# Patient Record
Sex: MALE | Race: WHITE | NOT HISPANIC OR LATINO | Employment: OTHER | URBAN - NONMETROPOLITAN AREA
[De-identification: names, ages, dates, MRNs, and addresses within clinical notes are randomized per-mention and may not be internally consistent; named-entity substitution may affect disease eponyms.]

---

## 2018-06-01 ENCOUNTER — LAB (OUTPATIENT)
Dept: LAB | Facility: HOSPITAL | Age: 68
End: 2018-06-01

## 2018-06-01 ENCOUNTER — TRANSCRIBE ORDERS (OUTPATIENT)
Dept: LAB | Facility: HOSPITAL | Age: 68
End: 2018-06-01

## 2018-06-01 ENCOUNTER — APPOINTMENT (OUTPATIENT)
Dept: LAB | Facility: HOSPITAL | Age: 68
End: 2018-06-01

## 2018-06-01 DIAGNOSIS — D37.6 NEOPLASM OF UNCERTAIN BEHAVIOR OF LIVER, GALLBLADDER AND BILE DUCTS: Primary | ICD-10-CM

## 2018-06-01 DIAGNOSIS — D37.6 NEOPLASM OF UNCERTAIN BEHAVIOR OF LIVER AND BILIARY PASSAGES: ICD-10-CM

## 2018-06-01 DIAGNOSIS — K74.69 OTHER CIRRHOSIS OF LIVER (HCC): Primary | ICD-10-CM

## 2018-06-01 DIAGNOSIS — K76.9 LESION OF LIVER: ICD-10-CM

## 2018-06-01 DIAGNOSIS — K74.69 OTHER CIRRHOSIS OF LIVER (HCC): ICD-10-CM

## 2018-06-01 DIAGNOSIS — D37.9 NEOPLASM OF UNCERTAIN BEHAVIOR OF DIGESTIVE ORGAN, UNSPECIFIED: ICD-10-CM

## 2018-06-01 DIAGNOSIS — R97.8 OTHER ABNORMAL TUMOR MARKERS: ICD-10-CM

## 2018-06-01 DIAGNOSIS — K74.60 CIRRHOSIS OF LIVER WITHOUT ASCITES, UNSPECIFIED HEPATIC CIRRHOSIS TYPE (HCC): ICD-10-CM

## 2018-06-01 PROCEDURE — 80053 COMPREHEN METABOLIC PANEL: CPT

## 2018-06-01 PROCEDURE — 85610 PROTHROMBIN TIME: CPT

## 2018-06-01 PROCEDURE — 86301 IMMUNOASSAY TUMOR CA 19-9: CPT

## 2018-06-01 PROCEDURE — 85025 COMPLETE CBC W/AUTO DIFF WBC: CPT

## 2018-06-01 PROCEDURE — 82378 CARCINOEMBRYONIC ANTIGEN: CPT

## 2018-06-01 PROCEDURE — 36415 COLL VENOUS BLD VENIPUNCTURE: CPT

## 2018-06-04 ENCOUNTER — TRANSCRIBE ORDERS (OUTPATIENT)
Dept: LAB | Facility: HOSPITAL | Age: 68
End: 2018-06-04

## 2018-06-04 DIAGNOSIS — D37.9 NEOPLASM OF UNCERTAIN BEHAVIOR OF DIGESTIVE ORGAN, UNSPECIFIED: ICD-10-CM

## 2018-06-04 DIAGNOSIS — K74.69 OTHER CIRRHOSIS OF LIVER (HCC): Primary | ICD-10-CM

## 2018-06-04 DIAGNOSIS — D37.6 NEOPLASM OF UNCERTAIN BEHAVIOR OF LIVER AND BILIARY PASSAGES: ICD-10-CM

## 2018-06-04 DIAGNOSIS — K76.9 LESION OF LIVER: ICD-10-CM

## 2018-06-04 DIAGNOSIS — R97.8 OTHER ABNORMAL TUMOR MARKERS: ICD-10-CM

## 2018-06-04 LAB
ALBUMIN SERPL-MCNC: 4.1 G/DL (ref 3.4–4.8)
ALBUMIN/GLOB SERPL: 1.4 G/DL (ref 1.1–1.8)
ALP SERPL-CCNC: 115 U/L (ref 38–126)
ALT SERPL W P-5'-P-CCNC: 32 U/L (ref 21–72)
ANION GAP SERPL CALCULATED.3IONS-SCNC: 12 MMOL/L (ref 5–15)
AST SERPL-CCNC: 28 U/L (ref 17–59)
BASOPHILS # BLD AUTO: 0.01 10*3/MM3 (ref 0–0.2)
BASOPHILS NFR BLD AUTO: 0.3 % (ref 0–2)
BILIRUB SERPL-MCNC: 1.6 MG/DL (ref 0.2–1.3)
BUN BLD-MCNC: 13 MG/DL (ref 7–21)
BUN/CREAT SERPL: 17.6 (ref 7–25)
CALCIUM SPEC-SCNC: 9.8 MG/DL (ref 8.4–10.2)
CHLORIDE SERPL-SCNC: 98 MMOL/L (ref 95–110)
CO2 SERPL-SCNC: 27 MMOL/L (ref 22–31)
CREAT BLD-MCNC: 0.74 MG/DL (ref 0.7–1.3)
DEPRECATED RDW RBC AUTO: 42.2 FL (ref 35.1–43.9)
EOSINOPHIL # BLD AUTO: 0.37 10*3/MM3 (ref 0–0.7)
EOSINOPHIL NFR BLD AUTO: 9.6 % (ref 0–7)
ERYTHROCYTE [DISTWIDTH] IN BLOOD BY AUTOMATED COUNT: 12.5 % (ref 11.5–14.5)
GFR SERPL CREATININE-BSD FRML MDRD: 105 ML/MIN/1.73 (ref 49–113)
GLOBULIN UR ELPH-MCNC: 2.9 GM/DL (ref 2.3–3.5)
GLUCOSE BLD-MCNC: 193 MG/DL (ref 60–100)
HCT VFR BLD AUTO: 36.9 % (ref 39–49)
HGB BLD-MCNC: 12.9 G/DL (ref 13.7–17.3)
IMM GRANULOCYTES # BLD: 0.01 10*3/MM3 (ref 0–0.02)
IMM GRANULOCYTES NFR BLD: 0.3 % (ref 0–0.5)
INR PPP: 1.05 (ref 0.8–1.2)
LYMPHOCYTES # BLD AUTO: 0.75 10*3/MM3 (ref 0.6–4.2)
LYMPHOCYTES NFR BLD AUTO: 19.4 % (ref 10–50)
MCH RBC QN AUTO: 32.3 PG (ref 26.5–34)
MCHC RBC AUTO-ENTMCNC: 35 G/DL (ref 31.5–36.3)
MCV RBC AUTO: 92.3 FL (ref 80–98)
MONOCYTES # BLD AUTO: 0.26 10*3/MM3 (ref 0–0.9)
MONOCYTES NFR BLD AUTO: 6.7 % (ref 0–12)
NEUTROPHILS # BLD AUTO: 2.46 10*3/MM3 (ref 2–8.6)
NEUTROPHILS NFR BLD AUTO: 63.7 % (ref 37–80)
NRBC BLD MANUAL-RTO: 0 /100 WBC (ref 0–0)
PLATELET # BLD AUTO: 83 10*3/MM3 (ref 150–450)
PMV BLD AUTO: 10.5 FL (ref 8–12)
POTASSIUM BLD-SCNC: 3.8 MMOL/L (ref 3.5–5.1)
PROT SERPL-MCNC: 7 G/DL (ref 6.3–8.6)
PROTHROMBIN TIME: 13.5 SECONDS (ref 11.1–15.3)
RBC # BLD AUTO: 4 10*6/MM3 (ref 4.37–5.74)
SODIUM BLD-SCNC: 137 MMOL/L (ref 137–145)
WBC NRBC COR # BLD: 3.86 10*3/MM3 (ref 3.2–9.8)

## 2018-06-05 LAB — CANCER AG19-9 SERPL-ACNC: 68 U/ML (ref 0–35)

## 2018-06-06 LAB — CEA SERPL-MCNC: 2.5 NG/ML (ref 0–5)

## 2023-02-09 ENCOUNTER — APPOINTMENT (OUTPATIENT)
Dept: CT IMAGING | Facility: HOSPITAL | Age: 73
DRG: 441 | End: 2023-02-09
Payer: MEDICARE

## 2023-02-09 ENCOUNTER — APPOINTMENT (OUTPATIENT)
Dept: GENERAL RADIOLOGY | Facility: HOSPITAL | Age: 73
DRG: 441 | End: 2023-02-09
Payer: MEDICARE

## 2023-02-09 ENCOUNTER — HOSPITAL ENCOUNTER (INPATIENT)
Facility: HOSPITAL | Age: 73
LOS: 4 days | Discharge: ANOTHER HOSPITAL | DRG: 441 | End: 2023-02-14
Attending: EMERGENCY MEDICINE | Admitting: STUDENT IN AN ORGANIZED HEALTH CARE EDUCATION/TRAINING PROGRAM
Payer: MEDICARE

## 2023-02-09 DIAGNOSIS — E87.5 HYPERKALEMIA: ICD-10-CM

## 2023-02-09 DIAGNOSIS — E72.20 HYPERAMMONEMIA: ICD-10-CM

## 2023-02-09 DIAGNOSIS — D64.9 ANEMIA, UNSPECIFIED TYPE: ICD-10-CM

## 2023-02-09 DIAGNOSIS — R41.0 CONFUSION: ICD-10-CM

## 2023-02-09 DIAGNOSIS — N17.9 ACUTE RENAL FAILURE, UNSPECIFIED ACUTE RENAL FAILURE TYPE: Primary | ICD-10-CM

## 2023-02-09 PROBLEM — K74.60 LIVER CIRRHOSIS SECONDARY TO NASH: Status: ACTIVE | Noted: 2023-02-09

## 2023-02-09 PROBLEM — D63.8 ANEMIA, CHRONIC DISEASE: Status: ACTIVE | Noted: 2023-02-09

## 2023-02-09 PROBLEM — K75.81 LIVER CIRRHOSIS SECONDARY TO NASH: Status: ACTIVE | Noted: 2023-02-09

## 2023-02-09 PROBLEM — R77.8 ELEVATED TROPONIN: Status: ACTIVE | Noted: 2023-02-09

## 2023-02-09 PROBLEM — E87.20 METABOLIC ACIDOSIS: Status: ACTIVE | Noted: 2023-02-09

## 2023-02-09 PROBLEM — G93.41 METABOLIC ENCEPHALOPATHY: Status: ACTIVE | Noted: 2023-02-09

## 2023-02-09 PROBLEM — E87.1 HYPONATREMIA: Status: ACTIVE | Noted: 2023-02-09

## 2023-02-09 PROBLEM — E80.6 HYPERBILIRUBINEMIA: Status: ACTIVE | Noted: 2023-02-09

## 2023-02-09 LAB
ALBUMIN SERPL-MCNC: 3 G/DL (ref 3.5–5.2)
ALBUMIN/GLOB SERPL: 0.8 G/DL
ALP SERPL-CCNC: 126 U/L (ref 39–117)
ALT SERPL W P-5'-P-CCNC: 22 U/L (ref 1–41)
AMMONIA BLD-SCNC: 77 UMOL/L (ref 16–60)
AMPHET+METHAMPHET UR QL: NEGATIVE
AMPHETAMINES UR QL: NEGATIVE
ANION GAP SERPL CALCULATED.3IONS-SCNC: 17 MMOL/L (ref 5–15)
ANION GAP SERPL CALCULATED.3IONS-SCNC: 18 MMOL/L (ref 5–15)
APAP SERPL-MCNC: <5 MCG/ML (ref 0–30)
AST SERPL-CCNC: 40 U/L (ref 1–40)
BACTERIA UR QL AUTO: ABNORMAL /HPF
BARBITURATES UR QL SCN: NEGATIVE
BASOPHILS # BLD AUTO: 0.11 10*3/MM3 (ref 0–0.2)
BASOPHILS NFR BLD AUTO: 1.1 % (ref 0–1.5)
BENZODIAZ UR QL SCN: NEGATIVE
BILIRUB SERPL-MCNC: 2.6 MG/DL (ref 0–1.2)
BILIRUB UR QL STRIP: NEGATIVE
BUN SERPL-MCNC: 71 MG/DL (ref 8–23)
BUN SERPL-MCNC: 72 MG/DL (ref 8–23)
BUN/CREAT SERPL: 15.6 (ref 7–25)
BUN/CREAT SERPL: 15.6 (ref 7–25)
BUPRENORPHINE SERPL-MCNC: NEGATIVE NG/ML
CALCIUM SPEC-SCNC: 9.6 MG/DL (ref 8.6–10.5)
CALCIUM SPEC-SCNC: 9.6 MG/DL (ref 8.6–10.5)
CANNABINOIDS SERPL QL: NEGATIVE
CHLORIDE SERPL-SCNC: 101 MMOL/L (ref 98–107)
CHLORIDE SERPL-SCNC: 102 MMOL/L (ref 98–107)
CLARITY UR: CLEAR
CO2 SERPL-SCNC: 14 MMOL/L (ref 22–29)
CO2 SERPL-SCNC: 16 MMOL/L (ref 22–29)
COCAINE UR QL: NEGATIVE
COLOR UR: YELLOW
CREAT SERPL-MCNC: 4.56 MG/DL (ref 0.76–1.27)
CREAT SERPL-MCNC: 4.61 MG/DL (ref 0.76–1.27)
D-LACTATE SERPL-SCNC: 5.3 MMOL/L (ref 0.5–2)
D-LACTATE SERPL-SCNC: 6.7 MMOL/L (ref 0.5–2)
DEPRECATED RDW RBC AUTO: 64.5 FL (ref 37–54)
EGFRCR SERPLBLD CKD-EPI 2021: 12.6 ML/MIN/1.73
EGFRCR SERPLBLD CKD-EPI 2021: 12.8 ML/MIN/1.73
EOSINOPHIL # BLD AUTO: 1.94 10*3/MM3 (ref 0–0.4)
EOSINOPHIL NFR BLD AUTO: 19.7 % (ref 0.3–6.2)
ERYTHROCYTE [DISTWIDTH] IN BLOOD BY AUTOMATED COUNT: 17.2 % (ref 12.3–15.4)
ETHANOL BLD-MCNC: <10 MG/DL (ref 0–10)
GEN 5 2HR TROPONIN T REFLEX: 40 NG/L
GLOBULIN UR ELPH-MCNC: 3.6 GM/DL
GLUCOSE SERPL-MCNC: 104 MG/DL (ref 65–99)
GLUCOSE SERPL-MCNC: 122 MG/DL (ref 65–99)
GLUCOSE UR STRIP-MCNC: NEGATIVE MG/DL
HCT VFR BLD AUTO: 28.5 % (ref 37.5–51)
HGB BLD-MCNC: 9.7 G/DL (ref 13–17.7)
HGB UR QL STRIP.AUTO: ABNORMAL
HOLD SPECIMEN: NORMAL
HOLD SPECIMEN: NORMAL
HYALINE CASTS UR QL AUTO: ABNORMAL /LPF
IMM GRANULOCYTES # BLD AUTO: 0.1 10*3/MM3 (ref 0–0.05)
IMM GRANULOCYTES NFR BLD AUTO: 1 % (ref 0–0.5)
INR PPP: 1.43 (ref 0.84–1.13)
KETONES UR QL STRIP: ABNORMAL
LEUKOCYTE ESTERASE UR QL STRIP.AUTO: ABNORMAL
LYMPHOCYTES # BLD AUTO: 1.07 10*3/MM3 (ref 0.7–3.1)
LYMPHOCYTES NFR BLD AUTO: 10.9 % (ref 19.6–45.3)
MAGNESIUM SERPL-MCNC: 2 MG/DL (ref 1.6–2.4)
MCH RBC QN AUTO: 34.9 PG (ref 26.6–33)
MCHC RBC AUTO-ENTMCNC: 34 G/DL (ref 31.5–35.7)
MCV RBC AUTO: 102.5 FL (ref 79–97)
METHADONE UR QL SCN: NEGATIVE
MONOCYTES # BLD AUTO: 0.71 10*3/MM3 (ref 0.1–0.9)
MONOCYTES NFR BLD AUTO: 7.2 % (ref 5–12)
NEUTROPHILS NFR BLD AUTO: 5.9 10*3/MM3 (ref 1.7–7)
NEUTROPHILS NFR BLD AUTO: 60.1 % (ref 42.7–76)
NITRITE UR QL STRIP: NEGATIVE
NRBC BLD AUTO-RTO: 0 /100 WBC (ref 0–0.2)
OPIATES UR QL: NEGATIVE
OSMOLALITY SERPL: 315 MOSM/KG (ref 275–295)
OXYCODONE UR QL SCN: NEGATIVE
PCP UR QL SCN: NEGATIVE
PH UR STRIP.AUTO: <=5 [PH] (ref 5–8)
PLATELET # BLD AUTO: 144 10*3/MM3 (ref 140–450)
PMV BLD AUTO: 9 FL (ref 6–12)
POTASSIUM SERPL-SCNC: 5.2 MMOL/L (ref 3.5–5.2)
POTASSIUM SERPL-SCNC: 6 MMOL/L (ref 3.5–5.2)
PROCALCITONIN SERPL-MCNC: 0.79 NG/ML (ref 0–0.25)
PROPOXYPH UR QL: NEGATIVE
PROT SERPL-MCNC: 6.6 G/DL (ref 6–8.5)
PROT UR QL STRIP: ABNORMAL
PROTHROMBIN TIME: 17.4 SECONDS (ref 11.4–14.4)
RBC # BLD AUTO: 2.78 10*6/MM3 (ref 4.14–5.8)
RBC # UR STRIP: ABNORMAL /HPF
REF LAB TEST METHOD: ABNORMAL
SALICYLATES SERPL-MCNC: <0.3 MG/DL
SODIUM SERPL-SCNC: 134 MMOL/L (ref 136–145)
SODIUM SERPL-SCNC: 134 MMOL/L (ref 136–145)
SP GR UR STRIP: 1.02 (ref 1–1.03)
SQUAMOUS #/AREA URNS HPF: ABNORMAL /HPF
T4 FREE SERPL-MCNC: 1.27 NG/DL (ref 0.93–1.7)
TRICYCLICS UR QL SCN: NEGATIVE
TROPONIN T DELTA: -5 NG/L
TROPONIN T SERPL HS-MCNC: 45 NG/L
TSH SERPL DL<=0.05 MIU/L-ACNC: 1.6 UIU/ML (ref 0.27–4.2)
UROBILINOGEN UR QL STRIP: ABNORMAL
WBC # UR STRIP: ABNORMAL /HPF
WBC NRBC COR # BLD: 9.83 10*3/MM3 (ref 3.4–10.8)
WHOLE BLOOD HOLD COAG: NORMAL
WHOLE BLOOD HOLD SPECIMEN: NORMAL

## 2023-02-09 PROCEDURE — 80143 DRUG ASSAY ACETAMINOPHEN: CPT | Performed by: EMERGENCY MEDICINE

## 2023-02-09 PROCEDURE — 82077 ASSAY SPEC XCP UR&BREATH IA: CPT | Performed by: EMERGENCY MEDICINE

## 2023-02-09 PROCEDURE — 80179 DRUG ASSAY SALICYLATE: CPT | Performed by: EMERGENCY MEDICINE

## 2023-02-09 PROCEDURE — 83735 ASSAY OF MAGNESIUM: CPT | Performed by: EMERGENCY MEDICINE

## 2023-02-09 PROCEDURE — 84484 ASSAY OF TROPONIN QUANT: CPT | Performed by: EMERGENCY MEDICINE

## 2023-02-09 PROCEDURE — 84439 ASSAY OF FREE THYROXINE: CPT | Performed by: EMERGENCY MEDICINE

## 2023-02-09 PROCEDURE — 71045 X-RAY EXAM CHEST 1 VIEW: CPT

## 2023-02-09 PROCEDURE — 84145 PROCALCITONIN (PCT): CPT | Performed by: NURSE PRACTITIONER

## 2023-02-09 PROCEDURE — 99285 EMERGENCY DEPT VISIT HI MDM: CPT

## 2023-02-09 PROCEDURE — 94640 AIRWAY INHALATION TREATMENT: CPT

## 2023-02-09 PROCEDURE — G0378 HOSPITAL OBSERVATION PER HR: HCPCS

## 2023-02-09 PROCEDURE — 82140 ASSAY OF AMMONIA: CPT | Performed by: EMERGENCY MEDICINE

## 2023-02-09 PROCEDURE — 80306 DRUG TEST PRSMV INSTRMNT: CPT | Performed by: EMERGENCY MEDICINE

## 2023-02-09 PROCEDURE — 81001 URINALYSIS AUTO W/SCOPE: CPT | Performed by: EMERGENCY MEDICINE

## 2023-02-09 PROCEDURE — 87086 URINE CULTURE/COLONY COUNT: CPT | Performed by: EMERGENCY MEDICINE

## 2023-02-09 PROCEDURE — 85025 COMPLETE CBC W/AUTO DIFF WBC: CPT | Performed by: EMERGENCY MEDICINE

## 2023-02-09 PROCEDURE — 99223 1ST HOSP IP/OBS HIGH 75: CPT | Performed by: INTERNAL MEDICINE

## 2023-02-09 PROCEDURE — 93005 ELECTROCARDIOGRAM TRACING: CPT | Performed by: EMERGENCY MEDICINE

## 2023-02-09 PROCEDURE — P9612 CATHETERIZE FOR URINE SPEC: HCPCS

## 2023-02-09 PROCEDURE — 80053 COMPREHEN METABOLIC PANEL: CPT | Performed by: EMERGENCY MEDICINE

## 2023-02-09 PROCEDURE — 83930 ASSAY OF BLOOD OSMOLALITY: CPT | Performed by: INTERNAL MEDICINE

## 2023-02-09 PROCEDURE — 71250 CT THORAX DX C-: CPT

## 2023-02-09 PROCEDURE — 74176 CT ABD & PELVIS W/O CONTRAST: CPT

## 2023-02-09 PROCEDURE — 70450 CT HEAD/BRAIN W/O DYE: CPT

## 2023-02-09 PROCEDURE — 84443 ASSAY THYROID STIM HORMONE: CPT | Performed by: EMERGENCY MEDICINE

## 2023-02-09 PROCEDURE — 36415 COLL VENOUS BLD VENIPUNCTURE: CPT

## 2023-02-09 PROCEDURE — 85610 PROTHROMBIN TIME: CPT | Performed by: NURSE PRACTITIONER

## 2023-02-09 PROCEDURE — 83605 ASSAY OF LACTIC ACID: CPT | Performed by: INTERNAL MEDICINE

## 2023-02-09 PROCEDURE — 63710000001 INSULIN REGULAR HUMAN PER 5 UNITS: Performed by: EMERGENCY MEDICINE

## 2023-02-09 RX ORDER — SODIUM CHLORIDE 9 MG/ML
50 INJECTION, SOLUTION INTRAVENOUS CONTINUOUS
Status: DISCONTINUED | OUTPATIENT
Start: 2023-02-09 | End: 2023-02-10

## 2023-02-09 RX ORDER — SODIUM CHLORIDE 0.9 % (FLUSH) 0.9 %
10 SYRINGE (ML) INJECTION AS NEEDED
Status: DISCONTINUED | OUTPATIENT
Start: 2023-02-09 | End: 2023-02-14 | Stop reason: HOSPADM

## 2023-02-09 RX ORDER — HYDROXYZINE HYDROCHLORIDE 25 MG/1
25 TABLET, FILM COATED ORAL 3 TIMES DAILY PRN
Status: DISCONTINUED | OUTPATIENT
Start: 2023-02-09 | End: 2023-02-14 | Stop reason: HOSPADM

## 2023-02-09 RX ORDER — LANOLIN ALCOHOL/MO/W.PET/CERES
400 CREAM (GRAM) TOPICAL DAILY
Status: ON HOLD | COMMUNITY
End: 2023-03-26

## 2023-02-09 RX ORDER — PIOGLITAZONEHYDROCHLORIDE 45 MG/1
45 TABLET ORAL DAILY
COMMUNITY
End: 2023-02-14 | Stop reason: HOSPADM

## 2023-02-09 RX ORDER — HYDROXYZINE HYDROCHLORIDE 25 MG/1
25 TABLET, FILM COATED ORAL 3 TIMES DAILY PRN
COMMUNITY
End: 2023-02-14 | Stop reason: HOSPADM

## 2023-02-09 RX ORDER — SPIRONOLACTONE 50 MG/1
50 TABLET, FILM COATED ORAL DAILY
COMMUNITY
Start: 2022-10-04 | End: 2023-02-14 | Stop reason: HOSPADM

## 2023-02-09 RX ORDER — FERROUS GLUCONATE 270(27)MG
1 TABLET ORAL DAILY
COMMUNITY
Start: 2023-01-10 | End: 2023-04-04 | Stop reason: HOSPADM

## 2023-02-09 RX ORDER — ALBUTEROL SULFATE 2.5 MG/3ML
10 SOLUTION RESPIRATORY (INHALATION) ONCE
Status: COMPLETED | OUTPATIENT
Start: 2023-02-09 | End: 2023-02-09

## 2023-02-09 RX ORDER — PROPRANOLOL HYDROCHLORIDE 10 MG/1
10 TABLET ORAL 2 TIMES DAILY
Status: DISCONTINUED | OUTPATIENT
Start: 2023-02-09 | End: 2023-02-11

## 2023-02-09 RX ORDER — SIMVASTATIN 5 MG
1 TABLET ORAL DAILY
COMMUNITY
Start: 2022-12-19 | End: 2023-02-09 | Stop reason: SDUPTHER

## 2023-02-09 RX ORDER — LISINOPRIL 20 MG/1
20 TABLET ORAL DAILY
COMMUNITY
End: 2023-02-14 | Stop reason: HOSPADM

## 2023-02-09 RX ORDER — FLUTICASONE PROPIONATE 50 MCG
2 SPRAY, SUSPENSION (ML) NASAL DAILY
COMMUNITY
Start: 2022-12-19

## 2023-02-09 RX ORDER — LOSARTAN POTASSIUM 50 MG/1
100 TABLET ORAL DAILY
COMMUNITY
End: 2023-02-14 | Stop reason: HOSPADM

## 2023-02-09 RX ORDER — GLIMEPIRIDE 1 MG/1
1 TABLET ORAL
COMMUNITY
End: 2023-02-14 | Stop reason: HOSPADM

## 2023-02-09 RX ORDER — DEXTROSE MONOHYDRATE 25 G/50ML
50 INJECTION, SOLUTION INTRAVENOUS ONCE
Status: COMPLETED | OUTPATIENT
Start: 2023-02-09 | End: 2023-02-09

## 2023-02-09 RX ORDER — LACTULOSE 10 G/15ML
20 SOLUTION ORAL 3 TIMES DAILY
Status: DISCONTINUED | OUTPATIENT
Start: 2023-02-09 | End: 2023-02-10

## 2023-02-09 RX ORDER — PROPRANOLOL HYDROCHLORIDE 10 MG/1
10 TABLET ORAL 2 TIMES DAILY
COMMUNITY
End: 2023-02-14 | Stop reason: HOSPADM

## 2023-02-09 RX ORDER — NICOTINE POLACRILEX 4 MG
15 LOZENGE BUCCAL
Status: DISCONTINUED | OUTPATIENT
Start: 2023-02-09 | End: 2023-02-14 | Stop reason: HOSPADM

## 2023-02-09 RX ORDER — INSULIN LISPRO 100 [IU]/ML
0-7 INJECTION, SOLUTION INTRAVENOUS; SUBCUTANEOUS
Status: DISCONTINUED | OUTPATIENT
Start: 2023-02-10 | End: 2023-02-14 | Stop reason: HOSPADM

## 2023-02-09 RX ORDER — FLUTICASONE PROPIONATE 50 MCG
2 SPRAY, SUSPENSION (ML) NASAL DAILY
Status: DISCONTINUED | OUTPATIENT
Start: 2023-02-10 | End: 2023-02-14 | Stop reason: HOSPADM

## 2023-02-09 RX ORDER — DEXTROSE MONOHYDRATE 25 G/50ML
25 INJECTION, SOLUTION INTRAVENOUS
Status: DISCONTINUED | OUTPATIENT
Start: 2023-02-09 | End: 2023-02-14 | Stop reason: HOSPADM

## 2023-02-09 RX ORDER — URSODIOL 300 MG/1
300 CAPSULE ORAL 3 TIMES DAILY
Status: ON HOLD | COMMUNITY
End: 2023-04-04 | Stop reason: SDUPTHER

## 2023-02-09 RX ORDER — URSODIOL 300 MG/1
300 CAPSULE ORAL 2 TIMES DAILY
Status: DISCONTINUED | OUTPATIENT
Start: 2023-02-09 | End: 2023-02-14 | Stop reason: HOSPADM

## 2023-02-09 RX ORDER — FERROUS SULFATE 325(65) MG
325 TABLET ORAL
Status: DISCONTINUED | OUTPATIENT
Start: 2023-02-10 | End: 2023-02-14 | Stop reason: HOSPADM

## 2023-02-09 RX ORDER — LANOLIN ALCOHOL/MO/W.PET/CERES
400 CREAM (GRAM) TOPICAL DAILY
Status: DISCONTINUED | OUTPATIENT
Start: 2023-02-10 | End: 2023-02-14 | Stop reason: HOSPADM

## 2023-02-09 RX ORDER — SODIUM CHLORIDE 9 MG/ML
40 INJECTION, SOLUTION INTRAVENOUS AS NEEDED
Status: DISCONTINUED | OUTPATIENT
Start: 2023-02-09 | End: 2023-02-14 | Stop reason: HOSPADM

## 2023-02-09 RX ORDER — SODIUM CHLORIDE 0.9 % (FLUSH) 0.9 %
10 SYRINGE (ML) INJECTION EVERY 12 HOURS SCHEDULED
Status: DISCONTINUED | OUTPATIENT
Start: 2023-02-09 | End: 2023-02-14 | Stop reason: HOSPADM

## 2023-02-09 RX ORDER — FUROSEMIDE 20 MG/1
20 TABLET ORAL 2 TIMES DAILY
COMMUNITY
End: 2023-02-14 | Stop reason: HOSPADM

## 2023-02-09 RX ORDER — LOSARTAN POTASSIUM 100 MG/1
100 TABLET ORAL DAILY
COMMUNITY
Start: 2015-07-10 | End: 2023-02-09 | Stop reason: SDUPTHER

## 2023-02-09 RX ADMIN — ALBUTEROL SULFATE 10 MG: 2.5 SOLUTION RESPIRATORY (INHALATION) at 21:27

## 2023-02-09 RX ADMIN — DEXTROSE MONOHYDRATE 50 ML: 25 INJECTION, SOLUTION INTRAVENOUS at 21:23

## 2023-02-09 RX ADMIN — INSULIN HUMAN 7 UNITS: 100 INJECTION, SOLUTION PARENTERAL at 21:23

## 2023-02-09 RX ADMIN — SODIUM ZIRCONIUM CYCLOSILICATE 10 G: 10 POWDER, FOR SUSPENSION ORAL at 21:23

## 2023-02-09 RX ADMIN — SODIUM CHLORIDE 1000 ML: 9 INJECTION, SOLUTION INTRAVENOUS at 21:24

## 2023-02-10 ENCOUNTER — APPOINTMENT (OUTPATIENT)
Dept: ULTRASOUND IMAGING | Facility: HOSPITAL | Age: 73
DRG: 441 | End: 2023-02-10
Payer: MEDICARE

## 2023-02-10 ENCOUNTER — APPOINTMENT (OUTPATIENT)
Dept: CARDIOLOGY | Facility: HOSPITAL | Age: 73
DRG: 441 | End: 2023-02-10
Payer: MEDICARE

## 2023-02-10 PROBLEM — N17.9 ACUTE RENAL FAILURE, UNSPECIFIED ACUTE RENAL FAILURE TYPE (HCC): Status: ACTIVE | Noted: 2023-02-10

## 2023-02-10 LAB
ALBUMIN FLD-MCNC: 0.3 G/DL
ALBUMIN SERPL-MCNC: 2.9 G/DL (ref 3.5–5.2)
ALBUMIN/GLOB SERPL: 0.8 G/DL
ALP SERPL-CCNC: 119 U/L (ref 39–117)
ALT SERPL W P-5'-P-CCNC: 20 U/L (ref 1–41)
AMMONIA BLD-SCNC: 189 UMOL/L (ref 16–60)
ANION GAP SERPL CALCULATED.3IONS-SCNC: 20 MMOL/L (ref 5–15)
ANISOCYTOSIS BLD QL: NORMAL
APPEARANCE FLD: CLEAR
AST SERPL-CCNC: 40 U/L (ref 1–40)
BASOPHILS # BLD AUTO: 0.09 10*3/MM3 (ref 0–0.2)
BASOPHILS NFR BLD AUTO: 1 % (ref 0–1.5)
BILIRUB SERPL-MCNC: 2.3 MG/DL (ref 0–1.2)
BUN SERPL-MCNC: 73 MG/DL (ref 8–23)
BUN/CREAT SERPL: 15.2 (ref 7–25)
BURR CELLS BLD QL SMEAR: NORMAL
CALCIUM SPEC-SCNC: 9.7 MG/DL (ref 8.6–10.5)
CHLORIDE SERPL-SCNC: 103 MMOL/L (ref 98–107)
CHLORIDE UR-SCNC: 35 MMOL/L
CO2 SERPL-SCNC: 14 MMOL/L (ref 22–29)
COLOR FLD: YELLOW
CREAT SERPL-MCNC: 4.79 MG/DL (ref 0.76–1.27)
D-LACTATE SERPL-SCNC: 2.8 MMOL/L (ref 0.5–2)
D-LACTATE SERPL-SCNC: 3.6 MMOL/L (ref 0.5–2)
D-LACTATE SERPL-SCNC: 5.4 MMOL/L (ref 0.5–2)
D-LACTATE SERPL-SCNC: 6.7 MMOL/L (ref 0.5–2)
DEPRECATED RDW RBC AUTO: 66.4 FL (ref 37–54)
EGFRCR SERPLBLD CKD-EPI 2021: 12.1 ML/MIN/1.73
EOSINOPHIL # BLD AUTO: 1.76 10*3/MM3 (ref 0–0.4)
EOSINOPHIL NFR BLD AUTO: 18.7 % (ref 0.3–6.2)
ERYTHROCYTE [DISTWIDTH] IN BLOOD BY AUTOMATED COUNT: 17.2 % (ref 12.3–15.4)
GLOBULIN UR ELPH-MCNC: 3.6 GM/DL
GLUCOSE BLDC GLUCOMTR-MCNC: 116 MG/DL (ref 70–130)
GLUCOSE BLDC GLUCOMTR-MCNC: 133 MG/DL (ref 70–130)
GLUCOSE BLDC GLUCOMTR-MCNC: 183 MG/DL (ref 70–130)
GLUCOSE SERPL-MCNC: 113 MG/DL (ref 65–99)
HBA1C MFR BLD: 6.1 % (ref 4.8–5.6)
HCT VFR BLD AUTO: 28.9 % (ref 37.5–51)
HGB BLD-MCNC: 9.5 G/DL (ref 13–17.7)
HOLD SPECIMEN: NORMAL
IMM GRANULOCYTES # BLD AUTO: 0.08 10*3/MM3 (ref 0–0.05)
IMM GRANULOCYTES NFR BLD AUTO: 0.9 % (ref 0–0.5)
INR PPP: 1.47 (ref 0.84–1.13)
LYMPHOCYTES # BLD AUTO: 1 10*3/MM3 (ref 0.7–3.1)
LYMPHOCYTES NFR BLD AUTO: 10.6 % (ref 19.6–45.3)
LYMPHOCYTES NFR FLD MANUAL: 10 %
MAGNESIUM SERPL-MCNC: 2.1 MG/DL (ref 1.6–2.4)
MCH RBC QN AUTO: 34.1 PG (ref 26.6–33)
MCHC RBC AUTO-ENTMCNC: 32.9 G/DL (ref 31.5–35.7)
MCV RBC AUTO: 103.6 FL (ref 79–97)
MESOTHL CELL NFR FLD MANUAL: 12 %
MONOCYTES # BLD AUTO: 0.78 10*3/MM3 (ref 0.1–0.9)
MONOCYTES NFR BLD AUTO: 8.3 % (ref 5–12)
MONOCYTES NFR FLD: 71 %
NEUTROPHILS NFR BLD AUTO: 5.7 10*3/MM3 (ref 1.7–7)
NEUTROPHILS NFR BLD AUTO: 60.5 % (ref 42.7–76)
NEUTROPHILS NFR FLD MANUAL: 7 %
NRBC BLD AUTO-RTO: 0 /100 WBC (ref 0–0.2)
OSMOLALITY UR: 379 MOSM/KG (ref 300–1100)
PHOSPHATE SERPL-MCNC: 3.5 MG/DL (ref 2.5–4.5)
PLAT MORPH BLD: NORMAL
PLATELET # BLD AUTO: 144 10*3/MM3 (ref 140–450)
PMV BLD AUTO: 9.4 FL (ref 6–12)
POTASSIUM SERPL-SCNC: 5.1 MMOL/L (ref 3.5–5.2)
PROT FLD-MCNC: <1 G/DL
PROT SERPL-MCNC: 6.5 G/DL (ref 6–8.5)
PROTHROMBIN TIME: 17.8 SECONDS (ref 11.4–14.4)
QT INTERVAL: 452 MS
QTC INTERVAL: 484 MS
RBC # BLD AUTO: 2.79 10*6/MM3 (ref 4.14–5.8)
RBC # FLD AUTO: <2000 /MM3
SODIUM SERPL-SCNC: 137 MMOL/L (ref 136–145)
SODIUM UR-SCNC: <20 MMOL/L
WBC # FLD AUTO: 52 /MM3
WBC MORPH BLD: NORMAL
WBC NRBC COR # BLD: 9.41 10*3/MM3 (ref 3.4–10.8)

## 2023-02-10 PROCEDURE — 93010 ELECTROCARDIOGRAM REPORT: CPT | Performed by: INTERNAL MEDICINE

## 2023-02-10 PROCEDURE — 84100 ASSAY OF PHOSPHORUS: CPT | Performed by: NURSE PRACTITIONER

## 2023-02-10 PROCEDURE — 87040 BLOOD CULTURE FOR BACTERIA: CPT | Performed by: NURSE PRACTITIONER

## 2023-02-10 PROCEDURE — C1729 CATH, DRAINAGE: HCPCS

## 2023-02-10 PROCEDURE — 93975 VASCULAR STUDY: CPT

## 2023-02-10 PROCEDURE — 85610 PROTHROMBIN TIME: CPT | Performed by: NURSE PRACTITIONER

## 2023-02-10 PROCEDURE — 87205 SMEAR GRAM STAIN: CPT | Performed by: STUDENT IN AN ORGANIZED HEALTH CARE EDUCATION/TRAINING PROGRAM

## 2023-02-10 PROCEDURE — 82140 ASSAY OF AMMONIA: CPT | Performed by: NURSE PRACTITIONER

## 2023-02-10 PROCEDURE — 83605 ASSAY OF LACTIC ACID: CPT | Performed by: INTERNAL MEDICINE

## 2023-02-10 PROCEDURE — 82042 OTHER SOURCE ALBUMIN QUAN EA: CPT | Performed by: STUDENT IN AN ORGANIZED HEALTH CARE EDUCATION/TRAINING PROGRAM

## 2023-02-10 PROCEDURE — 85025 COMPLETE CBC W/AUTO DIFF WBC: CPT | Performed by: NURSE PRACTITIONER

## 2023-02-10 PROCEDURE — 82436 ASSAY OF URINE CHLORIDE: CPT | Performed by: INTERNAL MEDICINE

## 2023-02-10 PROCEDURE — 87015 SPECIMEN INFECT AGNT CONCNTJ: CPT | Performed by: STUDENT IN AN ORGANIZED HEALTH CARE EDUCATION/TRAINING PROGRAM

## 2023-02-10 PROCEDURE — 83036 HEMOGLOBIN GLYCOSYLATED A1C: CPT | Performed by: NURSE PRACTITIONER

## 2023-02-10 PROCEDURE — P9047 ALBUMIN (HUMAN), 25%, 50ML: HCPCS | Performed by: PHYSICIAN ASSISTANT

## 2023-02-10 PROCEDURE — 84300 ASSAY OF URINE SODIUM: CPT | Performed by: NURSE PRACTITIONER

## 2023-02-10 PROCEDURE — 84157 ASSAY OF PROTEIN OTHER: CPT | Performed by: STUDENT IN AN ORGANIZED HEALTH CARE EDUCATION/TRAINING PROGRAM

## 2023-02-10 PROCEDURE — 87070 CULTURE OTHR SPECIMN AEROBIC: CPT | Performed by: STUDENT IN AN ORGANIZED HEALTH CARE EDUCATION/TRAINING PROGRAM

## 2023-02-10 PROCEDURE — 0W9G3ZZ DRAINAGE OF PERITONEAL CAVITY, PERCUTANEOUS APPROACH: ICD-10-PCS | Performed by: STUDENT IN AN ORGANIZED HEALTH CARE EDUCATION/TRAINING PROGRAM

## 2023-02-10 PROCEDURE — 76942 ECHO GUIDE FOR BIOPSY: CPT

## 2023-02-10 PROCEDURE — 25010000002 CEFTRIAXONE PER 250 MG: Performed by: NURSE PRACTITIONER

## 2023-02-10 PROCEDURE — 99222 1ST HOSP IP/OBS MODERATE 55: CPT | Performed by: PHYSICIAN ASSISTANT

## 2023-02-10 PROCEDURE — 25010000002 OCTREOTIDE PER 25 MCG: Performed by: PHYSICIAN ASSISTANT

## 2023-02-10 PROCEDURE — 89051 BODY FLUID CELL COUNT: CPT | Performed by: STUDENT IN AN ORGANIZED HEALTH CARE EDUCATION/TRAINING PROGRAM

## 2023-02-10 PROCEDURE — 25010000002 ALBUMIN HUMAN 25% PER 50 ML: Performed by: INTERNAL MEDICINE

## 2023-02-10 PROCEDURE — 99233 SBSQ HOSP IP/OBS HIGH 50: CPT | Performed by: STUDENT IN AN ORGANIZED HEALTH CARE EDUCATION/TRAINING PROGRAM

## 2023-02-10 PROCEDURE — 87075 CULTR BACTERIA EXCEPT BLOOD: CPT | Performed by: STUDENT IN AN ORGANIZED HEALTH CARE EDUCATION/TRAINING PROGRAM

## 2023-02-10 PROCEDURE — 85007 BL SMEAR W/DIFF WBC COUNT: CPT | Performed by: NURSE PRACTITIONER

## 2023-02-10 PROCEDURE — 83935 ASSAY OF URINE OSMOLALITY: CPT | Performed by: NURSE PRACTITIONER

## 2023-02-10 PROCEDURE — 63710000001 INSULIN LISPRO (HUMAN) PER 5 UNITS: Performed by: NURSE PRACTITIONER

## 2023-02-10 PROCEDURE — 80053 COMPREHEN METABOLIC PANEL: CPT | Performed by: NURSE PRACTITIONER

## 2023-02-10 PROCEDURE — 25010000002 ALBUMIN HUMAN 25% PER 50 ML: Performed by: PHYSICIAN ASSISTANT

## 2023-02-10 PROCEDURE — 82962 GLUCOSE BLOOD TEST: CPT

## 2023-02-10 PROCEDURE — 83735 ASSAY OF MAGNESIUM: CPT | Performed by: NURSE PRACTITIONER

## 2023-02-10 PROCEDURE — 93005 ELECTROCARDIOGRAM TRACING: CPT | Performed by: NURSE PRACTITIONER

## 2023-02-10 PROCEDURE — 82570 ASSAY OF URINE CREATININE: CPT | Performed by: NURSE PRACTITIONER

## 2023-02-10 PROCEDURE — P9047 ALBUMIN (HUMAN), 25%, 50ML: HCPCS | Performed by: INTERNAL MEDICINE

## 2023-02-10 RX ORDER — MIDODRINE HYDROCHLORIDE 10 MG/1
10 TABLET ORAL
Status: DISCONTINUED | OUTPATIENT
Start: 2023-02-10 | End: 2023-02-11

## 2023-02-10 RX ORDER — ALBUMIN (HUMAN) 12.5 G/50ML
12.5 SOLUTION INTRAVENOUS ONCE
Status: DISCONTINUED | OUTPATIENT
Start: 2023-02-10 | End: 2023-02-10

## 2023-02-10 RX ORDER — LACTULOSE 10 G/15ML
30 SOLUTION ORAL 3 TIMES DAILY
Status: DISCONTINUED | OUTPATIENT
Start: 2023-02-10 | End: 2023-02-11

## 2023-02-10 RX ORDER — LACTULOSE 10 G/15ML
300 SOLUTION ORAL ONCE
Status: COMPLETED | OUTPATIENT
Start: 2023-02-10 | End: 2023-02-10

## 2023-02-10 RX ORDER — ALBUMIN (HUMAN) 12.5 G/50ML
50 SOLUTION INTRAVENOUS ONCE
Status: COMPLETED | OUTPATIENT
Start: 2023-02-10 | End: 2023-02-10

## 2023-02-10 RX ORDER — LIDOCAINE HYDROCHLORIDE 10 MG/ML
5 INJECTION, SOLUTION EPIDURAL; INFILTRATION; INTRACAUDAL; PERINEURAL ONCE
Status: DISCONTINUED | OUTPATIENT
Start: 2023-02-10 | End: 2023-02-14 | Stop reason: HOSPADM

## 2023-02-10 RX ORDER — ALBUMIN (HUMAN) 12.5 G/50ML
25 SOLUTION INTRAVENOUS 3 TIMES DAILY
Status: DISCONTINUED | OUTPATIENT
Start: 2023-02-10 | End: 2023-02-14 | Stop reason: HOSPADM

## 2023-02-10 RX ORDER — OCTREOTIDE ACETATE 100 UG/ML
100 INJECTION, SOLUTION INTRAVENOUS; SUBCUTANEOUS 3 TIMES DAILY
Status: DISCONTINUED | OUTPATIENT
Start: 2023-02-10 | End: 2023-02-12

## 2023-02-10 RX ORDER — SODIUM BICARBONATE 650 MG/1
1300 TABLET ORAL 3 TIMES DAILY
Status: DISCONTINUED | OUTPATIENT
Start: 2023-02-10 | End: 2023-02-14 | Stop reason: HOSPADM

## 2023-02-10 RX ADMIN — Medication 10 ML: at 00:08

## 2023-02-10 RX ADMIN — SODIUM BICARBONATE 650 MG TABLET 1300 MG: at 17:56

## 2023-02-10 RX ADMIN — CEFTRIAXONE 2 G: 2 INJECTION, POWDER, FOR SOLUTION INTRAMUSCULAR; INTRAVENOUS at 21:16

## 2023-02-10 RX ADMIN — PROPRANOLOL HYDROCHLORIDE 10 MG: 10 TABLET ORAL at 00:07

## 2023-02-10 RX ADMIN — LACTULOSE 20 G: 20 SOLUTION ORAL at 00:07

## 2023-02-10 RX ADMIN — LACTULOSE 30 G: 20 SOLUTION ORAL at 12:26

## 2023-02-10 RX ADMIN — SODIUM BICARBONATE 650 MG TABLET 1300 MG: at 21:33

## 2023-02-10 RX ADMIN — PROPRANOLOL HYDROCHLORIDE 10 MG: 10 TABLET ORAL at 21:20

## 2023-02-10 RX ADMIN — SODIUM CHLORIDE 50 ML/HR: 9 INJECTION, SOLUTION INTRAVENOUS at 00:08

## 2023-02-10 RX ADMIN — OCTREOTIDE ACETATE 100 MCG: 100 INJECTION, SOLUTION INTRAVENOUS; SUBCUTANEOUS at 21:29

## 2023-02-10 RX ADMIN — LACTULOSE 300 ML: 10 SOLUTION ORAL at 16:13

## 2023-02-10 RX ADMIN — Medication 10 ML: at 21:21

## 2023-02-10 RX ADMIN — ALBUMIN (HUMAN) 50 G: 0.25 INJECTION, SOLUTION INTRAVENOUS at 14:56

## 2023-02-10 RX ADMIN — Medication 10 ML: at 09:00

## 2023-02-10 RX ADMIN — CEFTRIAXONE 2 G: 2 INJECTION, POWDER, FOR SOLUTION INTRAMUSCULAR; INTRAVENOUS at 00:07

## 2023-02-10 RX ADMIN — ALBUMIN (HUMAN) 25 G: 0.25 INJECTION, SOLUTION INTRAVENOUS at 22:45

## 2023-02-10 RX ADMIN — OCTREOTIDE ACETATE 100 MCG: 100 INJECTION, SOLUTION INTRAVENOUS; SUBCUTANEOUS at 18:03

## 2023-02-10 RX ADMIN — INSULIN LISPRO 2 UNITS: 100 INJECTION, SOLUTION INTRAVENOUS; SUBCUTANEOUS at 17:56

## 2023-02-10 NOTE — ED PROVIDER NOTES
"Subjective   History of Present Illness  72-year-old male presents for evaluation of altered mental status.  Of note, the patient has a history of HAYES cirrhosis.  He is currently a somewhat limited historian.  I am awaiting arrival with his family to further corroborate his history.  His last known well is currently unclear.  He has reportedly been significantly confused today when compared to his baseline so family called EMS and he was brought to our facility to be evaluated.  Per EMS personnel, the patient was confused.  He was not hypoglycemic.  Per EMS, the patient is reportedly ambulatory and conversant at baseline.  They note that his current mental status is quite different than his baseline per family at home.        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  Pt. states this rxn has occurred \"the last couple of times I had MRI contrast.   • Gadobutrol Unknown (See Comments)       Past Surgical History:   Procedure Laterality Date   • ANKLE SURGERY     • EYE SURGERY         Family History   Problem Relation Age of Onset   • Rheum arthritis Mother    • Diabetes Mother    • COPD Mother    • Thyroid disease Mother    • Heart disease Father    • COPD Father    • Cancer Father    • Diabetes Father        Social History     Socioeconomic History   • Marital status:    Tobacco Use   • Smoking status: Never   • Smokeless tobacco: Never   Substance and Sexual Activity   • Alcohol use: Not Currently   • Drug use: Never           Objective   Physical Exam  Vitals and nursing note reviewed.   Constitutional:       Appearance: He is well-developed. He is not diaphoretic.      Comments: " Ill-appearing male, appears confused   HENT:      Head: Normocephalic and atraumatic.   Neck:      Vascular: No JVD.      Comments: No meningeal signs or nuchal rigidity  Cardiovascular:      Rate and Rhythm: Normal rate and regular rhythm.      Heart sounds: Normal heart sounds. No murmur heard.    No friction rub. No gallop.   Pulmonary:      Effort: Pulmonary effort is normal. No respiratory distress.      Breath sounds: Normal breath sounds. No wheezing or rales.   Abdominal:      General: Bowel sounds are normal. There is no distension.      Palpations: Abdomen is soft. There is no mass.      Tenderness: There is no abdominal tenderness. There is no guarding.      Comments: No focal abdominal tenderness, no peritoneal signs, no pain out of proportion to exam   Musculoskeletal:         General: Normal range of motion.      Cervical back: Normal range of motion.   Skin:     General: Skin is warm and dry.      Coloration: Skin is not pale.      Findings: No erythema or rash.   Neurological:      Comments: Alert to person only, moving all fours, appears generally weak, no unilateral weakness, neurovascularly intact distally in all fours abetting distal pulses normal sensation, no asterixis, following simple commands   Psychiatric:         Thought Content: Thought content normal.         Judgment: Judgment normal.      Comments: Unable to adequately evaluate         Critical Care  Performed by: Honorio Baltazar MD  Authorized by: Honorio Baltazar MD     Critical care provider statement:     Critical care time (minutes):  35    Critical care was necessary to treat or prevent imminent or life-threatening deterioration of the following conditions:  Renal failure    Critical care was time spent personally by me on the following activities:  Development of treatment plan with patient or surrogate, evaluation of patient's response to treatment, examination of patient, obtaining history from patient or surrogate,  ordering and performing treatments and interventions, ordering and review of laboratory studies, ordering and review of radiographic studies, pulse oximetry, re-evaluation of patient's condition and review of old charts               ED Course  ED Course as of 02/10/23 0018   Thu Feb 09, 2023 2152 72-year-old male presents for evaluation of altered mental status.  He is currently a somewhat limited historian.  I am awaiting arrival of family to further corroborate his history.  His last known well is unclear.  He has reportedly been significantly confused today when compared to baseline so EMS was called and he was brought to our facility to be evaluated.  On arrival, the patient believes that he is currently in [place], [place] and believes that the year is 1950.  He is able to follow simple commands.  He appears generally weak but is moving all 4 extremities.  Labs obtained remarkable for acute renal failure with a creatinine of 4.6 and a potassium of 6.  No QRS widening noted on EKG.  Hyperkalemia treated medically.  IV fluids given.  CT head is negative. [DD]   2153 I personally and independently viewed the patient's x-ray images myself, and I am in agreement with the radiologist's reading for final interpretation.   [DD]   2154 The patient clearly warrants admission to the hospital at this point.  I discussed the patient's case with Dr. Mar, and the patient will be admitted under his care for further evaluation and treatment.  The patient is hemodynamically stable at this time. [DD]      ED Course User Index  [DD] Honorio Baltazar MD                                          Recent Results (from the past 24 hour(s))   ECG 12 Lead ED Triage Standing Order; Weak / Dizzy / AMS    Collection Time: 02/09/23  7:43 PM   Result Value Ref Range    QT Interval 464 ms    QTC Interval 482 ms   Urinalysis With Culture If Indicated - Straight Cath    Collection Time: 02/09/23  8:10 PM    Specimen: Straight Cath;  Urine   Result Value Ref Range    Color, UA Yellow Yellow, Straw    Appearance, UA Clear Clear    pH, UA <=5.0 5.0 - 8.0    Specific Gravity, UA 1.020 1.001 - 1.030    Glucose, UA Negative Negative    Ketones, UA Trace (A) Negative    Bilirubin, UA Negative Negative    Blood, UA Trace (A) Negative    Protein, UA 30 mg/dL (1+) (A) Negative    Leuk Esterase, UA Trace (A) Negative    Nitrite, UA Negative Negative    Urobilinogen, UA 1.0 E.U./dL 0.2 - 1.0 E.U./dL   Urine Drug Screen - Straight Cath    Collection Time: 02/09/23  8:10 PM    Specimen: Straight Cath; Urine   Result Value Ref Range    THC, Screen, Urine Negative Negative    Phencyclidine (PCP), Urine Negative Negative    Cocaine Screen, Urine Negative Negative    Methamphetamine, Ur Negative Negative    Opiate Screen Negative Negative    Amphetamine Screen, Urine Negative Negative    Benzodiazepine Screen, Urine Negative Negative    Tricyclic Antidepressants Screen Negative Negative    Methadone Screen, Urine Negative Negative    Barbiturates Screen, Urine Negative Negative    Oxycodone Screen, Urine Negative Negative    Propoxyphene Screen Negative Negative    Buprenorphine, Screen, Urine Negative Negative   Urinalysis, Microscopic Only - Straight Cath    Collection Time: 02/09/23  8:10 PM    Specimen: Straight Cath; Urine   Result Value Ref Range    RBC, UA 3-6 (A) None Seen, 0-2 /HPF    WBC, UA 6-12 (A) None Seen, 0-2 /HPF    Bacteria, UA Trace None Seen, Trace /HPF    Squamous Epithelial Cells, UA 3-6 (A) None Seen, 0-2 /HPF    Hyaline Casts, UA 7-12 0 - 6 /LPF    Methodology Manual Light Microscopy    Comprehensive Metabolic Panel    Collection Time: 02/09/23  8:12 PM    Specimen: Blood   Result Value Ref Range    Glucose 104 (H) 65 - 99 mg/dL    BUN 72 (H) 8 - 23 mg/dL    Creatinine 4.61 (H) 0.76 - 1.27 mg/dL    Sodium 134 (L) 136 - 145 mmol/L    Potassium 6.0 (H) 3.5 - 5.2 mmol/L    Chloride 101 98 - 107 mmol/L    CO2 16.0 (L) 22.0 - 29.0 mmol/L     Calcium 9.6 8.6 - 10.5 mg/dL    Total Protein 6.6 6.0 - 8.5 g/dL    Albumin 3.0 (L) 3.5 - 5.2 g/dL    ALT (SGPT) 22 1 - 41 U/L    AST (SGOT) 40 1 - 40 U/L    Alkaline Phosphatase 126 (H) 39 - 117 U/L    Total Bilirubin 2.6 (H) 0.0 - 1.2 mg/dL    Globulin 3.6 gm/dL    A/G Ratio 0.8 g/dL    BUN/Creatinine Ratio 15.6 7.0 - 25.0    Anion Gap 17.0 (H) 5.0 - 15.0 mmol/L    eGFR 12.6 (L) >60.0 mL/min/1.73   High Sensitivity Troponin T    Collection Time: 02/09/23  8:12 PM    Specimen: Blood   Result Value Ref Range    HS Troponin T 45 (H) <15 ng/L   Magnesium    Collection Time: 02/09/23  8:12 PM    Specimen: Blood   Result Value Ref Range    Magnesium 2.0 1.6 - 2.4 mg/dL   Green Top (Gel)    Collection Time: 02/09/23  8:12 PM   Result Value Ref Range    Extra Tube Hold for add-ons.    Lavender Top    Collection Time: 02/09/23  8:12 PM   Result Value Ref Range    Extra Tube hold for add-on    Gold Top - SST    Collection Time: 02/09/23  8:12 PM   Result Value Ref Range    Extra Tube Hold for add-ons.    Gray Top    Collection Time: 02/09/23  8:12 PM   Result Value Ref Range    Extra Tube Hold for add-ons.    Light Blue Top    Collection Time: 02/09/23  8:12 PM   Result Value Ref Range    Extra Tube Hold for add-ons.    CBC Auto Differential    Collection Time: 02/09/23  8:12 PM    Specimen: Blood   Result Value Ref Range    WBC 9.83 3.40 - 10.80 10*3/mm3    RBC 2.78 (L) 4.14 - 5.80 10*6/mm3    Hemoglobin 9.7 (L) 13.0 - 17.7 g/dL    Hematocrit 28.5 (L) 37.5 - 51.0 %    .5 (H) 79.0 - 97.0 fL    MCH 34.9 (H) 26.6 - 33.0 pg    MCHC 34.0 31.5 - 35.7 g/dL    RDW 17.2 (H) 12.3 - 15.4 %    RDW-SD 64.5 (H) 37.0 - 54.0 fl    MPV 9.0 6.0 - 12.0 fL    Platelets 144 140 - 450 10*3/mm3    Neutrophil % 60.1 42.7 - 76.0 %    Lymphocyte % 10.9 (L) 19.6 - 45.3 %    Monocyte % 7.2 5.0 - 12.0 %    Eosinophil % 19.7 (H) 0.3 - 6.2 %    Basophil % 1.1 0.0 - 1.5 %    Immature Grans % 1.0 (H) 0.0 - 0.5 %    Neutrophils, Absolute 5.90 1.70 -  7.00 10*3/mm3    Lymphocytes, Absolute 1.07 0.70 - 3.10 10*3/mm3    Monocytes, Absolute 0.71 0.10 - 0.90 10*3/mm3    Eosinophils, Absolute 1.94 (H) 0.00 - 0.40 10*3/mm3    Basophils, Absolute 0.11 0.00 - 0.20 10*3/mm3    Immature Grans, Absolute 0.10 (H) 0.00 - 0.05 10*3/mm3    nRBC 0.0 0.0 - 0.2 /100 WBC   TSH    Collection Time: 02/09/23  8:12 PM    Specimen: Blood   Result Value Ref Range    TSH 1.600 0.270 - 4.200 uIU/mL   T4, Free    Collection Time: 02/09/23  8:12 PM    Specimen: Blood   Result Value Ref Range    Free T4 1.27 0.93 - 1.70 ng/dL   Salicylate Level    Collection Time: 02/09/23  8:12 PM    Specimen: Blood   Result Value Ref Range    Salicylate <0.3 <=30.0 mg/dL   Ethanol    Collection Time: 02/09/23  8:12 PM    Specimen: Blood   Result Value Ref Range    Ethanol <10 0 - 10 mg/dL   Acetaminophen Level    Collection Time: 02/09/23  8:12 PM    Specimen: Blood   Result Value Ref Range    Acetaminophen <5.0 0.0 - 30.0 mcg/mL   Ammonia    Collection Time: 02/09/23  8:12 PM    Specimen: Blood   Result Value Ref Range    Ammonia 77 (H) 16 - 60 umol/L   Procalcitonin    Collection Time: 02/09/23  8:12 PM    Specimen: Blood   Result Value Ref Range    Procalcitonin 0.79 (H) 0.00 - 0.25 ng/mL   Protime-INR    Collection Time: 02/09/23  8:12 PM    Specimen: Blood   Result Value Ref Range    Protime 17.4 (H) 11.4 - 14.4 Seconds    INR 1.43 (H) 0.84 - 1.13   Osmolality, Serum    Collection Time: 02/09/23  8:12 PM    Specimen: Blood   Result Value Ref Range    Osmolality 315 (H) 275 - 295 mOsm/kg   Lactic Acid, Plasma    Collection Time: 02/09/23  8:12 PM    Specimen: Blood   Result Value Ref Range    Lactate 5.3 (C) 0.5 - 2.0 mmol/L   High Sensitivity Troponin T 2Hr    Collection Time: 02/09/23 10:47 PM    Specimen: Blood   Result Value Ref Range    HS Troponin T 40 (H) <15 ng/L    Troponin T Delta -5 (L) <= -/+ 4 Change ng/L   Basic Metabolic Panel    Collection Time: 02/09/23 10:47 PM    Specimen: Blood  "  Result Value Ref Range    Glucose 122 (H) 65 - 99 mg/dL    BUN 71 (H) 8 - 23 mg/dL    Creatinine 4.56 (H) 0.76 - 1.27 mg/dL    Sodium 134 (L) 136 - 145 mmol/L    Potassium 5.2 3.5 - 5.2 mmol/L    Chloride 102 98 - 107 mmol/L    CO2 14.0 (L) 22.0 - 29.0 mmol/L    Calcium 9.6 8.6 - 10.5 mg/dL    BUN/Creatinine Ratio 15.6 7.0 - 25.0    Anion Gap 18.0 (H) 5.0 - 15.0 mmol/L    eGFR 12.8 (L) >60.0 mL/min/1.73   STAT Lactic Acid, Reflex    Collection Time: 02/09/23 11:17 PM    Specimen: Blood   Result Value Ref Range    Lactate 6.7 (C) 0.5 - 2.0 mmol/L     Note: In addition to lab results from this visit, the labs listed above may include labs taken at another facility or during a different encounter within the last 24 hours. Please correlate lab times with ED admission and discharge times for further clarification of the services performed during this visit.    CT Head Without Contrast   Final Result   Impression:   Mild generalized atrophy and chronic microvascular ischemia. No acute intracranial process.      Electronically Signed: Abhishek Reyes     2/9/2023 9:43 PM EST     Workstation ID: [id number]      XR Chest 1 View   Final Result   Impression:   Layering right pleural effusion. Right basilar atelectasis.      Electronically Signed: Abhishek Reyes     2/9/2023 9:19 PM EST     Workstation ID: [id number]      CT Abdomen Pelvis Without Contrast    (Results Pending)   CT Chest Without Contrast Diagnostic    (Results Pending)     Vitals:    02/09/23 1944 02/09/23 2127 02/09/23 2235   BP: 125/66  126/86   BP Location: Left arm  Left arm   Patient Position: Lying  Lying   Pulse: 63 64 67   Resp: 14 16 15   Temp: 97.5 °F (36.4 °C)  97.6 °F (36.4 °C)   TempSrc: Oral  Oral   SpO2: 99% 100% 100%   Weight: 72.6 kg (160 lb)  72.1 kg (159 lb)   Height: 172.7 cm (67.99\")       Medications   sodium chloride 0.9 % flush 10 mL (has no administration in time range)   lactulose (CHRONULAC) 10 GM/15ML solution 20 g (20 g Oral Given 2/10/23 " 0007)   ferrous sulfate tablet 325 mg (has no administration in time range)   fluticasone (FLONASE) 50 MCG/ACT nasal spray 2 spray (has no administration in time range)   folic acid (FOLVITE) tablet 400 mcg (has no administration in time range)   hydrOXYzine (ATARAX) tablet 25 mg (has no administration in time range)   propranolol (INDERAL) tablet 10 mg (10 mg Oral Given 2/10/23 0007)   ursodiol (ACTIGALL) capsule 300 mg (has no administration in time range)   sodium chloride 0.9 % flush 10 mL (10 mL Intravenous Given 2/10/23 0008)   sodium chloride 0.9 % flush 10 mL (has no administration in time range)   sodium chloride 0.9 % infusion 40 mL (has no administration in time range)   sodium chloride 0.9 % infusion (50 mL/hr Intravenous New Bag 2/10/23 0008)   dextrose (GLUTOSE) oral gel 15 g (has no administration in time range)   dextrose (D50W) (25 g/50 mL) IV injection 25 g (has no administration in time range)   glucagon (GLUCAGEN) injection 1 mg (has no administration in time range)   Insulin Lispro (humaLOG) injection 0-7 Units (has no administration in time range)   cefTRIAXone (ROCEPHIN) 2 g/100 mL 0.9% NS IVPB (MBP) (2 g Intravenous New Bag 2/10/23 0007)   insulin regular (humuLIN R,novoLIN R) injection 7 Units (7 Units Intravenous Given 2/9/23 2123)   dextrose (D50W) (25 g/50 mL) IV injection 50 mL (50 mL Intravenous Given 2/9/23 2123)   sodium zirconium cyclosilicate (LOKELMA) pack 10 g (10 g Oral Given 2/9/23 2123)   albuterol (PROVENTIL) nebulizer solution 0.083% 2.5 mg/3mL (10 mg Nebulization Given 2/9/23 2127)   sodium chloride 0.9 % bolus 1,000 mL (1,000 mL Intravenous New Bag 2/9/23 2124)     ECG/EMG Results (last 24 hours)     Procedure Component Value Units Date/Time    ECG 12 Lead ED Triage Standing Order; Weak / Dizzy / AMS [[id number]] Collected: 02/09/23 1943     Updated: 02/09/23 1943     QT Interval 464 ms      QTC Interval 482 ms     Narrative:      Test Reason : ED Triage Standing  Order~  Blood Pressure :   */*   mmHG  Vent. Rate :  65 BPM     Atrial Rate :  65 BPM     P-R Int : 170 ms          QRS Dur :  92 ms      QT Int : 464 ms       P-R-T Axes :  50 -17   7 degrees     QTc Int : 482 ms    Normal sinus rhythm  Prolonged QT  Abnormal ECG  When compared with ECG of 24-MAY-2015 03:57,  QT has lengthened    Referred By: EDMD           Confirmed By:         ECG 12 Lead ED Triage Standing Order; Weak / Dizzy / AMS   Preliminary Result   Test Reason : ED Triage Standing Order~   Blood Pressure :   */*   mmHG   Vent. Rate :  65 BPM     Atrial Rate :  65 BPM      P-R Int : 170 ms          QRS Dur :  92 ms       QT Int : 464 ms       P-R-T Axes :  50 -17   7 degrees      QTc Int : 482 ms      Normal sinus rhythm   Prolonged QT   Abnormal ECG   When compared with ECG of 24-MAY-2015 03:57,   QT has lengthened      Referred By: EDMD           Confirmed By:       ECG 12 Lead Altered Mental Status    (Results Pending)           MDM    Final diagnoses:   Acute renal failure, unspecified acute renal failure type (HCC)   Hyperkalemia   Hyperammonemia (HCC)   Confusion   Anemia, unspecified type       ED Disposition  ED Disposition     ED Disposition   Decision to Admit    Condition   --    Comment   Level of Care: Telemetry [5]   Diagnosis: Altered mental status [780.97.ICD-9-CM]               No follow-up provider specified.       Medication List      ASK your doctor about these medications    losartan 50 MG tablet  Commonly known as: COZAAR  Ask about: Which instructions should I use?     metFORMIN 1000 MG tablet  Commonly known as: GLUCOPHAGE  Ask about: Which instructions should I use?     SIMVASTATIN PO  Ask about: Which instructions should I use?             Honorio Baltazar MD  02/10/23 0022

## 2023-02-10 NOTE — PROGRESS NOTES
[facility] Medicine Services  PROGRESS NOTE    Patient Name: Leoncio Hopson  : 1950  MRN: [id number]    Date of Admission: 2023  Primary Care Physician: Antonio Castro MD    Subjective   Subjective     CC:  Altered mental status    HPI:  Patient's daughter and wife at bedside.  Patient extremely altered.  Answers some questions, but mostly just says no to everything.  Actively getting an ultrasound, but having difficulty following two-step commands.  Family reports that he has been stooling this morning.  Had 1-2 episodes of emesis yesterday.  No bleeding that they know.  No recent illnesses.  No prior hepatic encephalopathy.  Nursing, when they walked into the room this morning he was sitting on the  side of the bed and having a bowel movement.    ROS:  Unable to obtain given altered mental status    Objective   Objective     Vital Signs:   Temp:  [97.1 °F (36.2 °C)-97.6 °F (36.4 °C)] 97.1 °F (36.2 °C)  Heart Rate:  [63-81] 69  Resp:  [14-16] 16  BP: ()/(43-86) 105/57     Physical Exam:  Constitutional: Laying in bed  HENT: NCAT, mucous membranes moist  Respiratory: Clear to auscultation bilaterally, respiratory effort normal   Cardiovascular: RRR, no murmurs, rubs, or gallops  Gastrointestinal: Normoactive bowel sounds, soft, nontender, nondistended  Musculoskeletal: No bilateral ankle edema  Psychiatric: Agitated  Neurologic: PERRL, opens eyes and mouth on command, positive asterixis, +1-2 beat clonus in bilateral feet, moving all extremities  Skin: No rashes, several scabs on his abdomen    Results Reviewed:  LAB RESULTS:      Lab 02/10/23  1232 02/10/23  0655 02/10/23  0301 23  2317 23   WBC  --   --  9.41  --  9.83   HEMOGLOBIN  --   --  9.5*  --  9.7*   HEMATOCRIT  --   --  28.9*  --  28.5*   PLATELETS  --   --  144  --  144   NEUTROS ABS  --   --  5.70  --  5.90   IMMATURE GRANS (ABS)  --   --  0.08*  --  0.10*   LYMPHS ABS  --   --  1.00  --   1.07   MONOS ABS  --   --  0.78  --  0.71   EOS ABS  --   --  1.76*  --  1.94*   MCV  --   --  103.6*  --  102.5*   PROCALCITONIN  --   --   --   --  0.79*   LACTATE 3.6* 5.4* 6.7* 6.7* 5.3*   PROTIME  --   --  17.8*  --  17.4*         Lab 02/10/23  0301 02/09/23  2247 02/09/23  2012   SODIUM 137 134* 134*   POTASSIUM 5.1 5.2 6.0*   CHLORIDE 103 102 101   CO2 14.0* 14.0* 16.0*   ANION GAP 20.0* 18.0* 17.0*   BUN 73* 71* 72*   CREATININE 4.79* 4.56* 4.61*   EGFR 12.1* 12.8* 12.6*   GLUCOSE 113* 122* 104*   CALCIUM 9.7 9.6 9.6   MAGNESIUM 2.1  --  2.0   PHOSPHORUS 3.5  --   --    HEMOGLOBIN A1C 6.10*  --   --    TSH  --   --  1.600         Lab 02/10/23  0301 02/09/23  2012   TOTAL PROTEIN 6.5 6.6   ALBUMIN 2.9* 3.0*   GLOBULIN 3.6 3.6   ALT (SGPT) 20 22   AST (SGOT) 40 40   BILIRUBIN 2.3* 2.6*   ALK PHOS 119* 126*         Lab 02/10/23  0301 02/09/23  2247 02/09/23  2012   HSTROP T  --  40* 45*   PROTIME 17.8*  --  17.4*   INR 1.47*  --  1.43*                 Brief Urine Lab Results  (Last result in the past 365 days)      Color   Clarity   Blood   Leuk Est   Nitrite   Protein   CREAT   Urine HCG        02/09/23 2010 Yellow   Clear   Trace   Trace   Negative   30 mg/dL (1+)                 Microbiology Results Abnormal     Procedure Component Value - Date/Time    Urine Culture - Urine, Straight Cath [[id number]]  (Normal) Collected: 02/09/23 2010    Lab Status: Preliminary result Specimen: Urine from Straight Cath Updated: 02/10/23 1118     Urine Culture No growth          CT Abdomen Pelvis Without Contrast    Result Date: 2/10/2023  EXAMINATION: CT SCAN OF THE CHEST, ABDOMEN AND PELVIS WITHOUT INTRAVENOUS CONTRAST DATE OF EXAM: 2/9/2023 11:30 PM SLOT:  60  HISTORY: Shortness of breath and abdominal distention. COMPARISON: None. TECHNIQUE: CT examination of the chest, abdomen and pelvis was performed without intravenous contrast. CT dose lowering techniques were used, to include: automated exposure control, adjustment  for patient size, and/or use of iterative reconstruction. Note: The exam is limited because some types of pathology may not be adequately demonstrated due to lack of contrast enhancement. FINDINGS: CHEST: Lungs:  Passive atelectasis in the right lung. Pleura: Moderate right pleural effusion. No pneumothorax. Mediastinum And Griselda: No suspicious lymphadenopathy. Cardiovascular: Coronary atherosclerosis. Chest Wall:  Normal. ABDOMEN/PELVIS: Liver: Cirrhotic liver morphology. Limited evaluation for focal lesions due to lack of intravenous contrast and artifacts related to overlying lead wires. Gallbladder/Billary: Cholelithiasis. Pancreas: Mildly atrophic. Spleen: Borderline enlarged. Calcified granulomata. Adrenal Glands: Normal. Kidneys: Right renal cyst. GI Tract: Mild diverticulosis without evidence of acute diverticulitis. Appendix: Not identified. No localized inflammatory changes present at the base of cecum. Mesentery/Peritoneum: Moderate volume abdominopelvic ascites. Vasculature: Scattered atherosclerotic vascular calcifications. No abdominal aortic aneurysm. Mild splenorenal shunting suggested. Lymph Nodes: No suspicious lymphadenopathy. Abdominal Wall: Normal. Bladder: Normal. Reproductive: Normal. Musculoskeletal: Bilateral L5 spondylolysis with grade 1 anterolisthesis of L5 over S1. No acute abnormality.     Impression: CT CHEST: 1.  Moderate right pleural effusion with associated atelectasis.  CT ABDOMEN/PELVIS: 1.  Cirrhosis with stigmata of portal hypertension. 2.  Moderate volume ascites. 3.  Cholelithiasis. Electronically signed by:  Troy Belcher M.D.  2/9/2023 10:22 PM Mountain Time    CT Head Without Contrast    Result Date: 2/9/2023  CT HEAD WO CONTRAST Date of Exam: 2/9/2023 9:31 PM EST Indication: Altered mental status. Comparison: None available. Technique: Axial CT images were obtained of the head without contrast administration.  Reconstructed coronal and sagittal images were also  obtained. Automated exposure control and iterative construction methods were used. Findings: There is mild diffuse generalized atrophy. There is low attenuation in the periventricular white matter consistent with chronic microvascular ischemic change. There is no acute hemorrhage. There is no mass or mass effect. There are no abnormal extra-axial fluid collections. The paranasal sinuses are clear.     Impression: Impression: Mild generalized atrophy and chronic microvascular ischemia. No acute intracranial process. Electronically Signed: Abhishek Reyes  2/9/2023 9:43 PM EST  Workstation ID: [id number]    CT Chest Without Contrast Diagnostic    Result Date: 2/10/2023  EXAMINATION: CT SCAN OF THE CHEST, ABDOMEN AND PELVIS WITHOUT INTRAVENOUS CONTRAST DATE OF EXAM: 2/9/2023 11:30 PM SLOT:  60  HISTORY: Shortness of breath and abdominal distention. COMPARISON: None. TECHNIQUE: CT examination of the chest, abdomen and pelvis was performed without intravenous contrast. CT dose lowering techniques were used, to include: automated exposure control, adjustment for patient size, and/or use of iterative reconstruction. Note: The exam is limited because some types of pathology may not be adequately demonstrated due to lack of contrast enhancement. FINDINGS: CHEST: Lungs:  Passive atelectasis in the right lung. Pleura: Moderate right pleural effusion. No pneumothorax. Mediastinum And Griselda: No suspicious lymphadenopathy. Cardiovascular: Coronary atherosclerosis. Chest Wall:  Normal. ABDOMEN/PELVIS: Liver: Cirrhotic liver morphology. Limited evaluation for focal lesions due to lack of intravenous contrast and artifacts related to overlying lead wires. Gallbladder/Billary: Cholelithiasis. Pancreas: Mildly atrophic. Spleen: Borderline enlarged. Calcified granulomata. Adrenal Glands: Normal. Kidneys: Right renal cyst. GI Tract: Mild diverticulosis without evidence of acute diverticulitis. Appendix: Not identified. No localized  inflammatory changes present at the base of cecum. Mesentery/Peritoneum: Moderate volume abdominopelvic ascites. Vasculature: Scattered atherosclerotic vascular calcifications. No abdominal aortic aneurysm. Mild splenorenal shunting suggested. Lymph Nodes: No suspicious lymphadenopathy. Abdominal Wall: Normal. Bladder: Normal. Reproductive: Normal. Musculoskeletal: Bilateral L5 spondylolysis with grade 1 anterolisthesis of L5 over S1. No acute abnormality.     Impression: CT CHEST: 1.  Moderate right pleural effusion with associated atelectasis.  CT ABDOMEN/PELVIS: 1.  Cirrhosis with stigmata of portal hypertension. 2.  Moderate volume ascites. 3.  Cholelithiasis. Electronically signed by:  Troy Belcher M.D.  2/9/2023 10:22 PM Mountain Time    XR Chest 1 View    Result Date: 2/9/2023  XR CHEST 1 VW Date of Exam: 2/9/2023 8:40 PM EST Indication: Weak/Dizzy/AMS triage protocol. Comparison: May 24, 2015 Findings: The heart is enlarged. There is mild pulmonary vascular congestion. Density is seen over the right lower chest consistent with a layering pleural effusion. There is right basilar atelectasis. The left lung is clear.     Impression: Impression: Layering right pleural effusion. Right basilar atelectasis. Electronically Signed: Abhishek Reyes  2/9/2023 9:19 PM EST  Workstation ID: [id number]          I have reviewed the medications:  Scheduled Meds:albumin human, 50 g, Intravenous, Once  cefTRIAXone, 2 g, Intravenous, Q24H  ferrous sulfate, 325 mg, Oral, Daily With Breakfast  fluticasone, 2 spray, Each Nare, Daily  folic acid, 400 mcg, Oral, Daily  insulin lispro, 0-7 Units, Subcutaneous, TID AC  lactulose, 30 g, Oral, TID  lactulose, 300 mL, Rectal, Once  octreotide, 100 mcg, Intravenous, TID  propranolol, 10 mg, Oral, BID  sodium chloride, 10 mL, Intravenous, Q12H  ursodiol, 300 mg, Oral, BID      Continuous Infusions:sodium chloride, 50 mL/hr, Last Rate: 50 mL/hr (02/10/23 0008)      PRN Meds:.•  dextrose  •   dextrose  •  glucagon (human recombinant)  •  hydrOXYzine  •  sodium chloride  •  sodium chloride  •  sodium chloride    Assessment & Plan   Assessment & Plan     Active Hospital Problems    Diagnosis  POA   • **Acute renal failure, unspecified acute renal failure type (HCC) [N17.9]  Yes   • Metabolic encephalopathy [G93.41]  Yes   • Metabolic acidosis [E87.20]  Yes   • Elevated troponin [R77.8]  Yes   • PAOLO (acute kidney injury) (HCC) [N17.9]  Yes   • Hyponatremia [E87.1]  Yes   • Hyperkalemia [E87.5]  Yes   • Liver cirrhosis secondary to HAYES (HCC) [K75.81, K74.60]  Yes   • Hyperbilirubinemia [E80.6]  Yes   • Anemia, chronic disease [D63.8]  Yes   • Hyperammonemia (HCC) [E72.20]  Yes      Resolved Hospital Problems   No resolved problems to display.        Brief Hospital Course to date:  Leoncio Hopson is a 72 y.o. male  With HAYES cirrhosis, recurrent pleural effusion, HTN, chronic liver lesions, DM2, chronic anemia, who presented to the ED with complaint of altered mental status who was found to have concern for PAOLO, metabolic acidosis, hyperkalemia, hyperammonemia.     Hepatic encephalopathy  Decompensated Hayes cirrhosis with associated ascites, hepatic hydrothorax, coagulopathy, macrocytic anemia, hypoalbuminemia, hyperbilirubinemia, acidosis  MELD-Na 28, Child-Ravi class C  Chronic liver lesions  Esophageal varices  Portal hypertension  - Increased lactulose to 30 g 3 times daily on 2/10  - Lactulose enema x1  - Fall precautions  - Known HAYES cirrhosis, follows outpatient with GI at   - Duplex ultrasound pending  - Consulted GI  - Started on rifaximin 550 mg twice daily  - Diagnostic paracentesis pending; call down to IR and communicated with them that only diagnostic paracentesis is desired, only obtain enough fluid to send cultures/fluid studies  - Trend lactic acid, currently downtrending  - Hold Lasix and spironolactone for now pending repeat CMP in the a.m. secondary to PAOLO  - Continue  propanolol     Lactic acidosis, secondary to above  Elevated procal   - Follow-up urine culture, no growth to date; UA with squamous epithelial cells, concern for possible contamination  - Follow-up blood cultures, currently no growth  - Follow-up cultures from paracentesis    PAOLO  - Creatinine recently increased (daughter has lab results from hematology in [place] with most recent creatinine 1.5-1.8)  - Likely related partially to dehydration, medications, possible HRS  - IVF bolus x1 given in the ED  - Albumin, octreotide  - Renal consulted     Hyperkalemia, resolved  - Albuterol, dextrose/insulin, and Lokelma given in the ED  - CMP in the a.m.     Elevated troponin  - Delta -5, likely related to demand from above and also acute kidney injury  - EKG without acute ischemia     Right pleural effusion  - Has been ongoing with most recent thoracentesis at  on 1/25/2023 negative for malignant growth  - Thought to be secondary to cirrhosis    Diabetes mellitus 2  - Hold glimepiride and Actos  - FSBG 3 times daily  - SS insulin  - Hemoglobin A1c 6.1%     Hypertension  - Hold lisinopril and losartan secondary to PAOLO (unclear why was on both)     Anemia  - Likely secondary to renal and hepatic disease  - Stable  - CBC in the a.m.  - Follows outpatient with hematology in [place]    Expected Discharge Location and Transportation: home vs rehab  Expected Discharge   Expected Discharge Date and Time     Expected Discharge Date Expected Discharge Time    Feb 14, 2023            DVT prophylaxis:  Mechanical DVT prophylaxis orders are present.          CODE STATUS:   Code Status and Medical Interventions:   Ordered at: 02/09/23 2248     Code Status (Patient has no pulse and is not breathing):    CPR (Attempt to Resuscitate)     Medical Interventions (Patient has pulse or is breathing):    Full Support       Tracie Sultana MD  02/10/23

## 2023-02-10 NOTE — CONSULTS
[facility]ology Consult    Referring Provider: Tracie Sultana MD   PCP: Antonio Castro MD    Reason for Consultation: Altered mental status     Chief complaint: Altered mental status     History of present illness:    Leoncio Hopson is a 72 y.o. male who is admitted with hepatic encephalopathy.  He has known decompensated HAYES cirrhosis with complications of hepatic hydrothorax and chronic liver lesions.  He is followed at  Hepatology.   No history of previous hepatic encephalopathy.   He began to have acute mental status change yesterday.   He is lethargic and unable to provide history.  His wife and daughter are at bedside whom assist in history.        Allergies:  Contrast dye (echo or unknown ct/mr) and Gadobutrol    Scheduled Meds:  albumin human, 12.5 g, Intravenous, Once  cefTRIAXone, 2 g, Intravenous, Q24H  ferrous sulfate, 325 mg, Oral, Daily With Breakfast  fluticasone, 2 spray, Each Nare, Daily  folic acid, 400 mcg, Oral, Daily  insulin lispro, 0-7 Units, Subcutaneous, TID AC  lactulose, 30 g, Oral, TID  propranolol, 10 mg, Oral, BID  sodium chloride, 10 mL, Intravenous, Q12H  ursodiol, 300 mg, Oral, BID         Infusions:  sodium chloride, 50 mL/hr, Last Rate: 50 mL/hr (02/10/23 0008)        PRN Meds:  •  dextrose  •  dextrose  •  glucagon (human recombinant)  •  hydrOXYzine  •  sodium chloride  •  sodium chloride  •  sodium chloride    Home Meds:  Medications Prior to Admission   Medication Sig Dispense Refill Last Dose   • folic acid (FOLVITE) 400 MCG tablet Take 400 mcg by mouth Daily.      • furosemide (LASIX) 20 MG tablet Take 20 mg by mouth 2 (Two) Times a Day.      • glimepiride (AMARYL) 1 MG tablet Take 1 mg by mouth Every Morning Before Breakfast.      • hydrOXYzine (ATARAX) 25 MG tablet Take 25 mg by mouth 3 (Three) Times a Day As Needed for Itching.      • losartan (COZAAR) 50 MG tablet Take 100 mg by mouth Daily.      • metFORMIN (GLUCOPHAGE) 1000 MG tablet Take 1,000 mg by mouth 2  "(Two) Times a Day With Meals.      • pioglitazone (ACTOS) 45 MG tablet Take 45 mg by mouth Daily.      • propranolol (INDERAL) 10 MG tablet Take 10 mg by mouth 2 (Two) Times a Day.      • SIMVASTATIN PO Take  by mouth.      • ursodiol (ACTIGALL) 300 MG capsule Take 300 mg by mouth 2 (Two) Times a Day.      • Fergon 240 (27 Fe) MG tablet Take 1 tablet by mouth Daily.      • fluticasone (FLONASE) 50 MCG/ACT nasal spray 2 sprays by Each Nare route Daily.      • lisinopril (PRINIVIL,ZESTRIL) 20 MG tablet Take 20 mg by mouth Daily.      • spironolactone (ALDACTONE) 50 MG tablet Take 50 mg by mouth Daily.          ROS: Review of Systems   Unable to perform ROS: Mental status change       PAST MED HX:  Past Medical History:   Diagnosis Date   • Anemia    • Diabetes mellitus (HCC)    • Hypertension    • Liver cirrhosis secondary to HAYES (HCC)    • Liver lesion        PAST SURG HX:  Past Surgical History:   Procedure Laterality Date   • ANKLE SURGERY     • EYE SURGERY         FAM HX:  Family History   Problem Relation Age of Onset   • Rheum arthritis Mother    • Diabetes Mother    • COPD Mother    • Thyroid disease Mother    • Heart disease Father    • COPD Father    • Cancer Father    • Diabetes Father        SOC HX:  Social History     Socioeconomic History   • Marital status:    Tobacco Use   • Smoking status: Never   • Smokeless tobacco: Never   Vaping Use   • Vaping Use: Never used   Substance and Sexual Activity   • Alcohol use: Not Currently   • Drug use: Never       PHYSICAL EXAM  BP 91/43   Pulse 69   Temp 97.5 °F (36.4 °C) (Axillary)   Resp 16   Ht 172.7 cm (67.99\")   Wt 71.5 kg (157 lb 11.2 oz)   SpO2 98%   BMI 23.98 kg/m²   Wt Readings from Last 3 Encounters:   02/10/23 71.5 kg (157 lb 11.2 oz)   07/10/15 69.4 kg (153 lb)   ,body mass index is 23.98 kg/m².  Physical Exam  Constitutional:       Appearance: He is ill-appearing.   HENT:      Head: Normocephalic and atraumatic.      Nose: Nose normal. " "  Eyes:      General: No scleral icterus.  Cardiovascular:      Rate and Rhythm: Normal rate and regular rhythm.   Pulmonary:      Effort: Pulmonary effort is normal. No respiratory distress.   Abdominal:      General: Bowel sounds are normal. There is distension.      Palpations: Abdomen is soft.      Tenderness: There is no abdominal tenderness. There is no guarding.   Musculoskeletal:      Right lower leg: No edema.      Left lower leg: No edema.   Skin:     General: Skin is warm and dry.   Neurological:      Mental Status: He is alert.      Comments: Not oriented to time, nor place.  Able to awaken briefly and say \"yes\"     Does not follow commands    Psychiatric:         Cognition and Memory: Cognition is impaired. Memory is impaired.       Results Review:   I reviewed the patient's new clinical results.    Lab Results   Component Value Date    WBC 9.41 02/10/2023    HGB 9.5 (L) 02/10/2023    HGB 9.7 (L) 02/09/2023    HGB 7.4 (L) 11/01/2022    HCT 28.9 (L) 02/10/2023    .6 (H) 02/10/2023     02/10/2023     Lab Results   Component Value Date    INR 1.47 (H) 02/10/2023    INR 1.43 (H) 02/09/2023    INR 1.3 (H) 11/01/2022       Lab Results   Component Value Date    GLUCOSE 113 (H) 02/10/2023    BUN 73 (H) 02/10/2023    CREATININE 4.79 (H) 02/10/2023    EGFRIFNONA 59 (L) 04/22/2022    EGFRIFAFRI >60 04/22/2022    BCR 15.2 02/10/2023     02/10/2023    K 5.1 02/10/2023    CO2 14.0 (L) 02/10/2023    CALCIUM 9.7 02/10/2023    PROTENTOTREF 5.8 (L) 05/24/2015    ALBUMIN 2.9 (L) 02/10/2023    ALKPHOS 119 (H) 02/10/2023    BILITOT 2.3 (H) 02/10/2023    ALT 20 02/10/2023    AST 40 02/10/2023     I reviewed his pertinent previous medical records including most recent  Hepatology office visit with QUOC Peña from 11/1/2022.   He gets repeat thoracentesis (last 1/25/23), approximately monthly.        Latest Reference Range & Units 02/10/23 06:55   Ammonia 16 - 60 umol/L 189 (H)     CT " Abdomen/Pelvis and chest:   CHEST:   Lungs:  Passive atelectasis in the right lung.    Pleura: Moderate right pleural effusion. No pneumothorax.    Mediastinum And Griselda: No suspicious lymphadenopathy.    Cardiovascular: Coronary atherosclerosis.    Chest Wall:  Normal.    ABDOMEN/PELVIS:   Liver: Cirrhotic liver morphology. Limited evaluation for focal lesions due to lack of intravenous contrast and artifacts related to overlying lead wires.    Gallbladder/Billary: Cholelithiasis.   Pancreas: Mildly atrophic.    Spleen: Borderline enlarged. Calcified granulomata.    Adrenal Glands: Normal.    Kidneys: Right renal cyst.    GI Tract: Mild diverticulosis without evidence of acute diverticulitis.    Appendix: Not identified. No localized inflammatory changes present at the base of cecum.   Mesentery/Peritoneum: Moderate volume abdominopelvic ascites.   Vasculature: Scattered atherosclerotic vascular calcifications. No abdominal aortic aneurysm. Mild splenorenal shunting suggested.   Lymph Nodes: No suspicious lymphadenopathy.    Abdominal Wall: Normal.    Bladder: Normal.    Reproductive: Normal.    Musculoskeletal: Bilateral L5 spondylolysis with grade 1 anterolisthesis of L5 over S1. No acute abnormality.   IMPRESSION:   CT CHEST:  1.  Moderate right pleural effusion with associated atelectasis.   CT ABDOMEN/PELVIS:  1.  Cirrhosis with stigmata of portal hypertension.  2.  Moderate volume ascites.  3.  Cholelithiasis.    ASSESSMENTS/PLANS    1. Hepatic encephalopathy  2. Decompensated HAYES Cirrhosis with ascites, hepatic hydrothorax.   Receives outpatient monthly thoracentesis.   MELD-Na is 28.  Child Ravi Class C.  3.  Acute kidney injury, severe.   Possible HRS.      4. Macrocytic anemia, stable/improved.     5. UTI ? Culture pending.      >>  Agree with nephrology consultation.  Possible HRS.  Will check a urine sodium.   Will add 50 gm Albumin IV as well as IV Octreotide 100 mg TID.  >> Lactulose enema x 1 now    >> Lactulose 30 gm TID  >> IV Rocephin, possible UTI.    >> Begin Xifaxan 550 mg BID     I discussed the patient's findings and my recommendations with patient, his wife and daughter whom are present at the bedside.      JOSE Gee  02/10/23  11:15 EST

## 2023-02-10 NOTE — H&P
[facility] Medicine Services  HISTORY AND PHYSICAL    Patient Name: Leoncio Hopson  : 1950  MRN: [id number]  Primary Care Physician: Antonio Castro MD  Date of admission: 2023    Subjective   Subjective     Chief Complaint:  Altered mental status    HPI:  Leoncio Hopson is a 72 y.o. male with PMH significant for Hayes cirrhosis, recurrent pleural effusion, HTN, chronic liver lesions, DM2, chronic anemia, who presents to the ED with complaint of altered mental status.  His daughter who is at bedside provides HPI.  She states that yesterday he was his normal state of health.  Today, she was called by her brother and told that he was very confused.  When she arrived, he was very disoriented and not able to answer questions appropriately.  She notes that he was previously very active until the past year since struggling with Hayes cirrhosis and now recurrent pleural effusions.  Upon arrival to the ED, he is noted to have elevated troponin, hyperkalemia, metabolic acidosis, PAOLO, hyperbilirubinemia.  His ammonia is noted to be elevated at 77 his chronic anemia is stable.  UDS is negative.  CT head is negative for acute findings.  CXR notes right layering pleural effusion.  EKG notes prolonged QT.  He was given albuterol, insulin/dextrose, Lokelma, and IVF bolus while in the ED.  He will be admitted to hospital medicine for further evaluation.    Review of Systems   Unable to perform ROS: Mental status change        All other systems reviewed and are negative.     Personal History     Past Medical History:   Diagnosis Date   • Anemia    • Diabetes mellitus (HCC)    • Hypertension    • Liver cirrhosis secondary to HAYES (HCC)    • Liver lesion        Past Surgical History:   Procedure Laterality Date   • ANKLE SURGERY     • EYE SURGERY         Family History:  family history includes COPD in his father and mother; Cancer in his father; Diabetes in his father and mother; Heart disease  "in his father; Rheum arthritis in his mother; Thyroid disease in his mother. Otherwise pertinent FHx was reviewed and unremarkable.     Social History:  reports that he has never smoked. He has never used smokeless tobacco. He reports that he does not currently use alcohol. He reports that he does not use drugs.  Social History     Social History Narrative   • Not on file       Medications:  Simvastatin, ferrous gluconate, fluticasone, folic acid, furosemide, glimepiride, hydrOXYzine, lisinopril, losartan, metFORMIN, pioglitazone, propranolol, spironolactone, and ursodiol    Allergies   Allergen Reactions   • Contrast Dye (Echo Or Unknown Ct/Mr) Hives     Single hive in substernal/epigastric abdominal area.  Pt. states this rxn has occurred \"the last couple of times I had MRI contrast.   • Gadobutrol Unknown (See Comments)       Objective   Objective     Vital Signs:   Temp:  [97.5 °F (36.4 °C)-97.6 °F (36.4 °C)] 97.6 °F (36.4 °C)  Heart Rate:  [63-67] 67  Resp:  [14-16] 15  BP: (125-126)/(66-86) 126/86    Physical Exam   Constitutional: Somnolent, lying in bed with eyes closed  Eyes: PERRLA, mild sclerae icterus, no conjunctival injection  HENT: NCAT, mucous membranes moist  Neck: Supple, no thyromegaly, no lymphadenopathy, trachea midline  Respiratory: Clear to auscultation on the left, decreased on the right, nonlabored respirations, currently has albuterol nebulizing treatment running  Cardiovascular: RRR, no murmurs, rubs, or gallops, palpable pedal pulses bilaterally  Gastrointestinal: Positive bowel sounds, soft, nontender, distended  Musculoskeletal: No bilateral ankle edema, no clubbing or cyanosis to extremities  Psychiatric: Appropriate affect, cooperative  Neurologic: Oriented to self only, strength symmetric in all extremities, Cranial Nerves grossly intact to confrontation, speech clear  Skin: No rashes, diffuse scabbing lower extremities      Result Review:  I have personally reviewed the results from " the time of this admission to 2/9/2023 22:55 EST and agree with these findings:  [x]  Laboratory list / accordion  []  Microbiology  [x]  Radiology  [x]  EKG/Telemetry   []  Cardiology/Vascular   []  Pathology  [x]  Old records  [x]  Other: Medical records from   Most notable findings include:    LAB RESULTS:      Lab 02/09/23 2012   WBC 9.83   HEMOGLOBIN 9.7*   HEMATOCRIT 28.5*   PLATELETS 144   NEUTROS ABS 5.90   IMMATURE GRANS (ABS) 0.10*   LYMPHS ABS 1.07   MONOS ABS 0.71   EOS ABS 1.94*   .5*   PROCALCITONIN 0.79*   PROTIME 17.4*         Lab 02/09/23 2012   SODIUM 134*   POTASSIUM 6.0*   CHLORIDE 101   CO2 16.0*   ANION GAP 17.0*   BUN 72*   CREATININE 4.61*   EGFR 12.6*   GLUCOSE 104*   CALCIUM 9.6   MAGNESIUM 2.0   TSH 1.600         Lab 02/09/23 2012   TOTAL PROTEIN 6.6   ALBUMIN 3.0*   GLOBULIN 3.6   ALT (SGPT) 22   AST (SGOT) 40   BILIRUBIN 2.6*   ALK PHOS 126*         Lab 02/09/23 2012   HSTROP T 45*   PROTIME 17.4*   INR 1.43*                 Brief Urine Lab Results  (Last result in the past 365 days)      Color   Clarity   Blood   Leuk Est   Nitrite   Protein   CREAT   Urine HCG        02/09/23 2010 Yellow   Clear   Trace   Trace   Negative   30 mg/dL (1+)               Microbiology Results (last 10 days)     ** No results found for the last 240 hours. **          CT Head Without Contrast    Result Date: 2/9/2023  CT HEAD WO CONTRAST Date of Exam: 2/9/2023 9:31 PM EST Indication: Altered mental status. Comparison: None available. Technique: Axial CT images were obtained of the head without contrast administration.  Reconstructed coronal and sagittal images were also obtained. Automated exposure control and iterative construction methods were used. Findings: There is mild diffuse generalized atrophy. There is low attenuation in the periventricular white matter consistent with chronic microvascular ischemic change. There is no acute hemorrhage. There is no mass or mass effect. There are no  abnormal extra-axial fluid collections. The paranasal sinuses are clear.     Impression: Impression: Mild generalized atrophy and chronic microvascular ischemia. No acute intracranial process. Electronically Signed: Abhishek Reyes  2/9/2023 9:43 PM EST  Workstation ID: [id number]    XR Chest 1 View    Result Date: 2/9/2023  XR CHEST 1 VW Date of Exam: 2/9/2023 8:40 PM EST Indication: Weak/Dizzy/AMS triage protocol. Comparison: May 24, 2015 Findings: The heart is enlarged. There is mild pulmonary vascular congestion. Density is seen over the right lower chest consistent with a layering pleural effusion. There is right basilar atelectasis. The left lung is clear.     Impression: Impression: Layering right pleural effusion. Right basilar atelectasis. Electronically Signed: Abhishek Reyes  2/9/2023 9:19 PM EST  Workstation ID: [id number]          Assessment & Plan   Assessment & Plan       Metabolic encephalopathy    Metabolic acidosis    Elevated troponin    PAOLO (acute kidney injury) (HCC)    Hyponatremia    Hyperkalemia    Liver cirrhosis secondary to HAYES (HCC)    Hyperbilirubinemia    Anemia, chronic disease    Hyperammonemia (HCC)    72 y.o. male with PMH significant for Hayes cirrhosis, recurrent pleural effusion, HTN, chronic liver lesions, DM2, chronic anemia, who presents to the ED with complaint of altered mental status who was found to have concern for PAOLO, metabolic acidosis, hyperkalemia, hyperammonemia.    Metabolic encephalopathy  Hyperammonemia  Hyperbilirubinemia  - Lactulose 20 g 3 times daily for now  - Repeat ammonia in the a.m.  - Fall precautions  - Known Hayes cirrhosis, follows outpatient with GI at   - CMP in the a.m.    Lactic acidosis  Elevated procal   -will cover with Rocephin 2g for now  -trend lactic   -CBC in am     PAOLO  -  creatinine recently increased (daughter has lab results from hematology in [place] with most recent creatinine 1.5-1.8)  - Likely related partially to dehydration and  medications  - IVF bolus x1 given in the ED  - Continue gentle IVF  - Urine studies  - Consider nephrology consult in the a.m. if not improving  - Hold nephrotoxic medications    Hyperkalemia  - Albuterol, dextrose/insulin, and Lokelma given in the ED  - Repeat BMP at midnight  - CMP in the a.m.  - Repeat EKG in a.m.    Metabolic acidosis  - Likely related volume depletion and PAOLO  - CMP in the a.m.  - Continue gentle IVF    Elevated troponin  - Reflex and delta pending  - EKG without acute ischemia    Right pleural effusion  - Has been ongoing with most recent thoracentesis at  on 1/25/2023 negative for malignant growth  - Thought to be secondary to cirrhosis    HAYES cirrhosis  Chronic liver lesions  Esophageal varices  Portal hypertension  - Follows outpatient with GI at   - Hold Lasix and spironolactone for now pending repeat CMP in the a.m. secondary to PAOLO  - Continue propanolol    Diabetes mellitus 2  - Hold glimepiride and Actos  - FSBG 3 times daily  -  insulin  - Hemoglobin A1c in the a.m.    Hypertension  - Hold lisinopril and losartan secondary to PAOLO    Anemia  - Likely secondary to renal and hepatic disease  - Stable  - CBC in the a.m.  - Follows outpatient with hematology in [place]    DVT prophylaxis: Mechanical    CODE STATUS: Discussed with daughter at bedside  Code Status (Patient has no pulse and is not breathing): CPR (Attempt to Resuscitate)  Medical Interventions (Patient has pulse or is breathing): Full Support      Expected Discharge 2/12 (Discharge date is tentative pending patient's medical condition and is subject to change)      This note has been completed as part of a split-shared workflow.     Signature: Electronically signed by QUOC Kate, 02/09/23, 10:54 PM EST.  Total APC time spent 61 minutes       Attending   Admission Attestation       I have performed an independent face-to-face diagnostic evaluation including performing an independent physical examination as  documented here.  The documented plan of care above was reviewed and developed with the advanced practice clinician (APC).      Brief Summary Statement:   Leoncio Hopson is a 72 y.o. male w HAYES cirrhosis c/b hepatic hydrothorax, CKDIIIa who presents with 1 day of acute encephalopathy. Acquired history from daughter and son who were both bedside on my personal exam. Yesterday was his normal self - some weakness that family attributes to his anemia that is chronic, but no other apparent changes and son present most of day. This morning, woke up at 0430 and vomited x1, then back to bed until 1100. Then up to recliner where he slept most of the day, very altered, until daughter evaluated him after work and brought him in for evaluation.   Family denies NSAID use, deny toxic ingestion, deny h/o alcohol use and patient is monitored by them and wife often. Report consistent decline from liver disease but are unaware of prior instances of HE/ascites requiring paracentesis, only thoracenteses for hydrothorax multiple times. Last Cr ~1.5-1.8 in January, prior to that Cr ~1.1.  Patient denies any pain to me, cannot answer questions reliably making us dependent on the above.    Remainder of detailed HPI is as noted by APC and has been reviewed and/or edited by me for completeness.    Attending Physical Exam:  Temp:  [97.5 °F (36.4 °C)-97.6 °F (36.4 °C)] 97.6 °F (36.4 °C)  Heart Rate:  [63-67] 67  Resp:  [14-16] 15  BP: (125-126)/(66-86) 126/86    Constitutional: Awake, alert male. Confused in appearance, sitting up in bed  Eyes: PERRLA, sclerae anicteric, no conjunctival injection  HENT: NCAT, mucous membranes moist  Neck: Supple, no thyromegaly, no lymphadenopathy, trachea midline  Respiratory: Clear to auscultation bilaterally, nonlabored respirations   Cardiovascular: RRR, +systolic murmur II/VI present, no rubs or gallops, palpable pedal pulses bilaterally  Gastrointestinal: Positive bowel sounds, soft, moderate distension  w tightness and no clear fluid wave, not tender  Musculoskeletal: No bilateral ankle edema, no clubbing or cyanosis to extremities  Psychiatric: Calm but dazes in place and is not appropriate  Neurologic: Alert with eyes open and tracking, but not responding appropriately to simple questions. +asterixis present. Speech clear but hesitant w long pauses. No facial droop appreciated or focal weakness.   Skin: No rashes appreciated on exam    Brief Assessment/Plan :    In summary, this is a 72 y.o. male w HAYES cirrhosis c/b hepatic hydrothorax, CKDIIIa who presents with 1 day of acute encephalopathy. Found to have severe PAOLO c/b hyperkalemia and lactic acidosis.    1) Severe PAOLO on CKDIIIa c/b hyperkalemia  -baseline Cr ~1.5 on review, uptrending over last 3 months  -ddx includes hepatorenal (though BP strong), prerenal, septic PAOLO. Bladder scan appropriate post-void so post-renal less likely  -repeat BMP to assess response in K/Cr after ED fluids. Nephrology consult placed for AM  -hard tele  -cIVF. Low threshold for albumin 1.5 g/kg as next step  -hold home lisinopril or losartan (both listed on med rec) + spironolactone    2) Lactic acidosis, unclear etiology  -likely component of hypovolemia  -no clear infectious source. Possible UTI, possible SBP  -CT abdomen stat --> paracentesis if any ascites to r/o SBP  -blood cultures  -rocephin w low threshold to broaden coverage further    3) Acute encephalopathy  -likely multifactorial w uremia, lactic acidosis/infection, and hepatic encephalopathy  -asterixis on p/e + elevated ammonia, start lactulose. Escalate to enema if not taking PO    4) Decompensated HAYES cirrhosis c/b hepatic hydrothorax  -right pleural effusion chronic, s/p multiple thoracentesis on review. No rush for now  -lactulose, dx para if ascites on imaging    5) Anemia, chronic - appears at baseline, no s/s of active blood loss    For assessment of other problems, please refer to note above      Eve PLATA  MD Alvino  02/09/23

## 2023-02-10 NOTE — PRE-SEDATION DOCUMENTATION
[facility]   Vascular Interventional Radiology  History & Physicial    Pertinent information including components of history, physical examination, review of systems, lab and imaging data, and impression and plan from this source was also reviewed for the creation of the following pre-procedural note: Progress Notes by Tracie Sultana MD (02/10/2023 13:35)         Patient Name:Leoncio Hopson    : 1950    MRN: [id number]    Primary Care Physician: Antonio Castro MD    Referring Physician: No ref. provider found     Date of admission: 2023    Subjective     Reason for Consult: Para    History of Present Illness     Leoncio Hopson is a 72 y.o. male referred to IR as noted above.      Active Hospital Problems:  Active Hospital Problems    Diagnosis    • **Acute renal failure, unspecified acute renal failure type (HCC)    • Metabolic encephalopathy    • Metabolic acidosis    • Elevated troponin    • PAOLO (acute kidney injury) (HCC)    • Hyponatremia    • Hyperkalemia    • Liver cirrhosis secondary to HAYES (HCC)    • Hyperbilirubinemia    • Anemia, chronic disease    • Hyperammonemia (HCC)        Personal History     Past Medical History:   Diagnosis Date   • Anemia    • Diabetes mellitus (HCC)    • Hypertension    • Liver cirrhosis secondary to HAYES (HCC)    • Liver lesion        Past Surgical History:   Procedure Laterality Date   • ANKLE SURGERY     • EYE SURGERY         Family History: His family history includes COPD in his father and mother; Cancer in his father; Diabetes in his father and mother; Heart disease in his father; Rheum arthritis in his mother; Thyroid disease in his mother.     Social History: He  reports that he has never smoked. He has never used smokeless tobacco. He reports that he does not currently use alcohol. He reports that he does not use drugs.    Home Medications:  Simvastatin, ferrous gluconate, fluticasone, folic acid, furosemide, glimepiride, hydrOXYzine, lisinopril,  "losartan, metFORMIN, pioglitazone, propranolol, spironolactone, and ursodiol    Current Medications:  •  albumin human  •  cefTRIAXone  •  dextrose  •  dextrose  •  ferrous sulfate  •  fluticasone  •  folic acid  •  glucagon (human recombinant)  •  hydrOXYzine  •  insulin lispro  •  lactulose  •  lactulose  •  octreotide  •  propranolol  •  sodium chloride  •  sodium chloride  •  sodium chloride  •  sodium chloride  •  sodium chloride  •  ursodiol     Allergies:  He is allergic to contrast dye (echo or unknown ct/mr) and gadobutrol.    Review of Systems    IR Procedure pertinent significant findings are mentioned in the PMH and PSH above.    Objective     Visit Vitals  /57 (BP Location: Left arm, Patient Position: Lying)   Pulse 69   Temp 97.1 °F (36.2 °C) (Axillary)   Resp 16   Ht 170 cm (66.93\")   Wt 71.2 kg (157 lb)   SpO2 98%   BMI 24.64 kg/m²        Physical Exam    See note mentioned above.    Result Review      I have personally reviewed the results from the time of this admission to 2/10/2023 13:51 EST and agree with these findings.  [x]  Laboratory  []  Microbiology  [x]  Radiology  []  EKG/Telemetry   []  Cardiology/Vascular   []  Pathology  []  Old records  []  Other:    Most notable findings include: As noted:    Results from last 7 days   Lab Units 02/10/23  0301 02/09/23 2012   INR  1.47* 1.43*   HEMOGLOBIN g/dL 9.5* 9.7*   HEMATOCRIT % 28.9* 28.5*   PLATELETS 10*3/mm3 144 144       Estimated Creatinine Clearance: 13.6 mL/min (A) (by C-G formula based on SCr of 4.79 mg/dL (H)).   Creatinine   Date Value Ref Range Status   02/10/2023 4.79 (H) 0.76 - 1.27 mg/dL Final   02/09/2023 4.56 (H) 0.76 - 1.27 mg/dL Final   02/09/2023 4.61 (H) 0.76 - 1.27 mg/dL Final       SARS-CoV-2, JUAN   Date Value Ref Range Status   04/28/2022 Not Detected Not Detected Final        No results found for: PREGTESTUR, PREGSERUM, HCG, HCGQUANT     ASA SCALE ASSESSMENT (applicable ONLY if sedation planned):        MALLAMPATI " CLASSIFICATION (applicable ONLY if sedation planned):       Assessment / Plan     Leoncio Hopson is a 72 y.o. male referred to the IR service with above problem.    Plan:   As above.    Notice: The note was created before the performance of the procedure. It might have been left in the pending status and signed off after the procedure. The time stamp on the note may be misleading.    Jamar Dickson MD   Vascular Interventional Radiology  02/10/23   1:51 PM EST

## 2023-02-10 NOTE — CONSULTS
"                  Clinical Nutrition     Nutrition Support Assessment  Reason for Visit: MST score 2+, Reduced oral intake, \"Unsure\" unintentional weight loss      Patient Name: Leoncio Hopson  YOB: 1950  MRN: [id number]  Date of Encounter: 02/10/23 11:29 EST  Admission date: 2/9/2023    Comments:     Pt does not meet malnutrition criteria with data available at this time; unable to complete NFPE & verify wt history. RD will continue to follow closely. Supplements in place.    Nutrition Assessment   Admission Diagnosis:  Altered mental status [R41.82]  Acute renal failure, unspecified acute renal failure type (HCC) [N17.9]      Problem List:    Acute renal failure, unspecified acute renal failure type (HCC)    Metabolic encephalopathy    Metabolic acidosis    Elevated troponin    PAOLO (acute kidney injury) (HCC)    Hyponatremia    Hyperkalemia    Liver cirrhosis secondary to HAYES (HCC)    Hyperbilirubinemia    Anemia, chronic disease    Hyperammonemia (HCC)        PMH:   He  has a past medical history of Anemia, Diabetes mellitus (HCC), Hypertension, Liver cirrhosis secondary to HAYES (HCC), and Liver lesion.    PSH:  He  has a past surgical history that includes Ankle surgery and Eye surgery.      Applicable Nutrition Concerns:   Skin:  Oral:  GI:      Applicable Interval History:         Reported/Observed/Food/Nutrition Related History:     RD spoke w/pt's daughter and wife at bedside; pt in procedure during visit. Pt's family states pt appears to have had wt loss and reports pt's [id number] (EMR indicates pt wt this admit is 157lbs-bed scale). Family states pt's appetite has decreased 2/2 to fatigue and has been eating ~25% of usual intake off and on x 1 month. Family tells me pt would likely be agreeable to ONS-will add TID. NKFA.       Labs    Labs Reviewed: Yes     Results from last 7 days   Lab Units 02/10/23  0301 02/09/23  2247 02/09/23 2012   GLUCOSE mg/dL 113* 122* 104*   BUN mg/dL 73* " "71* 72*   CREATININE mg/dL 4.79* 4.56* 4.61*   SODIUM mmol/L 137 134* 134*   CHLORIDE mmol/L 103 102 101   POTASSIUM mmol/L 5.1 5.2 6.0*   PHOSPHORUS mg/dL 3.5  --   --    MAGNESIUM mg/dL 2.1  --  2.0   ALT (SGPT) U/L 20  --  22       Results from last 7 days   Lab Units 02/10/23  0301 02/09/23 2012   ALBUMIN g/dL 2.9* 3.0*       Results from last 7 days   Lab Units 02/10/23  0730   GLUCOSE mg/dL 116     Lab Results   Lab Value Date/Time    HGBA1C 6.10 (H) 02/10/2023 0301    HGBA1C 5.9 (H) 04/22/2022 0912    HGBA1C 7.7 (H) 05/24/2015 0625                 Medications    Medications Reviewed: Yes  Pertinent: folvite, insulin, lactulose, iron  Infusion: NS @ 50ml/hr  PRN:       Intake/Ouptut 24 hrs (0701 - 0700)   I&O's Reviewed: Yes     BM 2/10    Anthropometrics     Flowsheet Rows    Flowsheet Row First Filed Value   Admission Height 172.7 cm (67.99\") Documented at 02/09/2023 1944   Admission Weight 72.6 kg (160 lb) Documented at 02/09/2023 1944            Height: Height: 172.7 cm (67.99\")  Last Filed Weight: Weight: 71.5 kg (157 lb 11.2 oz) (02/10/23 0500)  Method: Weight Method: Bed scale  BMI: BMI (Calculated): 24  BMI classification: Normal: 18.5-24.9kg/m2  IBW:  150    UBW: 150-170lbs (per family report)  Weight change: PADMINI    Nutrition Focused Physical Exam     Date:  2/10    Unable to perform exam due to: Pt unable to participate at time of visit    Current Nutrition Prescription     PO: Diet: Diabetic Diets, Renal Diets; Consistent Carbohydrate; Low Sodium (2-3g); Texture: Regular Texture (IDDSI 7); Fluid Consistency: Thin (IDDSI 0)  Oral Nutrition Supplement:   Intake: 1 Day: 0% breakfast this am      Nutrition Diagnosis   Date: 2/10 Updated:   Problem Inadequate energy intake   Etiology Decreased appetite 2/2 to fatigue   Signs/Symptoms 0% x breakfast this am, 25% usual intake x 1 month   Status: ongoing      Goal:   General: Maintain nutrition, Nutrition to support treatment  PO: Establish PO, Tolerate " PO, Increase intake  EN/PN: N/A    Nutrition Intervention      Follow treatment progress, Care plan reviewed, Encourage intake, Supplement provided    Boost GC TID; will modify ONS to nepro/novasource if K+ elevated    Monitoring/Evaluation:   Per protocol, PO intake, Supplement intake, Weight      Gely Gonzalez RD  Time Spent: 35

## 2023-02-10 NOTE — CASE MANAGEMENT/SOCIAL WORK
Discharge Planning Assessment  [facility]     Patient Name: Leoncio Hopson  MRN: [id number]  Today's Date: 2/10/2023    Admit Date: 2/9/2023    Plan: HOME   Discharge Needs Assessment     Row Name 02/10/23 120       Living Environment    People in Home spouse    Current Living Arrangements home    Primary Care Provided by self    Provides Primary Care For no one    Family Caregiver if Needed spouse    Quality of Family Relationships helpful;involved;supportive    Able to Return to Prior Arrangements yes       Transition Planning    Patient/Family Anticipates Transition to home with family    Patient/Family Anticipated Services at Transition     Transportation Anticipated family or friend will provide       Discharge Needs Assessment    Readmission Within the Last 30 Days no previous admission in last 30 days    Equipment Currently Used at Home none    Concerns to be Addressed no discharge needs identified;denies needs/concerns at this time;discharge planning    Anticipated Changes Related to Illness --  TBD    Equipment Needed After Discharge --  TBD    Outpatient/Agency/Support Group Needs --  TBD    Discharge Facility/Level of Care Needs --  TBD    Current Discharge Risk chronically ill               Discharge Plan     Row Name 02/10/23 1200       Plan    Plan HOME    Patient/Family in Agreement with Plan yes    Plan Comments Met with pt/wife at bedside for DCP.  Pt sleeping.  Wife provided info.  They reside in [facility].  Pt is independent with ADLs at baseline.  No current DME or HH services.  Confirmed he has Medicare and [employer] insurance with Rx coverage.  Goal is to return home upon DC.  CM will cont to follow for any DC needs.    Final Discharge Disposition Code 01 - home or self-care              Continued Care and Services - Admitted Since 2/9/2023    Coordination has not been started for this encounter.       Expected Discharge Date and Time     Expected Discharge Date Expected Discharge Time     Feb 14, 2023          Demographic Summary     Row Name 02/10/23 1202       General Information    Admission Type inpatient    Referral Source admission list    Reason for Consult discharge planning    Preferred Language English       Contact Information    Permission Granted to Share Info With     Contact Information Obtained for                Functional Status     Row Name 02/10/23 1202       Functional Status    Usual Activity Tolerance moderate    Current Activity Tolerance moderate       Functional Status, IADL    Medications independent    Meal Preparation assistive person    Housekeeping assistive person    Laundry assistive person    Shopping assistive person               Psychosocial    No documentation.                Abuse/Neglect    No documentation.                Legal    No documentation.                Substance Abuse    No documentation.                Patient Forms    No documentation.                   Tomeka Butt RN

## 2023-02-10 NOTE — CONSULTS
Patient Care Team:  Antonio Castro MD as PCP - General  Antonio Castro MD as PCP - Family Medicine    Chief complaint: Acute on chronic kidney disease stage III  Hyperkalemia  AMS        History of Present Illness: Mr Hopson is a 73 yo gentleman with past medical hx of HAYES, Liver cirrhosis, hepatic hydrothorax in the past. He is admitted with progressive worsening mental status and concern for hepatic encephalopathy. On admission he is noted to have acute worsening of renal function. Patient is lethargic and confused HPI obtain from wife present at bedside. She mentioned patient's mentation changed in last few days. He became more and more confuse, His appetite also dropped. She jaime any hx of diarrhea, n or vomiting. No hx of NSAID use. Labs on admission remarkable for cr~ 4.5-4.7 K 6.1 bicarb 14. Lactate 3.6 Ammonia 189. Patient has no LE edema. He is currently on room air. CT A/P showed moderate ascites, no hydronephrosis.     Review of Systems   Unable to perform ROS: Acuity of condition       Past Medical History:   Diagnosis Date   • Anemia    • Diabetes mellitus (HCC)    • Hypertension    • Liver cirrhosis secondary to HAYES (HCC)    • Liver lesion    ,   Past Surgical History:   Procedure Laterality Date   • ANKLE SURGERY     • EYE SURGERY     ,   Family History   Problem Relation Age of Onset   • Rheum arthritis Mother    • Diabetes Mother    • COPD Mother    • Thyroid disease Mother    • Heart disease Father    • COPD Father    • Cancer Father    • Diabetes Father    ,   Social History     Socioeconomic History   • Marital status:    Tobacco Use   • Smoking status: Never   • Smokeless tobacco: Never   Vaping Use   • Vaping Use: Never used   Substance and Sexual Activity   • Alcohol use: Not Currently   • Drug use: Never     E-cigarette/Vaping   • E-cigarette/Vaping Use Never User      E-cigarette/Vaping Substances     E-cigarette/Vaping Devices       ,   Medications Prior to Admission    Medication Sig Dispense Refill Last Dose   • folic acid (FOLVITE) 400 MCG tablet Take 400 mcg by mouth Daily.      • furosemide (LASIX) 20 MG tablet Take 20 mg by mouth 2 (Two) Times a Day.      • glimepiride (AMARYL) 1 MG tablet Take 1 mg by mouth Every Morning Before Breakfast.      • hydrOXYzine (ATARAX) 25 MG tablet Take 25 mg by mouth 3 (Three) Times a Day As Needed for Itching.      • losartan (COZAAR) 50 MG tablet Take 100 mg by mouth Daily.      • metFORMIN (GLUCOPHAGE) 1000 MG tablet Take 1,000 mg by mouth 2 (Two) Times a Day With Meals.      • pioglitazone (ACTOS) 45 MG tablet Take 45 mg by mouth Daily.      • propranolol (INDERAL) 10 MG tablet Take 10 mg by mouth 2 (Two) Times a Day.      • SIMVASTATIN PO Take  by mouth.      • ursodiol (ACTIGALL) 300 MG capsule Take 300 mg by mouth 2 (Two) Times a Day.      • Fergon 240 (27 Fe) MG tablet Take 1 tablet by mouth Daily.      • fluticasone (FLONASE) 50 MCG/ACT nasal spray 2 sprays by Each Nare route Daily.      • lisinopril (PRINIVIL,ZESTRIL) 20 MG tablet Take 20 mg by mouth Daily.      • spironolactone (ALDACTONE) 50 MG tablet Take 50 mg by mouth Daily.       and Scheduled Meds:  albumin human, 25 g, Intravenous, TID  cefTRIAXone, 2 g, Intravenous, Q24H  ferrous sulfate, 325 mg, Oral, Daily With Breakfast  fluticasone, 2 spray, Each Nare, Daily  folic acid, 400 mcg, Oral, Daily  insulin lispro, 0-7 Units, Subcutaneous, TID AC  lactulose, 30 g, Oral, TID  lidocaine PF 1%, 5 mL, Injection, Once  octreotide, 100 mcg, Intravenous, TID  propranolol, 10 mg, Oral, BID  sodium bicarbonate, 1,300 mg, Oral, TID  sodium chloride, 10 mL, Intravenous, Q12H  ursodiol, 300 mg, Oral, BID        Objective     Vital Signs  Temp:  [97.1 °F (36.2 °C)-97.6 °F (36.4 °C)] 97.3 °F (36.3 °C)  Heart Rate:  [63-81] 73  Resp:  [14-16] 14  BP: ()/(43-86) 131/61    No intake/output data recorded.  No intake/output data recorded.    Physical Exam  Constitutional:        Appearance: He is ill-appearing.   HENT:      Head: Normocephalic.      Mouth/Throat:      Mouth: Mucous membranes are moist.   Eyes:      Extraocular Movements: Extraocular movements intact.      Pupils: Pupils are equal, round, and reactive to light.   Cardiovascular:      Rate and Rhythm: Normal rate and regular rhythm.   Pulmonary:      Effort: Pulmonary effort is normal. No respiratory distress.      Breath sounds: No wheezing or rales.   Chest:      Chest wall: No tenderness.   Abdominal:      General: Bowel sounds are normal. There is distension.      Tenderness: There is no abdominal tenderness. There is no guarding.   Musculoskeletal:      Right lower leg: No edema.      Left lower leg: No edema.   Skin:     General: Skin is dry.   Neurological:      Comments: Confused. Arousable. Following few verbal commands. Non focal. No asterixis noted         Results Review:    I reviewed the patient's new clinical results.    WBC WBC   Date Value Ref Range Status   02/10/2023 9.41 3.40 - 10.80 10*3/mm3 Final   02/09/2023 9.83 3.40 - 10.80 10*3/mm3 Final      HGB Hemoglobin   Date Value Ref Range Status   02/10/2023 9.5 (L) 13.0 - 17.7 g/dL Final   02/09/2023 9.7 (L) 13.0 - 17.7 g/dL Final      HCT Hematocrit   Date Value Ref Range Status   02/10/2023 28.9 (L) 37.5 - 51.0 % Final   02/09/2023 28.5 (L) 37.5 - 51.0 % Final      Platlets No results found for: LABPLAT   MCV MCV   Date Value Ref Range Status   02/10/2023 103.6 (H) 79.0 - 97.0 fL Final   02/09/2023 102.5 (H) 79.0 - 97.0 fL Final          Sodium Sodium   Date Value Ref Range Status   02/10/2023 137 136 - 145 mmol/L Final   02/09/2023 134 (L) 136 - 145 mmol/L Final   02/09/2023 134 (L) 136 - 145 mmol/L Final      Potassium Potassium   Date Value Ref Range Status   02/10/2023 5.1 3.5 - 5.2 mmol/L Final   02/09/2023 5.2 3.5 - 5.2 mmol/L Final     Comment:     Slight hemolysis detected by analyzer. Results may be affected.   02/09/2023 6.0 (H) 3.5 - 5.2 mmol/L  Final      Chloride Chloride   Date Value Ref Range Status   02/10/2023 103 98 - 107 mmol/L Final   02/09/2023 102 98 - 107 mmol/L Final   02/09/2023 101 98 - 107 mmol/L Final      CO2 CO2   Date Value Ref Range Status   02/10/2023 14.0 (L) 22.0 - 29.0 mmol/L Final   02/09/2023 14.0 (L) 22.0 - 29.0 mmol/L Final   02/09/2023 16.0 (L) 22.0 - 29.0 mmol/L Final      BUN BUN   Date Value Ref Range Status   02/10/2023 73 (H) 8 - 23 mg/dL Final   02/09/2023 71 (H) 8 - 23 mg/dL Final   02/09/2023 72 (H) 8 - 23 mg/dL Final      Creatinine Creatinine   Date Value Ref Range Status   02/10/2023 4.79 (H) 0.76 - 1.27 mg/dL Final   02/09/2023 4.56 (H) 0.76 - 1.27 mg/dL Final   02/09/2023 4.61 (H) 0.76 - 1.27 mg/dL Final      Calcium Calcium   Date Value Ref Range Status   02/10/2023 9.7 8.6 - 10.5 mg/dL Final   02/09/2023 9.6 8.6 - 10.5 mg/dL Final   02/09/2023 9.6 8.6 - 10.5 mg/dL Final      PO4 No results found for: CAPO4   Albumin Albumin   Date Value Ref Range Status   02/10/2023 2.9 (L) 3.5 - 5.2 g/dL Final   02/09/2023 3.0 (L) 3.5 - 5.2 g/dL Final      Magnesium Magnesium   Date Value Ref Range Status   02/10/2023 2.1 1.6 - 2.4 mg/dL Final   02/09/2023 2.0 1.6 - 2.4 mg/dL Final      Uric Acid No results found for: URICACID         Assessment & Plan       Acute renal failure, unspecified acute renal failure type (HCC)    Metabolic encephalopathy    Metabolic acidosis    Elevated troponin    PAOLO (acute kidney injury) (HCC)    Hyponatremia    Hyperkalemia    Liver cirrhosis secondary to HAYES (HCC)    Hyperbilirubinemia    Anemia, chronic disease    Hyperammonemia (HCC)      Assessment & Plan     Acute kidney injury: Baseline cr ~ 0.8-1.2mg/dl. Cr on this admission 4.5-4.7mg/dl BUN 72. UA trace ketones, blood and protein. CT didn't show any hydro     Met acidosis; Due to PAOLO and GI bicarb loss    Hyperkalemia: Due to PAOLO. On admission improved.    AMS: Hepatic encephalopathy    HAYES: Decompensated liver cirrhosis    Ascites:  moderate volume ascites.     Hypotension: in the setting of decompensated liver disease    Hyponatremia: improved.      Recs   Acute kidney injury likely hemodynamic variation in renal function. Other possibility includes hepato-renal syndrome vs ATN. Will start albumin challenge 25 g TID. Given low blood pressure will add midodrine 10 mg TID. Target SBP>120. Check Urine Na, cl and creatinine to calculate FeNA. Start na-bicab 1300 mg TID for correction of acidosis. Agree with lactulose for hepatic encephalopathy. Daily labs. Strict I/O No indication of dialysis at this stage. Hold ARB for now.  I discussed the patients findings and my recommendations with patient    Sergei العلي MD  02/10/23  16:55 EST

## 2023-02-11 PROBLEM — K74.60 DECOMPENSATED HEPATIC CIRRHOSIS: Status: ACTIVE | Noted: 2023-02-11

## 2023-02-11 PROBLEM — R18.8 OTHER ASCITES: Status: ACTIVE | Noted: 2023-02-11

## 2023-02-11 PROBLEM — K76.82 ENCEPHALOPATHY, HEPATIC (HCC): Status: ACTIVE | Noted: 2023-02-11

## 2023-02-11 PROBLEM — D68.9 COAGULOPATHY: Status: ACTIVE | Noted: 2023-02-11

## 2023-02-11 PROBLEM — K72.90 DECOMPENSATED HEPATIC CIRRHOSIS (HCC): Status: ACTIVE | Noted: 2023-02-11

## 2023-02-11 PROBLEM — D69.6 THROMBOCYTOPENIA: Status: ACTIVE | Noted: 2023-02-11

## 2023-02-11 LAB
ABO GROUP BLD: NORMAL
ABO GROUP BLD: NORMAL
ALBUMIN SERPL-MCNC: 3 G/DL (ref 3.5–5.2)
ALBUMIN/GLOB SERPL: 1.2 G/DL
ALP SERPL-CCNC: 76 U/L (ref 39–117)
ALT SERPL W P-5'-P-CCNC: 17 U/L (ref 1–41)
AMPHET+METHAMPHET UR QL: NEGATIVE
AMPHETAMINES UR QL: NEGATIVE
ANION GAP SERPL CALCULATED.3IONS-SCNC: 17 MMOL/L (ref 5–15)
AST SERPL-CCNC: 27 U/L (ref 1–40)
BACTERIA SPEC AEROBE CULT: NO GROWTH
BARBITURATES UR QL SCN: NEGATIVE
BASOPHILS # BLD AUTO: 0.04 10*3/MM3 (ref 0–0.2)
BASOPHILS NFR BLD AUTO: 0.8 % (ref 0–1.5)
BENZODIAZ UR QL SCN: NEGATIVE
BILIRUB SERPL-MCNC: 1.4 MG/DL (ref 0–1.2)
BLD GP AB SCN SERPL QL: NEGATIVE
BUN SERPL-MCNC: 83 MG/DL (ref 8–23)
BUN/CREAT SERPL: 14.8 (ref 7–25)
BUPRENORPHINE SERPL-MCNC: NEGATIVE NG/ML
CALCIUM SPEC-SCNC: 9.2 MG/DL (ref 8.6–10.5)
CANNABINOIDS SERPL QL: NEGATIVE
CHLORIDE SERPL-SCNC: 105 MMOL/L (ref 98–107)
CO2 SERPL-SCNC: 16 MMOL/L (ref 22–29)
COCAINE UR QL: NEGATIVE
CREAT SERPL-MCNC: 5.6 MG/DL (ref 0.76–1.27)
CREAT UR-MCNC: 223 MG/DL
DEPRECATED RDW RBC AUTO: 63.9 FL (ref 37–54)
DEPRECATED RDW RBC AUTO: 65 FL (ref 37–54)
EGFRCR SERPLBLD CKD-EPI 2021: 10 ML/MIN/1.73
EOSINOPHIL # BLD AUTO: 1.13 10*3/MM3 (ref 0–0.4)
EOSINOPHIL NFR BLD AUTO: 21.9 % (ref 0.3–6.2)
ERYTHROCYTE [DISTWIDTH] IN BLOOD BY AUTOMATED COUNT: 17 % (ref 12.3–15.4)
ERYTHROCYTE [DISTWIDTH] IN BLOOD BY AUTOMATED COUNT: 17.1 % (ref 12.3–15.4)
FLUAV RNA RESP QL NAA+PROBE: NOT DETECTED
FLUBV RNA RESP QL NAA+PROBE: NOT DETECTED
GLOBULIN UR ELPH-MCNC: 2.6 GM/DL
GLUCOSE BLDC GLUCOMTR-MCNC: 115 MG/DL (ref 70–130)
GLUCOSE BLDC GLUCOMTR-MCNC: 188 MG/DL (ref 70–130)
GLUCOSE BLDC GLUCOMTR-MCNC: 253 MG/DL (ref 70–130)
GLUCOSE SERPL-MCNC: 104 MG/DL (ref 65–99)
HCT VFR BLD AUTO: 21.6 % (ref 37.5–51)
HCT VFR BLD AUTO: 23.6 % (ref 37.5–51)
HGB BLD-MCNC: 7.2 G/DL (ref 13–17.7)
HGB BLD-MCNC: 7.8 G/DL (ref 13–17.7)
IMM GRANULOCYTES # BLD AUTO: 0.04 10*3/MM3 (ref 0–0.05)
IMM GRANULOCYTES NFR BLD AUTO: 0.8 % (ref 0–0.5)
INR PPP: 1.72 (ref 0.84–1.13)
LYMPHOCYTES # BLD AUTO: 0.76 10*3/MM3 (ref 0.7–3.1)
LYMPHOCYTES NFR BLD AUTO: 14.7 % (ref 19.6–45.3)
MCH RBC QN AUTO: 34.1 PG (ref 26.6–33)
MCH RBC QN AUTO: 34.4 PG (ref 26.6–33)
MCHC RBC AUTO-ENTMCNC: 33.1 G/DL (ref 31.5–35.7)
MCHC RBC AUTO-ENTMCNC: 33.3 G/DL (ref 31.5–35.7)
MCV RBC AUTO: 102.4 FL (ref 79–97)
MCV RBC AUTO: 104 FL (ref 79–97)
METHADONE UR QL SCN: NEGATIVE
MONOCYTES # BLD AUTO: 0.39 10*3/MM3 (ref 0.1–0.9)
MONOCYTES NFR BLD AUTO: 7.6 % (ref 5–12)
NEUTROPHILS NFR BLD AUTO: 2.8 10*3/MM3 (ref 1.7–7)
NEUTROPHILS NFR BLD AUTO: 54.2 % (ref 42.7–76)
NRBC BLD AUTO-RTO: 0 /100 WBC (ref 0–0.2)
OPIATES UR QL: NEGATIVE
OXYCODONE UR QL SCN: NEGATIVE
PCP UR QL SCN: NEGATIVE
PLATELET # BLD AUTO: 77 10*3/MM3 (ref 140–450)
PLATELET # BLD AUTO: 90 10*3/MM3 (ref 140–450)
PMV BLD AUTO: 8.9 FL (ref 6–12)
PMV BLD AUTO: 9.5 FL (ref 6–12)
POTASSIUM SERPL-SCNC: 4.6 MMOL/L (ref 3.5–5.2)
PROPOXYPH UR QL: NEGATIVE
PROT SERPL-MCNC: 5.6 G/DL (ref 6–8.5)
PROTHROMBIN TIME: 20.2 SECONDS (ref 11.4–14.4)
RBC # BLD AUTO: 2.11 10*6/MM3 (ref 4.14–5.8)
RBC # BLD AUTO: 2.27 10*6/MM3 (ref 4.14–5.8)
RH BLD: POSITIVE
RH BLD: POSITIVE
RSV RNA NPH QL NAA+NON-PROBE: NOT DETECTED
SARS-COV-2 RNA RESP QL NAA+PROBE: NOT DETECTED
SODIUM SERPL-SCNC: 138 MMOL/L (ref 136–145)
T&S EXPIRATION DATE: NORMAL
TRICYCLICS UR QL SCN: NEGATIVE
WBC NRBC COR # BLD: 5.16 10*3/MM3 (ref 3.4–10.8)
WBC NRBC COR # BLD: 5.9 10*3/MM3 (ref 3.4–10.8)

## 2023-02-11 PROCEDURE — 0 PHYTONADIONE 10 MG/ML SOLUTION 1 ML AMPULE: Performed by: STUDENT IN AN ORGANIZED HEALTH CARE EDUCATION/TRAINING PROGRAM

## 2023-02-11 PROCEDURE — P9047 ALBUMIN (HUMAN), 25%, 50ML: HCPCS | Performed by: INTERNAL MEDICINE

## 2023-02-11 PROCEDURE — 97165 OT EVAL LOW COMPLEX 30 MIN: CPT

## 2023-02-11 PROCEDURE — 99233 SBSQ HOSP IP/OBS HIGH 50: CPT | Performed by: STUDENT IN AN ORGANIZED HEALTH CARE EDUCATION/TRAINING PROGRAM

## 2023-02-11 PROCEDURE — 86923 COMPATIBILITY TEST ELECTRIC: CPT

## 2023-02-11 PROCEDURE — 85027 COMPLETE CBC AUTOMATED: CPT | Performed by: STUDENT IN AN ORGANIZED HEALTH CARE EDUCATION/TRAINING PROGRAM

## 2023-02-11 PROCEDURE — 86900 BLOOD TYPING SEROLOGIC ABO: CPT

## 2023-02-11 PROCEDURE — 87637 SARSCOV2&INF A&B&RSV AMP PRB: CPT | Performed by: STUDENT IN AN ORGANIZED HEALTH CARE EDUCATION/TRAINING PROGRAM

## 2023-02-11 PROCEDURE — 25010000002 ALBUMIN HUMAN 25% PER 50 ML: Performed by: STUDENT IN AN ORGANIZED HEALTH CARE EDUCATION/TRAINING PROGRAM

## 2023-02-11 PROCEDURE — 25010000002 CEFTRIAXONE PER 250 MG: Performed by: NURSE PRACTITIONER

## 2023-02-11 PROCEDURE — 86901 BLOOD TYPING SEROLOGIC RH(D): CPT

## 2023-02-11 PROCEDURE — 86850 RBC ANTIBODY SCREEN: CPT | Performed by: STUDENT IN AN ORGANIZED HEALTH CARE EDUCATION/TRAINING PROGRAM

## 2023-02-11 PROCEDURE — 80306 DRUG TEST PRSMV INSTRMNT: CPT | Performed by: STUDENT IN AN ORGANIZED HEALTH CARE EDUCATION/TRAINING PROGRAM

## 2023-02-11 PROCEDURE — 25010000002 ALBUMIN HUMAN 25% PER 50 ML: Performed by: INTERNAL MEDICINE

## 2023-02-11 PROCEDURE — 86901 BLOOD TYPING SEROLOGIC RH(D): CPT | Performed by: STUDENT IN AN ORGANIZED HEALTH CARE EDUCATION/TRAINING PROGRAM

## 2023-02-11 PROCEDURE — 86900 BLOOD TYPING SEROLOGIC ABO: CPT | Performed by: STUDENT IN AN ORGANIZED HEALTH CARE EDUCATION/TRAINING PROGRAM

## 2023-02-11 PROCEDURE — 82962 GLUCOSE BLOOD TEST: CPT

## 2023-02-11 PROCEDURE — 85610 PROTHROMBIN TIME: CPT | Performed by: STUDENT IN AN ORGANIZED HEALTH CARE EDUCATION/TRAINING PROGRAM

## 2023-02-11 PROCEDURE — 80053 COMPREHEN METABOLIC PANEL: CPT | Performed by: STUDENT IN AN ORGANIZED HEALTH CARE EDUCATION/TRAINING PROGRAM

## 2023-02-11 PROCEDURE — 97162 PT EVAL MOD COMPLEX 30 MIN: CPT

## 2023-02-11 PROCEDURE — P9047 ALBUMIN (HUMAN), 25%, 50ML: HCPCS | Performed by: STUDENT IN AN ORGANIZED HEALTH CARE EDUCATION/TRAINING PROGRAM

## 2023-02-11 PROCEDURE — 85025 COMPLETE CBC W/AUTO DIFF WBC: CPT | Performed by: STUDENT IN AN ORGANIZED HEALTH CARE EDUCATION/TRAINING PROGRAM

## 2023-02-11 PROCEDURE — 99232 SBSQ HOSP IP/OBS MODERATE 35: CPT | Performed by: PHYSICIAN ASSISTANT

## 2023-02-11 PROCEDURE — 97116 GAIT TRAINING THERAPY: CPT

## 2023-02-11 PROCEDURE — 25010000002 OCTREOTIDE PER 25 MCG: Performed by: PHYSICIAN ASSISTANT

## 2023-02-11 PROCEDURE — 63710000001 INSULIN LISPRO (HUMAN) PER 5 UNITS: Performed by: NURSE PRACTITIONER

## 2023-02-11 RX ORDER — OCTREOTIDE ACETATE 100 UG/ML
100 INJECTION, SOLUTION INTRAVENOUS; SUBCUTANEOUS 3 TIMES DAILY
Status: CANCELLED
Start: 2023-02-11

## 2023-02-11 RX ORDER — LACTULOSE 10 G/15ML
20 SOLUTION ORAL 2 TIMES DAILY
Status: DISCONTINUED | OUTPATIENT
Start: 2023-02-11 | End: 2023-02-11

## 2023-02-11 RX ORDER — ALBUMIN (HUMAN) 12.5 G/50ML
25 SOLUTION INTRAVENOUS 3 TIMES DAILY
Status: CANCELLED
Start: 2023-02-11

## 2023-02-11 RX ORDER — LACTULOSE 10 G/15ML
30 SOLUTION ORAL 2 TIMES DAILY
Status: DISCONTINUED | OUTPATIENT
Start: 2023-02-11 | End: 2023-02-14 | Stop reason: HOSPADM

## 2023-02-11 RX ORDER — ALBUMIN (HUMAN) 12.5 G/50ML
50 SOLUTION INTRAVENOUS ONCE
Status: COMPLETED | OUTPATIENT
Start: 2023-02-11 | End: 2023-02-11

## 2023-02-11 RX ADMIN — SODIUM BICARBONATE 650 MG TABLET 1300 MG: at 09:23

## 2023-02-11 RX ADMIN — OCTREOTIDE ACETATE 100 MCG: 100 INJECTION, SOLUTION INTRAVENOUS; SUBCUTANEOUS at 21:46

## 2023-02-11 RX ADMIN — SODIUM BICARBONATE 650 MG TABLET 1300 MG: at 21:45

## 2023-02-11 RX ADMIN — ALBUMIN (HUMAN) 50 G: 0.25 INJECTION, SOLUTION INTRAVENOUS at 13:13

## 2023-02-11 RX ADMIN — OCTREOTIDE ACETATE 100 MCG: 100 INJECTION, SOLUTION INTRAVENOUS; SUBCUTANEOUS at 10:08

## 2023-02-11 RX ADMIN — Medication 10 ML: at 10:10

## 2023-02-11 RX ADMIN — FLUTICASONE PROPIONATE 2 SPRAY: 50 SPRAY, METERED NASAL at 09:23

## 2023-02-11 RX ADMIN — INSULIN LISPRO 4 UNITS: 100 INJECTION, SOLUTION INTRAVENOUS; SUBCUTANEOUS at 13:10

## 2023-02-11 RX ADMIN — ALBUMIN (HUMAN) 25 G: 0.25 INJECTION, SOLUTION INTRAVENOUS at 10:10

## 2023-02-11 RX ADMIN — CEFTRIAXONE 2 G: 2 INJECTION, POWDER, FOR SOLUTION INTRAMUSCULAR; INTRAVENOUS at 21:45

## 2023-02-11 RX ADMIN — OCTREOTIDE ACETATE 100 MCG: 100 INJECTION, SOLUTION INTRAVENOUS; SUBCUTANEOUS at 17:38

## 2023-02-11 RX ADMIN — LACTULOSE 30 G: 20 SOLUTION ORAL at 09:24

## 2023-02-11 RX ADMIN — PHYTONADIONE 10 MG: 10 INJECTION, EMULSION INTRAMUSCULAR; INTRAVENOUS; SUBCUTANEOUS at 16:24

## 2023-02-11 RX ADMIN — MIDODRINE HYDROCHLORIDE 15 MG: 10 TABLET ORAL at 17:36

## 2023-02-11 RX ADMIN — SODIUM BICARBONATE 650 MG TABLET 1300 MG: at 17:36

## 2023-02-11 RX ADMIN — MIDODRINE HYDROCHLORIDE 15 MG: 10 TABLET ORAL at 09:23

## 2023-02-11 RX ADMIN — Medication 325 MG: at 09:22

## 2023-02-11 RX ADMIN — Medication 10 ML: at 21:46

## 2023-02-11 RX ADMIN — ALBUMIN (HUMAN) 25 G: 0.25 INJECTION, SOLUTION INTRAVENOUS at 21:46

## 2023-02-11 RX ADMIN — INSULIN LISPRO 2 UNITS: 100 INJECTION, SOLUTION INTRAVENOUS; SUBCUTANEOUS at 17:36

## 2023-02-11 RX ADMIN — FOLIC ACID TAB 400 MCG 400 MCG: 400 TAB at 09:23

## 2023-02-11 NOTE — PROGRESS NOTES
[facility] Medicine Services  PROGRESS NOTE    Patient Name: Leoncio Hopson  : 1950  MRN: [id number]    Date of Admission: 2023  Primary Care Physician: Antonio Castro MD    Subjective   Subjective     CC:  Altered mental status    HPI:  Patient's daughter, son, wife at bedside. Got paracentesis done yesterday. Seen bedside with Dr. العلي. More awake today. Denied pain. Only 55mL of UOP.     ROS:  Denied pain, otherwise unable to answer questions    Objective   Objective     Vital Signs:   Temp:  [97.1 °F (36.2 °C)-98.3 °F (36.8 °C)] 97.6 °F (36.4 °C)  Heart Rate:  [68-82] 72  Resp:  [14-16] 16  BP: ()/(43-65) 98/49     Physical Exam:  Constitutional: Laying in bed, NAD  HENT: NCAT, mucous membranes moist  Respiratory: Clear to auscultation bilaterally, respiratory effort normal   Cardiovascular: RRR, no murmurs, rubs, or gallops  Gastrointestinal: Normoactive bowel sounds, soft, nontender, distended  Musculoskeletal: No bilateral ankle edema  Psychiatric: calm  Neurologic: PERRL, opens eyes to voice, symmetric facies, oriented to self (full name), [facility], but unsure why he's in the hospital, unsure of date  Skin: No rashes, several scabs on his abdomen    Results Reviewed:  LAB RESULTS:      Lab 23  0506 02/10/23  2040 02/10/23  1232 02/10/23  0655 02/10/23  0301 23  2317 23   WBC 5.16  --   --   --  9.41  --  9.83   HEMOGLOBIN 7.2*  --   --   --  9.5*  --  9.7*   HEMATOCRIT 21.6*  --   --   --  28.9*  --  28.5*   PLATELETS 77*  --   --   --  144  --  144   NEUTROS ABS 2.80  --   --   --  5.70  --  5.90   IMMATURE GRANS (ABS) 0.04  --   --   --  0.08*  --  0.10*   LYMPHS ABS 0.76  --   --   --  1.00  --  1.07   MONOS ABS 0.39  --   --   --  0.78  --  0.71   EOS ABS 1.13*  --   --   --  1.76*  --  1.94*   .4*  --   --   --  103.6*  --  102.5*   PROCALCITONIN  --   --   --   --   --   --  0.79*   LACTATE  --  2.8* 3.6*  5.4* 6.7* 6.7* 5.3*   PROTIME 20.2*  --   --   --  17.8*  --  17.4*         Lab 02/11/23  0506 02/10/23  0301 02/09/23  2247 02/09/23  2012   SODIUM 138 137 134* 134*   POTASSIUM 4.6 5.1 5.2 6.0*   CHLORIDE 105 103 102 101   CO2 16.0* 14.0* 14.0* 16.0*   ANION GAP 17.0* 20.0* 18.0* 17.0*   BUN 83* 73* 71* 72*   CREATININE 5.60* 4.79* 4.56* 4.61*   EGFR 10.0* 12.1* 12.8* 12.6*   GLUCOSE 104* 113* 122* 104*   CALCIUM 9.2 9.7 9.6 9.6   MAGNESIUM  --  2.1  --  2.0   PHOSPHORUS  --  3.5  --   --    HEMOGLOBIN A1C  --  6.10*  --   --    TSH  --   --   --  1.600         Lab 02/11/23  0506 02/10/23  0301 02/09/23  2012   TOTAL PROTEIN 5.6* 6.5 6.6   ALBUMIN 3.0* 2.9* 3.0*   GLOBULIN 2.6 3.6 3.6   ALT (SGPT) 17 20 22   AST (SGOT) 27 40 40   BILIRUBIN 1.4* 2.3* 2.6*   ALK PHOS 76 119* 126*         Lab 02/11/23  0506 02/10/23  0301 02/09/23  2247 02/09/23  2012   HSTROP T  --   --  40* 45*   PROTIME 20.2* 17.8*  --  17.4*   INR 1.72* 1.47*  --  1.43*                 Brief Urine Lab Results  (Last result in the past 365 days)      Color   Clarity   Blood   Leuk Est   Nitrite   Protein   CREAT   Urine HCG        02/10/23 1831             223.0               Microbiology Results Abnormal     Procedure Component Value - Date/Time    Blood Culture - Blood, Hand, Left [[id number]]  (Normal) Collected: 02/10/23 0039    Lab Status: Preliminary result Specimen: Blood from Hand, Left Updated: 02/11/23 0200     Blood Culture No growth at 24 hours    Blood Culture - Blood, Hand, Right [[id number]]  (Normal) Collected: 02/10/23 0039    Lab Status: Preliminary result Specimen: Blood from Hand, Right Updated: 02/11/23 0200     Blood Culture No growth at 24 hours    Body Fluid Culture - Body Fluid, Peritoneum [[id number]] Collected: 02/10/23 1422    Lab Status: Preliminary result Specimen: Body Fluid from Peritoneum Updated: 02/10/23 2018     Gram Stain No WBCs or organisms seen    Urine Culture - Urine, Straight Cath [[id number]]  (Normal)  Collected: 02/09/23 2010    Lab Status: Preliminary result Specimen: Urine from Straight Cath Updated: 02/10/23 1118     Urine Culture No growth          CT Abdomen Pelvis Without Contrast    Result Date: 2/10/2023  EXAMINATION: CT SCAN OF THE CHEST, ABDOMEN AND PELVIS WITHOUT INTRAVENOUS CONTRAST DATE OF EXAM: 2/9/2023 11:30 PM SLOT:  60  HISTORY: Shortness of breath and abdominal distention. COMPARISON: None. TECHNIQUE: CT examination of the chest, abdomen and pelvis was performed without intravenous contrast. CT dose lowering techniques were used, to include: automated exposure control, adjustment for patient size, and/or use of iterative reconstruction. Note: The exam is limited because some types of pathology may not be adequately demonstrated due to lack of contrast enhancement. FINDINGS: CHEST: Lungs:  Passive atelectasis in the right lung. Pleura: Moderate right pleural effusion. No pneumothorax. Mediastinum And Griselda: No suspicious lymphadenopathy. Cardiovascular: Coronary atherosclerosis. Chest Wall:  Normal. ABDOMEN/PELVIS: Liver: Cirrhotic liver morphology. Limited evaluation for focal lesions due to lack of intravenous contrast and artifacts related to overlying lead wires. Gallbladder/Billary: Cholelithiasis. Pancreas: Mildly atrophic. Spleen: Borderline enlarged. Calcified granulomata. Adrenal Glands: Normal. Kidneys: Right renal cyst. GI Tract: Mild diverticulosis without evidence of acute diverticulitis. Appendix: Not identified. No localized inflammatory changes present at the base of cecum. Mesentery/Peritoneum: Moderate volume abdominopelvic ascites. Vasculature: Scattered atherosclerotic vascular calcifications. No abdominal aortic aneurysm. Mild splenorenal shunting suggested. Lymph Nodes: No suspicious lymphadenopathy. Abdominal Wall: Normal. Bladder: Normal. Reproductive: Normal. Musculoskeletal: Bilateral L5 spondylolysis with grade 1 anterolisthesis of L5 over S1. No acute abnormality.      Impression: CT CHEST: 1.  Moderate right pleural effusion with associated atelectasis.  CT ABDOMEN/PELVIS: 1.  Cirrhosis with stigmata of portal hypertension. 2.  Moderate volume ascites. 3.  Cholelithiasis. Electronically signed by:  Troy Belcher M.D.  2/9/2023 10:22 PM Mountain Time    CT Head Without Contrast    Result Date: 2/9/2023  CT HEAD WO CONTRAST Date of Exam: 2/9/2023 9:31 PM EST Indication: Altered mental status. Comparison: None available. Technique: Axial CT images were obtained of the head without contrast administration.  Reconstructed coronal and sagittal images were also obtained. Automated exposure control and iterative construction methods were used. Findings: There is mild diffuse generalized atrophy. There is low attenuation in the periventricular white matter consistent with chronic microvascular ischemic change. There is no acute hemorrhage. There is no mass or mass effect. There are no abnormal extra-axial fluid collections. The paranasal sinuses are clear.     Impression: Impression: Mild generalized atrophy and chronic microvascular ischemia. No acute intracranial process. Electronically Signed: Abhishek Reyes  2/9/2023 9:43 PM EST  Workstation ID: [id number]    CT Chest Without Contrast Diagnostic    Result Date: 2/10/2023  EXAMINATION: CT SCAN OF THE CHEST, ABDOMEN AND PELVIS WITHOUT INTRAVENOUS CONTRAST DATE OF EXAM: 2/9/2023 11:30 PM SLOT:  60  HISTORY: Shortness of breath and abdominal distention. COMPARISON: None. TECHNIQUE: CT examination of the chest, abdomen and pelvis was performed without intravenous contrast. CT dose lowering techniques were used, to include: automated exposure control, adjustment for patient size, and/or use of iterative reconstruction. Note: The exam is limited because some types of pathology may not be adequately demonstrated due to lack of contrast enhancement. FINDINGS: CHEST: Lungs:  Passive atelectasis in the right lung. Pleura: Moderate right  pleural effusion. No pneumothorax. Mediastinum And Griselda: No suspicious lymphadenopathy. Cardiovascular: Coronary atherosclerosis. Chest Wall:  Normal. ABDOMEN/PELVIS: Liver: Cirrhotic liver morphology. Limited evaluation for focal lesions due to lack of intravenous contrast and artifacts related to overlying lead wires. Gallbladder/Billary: Cholelithiasis. Pancreas: Mildly atrophic. Spleen: Borderline enlarged. Calcified granulomata. Adrenal Glands: Normal. Kidneys: Right renal cyst. GI Tract: Mild diverticulosis without evidence of acute diverticulitis. Appendix: Not identified. No localized inflammatory changes present at the base of cecum. Mesentery/Peritoneum: Moderate volume abdominopelvic ascites. Vasculature: Scattered atherosclerotic vascular calcifications. No abdominal aortic aneurysm. Mild splenorenal shunting suggested. Lymph Nodes: No suspicious lymphadenopathy. Abdominal Wall: Normal. Bladder: Normal. Reproductive: Normal. Musculoskeletal: Bilateral L5 spondylolysis with grade 1 anterolisthesis of L5 over S1. No acute abnormality.     Impression: CT CHEST: 1.  Moderate right pleural effusion with associated atelectasis.  CT ABDOMEN/PELVIS: 1.  Cirrhosis with stigmata of portal hypertension. 2.  Moderate volume ascites. 3.  Cholelithiasis. Electronically signed by:  Troy Belcher M.D.  2/9/2023 10:22 PM Mountain Time    XR Chest 1 View    Result Date: 2/9/2023  XR CHEST 1 VW Date of Exam: 2/9/2023 8:40 PM EST Indication: Weak/Dizzy/AMS triage protocol. Comparison: May 24, 2015 Findings: The heart is enlarged. There is mild pulmonary vascular congestion. Density is seen over the right lower chest consistent with a layering pleural effusion. There is right basilar atelectasis. The left lung is clear.     Impression: Impression: Layering right pleural effusion. Right basilar atelectasis. Electronically Signed: Abhishek Reyes  2/9/2023 9:19 PM EST  Workstation ID: [id number]    US Paracentesis    Result Date:  2/10/2023   PARACENTESIS-                                                         History: DIAGNOSTIC PARACENTESIS ONLY; only remove enough fluid for fluid studies; concern for SBP; N17.9-Acute kidney failure, unspecified; E87.5-Hyperkalemia; E72.20-Disorder of urea cycle metabolism, unspecified; R41.0-Disorientation, unspecified; D64.9-Anemia, unspecified    : Jamar Dickson MD.  Assistant:  None.                                                                             Modality: Ultrasonography                                                                                                          Sedation: None.  Anesthesia: Lidocaine, local infiltration.           Estimated blood loss:  < 5 cc.          Technique: Discussion of risks, benefits, and alternatives were made with the patient. The patient expressed understanding and agreed to proceed. A written informed written consent was obtained. A universal timeout was performed prior to starting the procedure.  A preliminary ultrasonography was performed to assess the target and determine a safe access site. It showed ascites. Pertinent ultrasound images were stored to the PACS for documentation.  Maximal sterile precautions were utilized. The procedure room personnel used personal protective equipment. The operators additionally used sterile surgical gloves. The access site was sterilely prepped and draped. Local anesthesia was administered. A dermatotomy was performed if needed. A catheter over the needle system was advanced into the peritoneal cavity. Straw colored fluid was aspirated and the plastic catheter advanced into the peritoneum. The catheter was then connected to a fluid recovery system. At the end of the procedure, the catheter was withdrawn and an aseptic dressing applied. The patient tolerated the procedure well.  After uneventful recovery recovery, the patient was discharged from the department in stable condition.  The total  amount of fluid recovered is given below.  Albumin was infused intravenously if ordered by the referring doctor.  Complications: None immediate.  Specimen: The specimen was labeled and sent to the lab in appropriate carriers & containers if such was requested by the ordering provider.                                                                 Impression: Impression:                                                              Successful ultrasound-guided paracentesis using a Right upper quadrant access with recovery of 50 cc of fluid for diagnostic purposes as described above.  Thank you for the opportunity to assist in the care of your patient.  This report was finalized on 2/10/2023 8:56 PM by aJmar Dickson MD.            I have reviewed the medications:  Scheduled Meds:albumin human, 25 g, Intravenous, TID  cefTRIAXone, 2 g, Intravenous, Q24H  ferrous sulfate, 325 mg, Oral, Daily With Breakfast  fluticasone, 2 spray, Each Nare, Daily  folic acid, 400 mcg, Oral, Daily  insulin lispro, 0-7 Units, Subcutaneous, TID AC  lactulose, 30 g, Oral, TID  lidocaine PF 1%, 5 mL, Injection, Once  midodrine, 10 mg, Oral, TID AC  octreotide, 100 mcg, Intravenous, TID  propranolol, 10 mg, Oral, BID  sodium bicarbonate, 1,300 mg, Oral, TID  sodium chloride, 10 mL, Intravenous, Q12H  ursodiol, 300 mg, Oral, BID      Continuous Infusions:   PRN Meds:.•  dextrose  •  dextrose  •  glucagon (human recombinant)  •  hydrOXYzine  •  sodium chloride  •  sodium chloride  •  sodium chloride    Assessment & Plan   Assessment & Plan     Active Hospital Problems    Diagnosis  POA   • **Acute renal failure, unspecified acute renal failure type (HCC) [N17.9]  Yes   • Metabolic encephalopathy [G93.41]  Yes   • Metabolic acidosis [E87.20]  Yes   • Elevated troponin [R77.8]  Yes   • PAOLO (acute kidney injury) (HCC) [N17.9]  Yes   • Hyponatremia [E87.1]  Yes   • Hyperkalemia [E87.5]  Yes   • Liver cirrhosis secondary to HAYES (HCC) [K75.81,  K74.60]  Yes   • Hyperbilirubinemia [E80.6]  Yes   • Anemia, chronic disease [D63.8]  Yes   • Hyperammonemia (HCC) [E72.20]  Yes      Resolved Hospital Problems   No resolved problems to display.        Brief Hospital Course to date:  Leoncio Hopson is a 72 y.o. male  with DE LEON cirrhosis, recurrent pleural effusion, HTN, chronic liver lesions, DM2, chronic anemia, who presented to the ED with complaint of altered mental status who was found to have concern for PAOLO, metabolic acidosis, hyperkalemia, hyperammonemia.    Discussed overall poor prognosis with family on 2/11 with Dr. العلي present. All questions were answered.      Hepatic encephalopathy  Decompensated De Leon cirrhosis with associated ascites, hepatic hydrothorax, coagulopathy, macrocytic anemia, hypoalbuminemia, hyperbilirubinemia, acidosis  MELD-Na 28 on admission, Child-Ravi class C  Chronic liver lesions  Esophageal varices  Portal hypertension  - Increased lactulose to 30 g 3 times daily on 2/10; mentation improved on 2/11 and more distended --> decrease to 20g BID  - s/p Lactulose enema x1  - Fall precautions  - Known DE LEON cirrhosis, follows outpatient with GI at   - Duplex ultrasound done on 2/10, result pending, but prelim per GI is not concerning for PVT  - Consulted GI  - Started on rifaximin 550 mg twice daily  - Diagnostic paracentesis done on 2/10 with removal of 50mL of fluid; ANC 36, albumin 0.3, protein <1; per IR attending, there was minimal ascites on the paracentesis; low suspicion for abdominal compartment syndrome  - Trended lactic acid, currently downtrending  - Hold Lasix and spironolactone for now given poor renal function  - Off propanolol  -- Contacted [facility] to see about transfer for hepatology/liver transplant evaluation (age cut off of 73)  --Spoke with Dr. Noe Bruno from [facility] transfer services, he requested COVID screen and UDS; Dr. Bruno stated patient was excepted for transfer, but said that transfer could take several days  to a week at the least.     Lactic acidosis, secondary to above  Elevated procal   - Follow-up urine culture, no growth to date; UA with squamous epithelial cells, concern for possible contamination  - Follow-up blood cultures, currently no growth  - Follow-up cultures from paracentesis  -- Empiric CTX for now while awaiting cultures    Borderline anuric ARF  - Creatinine recently increased (daughter has lab results from hematology in [place] with most recent creatinine 1.5-1.8)  -- Urine Na < 20  - Likely related partially to dehydration, medications, suspected HRS  - IVF bolus x1 given in the ED  - Albumin, octreotide, midodrine (dose increased to 15mg TID)  -- Making minimal urine; ~55mL from straight cath on 2/10; placed abdul on 2/11 for 24h monitoring of UOP per renal  - Renal consulted    Worsening anemia  New thrombocytopenia  -- All cell lines have dropped compared to 2/10  -- Not on heparin  -- STAT repeat CBC, type and screen ordered; hemoglobin actually improved  -- If truly downtrending, then will obtain CT abd/pelvis  - Follows outpatient with hematology in [place]     Hyperkalemia, resolved  - Albuterol, dextrose/insulin, and Lokelma given in the ED  - CMP in the a.m.     Elevated troponin  - Delta -5, likely related to demand from above and also acute kidney injury  - EKG without acute ischemia     Right pleural effusion  - Has been ongoing with most recent thoracentesis at  on 1/25/2023 negative for malignant growth  -- Gets monthly thoracentesis  - Thought to be secondary to cirrhosis    Diabetes mellitus 2  - Hold glimepiride and Actos  - FSBG 3 times daily  - SS insulin  - Hemoglobin A1c 6.1%     Hypertension  - Hold lisinopril and losartan secondary to PAOLO (unclear why was on both)     Expected Discharge Location and Transportation: home vs rehab  Expected Discharge   Expected Discharge Date and Time     Expected Discharge Date Expected Discharge Time    Feb 14, 2023            DVT  prophylaxis:  Mechanical DVT prophylaxis orders are present.          CODE STATUS:   Code Status and Medical Interventions:   Ordered at: 02/09/23 2248     Code Status (Patient has no pulse and is not breathing):    CPR (Attempt to Resuscitate)     Medical Interventions (Patient has pulse or is breathing):    Full Support       Tracie Sultana MD  02/11/23

## 2023-02-11 NOTE — PROGRESS NOTES
"GI Daily Progress Note  Subjective:    Chief Complaint:  Follow up hepatic encephalopathy     More alert and oriented today.   Worked with physical therapy and required minimum assist to the bathroom.   He is now fatigued this afternoon and falling asleep during encounter.      Had 2 bowel movements this morning.       Objective:    BP 98/49 (BP Location: Left arm, Patient Position: Lying)   Pulse 69   Temp 97.6 °F (36.4 °C) (Axillary)   Resp 16   Ht 170 cm (66.93\")   Wt 72.6 kg (160 lb)   SpO2 97%   BMI 25.11 kg/m²     Physical Exam  Constitutional:       Appearance: He is ill-appearing.   Cardiovascular:      Rate and Rhythm: Normal rate and regular rhythm.   Pulmonary:      Effort: Pulmonary effort is normal. No respiratory distress.   Abdominal:      General: Bowel sounds are normal.      Palpations: Abdomen is soft.      Tenderness: There is no abdominal tenderness.      Comments: Mild distention.  Abdomen is soft, no tympany    Musculoskeletal:      Right lower leg: No edema.      Left lower leg: No edema.   Skin:     Coloration: Skin is pale.   Neurological:      Comments: Oriented to self and place         Lab  Lab Results   Component Value Date    WBC 5.90 02/11/2023    HGB 7.8 (L) 02/11/2023    HGB 7.2 (L) 02/11/2023    HGB 9.5 (L) 02/10/2023    .0 (H) 02/11/2023    PLT 90 (L) 02/11/2023    INR 1.72 (H) 02/11/2023    INR 1.47 (H) 02/10/2023    INR 1.43 (H) 02/09/2023    INR 1.3 (H) 11/01/2022    INR 1.3 (H) 04/22/2022     Lab Results   Component Value Date    GLUCOSE 104 (H) 02/11/2023    BUN 83 (H) 02/11/2023    CREATININE 5.60 (H) 02/11/2023    EGFRIFNONA 59 (L) 04/22/2022    EGFRIFAFRI >60 04/22/2022    BCR 14.8 02/11/2023     02/11/2023    K 4.6 02/11/2023    CO2 16.0 (L) 02/11/2023    CALCIUM 9.2 02/11/2023    PROTENTOTREF 5.8 (L) 05/24/2015    ALBUMIN 3.0 (L) 02/11/2023    ALKPHOS 76 02/11/2023    BILITOT 1.4 (H) 02/11/2023    ALT 17 02/11/2023    AST 27 02/11/2023 "     Assessment:    1. Hepatic encephalopathy  2. Decompensated HAYES Cirrhosis with ascites, hepatic hydrothorax.   Receives outpatient monthly thoracentesis.   MELD-Na is 28.  Child Ravi Class C.  3.  Acute kidney injury, severe.   Likely  HRS.      4. Macrocytic anemia, worse  5. UTI ? Culture pending.    6. Coagulopathy of liver disease, worse.       Plan:    Hepatic encephalopathy improving.   His renal function is however worsening.  Urine sodium < 20.  Likely HRS.      >> Discontinue Propanolol  >> IV Octreotide 100 mg TID   >> IV Albumin 25 mg TID  >> Midodrine 15 mg TID     >> Will decrease Lactulose to BID.   Goal of 2-3 soft bowel movements daily.       >> IV Vitamin K+ 10 mg x 1     Discussed concerns of poor prognosis with HRS and worsening renal function overnight.  Patient's wife, daughter and son are present at the bedside today.   They would like to pursue evaluation for liver transplant and transfer to .  They voiced understanding that he may not be candidate due to his advanced age as well as HE.    Discussed with Dr. Sultana whom will contact [facility].       Addendum: 1504 Patient accepted for transfer to [facility].  Await bed placement.      JOSE Gee  02/11/23  14:52 EST

## 2023-02-11 NOTE — THERAPY EVALUATION
Patient Name: Leoncio Hopson  : 1950    MRN: [id number]                              Today's Date: 2023       Admit Date: 2023    Visit Dx:     ICD-10-CM ICD-9-CM   1. Acute renal failure, unspecified acute renal failure type (HCC)  N17.9 584.9   2. Hyperkalemia  E87.5 276.7   3. Hyperammonemia (HCC)  E72.20 270.6   4. Confusion  R41.0 298.9   5. Anemia, unspecified type  D64.9 285.9     Patient Active Problem List   Diagnosis   • Metabolic encephalopathy   • Metabolic acidosis   • Elevated troponin   • PAOLO (acute kidney injury) (HCC)   • Hyponatremia   • Hyperkalemia   • Liver cirrhosis secondary to HAYES (HCC)   • Hyperbilirubinemia   • Anemia, chronic disease   • Hyperammonemia (HCC)   • Acute renal failure, unspecified acute renal failure type (HCC)     Past Medical History:   Diagnosis Date   • Anemia    • Diabetes mellitus (HCC)    • Hypertension    • Liver cirrhosis secondary to HAYES (HCC)    • Liver lesion      Past Surgical History:   Procedure Laterality Date   • ANKLE SURGERY     • EYE SURGERY        General Information     Row Name 23 1013          Physical Therapy Time and Intention    Document Type evaluation  -FREDY     Mode of Treatment physical therapy  -FREDY     Row Name 23 1013          General Information    Patient Profile Reviewed yes  -FREDY     Prior Level of Function independent:;all household mobility;ADL's;driving  -FREDY     Existing Precautions/Restrictions fall;other (see comments)  [place], wears hearing aid in R ear.  -FREDY     Barriers to Rehab hearing deficit  -FREDY     Row Name 23 1013          Living Environment    People in Home spouse  -FREDY     Row Name 23 1013          Home Main Entrance    Number of Stairs, Main Entrance one  -FREDY     Stair Railings, Main Entrance none  -FREDY     Row Name 23 1013          Stairs Within Home, Primary    Stairs, Within Home, Primary Has upstairs, does not have to use.  -FREDY     Row Name 23 1013          Cognition     "Orientation Status (Cognition) oriented to;person;place;disoriented to;time  -FREDY     Row Name 02/11/23 1013          Safety Issues, Functional Mobility    Impairments Affecting Function (Mobility) balance;endurance/activity tolerance;strength  -FREDY           User Key  (r) = Recorded By, (t) = Taken By, (c) = Cosigned By    Initials Name Provider Type    Rocio Reid, PT Physical Therapist               Mobility     Row Name 02/11/23 1016          Bed Mobility    Bed Mobility supine-sit  -FREDY     Supine-Sit [place] (Bed Mobility) standby assist  -FREDY     Assistive Device (Bed Mobility) head of bed elevated  -FREDY     Comment, (Bed Mobility) No assistance needed.  -FREDY     Row Name 02/11/23 1016          Sit-Stand Transfer    Sit-Stand [place] (Transfers) contact guard  -FREDY     Comment, (Sit-Stand Transfer) did not use AD.  -FREDY     Row Name 02/11/23 1016          Gait/Stairs (Locomotion)    [place] Level (Gait) minimum assist (75% patient effort);2 person assist;verbal cues  -FREDY     Assistive Device (Gait) other (see comments)  B UE support  -FREDY     Distance in Feet (Gait) 30  -FREDY     Deviations/Abnormal Patterns (Gait) bilateral deviations;gait speed decreased  -FREDY     Comment, (Gait/Stairs) Pt demo \"furniture surfing\" with gait, transitioned to B UE support. Pt will benefit from trial of FWW next visit. Further distance limited by fatigue.  -FREDY           User Key  (r) = Recorded By, (t) = Taken By, (c) = Cosigned By    Initials Name Provider Type    Rocio Reid, PT Physical Therapist               Obj/Interventions     Row Name 02/11/23 1018          Range of Motion Comprehensive    General Range of Motion bilateral lower extremity ROM WFL  -FREDY     Row Name 02/11/23 1018          Strength Comprehensive (MMT)    General Manual Muscle Testing (MMT) Assessment lower extremity strength deficits identified  -FREDY     Comment, General Manual Muscle Testing (MMT) Assessment B LEs grossly 4-/5  -FREDY     " [facility] Name 02/11/23 1018          Balance    Balance Assessment sitting static balance;sitting dynamic balance;sit to stand dynamic balance;standing static balance  -FREDY     Static Sitting Balance modified independence  -FREDY     Dynamic Sitting Balance contact guard  -FREDY     Position, Sitting Balance unsupported;sitting edge of bed  -FREDY     Sit to Stand Dynamic Balance contact guard  -FREDY     Static Standing Balance contact guard  -FREDY     Position/Device Used, Standing Balance unsupported  -FREDY     Balance Interventions sitting;standing;sit to stand;occupation based/functional task  -[facility] Name 02/11/23 1018          Sensory Assessment (Somatosensory)    Sensory Assessment (Somatosensory) not tested  -FREDY           User Key  (r) = Recorded By, (t) = Taken By, (c) = Cosigned By    Initials Name Provider Type    FREDY Rocio Saenz, PT Physical Therapist               Goals/Plan     Row Name 02/11/23 1022          Bed Mobility Goal 1 (PT)    Activity/Assistive Device (Bed Mobility Goal 1, PT) sit to supine/supine to sit  -FREDY     [place] Level/Cues Needed (Bed Mobility Goal 1, PT) independent  -FREDY     Time Frame (Bed Mobility Goal 1, PT) 10 days  -FREDY     Row Name 02/11/23 1022          Transfer Goal 1 (PT)    Activity/Assistive Device (Transfer Goal 1, PT) sit-to-stand/stand-to-sit;walker, rolling  -FREDY     [place] Level/Cues Needed (Transfer Goal 1, PT) modified independence  -FREDY     Time Frame (Transfer Goal 1, PT) 10 days  -FREDY     Row Name 02/11/23 1022          Gait Training Goal 1 (PT)    Activity/Assistive Device (Gait Training Goal 1, PT) gait (walking locomotion);walker, rolling  -FREDY     [place] Level (Gait Training Goal 1, PT) modified independence  -FREDY     Distance (Gait Training Goal 1, PT) 100  -FREDY     Time Frame (Gait Training Goal 1, PT) 10 days  -[facility] Name 02/11/23 1022          Therapy Assessment/Plan (PT)    Planned Therapy Interventions (PT) balance training;bed mobility  training;gait training;home exercise program;strengthening;stair training;transfer training;stretching;patient/family education  -FREDY           User Key  (r) = Recorded By, (t) = Taken By, (c) = Cosigned By    Initials Name Provider Type    FREDY Rocio Saenz, PT Physical Therapist               Clinical Impression     Row Name 02/11/23 1019          Pain    Pretreatment Pain Rating 0/10 - no pain  -FREDY     Posttreatment Pain Rating 0/10 - no pain  -FREDY     Row Name 02/11/23 1019          Plan of Care Review    Plan of Care Reviewed With patient;daughter  -FREDY     Progress no change  -FREDY     Outcome Evaluation PT evaluation completed. Pt presents with decreased strength and balance, will benefit from IP PT services. Pt req minAx2 with ambulation in room, recommend IP rehab facility upon DC at this time.  -     Row Name 02/11/23 1019          Therapy Assessment/Plan (PT)    Rehab Potential (PT) good, to achieve stated therapy goals  -FREDY     Criteria for Skilled Interventions Met (PT) yes;skilled treatment is necessary  -FREDY     Therapy Frequency (PT) daily  -     Row Name 02/11/23 1019          Vital Signs    Pre Systolic BP Rehab 98  -FREDY     Pre Treatment Diastolic BP 49  -FREDY     Pretreatment Heart Rate (beats/min) 71  -FREDY     Posttreatment Heart Rate (beats/min) 73  -FREDY     Pre SpO2 (%) 97  -FREDY     O2 Delivery Pre Treatment room air  -FREDY     O2 Delivery Intra Treatment room air  -FREDY     Post SpO2 (%) 99  -FREDY     O2 Delivery Post Treatment room air  -FREDY     Pre Patient Position Supine  -FREDY     Intra Patient Position Standing  -FREDY     Post Patient Position Sitting  -FREDY     Row Name 02/11/23 1019          Positioning and Restraints    Pre-Treatment Position in bed  -FREDY     Post Treatment Position chair  -FREDY     In Chair notified nsg;reclined;call light within reach;encouraged to call for assist;exit alarm on;with family/caregiver;legs elevated;waffle cushion  -           User Key  (r) = Recorded By, (t) = Taken By,  (c) = Cosigned By    Initials Name Provider Type    Rocio Reid, PT Physical Therapist               Outcome Measures     Row Name 02/11/23 1023          How much help from another person do you currently need...    Turning from your back to your side while in flat bed without using bedrails? 4  -FREDY     Moving from lying on back to sitting on the side of a flat bed without bedrails? 3  -FREDY     Moving to and from a bed to a chair (including a wheelchair)? 3  -FREDY     Standing up from a chair using your arms (e.g., wheelchair, bedside chair)? 3  -FREDY     Climbing 3-5 steps with a railing? 2  -FREDY     To walk in hospital room? 3  -FREDY     AM-PAC 6 Clicks Score (PT) 18  -FREDY     Highest level of mobility 6 --> Walked 10 steps or more  -FREDY     Row Name 02/11/23 1023 02/11/23 0817       Functional Assessment    Outcome Measure Options AM-PAC 6 Clicks Basic Mobility (PT)  -FREDY AM-PAC 6 Clicks Daily Activity (OT)  -AC          User Key  (r) = Recorded By, (t) = Taken By, (c) = Cosigned By    Initials Name Provider Type    Reyna Ashby, OT Occupational Therapist    Rocio Reid, YASMIN Physical Therapist                             Physical Therapy Education     Title: PT OT SLP Therapies (In Progress)     Topic: Physical Therapy (In Progress)     Point: Mobility training (Done)     Learning Progress Summary           Patient Acceptance, E, VU,NR by FREDY at 2/11/2023 1023   Family Acceptance, E, VU,NR by FREDY at 2/11/2023 1023                   Point: Home exercise program (Not Started)     Learner Progress:  Not documented in this visit.          Point: Body mechanics (Done)     Learning Progress Summary           Patient Acceptance, E, VU,NR by FREDY at 2/11/2023 1023   Family Acceptance, E, VU,NR by FREDY at 2/11/2023 1023                   Point: Precautions (Done)     Learning Progress Summary           Patient Acceptance, E, VU,NR by FREDY at 2/11/2023 1023   Family Acceptance, E, VU,NR by FREDY at 2/11/2023 1023                                User Key     Initials Effective Dates Name Provider Type Discipline    FREDY 06/16/21 -  Rocio Saenz PT Physical Therapist PT              PT Recommendation and Plan  Planned Therapy Interventions (PT): balance training, bed mobility training, gait training, home exercise program, strengthening, stair training, transfer training, stretching, patient/family education  Plan of Care Reviewed With: patient, daughter  Progress: no change  Outcome Evaluation: PT evaluation completed. Pt presents with decreased strength and balance, will benefit from IP PT services. Pt req minAx2 with ambulation in room, recommend IP rehab facility upon DC at this time.     Time Calculation:    PT Charges     Row Name 02/11/23 1024             Time Calculation    Start Time 0825  -FREDY      PT Received On 02/11/23  -FREDY      PT Goal Re-Cert Due Date 02/21/23  -FREDY         Time Calculation- PT    Total Timed Code Minutes- PT 8 minute(s)  -FREDY         Timed Charges    62056 - Gait Training Minutes  8  -FREDY         Untimed Charges    PT Eval/Re-eval Minutes 53  -FREDY         Total Minutes    Timed Charges Total Minutes 8  -FREDY      Untimed Charges Total Minutes 53  -FREDY       Total Minutes 61  -FREDY            User Key  (r) = Recorded By, (t) = Taken By, (c) = Cosigned By    Initials Name Provider Type    Rocio Reid, PT Physical Therapist              Therapy Charges for Today     Code Description Service Date Service Provider Modifiers Qty    17008918246 HC GAIT TRAINING EA 15 MIN 2/11/2023 Rocio Saenz, PT GP 1    70300092430 HC PT EVAL MOD COMPLEXITY 4 2/11/2023 Rocio Saenz, PT GP 1          PT G-Codes  Outcome Measure Options: AM-PAC 6 Clicks Basic Mobility (PT)  AM-PAC 6 Clicks Score (PT): 18  AM-PAC 6 Clicks Score (OT): 16  PT Discharge Summary  Anticipated Discharge Disposition (PT): inpatient rehabilitation facility    Rocio Saenz PT  2/11/2023

## 2023-02-11 NOTE — PROGRESS NOTES
" LOS: 1 day   Patient Care Team:  Antonio Castro MD as PCP - General  Antonio Castro MD as PCP - Family Medicine    Chief Complaint: Acute kidney injury     Subjective       Subjective:  Symptoms:  Improved.  No shortness of breath, chest pain or chest pressure.    Diet:  Adequate intake.        History taken from: patient    Objective     Vital Sign Min/Max for last 24 hours  Temp  Min: 97.3 °F (36.3 °C)  Max: 98.3 °F (36.8 °C)   BP  Min: 98/49  Max: 131/61   Pulse  Min: 69  Max: 82   Resp  Min: 14  Max: 16   SpO2  Min: 95 %  Max: 100 %   No data recorded   Weight  Min: 72.6 kg (160 lb)  Max: 72.6 kg (160 lb)     Flowsheet Rows    Flowsheet Row First Filed Value   Admission Height 172.7 cm (67.99\") Documented at 02/09/2023 1944   Admission Weight 72.6 kg (160 lb) Documented at 02/09/2023 1944          No intake/output data recorded.  I/O last 3 completed shifts:  In: 300 [P.O.:300]  Out: 55 [Urine:55]    Objective:  General Appearance:  Comfortable.    Vital signs: (most recent): Blood pressure 98/49, pulse 69, temperature 97.6 °F (36.4 °C), temperature source Axillary, resp. rate 16, height 170 cm (66.93\"), weight 72.6 kg (160 lb), SpO2 97 %.  Vital signs are normal.    Output: Minimal urine output.    Lungs:  Normal effort and normal respiratory rate.  Breath sounds clear to auscultation.  He is not in respiratory distress.  No decreased breath sounds or wheezes.    Heart: Normal rate.  Regular rhythm.  S1 normal and S2 normal.  No murmur.   Abdomen: Abdomen is soft and distended.  There are signs of ascites.   Extremities: There is no dependent edema or local swelling.    Neurological: Patient is alert and oriented to person, place and time.    Pupils:  Pupils are equal, round, and reactive to light.              Results Review:     I reviewed the patient's new clinical results.    WBC WBC   Date Value Ref Range Status   02/11/2023 5.90 3.40 - 10.80 10*3/mm3 Final   02/11/2023 5.16 3.40 - 10.80 10*3/mm3 " Final   02/10/2023 9.41 3.40 - 10.80 10*3/mm3 Final   02/09/2023 9.83 3.40 - 10.80 10*3/mm3 Final      HGB Hemoglobin   Date Value Ref Range Status   02/11/2023 7.8 (L) 13.0 - 17.7 g/dL Final   02/11/2023 7.2 (L) 13.0 - 17.7 g/dL Final   02/10/2023 9.5 (L) 13.0 - 17.7 g/dL Final   02/09/2023 9.7 (L) 13.0 - 17.7 g/dL Final      HCT Hematocrit   Date Value Ref Range Status   02/11/2023 23.6 (L) 37.5 - 51.0 % Final   02/11/2023 21.6 (L) 37.5 - 51.0 % Final   02/10/2023 28.9 (L) 37.5 - 51.0 % Final   02/09/2023 28.5 (L) 37.5 - 51.0 % Final      Platlets No results found for: LABPLAT   MCV MCV   Date Value Ref Range Status   02/11/2023 104.0 (H) 79.0 - 97.0 fL Final   02/11/2023 102.4 (H) 79.0 - 97.0 fL Final   02/10/2023 103.6 (H) 79.0 - 97.0 fL Final   02/09/2023 102.5 (H) 79.0 - 97.0 fL Final          Sodium Sodium   Date Value Ref Range Status   02/11/2023 138 136 - 145 mmol/L Final   02/10/2023 137 136 - 145 mmol/L Final   02/09/2023 134 (L) 136 - 145 mmol/L Final   02/09/2023 134 (L) 136 - 145 mmol/L Final      Potassium Potassium   Date Value Ref Range Status   02/11/2023 4.6 3.5 - 5.2 mmol/L Final   02/10/2023 5.1 3.5 - 5.2 mmol/L Final   02/09/2023 5.2 3.5 - 5.2 mmol/L Final     Comment:     Slight hemolysis detected by analyzer. Results may be affected.   02/09/2023 6.0 (H) 3.5 - 5.2 mmol/L Final      Chloride Chloride   Date Value Ref Range Status   02/11/2023 105 98 - 107 mmol/L Final   02/10/2023 103 98 - 107 mmol/L Final   02/09/2023 102 98 - 107 mmol/L Final   02/09/2023 101 98 - 107 mmol/L Final      CO2 CO2   Date Value Ref Range Status   02/11/2023 16.0 (L) 22.0 - 29.0 mmol/L Final   02/10/2023 14.0 (L) 22.0 - 29.0 mmol/L Final   02/09/2023 14.0 (L) 22.0 - 29.0 mmol/L Final   02/09/2023 16.0 (L) 22.0 - 29.0 mmol/L Final      BUN BUN   Date Value Ref Range Status   02/11/2023 83 (H) 8 - 23 mg/dL Final   02/10/2023 73 (H) 8 - 23 mg/dL Final   02/09/2023 71 (H) 8 - 23 mg/dL Final   02/09/2023 72 (H) 8 - 23  mg/dL Final      Creatinine Creatinine   Date Value Ref Range Status   02/11/2023 5.60 (H) 0.76 - 1.27 mg/dL Final   02/10/2023 4.79 (H) 0.76 - 1.27 mg/dL Final   02/09/2023 4.56 (H) 0.76 - 1.27 mg/dL Final   02/09/2023 4.61 (H) 0.76 - 1.27 mg/dL Final      Calcium Calcium   Date Value Ref Range Status   02/11/2023 9.2 8.6 - 10.5 mg/dL Final   02/10/2023 9.7 8.6 - 10.5 mg/dL Final   02/09/2023 9.6 8.6 - 10.5 mg/dL Final   02/09/2023 9.6 8.6 - 10.5 mg/dL Final      PO4 No results found for: CAPO4   Albumin Albumin   Date Value Ref Range Status   02/11/2023 3.0 (L) 3.5 - 5.2 g/dL Final   02/10/2023 2.9 (L) 3.5 - 5.2 g/dL Final   02/09/2023 3.0 (L) 3.5 - 5.2 g/dL Final      Magnesium Magnesium   Date Value Ref Range Status   02/10/2023 2.1 1.6 - 2.4 mg/dL Final   02/09/2023 2.0 1.6 - 2.4 mg/dL Final      Uric Acid No results found for: URICACID     Medication Review: yes    Assessment & Plan       Acute renal failure, unspecified acute renal failure type (HCC)    Metabolic encephalopathy    Metabolic acidosis    Elevated troponin    PAOLO (acute kidney injury) (HCC)    Hyponatremia    Hyperkalemia    Liver cirrhosis secondary to HAYES (HCC)    Hyperbilirubinemia    Anemia, chronic disease    Hyperammonemia (HCC)      Assessment & Plan     Acute kidney injury: Baseline cr ~ 0.8-1.2mg/dl. Cr on this admission 4.5-4.7mg/dl BUN 72. UA trace ketones, blood and protein. CT didn't show any hydro. Urine Na<20 Urine cl 35     Met acidosis; Due to PAOLO and GI bicarb loss     Hyperkalemia: Due to PAOLO. On admission improved.     AMS: Hepatic encephalopathy     HAYES: Decompensated liver cirrhosis     Ascites: moderate volume ascites.      Hypotension: in the setting of decompensated liver disease     Hyponatremia: improved.        Recs   Acute kidney injury likely hemodynamic variation in renal function. Other possibility includes hepato-renal syndrome vs ATN. So far no improvement tin renal function. Will increase midodrine to 15 mg  TID. Agree with adding octerotide. Target SBP>120. Paracentesis to relieve abdominal pressure. Gallegos cath for 24hr for strict I/O. Discussed with family at bedside about worsening renal function and severe limitation of dialysis in HRS if there is no definitive plan for liver txp. Family will discuss with Hepatology team regarding options.     Sergei العلي MD  02/11/23  12:46 EST

## 2023-02-11 NOTE — DISCHARGE SUMMARY
[facility] Medicine Services  DISCHARGE SUMMARY    Patient Name: Leoncio Hopson  : 1950  MRN: [id number]    Date of Admission: 2023  7:39 PM  Date of Discharge:  2023  Primary Care Physician: Antonio Castro MD    Consults     Date and Time Order Name Status Description    2/10/2023  4:41 AM Inpatient Gastroenterology Consult Completed     2/10/2023  4:41 AM Inpatient Nephrology Consult      2/10/2023 12:34 AM Inpatient Nephrology Consult            Hospital Course     Presenting Problem:   Altered mental status [R41.82]  Acute renal failure, unspecified acute renal failure type (HCC) [N17.9]    Active Hospital Problems    Diagnosis  POA   • **Acute renal failure, unspecified acute renal failure type (HCC) [N17.9]  Yes   • Other ascites [R18.8]  Yes   • Coagulopathy (HCC) [D68.9]  Yes   • Thrombocytopenia (HCC) [D69.6]  No   • Decompensated hepatic cirrhosis (HCC) [K72.90, K74.60]  Yes   • Encephalopathy, hepatic [K76.82]  Yes   • Metabolic encephalopathy [G93.41]  Yes   • Metabolic acidosis [E87.20]  Yes   • Elevated troponin [R77.8]  Yes   • PAOLO (acute kidney injury) (HCC) [N17.9]  Yes   • Hyponatremia [E87.1]  Yes   • Hyperkalemia [E87.5]  Yes   • Liver cirrhosis secondary to HAYES (HCC) [K75.81, K74.60]  Yes   • Hyperbilirubinemia [E80.6]  Yes   • Anemia, chronic disease [D63.8]  Yes   • Hyperammonemia (HCC) [E72.20]  Yes      Resolved Hospital Problems   No resolved problems to display.          Hospital Course:  Leoncio Hopson is a 72 y.o. male  with HAYES cirrhosis with associated recurrent pleural effusion, HTN, chronic liver lesions, DM2, chronic anemia, who presented to the ED with complaint of altered mental status who was found to have concern for PAOLO, metabolic acidosis, hyperkalemia, hyperammonemia. Family report this was a sudden decompensation, but upon chart review, patient has had at least 3 monthly thoracenteses for hepatic hydrothorax, indicating  decompensated cirrhosis and also progressively worsening renal function/CKD with Cr up to 1.75 prior to admission. Both of these things indicate a progressive decline prior to admission.     Discussed overall poor prognosis with family on 2/11 with Dr. العلي present. All questions were answered.     This patient's problems and plans were partially entered by my partner and updated as appropriate by me 02/14/23.     Hepatic encephalopathy, improved  Decompensated De Leon cirrhosis with associated ascites (SAAG >1.1), hepatic hydrothorax, coagulopathy, macrocytic anemia, thrombocytopenia, hypoalbuminemia, hyperbilirubinemia, acidosis, hepatic encephalopathy  MELD-Na 28 on admission, Child-Ravi class C  Chronic liver lesions  Esophageal varices  Portal hypertension  Hematochezia  - Known DE LEON cirrhosis, follows outpatient with GI at   - Consulted GI  -- s/p Lactulose enema x1  - Lactulose 30 g 3 times daily   - Fall precautions  - Duplex ultrasound done on 2/10, not concerning for PVT  - Rifaximin 550 mg twice daily  - Diagnostic paracentesis done on 2/10 with removal of 50mL of fluid; ANC 36, albumin 0.3, protein <1; per IR attending, there was minimal ascites on the paracentesis; low suspicion for abdominal compartment syndrome  - Trended lactic acid, currently downtrending  - Hold Lasix and spironolactone for now given poor renal function  - Off propanolol  -- Contacted [facility] to see about transfer for hepatology/liver transplant evaluation (age cut off of 73)  -- On 2/11, spoke with Dr. Noe Bruno from [facility] transfer services, he requested COVID screen and UDS; Dr. Bruno stated patient was excepted for transfer, but said that transfer could take several days to a week at the least.  -- IV PPI daily  --IV vitamin K given on 2/11 and 2/12  --Discussed with JOSE Caceres last on 2/13; no plans for EGD or colonoscopy this admission per GI  --Has low protein ascites and renal dysfunction, along with Child-Ravi class C; recommend  continued SBP ppx (currently on CTX once daily)  --Transfer to  via EMS at 8am; family and patient aware; patient OK with transfer     Lactic acidosis, secondary to above  Elevated procal   - Follow-up urine culture, no growth to date; UA with squamous epithelial cells, concern for possible contamination  - Follow-up blood cultures, currently no growth  -- Follow-up cultures from paracentesis, currently no growth    Non-olguric ARF, likely due to HRS on CKD  - Creatinine recently increased (daughter has lab results from hematology in [place] with most recent creatinine 1.5-1.8)  -- Urine Na < 20  -- Other differential: ATN, medication-induced  - IVF bolus x1 given in the ED  - Albumin, octreotide (dose increased to 200mcg TID), midodrine (dose increased to 15mg TID)  -- Making small amount of urine, but increasing; ~55mL from straight cath on 2/10; placed abdul on 2/11 for 24h monitoring of UOP per renal and had 150mL UOP (subsequently removed after 24h period); 2/12: 375mL UOP; 2/13: 200mL UOP plus 3 unmeasured voids  - Renal consulted    Worsening anemia  Worsening thrombocytopenia  -- All cell lines have dropped compared to 2/10  -- Not on heparin  -- Transfused 1 unit RBC on 2/13 for Hgb 6.7, which patient and wife are agreeable to  -- Repeat H&H showed hemoglobin 7.5 on 2/13  - Follows outpatient with hematology in [place]     Hyperkalemia, resolved  - Albuterol, dextrose/insulin, and Lokelma given in the ED  - CMP in the a.m.     Elevated troponin  - Delta -5, likely related to demand from above and also acute kidney injury  - EKG without acute ischemia     Right pleural effusion  Hepatic hydrothorax  - Has been ongoing with most recent thoracentesis at  on 1/25/2023 negative for malignant growth  -- Gets monthly thoracentesis  - Thought to be secondary to cirrhosis    Diabetes mellitus 2  - Hold glimepiride and Actos  - FSBG 3 times daily  - SS insulin  - Hemoglobin A1c 6.1%     Hypertension  - Hold  "losartan secondary to PAOLO    Discharge Follow Up Recommendations for outpatient labs/diagnostics:  1. Follow-up with primary care doctor within 5 to 7 days regarding this hospitalization and for repeat lab work (CBC, CMP, INR)  2. Follow-up with nephrology within 1 to 2 weeks regarding your renal dysfunction  3. Follow-up with your hematologist as scheduled in [facility]  4. Follow-up with  hepatology as per    Rest of follow-up per   5. Would benefit from SBP ppx on discharge from  if still qualifies based on labs, Child-Ravi, but defer to     Day of Discharge     HPI:   Patient with improved mentation.  Having bowel movements.  Son at bedside.  Denied pain. Has hearing aid in, but still hard of hearing.     Review of Systems  Gen- No fevers, chills  CV- No chest pain, palpitations  Resp- No cough, dyspnea  GI- No N/V/D, abd pain    Vital Signs:   Temp:  [97.5 °F (36.4 °C)-98.6 °F (37 °C)] 98.1 °F (36.7 °C)  Heart Rate:  [61-85] 71  Resp:  [16-18] 16  BP: (104-138)/(48-85) 129/58  Flow (L/min):  [2] 2      Physical Exam:  Constitutional: Laying in bed, NAD, alert  HENT: NCAT, mucous membranes moist, no intraoral lesions  Respiratory: Clear to auscultation bilaterally, respiratory effort normal   Cardiovascular: RRR, no murmurs, rubs, or gallops  Gastrointestinal: Normoactive bowel sounds, soft, nontender, mildly distended  Musculoskeletal: No bilateral ankle edema  Psychiatric: calm, smiling  Neurologic: PERRL, symmetric facies, symmetric palate rise, tongue midline, no asterixis, oriented to self, [facility], 2023, knows it is Tuesday, he is in the hospital (for \"liver problems\")  Skin: No rashes, several scabs on his abdomen      Pertinent  and/or Most Recent Results     LAB RESULTS:      Lab 02/14/23  0338 02/13/23  1824 02/13/23  0508 02/12/23  0848 02/11/23  1127 02/11/23  0506 02/10/23  2040 02/10/23  1232 02/10/23  0655 02/10/23  0301 02/09/23  2317 02/09/23 2012   WBC 4.21  --  4.94 " 8.94 5.90 5.16  --   --   --  9.41  --  9.83   HEMOGLOBIN 7.2* 7.5* 6.7* 7.4* 7.8* 7.2*  --   --   --  9.5*  --  9.7*   HEMATOCRIT 21.1* 22.6* 20.0* 22.7* 23.6* 21.6*  --   --   --  28.9*  --  28.5*   PLATELETS 53*  --  59* 84* 90* 77*  --   --   --  144  --  144   NEUTROS ABS  --   --   --   --   --  2.80  --   --   --  5.70  --  5.90   IMMATURE GRANS (ABS)  --   --   --   --   --  0.04  --   --   --  0.08*  --  0.10*   LYMPHS ABS  --   --   --   --   --  0.76  --   --   --  1.00  --  1.07   MONOS ABS  --   --   --   --   --  0.39  --   --   --  0.78  --  0.71   EOS ABS  --   --   --   --   --  1.13*  --   --   --  1.76*  --  1.94*   .0*  --  103.6* 103.2* 104.0* 102.4*  --   --   --  103.6*  --  102.5*   PROCALCITONIN  --   --   --   --   --   --   --   --   --   --   --  0.79*   LACTATE  --   --   --   --   --   --  2.8* 3.6* 5.4* 6.7* 6.7* 5.3*   PROTIME 22.6*  --  22.5* 24.9*  --  20.2*  --   --   --  17.8*  --  17.4*         [facility] 02/14/23  0338 02/13/23  0508 02/12/23  0848 02/11/23  0506 02/10/23  0301 02/09/23  2247 02/09/23 2012   SODIUM 138 137 136 138 137   < > 134*   POTASSIUM 3.8 3.9 4.2 4.6 5.1   < > 6.0*   CHLORIDE 104 102 103 105 103   < > 101   CO2 20.0* 21.0* 18.0* 16.0* 14.0*   < > 16.0*   ANION GAP 14.0 14.0 15.0 17.0* 20.0*   < > 17.0*   BUN 78* 81* 84* 83* 73*   < > 72*   CREATININE 5.39* 5.02* 5.76* 5.60* 4.79*   < > 4.61*   EGFR 10.5* 11.4* 9.7* 10.0* 12.1*   < > 12.6*   GLUCOSE 164* 92 243* 104* 113*   < > 104*   CALCIUM 9.3 9.3 9.1 9.2 9.7   < > 9.6   MAGNESIUM  --   --   --   --  2.1  --  2.0   PHOSPHORUS  --   --   --   --  3.5  --   --    HEMOGLOBIN A1C  --   --   --   --  6.10*  --   --    TSH  --   --   --   --   --   --  1.600    < > = values in this interval not displayed.         Lab 02/14/23  0338 02/13/23  0508 02/12/23  0848 02/11/23  0506 02/10/23  0301   TOTAL PROTEIN 6.0 5.7* 5.7* 5.6* 6.5   ALBUMIN 4.0 3.8 3.4* 3.0* 2.9*   GLOBULIN 2.0 1.9 2.3 2.6 3.6   ALT (SGPT) 12  10 14 17 20   AST (SGOT) 21 21 26 27 40   BILIRUBIN 1.8* 1.2 1.4* 1.4* 2.3*   ALK PHOS 71 57 61 76 119*         Lab 02/14/23  0338 02/13/23  0508 02/12/23  0848 02/11/23  0506 02/10/23  0301 02/09/23  2247 02/09/23 2012   HSTROP T  --   --   --   --   --  40* 45*   PROTIME 22.6* 22.5* 24.9* 20.2* 17.8*  --  17.4*   INR 1.98* 1.98* 2.24* 1.72* 1.47*  --  1.43*             Lab 02/11/23  1622   ABO TYPING O   RH TYPING Positive   ANTIBODY SCREEN Negative         Brief Urine Lab Results  (Last result in the past 365 days)      Color   Clarity   Blood   Leuk Est   Nitrite   Protein   CREAT   Urine HCG        02/10/23 1831             223.0             Microbiology Results (last 10 days)     Procedure Component Value - Date/Time    COVID PRE-OP / PRE-PROCEDURE SCREENING ORDER (NO ISOLATION) - Swab, Nasopharynx [[id number]]  (Normal) Collected: 02/11/23 1605    Lab Status: Final result Specimen: Swab from Nasopharynx Updated: 02/11/23 1655    Narrative:      The following orders were created for panel order COVID PRE-OP / PRE-PROCEDURE SCREENING ORDER (NO ISOLATION) - Swab, Nasopharynx.  Procedure                               Abnormality         Status                     ---------                               -----------         ------                     COVID-19, FLU A/B, RSV P...[[id number]]  Normal              Final result                 Please view results for these tests on the individual orders.    COVID-19, FLU A/B, RSV PCR - Swab, Nasopharynx [[id number]]  (Normal) Collected: 02/11/23 1605    Lab Status: Final result Specimen: Swab from Nasopharynx Updated: 02/11/23 1655     COVID19 Not Detected     Influenza A PCR Not Detected     Influenza B PCR Not Detected     RSV, PCR Not Detected    Narrative:      Fact sheet for providers: https://www.fda.gov/media/119353/download    Fact sheet for patients: https://www.fda.gov/media/389231/download    Test performed by PCR.    Body Fluid Culture - Body Fluid, Peritoneum  [[id number]] Collected: 02/10/23 1422    Lab Status: Final result Specimen: Body Fluid from Peritoneum Updated: 02/13/23 0850     Body Fluid Culture No growth at 3 days     Gram Stain No WBCs or organisms seen    Anaerobic Culture - Body Fluid, Peritoneum [[id number]]  (Normal) Collected: 02/10/23 1422    Lab Status: Preliminary result Specimen: Body Fluid from Peritoneum Updated: 02/13/23 0820     Anaerobic Culture No anaerobes isolated at 3 days    Blood Culture - Blood, Hand, Left [[id number]]  (Normal) Collected: 02/10/23 0039    Lab Status: Preliminary result Specimen: Blood from Hand, Left Updated: 02/14/23 0200     Blood Culture No growth at 4 days    Blood Culture - Blood, Hand, Right [[id number]]  (Normal) Collected: 02/10/23 0039    Lab Status: Preliminary result Specimen: Blood from Hand, Right Updated: 02/14/23 0200     Blood Culture No growth at 4 days    Urine Culture - Urine, Straight Cath [[id number]]  (Normal) Collected: 02/09/23 2010    Lab Status: Final result Specimen: Urine from Straight Cath Updated: 02/11/23 1024     Urine Culture No growth          CT Abdomen Pelvis Without Contrast    Result Date: 2/10/2023  EXAMINATION: CT SCAN OF THE CHEST, ABDOMEN AND PELVIS WITHOUT INTRAVENOUS CONTRAST DATE OF EXAM: 2/9/2023 11:30 PM SLOT:  60  HISTORY: Shortness of breath and abdominal distention. COMPARISON: None. TECHNIQUE: CT examination of the chest, abdomen and pelvis was performed without intravenous contrast. CT dose lowering techniques were used, to include: automated exposure control, adjustment for patient size, and/or use of iterative reconstruction. Note: The exam is limited because some types of pathology may not be adequately demonstrated due to lack of contrast enhancement. FINDINGS: CHEST: Lungs:  Passive atelectasis in the right lung. Pleura: Moderate right pleural effusion. No pneumothorax. Mediastinum And Griselda: No suspicious lymphadenopathy. Cardiovascular: Coronary atherosclerosis. Chest  Wall:  Normal. ABDOMEN/PELVIS: Liver: Cirrhotic liver morphology. Limited evaluation for focal lesions due to lack of intravenous contrast and artifacts related to overlying lead wires. Gallbladder/Billary: Cholelithiasis. Pancreas: Mildly atrophic. Spleen: Borderline enlarged. Calcified granulomata. Adrenal Glands: Normal. Kidneys: Right renal cyst. GI Tract: Mild diverticulosis without evidence of acute diverticulitis. Appendix: Not identified. No localized inflammatory changes present at the base of cecum. Mesentery/Peritoneum: Moderate volume abdominopelvic ascites. Vasculature: Scattered atherosclerotic vascular calcifications. No abdominal aortic aneurysm. Mild splenorenal shunting suggested. Lymph Nodes: No suspicious lymphadenopathy. Abdominal Wall: Normal. Bladder: Normal. Reproductive: Normal. Musculoskeletal: Bilateral L5 spondylolysis with grade 1 anterolisthesis of L5 over S1. No acute abnormality.     CT CHEST: 1.  Moderate right pleural effusion with associated atelectasis.  CT ABDOMEN/PELVIS: 1.  Cirrhosis with stigmata of portal hypertension. 2.  Moderate volume ascites. 3.  Cholelithiasis. Electronically signed by:  Troy Belcher M.D.  2/9/2023 10:22 PM Mountain Time    CT Head Without Contrast    Result Date: 2/9/2023  CT HEAD WO CONTRAST Date of Exam: 2/9/2023 9:31 PM EST Indication: Altered mental status. Comparison: None available. Technique: Axial CT images were obtained of the head without contrast administration.  Reconstructed coronal and sagittal images were also obtained. Automated exposure control and iterative construction methods were used. Findings: There is mild diffuse generalized atrophy. There is low attenuation in the periventricular white matter consistent with chronic microvascular ischemic change. There is no acute hemorrhage. There is no mass or mass effect. There are no abnormal extra-axial fluid collections. The paranasal sinuses are clear.     Impression: Mild  generalized atrophy and chronic microvascular ischemia. No acute intracranial process. Electronically Signed: Abhishek Reyes  2/9/2023 9:43 PM EST  Workstation ID: [id number]    CT Chest Without Contrast Diagnostic    Result Date: 2/10/2023  EXAMINATION: CT SCAN OF THE CHEST, ABDOMEN AND PELVIS WITHOUT INTRAVENOUS CONTRAST DATE OF EXAM: 2/9/2023 11:30 PM SLOT:  60  HISTORY: Shortness of breath and abdominal distention. COMPARISON: None. TECHNIQUE: CT examination of the chest, abdomen and pelvis was performed without intravenous contrast. CT dose lowering techniques were used, to include: automated exposure control, adjustment for patient size, and/or use of iterative reconstruction. Note: The exam is limited because some types of pathology may not be adequately demonstrated due to lack of contrast enhancement. FINDINGS: CHEST: Lungs:  Passive atelectasis in the right lung. Pleura: Moderate right pleural effusion. No pneumothorax. Mediastinum And Griselda: No suspicious lymphadenopathy. Cardiovascular: Coronary atherosclerosis. Chest Wall:  Normal. ABDOMEN/PELVIS: Liver: Cirrhotic liver morphology. Limited evaluation for focal lesions due to lack of intravenous contrast and artifacts related to overlying lead wires. Gallbladder/Billary: Cholelithiasis. Pancreas: Mildly atrophic. Spleen: Borderline enlarged. Calcified granulomata. Adrenal Glands: Normal. Kidneys: Right renal cyst. GI Tract: Mild diverticulosis without evidence of acute diverticulitis. Appendix: Not identified. No localized inflammatory changes present at the base of cecum. Mesentery/Peritoneum: Moderate volume abdominopelvic ascites. Vasculature: Scattered atherosclerotic vascular calcifications. No abdominal aortic aneurysm. Mild splenorenal shunting suggested. Lymph Nodes: No suspicious lymphadenopathy. Abdominal Wall: Normal. Bladder: Normal. Reproductive: Normal. Musculoskeletal: Bilateral L5 spondylolysis with grade 1 anterolisthesis of L5 over S1. No  acute abnormality.     CT CHEST: 1.  Moderate right pleural effusion with associated atelectasis.  CT ABDOMEN/PELVIS: 1.  Cirrhosis with stigmata of portal hypertension. 2.  Moderate volume ascites. 3.  Cholelithiasis. Electronically signed by:  Troy Belcher M.D.  2/9/2023 10:22 PM Mountain Time    XR Chest 1 View    Result Date: 2/9/2023  XR CHEST 1 VW Date of Exam: 2/9/2023 8:40 PM EST Indication: Weak/Dizzy/AMS triage protocol. Comparison: May 24, 2015 Findings: The heart is enlarged. There is mild pulmonary vascular congestion. Density is seen over the right lower chest consistent with a layering pleural effusion. There is right basilar atelectasis. The left lung is clear.     Impression: Layering right pleural effusion. Right basilar atelectasis. Electronically Signed: Abhishek Reyes  2/9/2023 9:19 PM EST  Workstation ID: [id number]    US Paracentesis    Result Date: 2/10/2023  US PARACENTESIS-                                                         History: DIAGNOSTIC PARACENTESIS ONLY; only remove enough fluid for fluid studies; concern for SBP; N17.9-Acute kidney failure, unspecified; E87.5-Hyperkalemia; E72.20-Disorder of urea cycle metabolism, unspecified; R41.0-Disorientation, unspecified; D64.9-Anemia, unspecified    : Jamar Dickson MD.  Assistant:  None.                                                                             Modality: Ultrasonography                                                                                                          Sedation: None.  Anesthesia: Lidocaine, local infiltration.           Estimated blood loss:  < 5 cc.          Technique: Discussion of risks, benefits, and alternatives were made with the patient. The patient expressed understanding and agreed to proceed. A written informed written consent was obtained. A universal timeout was performed prior to starting the procedure.  A preliminary ultrasonography was performed to assess the target  and determine a safe access site. It showed ascites. Pertinent ultrasound images were stored to the PACS for documentation.  Maximal sterile precautions were utilized. The procedure room personnel used personal protective equipment. The operators additionally used sterile surgical gloves. The access site was sterilely prepped and draped. Local anesthesia was administered. A dermatotomy was performed if needed. A catheter over the needle system was advanced into the peritoneal cavity. Straw colored fluid was aspirated and the plastic catheter advanced into the peritoneum. The catheter was then connected to a fluid recovery system. At the end of the procedure, the catheter was withdrawn and an aseptic dressing applied. The patient tolerated the procedure well.  After uneventful recovery recovery, the patient was discharged from the department in stable condition.  The total amount of fluid recovered is given below.  Albumin was infused intravenously if ordered by the referring doctor.  Complications: None immediate.  Specimen: The specimen was labeled and sent to the lab in appropriate carriers & containers if such was requested by the ordering provider.                                                                 Impression:                                                              Successful ultrasound-guided paracentesis using a Right upper quadrant access with recovery of 50 cc of fluid for diagnostic purposes as described above.  Thank you for the opportunity to assist in the care of your patient.  This report was finalized on 2/10/2023 8:56 PM by Jamar Dickson MD.      Duplex Portal Hepatic Complete CAR    Result Date: 2/13/2023  DUPLEX PORTAL HEPATIC COMPLETE CAR Date of Exam: 2/10/2023 10:25 AM EST Indication: 72-year-old male with hepatic cirrhosis and possible portal vein thrombosis. Comparison: No comparisons available. Technique: Color-flow duplex hepatic ultrasonography is accomplished as well as  limited evaluation of the left upper quadrant region. Findings: The liver demonstrates increased echogenicity and decreased size, consistent with probable cirrhosis. There is a moderate amount of ascites present. The main portal vein is patent with hepatopedal direction blood flow and a velocity of 38 cm/s. The main portal vein bifurcation along with the right and left portal veins appear to be patent. The right-sided middle hepatic veins are patent with appropriate hepatofugal direction blood flow. The IVC in its proximal portion is patent as is the abdominal aorta  which measures approximate 2.7 cm in AP dimension on sagittal images. As best can be determined, the superior mesenteric vein appears patent splenic vein is patent in the splenic hilum. The spleen is borderline enlarged at 12.5 cm length. The adjacent right hepatic artery appears patent as do the right and left     Impression: 1. Patent main portal vein as well as the left and right portal veins with hepatopedal direction blood flow. 2. Patent right and middle hepatic veins. 3. Patent superior mesenteric vein and splenic vein and the splenic hilar region. 4. Coarsened echotexture of the liver, consistent with cirrhosis. 5. Moderate amount of ascites. #6 additional findings as described above in detail. Electronically Signed: Jameson Wilkinson  2/13/2023 9:15 AM EST  Workstation ID: [id number]      Results for orders placed during the hospital encounter of 02/09/23    Duplex Portal Hepatic Complete CAR    Interpretation Summary  DUPLEX PORTAL HEPATIC COMPLETE CAR    Date of Exam: 2/10/2023 10:25 AM EST    Indication: 72-year-old male with hepatic cirrhosis and possible portal vein thrombosis.    Comparison: No comparisons available.    Technique: Color-flow duplex hepatic ultrasonography is accomplished as well as limited evaluation of the left upper quadrant region.    Findings:  The liver demonstrates increased echogenicity and decreased size, consistent  with probable cirrhosis. There is a moderate amount of ascites present. The main portal vein is patent with hepatopedal direction blood flow and a velocity of 38 cm/s. The main  portal vein bifurcation along with the right and left portal veins appear to be patent. The right-sided middle hepatic veins are patent with appropriate hepatofugal direction blood flow. The IVC in its proximal portion is patent as is the abdominal aorta  which measures approximate 2.7 cm in AP dimension on sagittal images.  As best can be determined, the superior mesenteric vein appears patent splenic vein is patent in the splenic hilum. The spleen is borderline enlarged at 12.5 cm length. The adjacent right hepatic artery appears patent as do the right and left    Impression  Impression:    1. Patent main portal vein as well as the left and right portal veins with hepatopedal direction blood flow.  2. Patent right and middle hepatic veins.  3. Patent superior mesenteric vein and splenic vein and the splenic hilar region.  4. Coarsened echotexture of the liver, consistent with cirrhosis.  5. Moderate amount of ascites. #6 additional findings as described above in detail.    Electronically Signed: Jameson Wilkinson  2/13/2023 9:15 AM EST  Workstation ID: [id number]      Results for orders placed during the hospital encounter of 02/09/23    Duplex Portal Hepatic Complete CAR    Interpretation Summary  DUPLEX PORTAL HEPATIC COMPLETE CAR    Date of Exam: 2/10/2023 10:25 AM EST    Indication: 72-year-old male with hepatic cirrhosis and possible portal vein thrombosis.    Comparison: No comparisons available.    Technique: Color-flow duplex hepatic ultrasonography is accomplished as well as limited evaluation of the left upper quadrant region.    Findings:  The liver demonstrates increased echogenicity and decreased size, consistent with probable cirrhosis. There is a moderate amount of ascites present. The main portal vein is patent with hepatopedal  direction blood flow and a velocity of 38 cm/s. The main  portal vein bifurcation along with the right and left portal veins appear to be patent. The right-sided middle hepatic veins are patent with appropriate hepatofugal direction blood flow. The IVC in its proximal portion is patent as is the abdominal aorta  which measures approximate 2.7 cm in AP dimension on sagittal images.  As best can be determined, the superior mesenteric vein appears patent splenic vein is patent in the splenic hilum. The spleen is borderline enlarged at 12.5 cm length. The adjacent right hepatic artery appears patent as do the right and left    Impression  Impression:    1. Patent main portal vein as well as the left and right portal veins with hepatopedal direction blood flow.  2. Patent right and middle hepatic veins.  3. Patent superior mesenteric vein and splenic vein and the splenic hilar region.  4. Coarsened echotexture of the liver, consistent with cirrhosis.  5. Moderate amount of ascites. #6 additional findings as described above in detail.    Electronically Signed: Jameson Wilkinson  2/13/2023 9:15 AM EST  Workstation ID: [id number]          Plan for Follow-up of Pending Labs/Results: PCP  Pending Labs     Order Current Status    Anaerobic Culture - Body Fluid, Peritoneum Preliminary result    Blood Culture - Blood, Hand, Left Preliminary result    Blood Culture - Blood, Hand, Right Preliminary result        Discharge Details        Discharge Medications      New Medications      Instructions Start Date   cefTRIAXone  Commonly known as: ROCEPHIN   2 g, Intravenous, Every 24 Hours Scheduled      lactulose 10 GM/15ML solution  Commonly known as: CHRONULAC   30 g, Oral, 2 Times Daily      midodrine 5 MG tablet  Commonly known as: PROAMATINE   15 mg, Oral, 3 Times Daily Before Meals      octreotide 100 MCG/ML injection  Commonly known as: sandoSTATIN   200 mcg, Intravenous, 3 Times Daily      pantoprazole 40 MG injection  Commonly known  "as: PROTONIX   40 mg, Intravenous, Every Early Morning      riFAXIMin 550 MG tablet  Commonly known as: XIFAXAN   550 mg, Oral, Every 12 Hours Scheduled      sodium bicarbonate 650 MG tablet   1,300 mg, Oral, 3 Times Daily         Continue These Medications      Instructions Start Date   Fergon 240 (27 FE) MG tablet  Generic drug: ferrous gluconate   1 tablet, Oral, Daily      fluticasone 50 MCG/ACT nasal spray  Commonly known as: FLONASE   2 sprays, Each Nare, Daily      folic acid 400 MCG tablet  Commonly known as: FOLVITE   400 mcg, Oral, Daily      ursodiol 300 MG capsule  Commonly known as: ACTIGALL   300 mg, Oral, 2 Times Daily         Stop These Medications    furosemide 20 MG tablet  Commonly known as: LASIX     glimepiride 1 MG tablet  Commonly known as: AMARYL     hydrOXYzine 25 MG tablet  Commonly known as: ATARAX     lisinopril 20 MG tablet  Commonly known as: PRINIVIL,ZESTRIL     losartan 50 MG tablet  Commonly known as: COZAAR     metFORMIN 1000 MG tablet  Commonly known as: GLUCOPHAGE     pioglitazone 45 MG tablet  Commonly known as: ACTOS     propranolol 10 MG tablet  Commonly known as: INDERAL     SIMVASTATIN PO     spironolactone 50 MG tablet  Commonly known as: ALDACTONE            Allergies   Allergen Reactions   • Contrast Dye (Echo Or Unknown Ct/Mr) Hives     Single hive in substernal/epigastric abdominal area.  Pt. states this rxn has occurred \"the last couple of times I had MRI contrast.   • Gadobutrol Unknown (See Comments)         Discharge Disposition:  TO     Diet:  Hospital:  Diet Order   Procedures   • Diet: Diabetic Diets, Renal Diets; Consistent Carbohydrate; Low Sodium (2-3g); Texture: Regular Texture (IDDSI 7); Fluid Consistency: Thin (IDDSI 0)       Activity:      Restrictions or Other Recommendations:  Up with assistance       CODE STATUS:    Code Status and Medical Interventions:   Ordered at: 02/09/23 4292     Code Status (Patient has no pulse and is not breathing):    CPR " (Attempt to Resuscitate)     Medical Interventions (Patient has pulse or is breathing):    Full Support       No future appointments.    Additional Instructions for the Follow-ups that You Need to Schedule     Discharge Follow-up with PCP   As directed       Currently Documented PCP:    Antonio Castro MD    PCP Phone Number:    [phone]     Follow Up Details: Follow-up with primary care doctor within 5 to 7 days regarding this hospitalization and for repeat lab work (CBC, CMP, INR)         Discharge Follow-up with Specialty: Follow-up with nephrology within 1 to 2 weeks regarding your renal dysfunction   As directed      Specialty: Follow-up with nephrology within 1 to 2 weeks regarding your renal dysfunction                     Tracie Sultana MD  02/14/23      Time Spent on Discharge:  I spent  50  minutes on this discharge activity which included: face-to-face encounter with the patient, reviewing the data in the system, coordination of the care with the nursing staff as well as consultants, documentation, and entering orders.

## 2023-02-11 NOTE — THERAPY EVALUATION
Patient Name: Leoncio Hopson  : 1950    MRN: [id number]                              Today's Date: 2023       Admit Date: 2023    Visit Dx:     ICD-10-CM ICD-9-CM   1. Acute renal failure, unspecified acute renal failure type (HCC)  N17.9 584.9   2. Hyperkalemia  E87.5 276.7   3. Hyperammonemia (HCC)  E72.20 270.6   4. Confusion  R41.0 298.9   5. Anemia, unspecified type  D64.9 285.9     Patient Active Problem List   Diagnosis   • Metabolic encephalopathy   • Metabolic acidosis   • Elevated troponin   • PAOLO (acute kidney injury) (HCC)   • Hyponatremia   • Hyperkalemia   • Liver cirrhosis secondary to HAYES (HCC)   • Hyperbilirubinemia   • Anemia, chronic disease   • Hyperammonemia (HCC)   • Acute renal failure, unspecified acute renal failure type (HCC)     Past Medical History:   Diagnosis Date   • Anemia    • Diabetes mellitus (HCC)    • Hypertension    • Liver cirrhosis secondary to HAYES (HCC)    • Liver lesion      Past Surgical History:   Procedure Laterality Date   • ANKLE SURGERY     • EYE SURGERY        General Information     Row Name 23          OT Time and Intention    Document Type evaluation  -AC     Mode of Treatment occupational therapy  -AC     Row Name 23          General Information    Patient Profile Reviewed yes  -AC     Prior Level of Function independent:;all household mobility;ADL's;driving  no AD  -AC     Existing Precautions/Restrictions fall  [place]  -AC     Barriers to Rehab hearing deficit  -AC     Row Name 23          Occupational Profile    Environmental Supports and Barriers (Occupational Profile) walk in shower with shower seat  -AC     Row Name 23          Living Environment    People in Home spouse  -AC     Row Name 23          Home Main Entrance    Number of Stairs, Main Entrance one  -AC     Stair Railings, Main Entrance none  -AC     Row Name 23          Stairs Within Home, Primary    Stairs, Within  Home, Primary 2 story - lives main level  -     Row Name 02/11/23 0817          Cognition    Orientation Status (Cognition) oriented to;person;place;disoriented to;time  knew he was in the hospital, but not name of hospital  -     Row Name 02/11/23 0817          Safety Issues, Functional Mobility    Safety Issues Affecting Function (Mobility) insight into deficits/self-awareness;safety precaution awareness  -     Impairments Affecting Function (Mobility) balance;endurance/activity tolerance;strength;cognition  -     Cognitive Impairments, Mobility Safety/Performance insight into deficits/self-awareness;safety precaution awareness  -           User Key  (r) = Recorded By, (t) = Taken By, (c) = Cosigned By    Initials Name Provider Type     Reyna Montelongo OT Occupational Therapist                 Mobility/ADL's     Row Name 02/11/23 0942          Bed Mobility    Bed Mobility supine-sit  -     Supine-Sit [place] (Bed Mobility) supervision  -     Row Name 02/11/23 0942          Transfers    Transfers sit-stand transfer  -     Row Name 02/11/23 0942          Sit-Stand Transfer    Sit-Stand [place] (Transfers) contact guard  -     Row Name 02/11/23 0942          Functional Mobility    Functional Mobility- Ind. Level minimum assist (75% patient effort);2 person assist required  -     Functional Mobility- Device other (see comments)  BUE support  -     Functional Mobility-Distance (Feet) 30  -     Row Name 02/11/23 0942          Activities of Daily Living    BADL Assessment/Intervention lower body dressing;feeding  -     Row Name 02/11/23 0942          Lower Body Dressing Assessment/Training    [place] Level (Lower Body Dressing) don;socks;minimum assist (75% patient effort)  -     Position (Lower Body Dressing) edge of bed sitting  -     Row Name 02/11/23 0942          Self-Feeding Assessment/Training    [place] Level (Feeding) scoop food and bring to mouth;set up  -      Position (Self-Feeding) supported sitting  -           User Key  (r) = Recorded By, (t) = Taken By, (c) = Cosigned By    Initials Name Provider Type    Reyna Ashby OT Occupational Therapist               Obj/Interventions     Row Name 02/11/23 0943          Sensory Assessment (Somatosensory)    Sensory Assessment (Somatosensory) UE sensation intact  -     Row Name 02/11/23 0943          Vision Assessment/Intervention    Visual Impairment/Limitations corrective lenses full-time  -     Row Name 02/11/23 0943          Range of Motion Comprehensive    General Range of Motion bilateral upper extremity ROM WNL  -     Row Name 02/11/23 0943          Strength Comprehensive (MMT)    Comment, General Manual Muscle Testing (MMT) Assessment BUE grossly 4/5  -           User Key  (r) = Recorded By, (t) = Taken By, (c) = Cosigned By    Initials Name Provider Type    Reyna Ashby, ARCELIA Occupational Therapist               Goals/Plan     Row Name 02/11/23 0947          Transfer Goal 1 (OT)    Activity/Assistive Device (Transfer Goal 1, OT) bed-to-chair/chair-to-bed;toilet;walker, rolling  -AC     [place] Level/Cues Needed (Transfer Goal 1, OT) contact guard required  -AC     Time Frame (Transfer Goal 1, OT) by discharge  -AC     Progress/Outcome (Transfer Goal 1, OT) goal ongoing  -     Row Name 02/11/23 0947          Dressing Goal 1 (OT)    Activity/Device (Dressing Goal 1, OT) lower body dressing  -AC     Christian/Cues Needed (Dressing Goal 1, OT) set-up required;supervision required  -AC     Time Frame (Dressing Goal 1, OT) by discharge  -AC     Progress/Outcome (Dressing Goal 1, OT) goal ongoing  -     Row Name 02/11/23 0947          Toileting Goal 1 (OT)    Activity/Device (Toileting Goal 1, OT) adjust/manage clothing;perform perineal hygiene  -AC     [place] Level/Cues Needed (Toileting Goal 1, OT) minimum assist (75% or more patient effort)  -AC     Time Frame (Toileting Goal 1, OT)  by discharge  -     Progress/Outcome (Toileting Goal 1, OT) goal ongoing  -     Row Name 02/11/23 0947          Therapy Assessment/Plan (OT)    Planned Therapy Interventions (OT) activity tolerance training;BADL retraining;functional balance retraining;occupation/activity based interventions;patient/caregiver education/training;strengthening exercise;transfer/mobility retraining  -           User Key  (r) = Recorded By, (t) = Taken By, (c) = Cosigned By    Initials Name Provider Type     Reyna Montelongo, OT Occupational Therapist               Clinical Impression     Row Name 02/11/23 0944          Pain Assessment    Pretreatment Pain Rating 0/10 - no pain  -     Posttreatment Pain Rating 0/10 - no pain  -     Row Name 02/11/23 0944          Plan of Care Review    Plan of Care Reviewed With patient;daughter  -     Outcome Evaluation Pt presents below baseline function with self care/mobility d/t weakness, decreased balance and activity tolerance.  OT will follow to advance pt toward PLOF.  Recommend IP rehab upon d/c pending progress.  -     Row Name 02/11/23 0944          Therapy Assessment/Plan (OT)    Rehab Potential (OT) good, to achieve stated therapy goals  -     Criteria for Skilled Therapeutic Interventions Met (OT) yes;skilled treatment is necessary  -     Therapy Frequency (OT) daily  -     Row Name 02/11/23 0944          Therapy Plan Review/Discharge Plan (OT)    Anticipated Discharge Disposition (OT) inpatient rehabilitation facility  -     Row Name 02/11/23 0944          Vital Signs    Pre Systolic BP Rehab 98  -AC     Pre Treatment Diastolic BP 49  -AC     Pretreatment Heart Rate (beats/min) 71  -AC     Posttreatment Heart Rate (beats/min) 72  -AC     Pre SpO2 (%) 96  -AC     O2 Delivery Pre Treatment room air  -AC     O2 Delivery Intra Treatment room air  -AC     Post SpO2 (%) 98  -AC     O2 Delivery Post Treatment room air  -AC     Pre Patient Position Supine  -AC     Post  Patient Position Sitting  -AC     Row Name 02/11/23 0944          Positioning and Restraints    Pre-Treatment Position in bed  -AC     Post Treatment Position chair  -AC     In Chair notified nsg;reclined;call light within reach;encouraged to call for assist;exit alarm on;waffle cushion  -           User Key  (r) = Recorded By, (t) = Taken By, (c) = Cosigned By    Initials Name Provider Type    Reyna Ashby, OT Occupational Therapist               Outcome Measures     Row Name 02/11/23 0817          How much help from another is currently needed...    Putting on and taking off regular lower body clothing? 3  -AC     Bathing (including washing, rinsing, and drying) 2  -AC     Toileting (which includes using toilet bed pan or urinal) 2  -AC     Putting on and taking off regular upper body clothing 3  -AC     Taking care of personal grooming (such as brushing teeth) 3  -AC     Eating meals 3  -AC     AM-PAC 6 Clicks Score (OT) 16  -     Row Name 02/11/23 0817          Functional Assessment    Outcome Measure Options AM-PAC 6 Clicks Daily Activity (OT)  -           User Key  (r) = Recorded By, (t) = Taken By, (c) = Cosigned By    Initials Name Provider Type    Reyna Ashby, OT Occupational Therapist                Occupational Therapy Education     Title: PT OT SLP Therapies (In Progress)     Topic: Occupational Therapy (In Progress)     Point: ADL training (Done)     Description:   Instruct learner(s) on proper safety adaptation and remediation techniques during self care or transfers.   Instruct in proper use of assistive devices.              Learning Progress Summary           Patient Acceptance, E, VU by  at 2/11/2023 0949    Comment: Role of OT, beneftis of activity   Family Acceptance, E, VU by  at 2/11/2023 0949    Comment: Role of OT, beneftis of activity                   Point: Home exercise program (Not Started)     Description:   Instruct learner(s) on appropriate technique for  monitoring, assisting and/or progressing therapeutic exercises/activities.              Learner Progress:  Not documented in this visit.          Point: Precautions (Not Started)     Description:   Instruct learner(s) on prescribed precautions during self-care and functional transfers.              Learner Progress:  Not documented in this visit.          Point: Body mechanics (Not Started)     Description:   Instruct learner(s) on proper positioning and spine alignment during self-care, functional mobility activities and/or exercises.              Learner Progress:  Not documented in this visit.                      User Key     Initials Effective Dates Name Provider Type [facility] 02/03/23 -  Reyna Montelongo OT Occupational Therapist OT              OT Recommendation and Plan  Planned Therapy Interventions (OT): activity tolerance training, BADL retraining, functional balance retraining, occupation/activity based interventions, patient/caregiver education/training, strengthening exercise, transfer/mobility retraining  Therapy Frequency (OT): daily  Plan of Care Review  Plan of Care Reviewed With: patient, daughter  Outcome Evaluation: Pt presents below baseline function with self care/mobility d/t weakness, decreased balance and activity tolerance.  OT will follow to advance pt toward PLOF.  Recommend IP rehab upon d/c pending progress.     Time Calculation:    Time Calculation- OT     Row Name 02/11/23 0817             Time Calculation- OT    OT Start Time 0817  -AC      OT Received On 02/11/23  -      OT Goal Re-Cert Due Date 02/21/23  -         Untimed Charges    OT Eval/Re-eval Minutes 50  -AC         Total Minutes    Untimed Charges Total Minutes 50  -AC       Total Minutes 50  -AC            User Key  (r) = Recorded By, (t) = Taken By, (c) = Cosigned By    Initials Name Provider Type     Reyna Montelongo OT Occupational Therapist              Therapy Charges for Today     Code Description Service  Date Service Provider Modifiers Qty    19121039400 HC OT EVAL LOW COMPLEXITY 4 2/11/2023 Reyna Montelongo, OT GO 1               Reyna Montelongo OT  2/11/2023

## 2023-02-11 NOTE — PLAN OF CARE
Goal Outcome Evaluation:  Patient has been lethargic and confused today. He is also very hard of hearing. He has had two large bowel movements today. He has not had any urine output, bladder scan for 58 mL and in and out cath for total of 55 mL to send for UA. Patient had hepatic ultrasound at bedside and went down for a paracentesis this afternoon. Patient pulled out PIV in right arm, PIV in left arm still good. Lactate has improved today. Daughter, wife and two sons have all taken turns being at bedside today. He has remained on RA and NSR on monitor.

## 2023-02-11 NOTE — PLAN OF CARE
Goal Outcome Evaluation:  Plan of Care Reviewed With: patient, daughter           Outcome Evaluation: Pt presents below baseline function with self care/mobility d/t weakness, decreased balance and activity tolerance.  OT will follow to advance pt toward PLOF.  Recommend IP rehab upon d/c pending progress.

## 2023-02-11 NOTE — PLAN OF CARE
Goal Outcome Evaluation:  Plan of Care Reviewed With: patient, daughter        Progress: no change  Outcome Evaluation: PT evaluation completed. Pt presents with decreased strength and balance, will benefit from IP PT services. Pt req minAx2 with ambulation in room, recommend IP rehab facility upon DC at this time.

## 2023-02-12 LAB
ALBUMIN SERPL-MCNC: 3.4 G/DL (ref 3.5–5.2)
ALBUMIN/GLOB SERPL: 1.5 G/DL
ALP SERPL-CCNC: 61 U/L (ref 39–117)
ALT SERPL W P-5'-P-CCNC: 14 U/L (ref 1–41)
ANION GAP SERPL CALCULATED.3IONS-SCNC: 15 MMOL/L (ref 5–15)
AST SERPL-CCNC: 26 U/L (ref 1–40)
BILIRUB SERPL-MCNC: 1.4 MG/DL (ref 0–1.2)
BUN SERPL-MCNC: 84 MG/DL (ref 8–23)
BUN/CREAT SERPL: 14.6 (ref 7–25)
CALCIUM SPEC-SCNC: 9.1 MG/DL (ref 8.6–10.5)
CHLORIDE SERPL-SCNC: 103 MMOL/L (ref 98–107)
CO2 SERPL-SCNC: 18 MMOL/L (ref 22–29)
CREAT SERPL-MCNC: 5.76 MG/DL (ref 0.76–1.27)
DEPRECATED RDW RBC AUTO: 62.2 FL (ref 37–54)
EGFRCR SERPLBLD CKD-EPI 2021: 9.7 ML/MIN/1.73
ERYTHROCYTE [DISTWIDTH] IN BLOOD BY AUTOMATED COUNT: 16.6 % (ref 12.3–15.4)
GLOBULIN UR ELPH-MCNC: 2.3 GM/DL
GLUCOSE BLDC GLUCOMTR-MCNC: 224 MG/DL (ref 70–130)
GLUCOSE BLDC GLUCOMTR-MCNC: 241 MG/DL (ref 70–130)
GLUCOSE BLDC GLUCOMTR-MCNC: 267 MG/DL (ref 70–130)
GLUCOSE SERPL-MCNC: 243 MG/DL (ref 65–99)
HCT VFR BLD AUTO: 22.7 % (ref 37.5–51)
HEMOCCULT STL QL: POSITIVE
HGB BLD-MCNC: 7.4 G/DL (ref 13–17.7)
INR PPP: 2.24 (ref 0.84–1.13)
MCH RBC QN AUTO: 33.6 PG (ref 26.6–33)
MCHC RBC AUTO-ENTMCNC: 32.6 G/DL (ref 31.5–35.7)
MCV RBC AUTO: 103.2 FL (ref 79–97)
PLATELET # BLD AUTO: 84 10*3/MM3 (ref 140–450)
PMV BLD AUTO: 9 FL (ref 6–12)
POTASSIUM SERPL-SCNC: 4.2 MMOL/L (ref 3.5–5.2)
PROT SERPL-MCNC: 5.7 G/DL (ref 6–8.5)
PROTHROMBIN TIME: 24.9 SECONDS (ref 11.4–14.4)
RBC # BLD AUTO: 2.2 10*6/MM3 (ref 4.14–5.8)
SODIUM SERPL-SCNC: 136 MMOL/L (ref 136–145)
WBC NRBC COR # BLD: 8.94 10*3/MM3 (ref 3.4–10.8)

## 2023-02-12 PROCEDURE — 63710000001 INSULIN LISPRO (HUMAN) PER 5 UNITS: Performed by: NURSE PRACTITIONER

## 2023-02-12 PROCEDURE — 0 PHYTONADIONE 10 MG/ML SOLUTION 1 ML AMPULE: Performed by: INTERNAL MEDICINE

## 2023-02-12 PROCEDURE — 85610 PROTHROMBIN TIME: CPT | Performed by: STUDENT IN AN ORGANIZED HEALTH CARE EDUCATION/TRAINING PROGRAM

## 2023-02-12 PROCEDURE — 82962 GLUCOSE BLOOD TEST: CPT

## 2023-02-12 PROCEDURE — 25010000002 OCTREOTIDE PER 25 MCG: Performed by: PHYSICIAN ASSISTANT

## 2023-02-12 PROCEDURE — 80053 COMPREHEN METABOLIC PANEL: CPT | Performed by: STUDENT IN AN ORGANIZED HEALTH CARE EDUCATION/TRAINING PROGRAM

## 2023-02-12 PROCEDURE — 99232 SBSQ HOSP IP/OBS MODERATE 35: CPT | Performed by: STUDENT IN AN ORGANIZED HEALTH CARE EDUCATION/TRAINING PROGRAM

## 2023-02-12 PROCEDURE — 25010000002 ALBUMIN HUMAN 25% PER 50 ML: Performed by: INTERNAL MEDICINE

## 2023-02-12 PROCEDURE — 82272 OCCULT BLD FECES 1-3 TESTS: CPT | Performed by: PHYSICIAN ASSISTANT

## 2023-02-12 PROCEDURE — 25010000002 OCTREOTIDE PER 25 MCG

## 2023-02-12 PROCEDURE — P9047 ALBUMIN (HUMAN), 25%, 50ML: HCPCS | Performed by: INTERNAL MEDICINE

## 2023-02-12 PROCEDURE — 99232 SBSQ HOSP IP/OBS MODERATE 35: CPT | Performed by: INTERNAL MEDICINE

## 2023-02-12 PROCEDURE — 85027 COMPLETE CBC AUTOMATED: CPT | Performed by: STUDENT IN AN ORGANIZED HEALTH CARE EDUCATION/TRAINING PROGRAM

## 2023-02-12 PROCEDURE — 25010000002 CEFTRIAXONE PER 250 MG: Performed by: NURSE PRACTITIONER

## 2023-02-12 RX ORDER — OCTREOTIDE ACETATE 100 UG/ML
200 INJECTION, SOLUTION INTRAVENOUS; SUBCUTANEOUS 3 TIMES DAILY
Status: DISCONTINUED | OUTPATIENT
Start: 2023-02-12 | End: 2023-02-14 | Stop reason: HOSPADM

## 2023-02-12 RX ORDER — OCTREOTIDE ACETATE 500 UG/ML
200 INJECTION, SOLUTION INTRAVENOUS; SUBCUTANEOUS 3 TIMES DAILY
Status: DISCONTINUED | OUTPATIENT
Start: 2023-02-12 | End: 2023-02-12

## 2023-02-12 RX ORDER — PANTOPRAZOLE SODIUM 40 MG/10ML
40 INJECTION, POWDER, LYOPHILIZED, FOR SOLUTION INTRAVENOUS
Status: DISCONTINUED | OUTPATIENT
Start: 2023-02-13 | End: 2023-02-14 | Stop reason: HOSPADM

## 2023-02-12 RX ADMIN — MIDODRINE HYDROCHLORIDE 15 MG: 10 TABLET ORAL at 16:49

## 2023-02-12 RX ADMIN — ALBUMIN (HUMAN) 25 G: 0.25 INJECTION, SOLUTION INTRAVENOUS at 20:44

## 2023-02-12 RX ADMIN — SODIUM BICARBONATE 650 MG TABLET 1300 MG: at 16:49

## 2023-02-12 RX ADMIN — URSODIOL 300 MG: 300 CAPSULE ORAL at 20:45

## 2023-02-12 RX ADMIN — LACTULOSE 30 G: 20 SOLUTION ORAL at 09:12

## 2023-02-12 RX ADMIN — OCTREOTIDE ACETATE 200 MCG: 100 INJECTION, SOLUTION INTRAVENOUS; SUBCUTANEOUS at 16:51

## 2023-02-12 RX ADMIN — INSULIN LISPRO 3 UNITS: 100 INJECTION, SOLUTION INTRAVENOUS; SUBCUTANEOUS at 16:47

## 2023-02-12 RX ADMIN — SODIUM BICARBONATE 650 MG TABLET 1300 MG: at 20:45

## 2023-02-12 RX ADMIN — OCTREOTIDE ACETATE 100 MCG: 100 INJECTION, SOLUTION INTRAVENOUS; SUBCUTANEOUS at 10:37

## 2023-02-12 RX ADMIN — MIDODRINE HYDROCHLORIDE 15 MG: 10 TABLET ORAL at 09:13

## 2023-02-12 RX ADMIN — INSULIN LISPRO 3 UNITS: 100 INJECTION, SOLUTION INTRAVENOUS; SUBCUTANEOUS at 09:22

## 2023-02-12 RX ADMIN — CEFTRIAXONE 2 G: 2 INJECTION, POWDER, FOR SOLUTION INTRAMUSCULAR; INTRAVENOUS at 22:17

## 2023-02-12 RX ADMIN — Medication 10 ML: at 20:56

## 2023-02-12 RX ADMIN — FOLIC ACID TAB 400 MCG 400 MCG: 400 TAB at 09:13

## 2023-02-12 RX ADMIN — ALBUMIN (HUMAN) 25 G: 0.25 INJECTION, SOLUTION INTRAVENOUS at 09:16

## 2023-02-12 RX ADMIN — PHYTONADIONE 10 MG: 10 INJECTION, EMULSION INTRAMUSCULAR; INTRAVENOUS; SUBCUTANEOUS at 12:10

## 2023-02-12 RX ADMIN — ALBUMIN (HUMAN) 25 G: 0.25 INJECTION, SOLUTION INTRAVENOUS at 16:48

## 2023-02-12 RX ADMIN — OCTREOTIDE ACETATE 200 MCG: 100 INJECTION, SOLUTION INTRAVENOUS; SUBCUTANEOUS at 20:47

## 2023-02-12 RX ADMIN — Medication 10 ML: at 09:16

## 2023-02-12 RX ADMIN — URSODIOL 300 MG: 300 CAPSULE ORAL at 09:14

## 2023-02-12 RX ADMIN — SODIUM BICARBONATE 650 MG TABLET 1300 MG: at 09:13

## 2023-02-12 RX ADMIN — FLUTICASONE PROPIONATE 2 SPRAY: 50 SPRAY, METERED NASAL at 09:14

## 2023-02-12 RX ADMIN — INSULIN LISPRO 2 UNITS: 100 INJECTION, SOLUTION INTRAVENOUS; SUBCUTANEOUS at 12:09

## 2023-02-12 RX ADMIN — Medication 325 MG: at 09:13

## 2023-02-12 NOTE — PLAN OF CARE
Goal Outcome Evaluation:  Plan of Care Reviewed With: patient, son     Progress: declining  Outcome Evaluation: VSS on RA. Patient up and down to BSC several times throughout the night. This morning, patient had a small amount of bright red blood per rectum and several small blood clots. Patient in no acute distress. Denies any pain or SOA. Son at the bedside.

## 2023-02-12 NOTE — PROGRESS NOTES
"GI Daily Progress Note  Subjective:    Chief Complaint:  \"not confused\"    RN reports that patient had a bloody bm that was bright with small clots;  Hemoglobin unchanged from yesterday;    Patient denies abdominal pain and wife reports he is eating  No colitis on CT    Objective:    /53 (BP Location: Left arm, Patient Position: Lying)   Pulse 70   Temp 97.6 °F (36.4 °C) (Oral)   Resp 16   Ht 170 cm (66.93\")   Wt 71.7 kg (158 lb 1.6 oz)   SpO2 98%   BMI 24.81 kg/m²     Physical Exam  Constitutional:       Appearance: He is ill-appearing.   HENT:      Head:      Comments: Decreased auditory acuity     Nose: Nose normal.      Mouth/Throat:      Mouth: Mucous membranes are dry.   Eyes:      Extraocular Movements: Extraocular movements intact.      Conjunctiva/sclera: Conjunctivae normal.      Pupils: Pupils are equal, round, and reactive to light.   Cardiovascular:      Rate and Rhythm: Normal rate and regular rhythm.      Pulses: Normal pulses.   Pulmonary:      Effort: Pulmonary effort is normal.   Abdominal:      General: Abdomen is flat. Bowel sounds are normal.      Palpations: Abdomen is soft.   Musculoskeletal:      Right lower leg: No edema.      Left lower leg: No edema.   Skin:     General: Skin is warm.   Neurological:      Mental Status: He is alert. Mental status is at baseline.   Psychiatric:         Mood and Affect: Mood normal.         Behavior: Behavior normal.         Lab  Lab Results   Component Value Date    WBC 8.94 02/12/2023    HGB 7.4 (L) 02/12/2023    HGB 7.8 (L) 02/11/2023    HGB 7.2 (L) 02/11/2023    .2 (H) 02/12/2023    PLT 84 (L) 02/12/2023    INR 2.24 (H) 02/12/2023    INR 1.72 (H) 02/11/2023    INR 1.47 (H) 02/10/2023    INR 1.43 (H) 02/09/2023    INR 1.3 (H) 11/01/2022       Lab Results   Component Value Date    GLUCOSE 243 (H) 02/12/2023    BUN 84 (H) 02/12/2023    CREATININE 5.76 (H) 02/12/2023    EGFRIFNONA 59 (L) 04/22/2022    EGFRIFAFRI >60 04/22/2022    BCR 14.6 " 02/12/2023     02/12/2023    K 4.2 02/12/2023    CO2 18.0 (L) 02/12/2023    CALCIUM 9.1 02/12/2023    PROTENTOTREF 5.8 (L) 05/24/2015    ALBUMIN 3.4 (L) 02/12/2023    ALKPHOS 61 02/12/2023    BILITOT 1.4 (H) 02/12/2023    ALT 14 02/12/2023    AST 26 02/12/2023       Assessment:    1. Hepatic encephalopathy  2. Decompensated HAYES Cirrhosis with ascites, hepatic hydrothorax.   Receives outpatient monthly thoracentesis.   MELD-Na is 28.  Child Ravi Class C.  3.  Acute kidney injury, severe.   Likely  HRS.      4. Macrocytic anemia, worse  5. UTI ? Culture pending.    6. Coagulopathy of liver disease, worse.       Hematochezia suspect lower source with either hemorrhoid, rectal varix, or ischemia with hypotension.  He did have lactulose enema which may have lead to mild trauma    HRS  Encephalopathy improved but I am concerned that he is has continue liver decompensation and agree with plans for transfer to [facility]      Plan:    1.  Add PPI  2.  Increase octreotide to 200 TID and albumin  3.  Add vitamin K noting INR increasing  4.  Continue lactulose but hold if volume depletion  5.  Awaiting for [facility] transfer and patient's family aware that main goal would be to get evaluation by hepatology team there    Discussed with RN the hematochezia    Reyna Sanchez MD  02/12/23  11:49 EST

## 2023-02-12 NOTE — PROGRESS NOTES
" LOS: 2 days   Patient Care Team:  Antonio Castro MD as PCP - General  Antonio Castro MD as PCP - Family Medicine    Chief Complaint: Acute kidney injury     Subjective      Mild confusion, BP slightly better. UOP 150cc. Cr stable. No significant improvement.     Subjective:  Symptoms:  Improved.  No shortness of breath, chest pain or chest pressure.    Diet:  Adequate intake.        History taken from: patient    Objective     Vital Sign Min/Max for last 24 hours  Temp  Min: 97.6 °F (36.4 °C)  Max: 98.2 °F (36.8 °C)   BP  Min: 104/74  Max: 141/70   Pulse  Min: 67  Max: 109   Resp  Min: 16  Max: 18   SpO2  Min: 95 %  Max: 100 %   No data recorded   Weight  Min: 71.7 kg (158 lb 1.6 oz)  Max: 71.7 kg (158 lb 1.6 oz)     Flowsheet Rows    Flowsheet Row First Filed Value   Admission Height 172.7 cm (67.99\") Documented at 02/09/2023 1944   Admission Weight 72.6 kg (160 lb) Documented at 02/09/2023 1944          No intake/output data recorded.  I/O last 3 completed shifts:  In: 840 [P.O.:840]  Out: 150 [Urine:150]    Objective:  General Appearance:  Comfortable.    Vital signs: (most recent): Blood pressure 141/70, pulse 69, temperature 97.6 °F (36.4 °C), temperature source Oral, resp. rate 18, height 170 cm (66.93\"), weight 71.7 kg (158 lb 1.6 oz), SpO2 97 %.  Vital signs are normal.    Output: Minimal urine output.    Lungs:  Normal effort and normal respiratory rate.  Breath sounds clear to auscultation.  He is not in respiratory distress.  No decreased breath sounds or wheezes.    Heart: Normal rate.  Regular rhythm.  S1 normal and S2 normal.  No murmur.   Abdomen: Abdomen is soft and non-distended.  There are no signs of ascites.    Extremities: There is no dependent edema or local swelling.    Neurological: Patient is alert and oriented to person, place and time.    Pupils:  Pupils are equal, round, and reactive to light.              Results Review:     I reviewed the patient's new clinical results.    WBC " WBC   Date Value Ref Range Status   02/12/2023 8.94 3.40 - 10.80 10*3/mm3 Final   02/11/2023 5.90 3.40 - 10.80 10*3/mm3 Final   02/11/2023 5.16 3.40 - 10.80 10*3/mm3 Final   02/10/2023 9.41 3.40 - 10.80 10*3/mm3 Final   02/09/2023 9.83 3.40 - 10.80 10*3/mm3 Final      HGB Hemoglobin   Date Value Ref Range Status   02/12/2023 7.4 (L) 13.0 - 17.7 g/dL Final   02/11/2023 7.8 (L) 13.0 - 17.7 g/dL Final   02/11/2023 7.2 (L) 13.0 - 17.7 g/dL Final   02/10/2023 9.5 (L) 13.0 - 17.7 g/dL Final   02/09/2023 9.7 (L) 13.0 - 17.7 g/dL Final      HCT Hematocrit   Date Value Ref Range Status   02/12/2023 22.7 (L) 37.5 - 51.0 % Final   02/11/2023 23.6 (L) 37.5 - 51.0 % Final   02/11/2023 21.6 (L) 37.5 - 51.0 % Final   02/10/2023 28.9 (L) 37.5 - 51.0 % Final   02/09/2023 28.5 (L) 37.5 - 51.0 % Final      Platlets No results found for: LABPLAT   MCV MCV   Date Value Ref Range Status   02/12/2023 103.2 (H) 79.0 - 97.0 fL Final   02/11/2023 104.0 (H) 79.0 - 97.0 fL Final   02/11/2023 102.4 (H) 79.0 - 97.0 fL Final   02/10/2023 103.6 (H) 79.0 - 97.0 fL Final   02/09/2023 102.5 (H) 79.0 - 97.0 fL Final          Sodium Sodium   Date Value Ref Range Status   02/12/2023 136 136 - 145 mmol/L Final   02/11/2023 138 136 - 145 mmol/L Final   02/10/2023 137 136 - 145 mmol/L Final   02/09/2023 134 (L) 136 - 145 mmol/L Final   02/09/2023 134 (L) 136 - 145 mmol/L Final      Potassium Potassium   Date Value Ref Range Status   02/12/2023 4.2 3.5 - 5.2 mmol/L Final   02/11/2023 4.6 3.5 - 5.2 mmol/L Final   02/10/2023 5.1 3.5 - 5.2 mmol/L Final   02/09/2023 5.2 3.5 - 5.2 mmol/L Final     Comment:     Slight hemolysis detected by analyzer. Results may be affected.   02/09/2023 6.0 (H) 3.5 - 5.2 mmol/L Final      Chloride Chloride   Date Value Ref Range Status   02/12/2023 103 98 - 107 mmol/L Final   02/11/2023 105 98 - 107 mmol/L Final   02/10/2023 103 98 - 107 mmol/L Final   02/09/2023 102 98 - 107 mmol/L Final   02/09/2023 101 98 - 107 mmol/L Final       CO2 CO2   Date Value Ref Range Status   02/12/2023 18.0 (L) 22.0 - 29.0 mmol/L Final   02/11/2023 16.0 (L) 22.0 - 29.0 mmol/L Final   02/10/2023 14.0 (L) 22.0 - 29.0 mmol/L Final   02/09/2023 14.0 (L) 22.0 - 29.0 mmol/L Final   02/09/2023 16.0 (L) 22.0 - 29.0 mmol/L Final      BUN BUN   Date Value Ref Range Status   02/12/2023 84 (H) 8 - 23 mg/dL Final   02/11/2023 83 (H) 8 - 23 mg/dL Final   02/10/2023 73 (H) 8 - 23 mg/dL Final   02/09/2023 71 (H) 8 - 23 mg/dL Final   02/09/2023 72 (H) 8 - 23 mg/dL Final      Creatinine Creatinine   Date Value Ref Range Status   02/12/2023 5.76 (H) 0.76 - 1.27 mg/dL Final   02/11/2023 5.60 (H) 0.76 - 1.27 mg/dL Final   02/10/2023 4.79 (H) 0.76 - 1.27 mg/dL Final   02/09/2023 4.56 (H) 0.76 - 1.27 mg/dL Final   02/09/2023 4.61 (H) 0.76 - 1.27 mg/dL Final      Calcium Calcium   Date Value Ref Range Status   02/12/2023 9.1 8.6 - 10.5 mg/dL Final   02/11/2023 9.2 8.6 - 10.5 mg/dL Final   02/10/2023 9.7 8.6 - 10.5 mg/dL Final   02/09/2023 9.6 8.6 - 10.5 mg/dL Final   02/09/2023 9.6 8.6 - 10.5 mg/dL Final      PO4 No results found for: CAPO4   Albumin Albumin   Date Value Ref Range Status   02/12/2023 3.4 (L) 3.5 - 5.2 g/dL Final   02/11/2023 3.0 (L) 3.5 - 5.2 g/dL Final   02/10/2023 2.9 (L) 3.5 - 5.2 g/dL Final   02/09/2023 3.0 (L) 3.5 - 5.2 g/dL Final      Magnesium Magnesium   Date Value Ref Range Status   02/10/2023 2.1 1.6 - 2.4 mg/dL Final   02/09/2023 2.0 1.6 - 2.4 mg/dL Final      Uric Acid No results found for: URICACID     Medication Review: yes    Assessment & Plan       Acute renal failure, unspecified acute renal failure type (HCC)    Metabolic encephalopathy    Metabolic acidosis    Elevated troponin    PAOLO (acute kidney injury) (HCC)    Hyponatremia    Hyperkalemia    Liver cirrhosis secondary to HAYES (HCC)    Hyperbilirubinemia    Anemia, chronic disease    Hyperammonemia (HCC)    Other ascites    Coagulopathy (HCC)    Thrombocytopenia (HCC)    Decompensated hepatic  cirrhosis (HCC)    Encephalopathy, hepatic      Assessment & Plan     Acute kidney injury: Baseline cr ~ 0.8-1.2mg/dl. Cr on this admission 4.5-4.7mg/dl BUN 72. UA trace ketones, blood and protein. CT didn't show any hydro. Urine Na<20 Urine cl 35     Met acidosis; Due to PAOLO and GI bicarb loss     Hyperkalemia: Due to PAOLO. On admission improved.     AMS: Hepatic encephalopathy     HAYES: Decompensated liver cirrhosis     Hypotension: in the setting of decompensated liver disease     Hyponatremia: improved.    Aneima: CT negative for bleed. Dilutional ?        Recs   Acute kidney injury likely hepato-renal syndrome vs ATN. So far no improvement tin renal function. Continue 25gm TID for another day for total 72 hr. Continue midodrine to 15 mg TID. Agree Also on octerotide. Target SBP>120. Ok to remove abdul cath. Patient is accepted at  for liver txp eval therefore will consider him for dialysis if needed. No indication of HD at present.     Sergei العلي MD  02/12/23  13:06 EST

## 2023-02-12 NOTE — PLAN OF CARE
Goal Outcome Evaluation:   Patient is resting in bed, with eyes closed, no s/s of pain or shortness of breath noted at this time. Vital signs stable. Noted blood in the stool with small clots, GI aware. Family at bedside. Call light in reach

## 2023-02-12 NOTE — PROGRESS NOTES
[facility] Medicine Services  PROGRESS NOTE    Patient Name: Leoncio Hopson  : 1950  MRN: [id number]    Date of Admission: 2023  Primary Care Physician: Antonio Castro MD    Subjective   Subjective     CC:  Altered mental status    HPI:  Gallegos catheter placed to monitor urine output yesterday per renal.  Urine output of 150 mL.  Wife at bedside.  She states he is no longer confused, however, patient is falling asleep midsentence.  Patient denies any pain.  Reports feeling well today.    ROS:  Gen- No fevers, chills  CV- No chest pain, palpitations  Resp- No cough, dyspnea  GI- No N/V/D, abd pain    Objective   Objective     Vital Signs:   Temp:  [97.6 °F (36.4 °C)-98.4 °F (36.9 °C)] 97.6 °F (36.4 °C)  Heart Rate:  [] 68  Resp:  [17-18] 17  BP: (104-123)/(46-74) 123/64     Physical Exam:  Constitutional: Laying in bed, NAD, falling asleep midsentence  HENT: NCAT, mucous membranes moist  Respiratory: Clear to auscultation bilaterally, respiratory effort normal   Cardiovascular: RRR, no murmurs, rubs, or gallops  Gastrointestinal: Normoactive bowel sounds, soft, nontender, less distended compared to   Musculoskeletal: No bilateral ankle edema  Psychiatric: calm  Neurologic: PERRL, opens eyes to voice, symmetric facies, oriented to self (full name), [facility], but unsure why he's in the hospital, unsure of date  Skin: No rashes, several scabs on his abdomen    Results Reviewed:  LAB RESULTS:      Lab 23  1127 23  0506 02/10/23  2040 02/10/23  1232 02/10/23  0655 02/10/23  0301 23  2317 23   WBC 5.90 5.16  --   --   --  9.41  --  9.83   HEMOGLOBIN 7.8* 7.2*  --   --   --  9.5*  --  9.7*   HEMATOCRIT 23.6* 21.6*  --   --   --  28.9*  --  28.5*   PLATELETS 90* 77*  --   --   --  144  --  144   NEUTROS ABS  --  2.80  --   --   --  5.70  --  5.90   IMMATURE GRANS (ABS)  --  0.04  --   --   --  0.08*  --  0.10*   LYMPHS ABS  --   0.76  --   --   --  1.00  --  1.07   MONOS ABS  --  0.39  --   --   --  0.78  --  0.71   EOS ABS  --  1.13*  --   --   --  1.76*  --  1.94*   .0* 102.4*  --   --   --  103.6*  --  102.5*   PROCALCITONIN  --   --   --   --   --   --   --  0.79*   LACTATE  --   --  2.8* 3.6* 5.4* 6.7* 6.7* 5.3*   PROTIME  --  20.2*  --   --   --  17.8*  --  17.4*         Lab 02/11/23  0506 02/10/23  0301 02/09/23  2247 02/09/23  2012   SODIUM 138 137 134* 134*   POTASSIUM 4.6 5.1 5.2 6.0*   CHLORIDE 105 103 102 101   CO2 16.0* 14.0* 14.0* 16.0*   ANION GAP 17.0* 20.0* 18.0* 17.0*   BUN 83* 73* 71* 72*   CREATININE 5.60* 4.79* 4.56* 4.61*   EGFR 10.0* 12.1* 12.8* 12.6*   GLUCOSE 104* 113* 122* 104*   CALCIUM 9.2 9.7 9.6 9.6   MAGNESIUM  --  2.1  --  2.0   PHOSPHORUS  --  3.5  --   --    HEMOGLOBIN A1C  --  6.10*  --   --    TSH  --   --   --  1.600         Lab 02/11/23  0506 02/10/23  0301 02/09/23  2012   TOTAL PROTEIN 5.6* 6.5 6.6   ALBUMIN 3.0* 2.9* 3.0*   GLOBULIN 2.6 3.6 3.6   ALT (SGPT) 17 20 22   AST (SGOT) 27 40 40   BILIRUBIN 1.4* 2.3* 2.6*   ALK PHOS 76 119* 126*         Lab 02/11/23  0506 02/10/23  0301 02/09/23  2247 02/09/23  2012   HSTROP T  --   --  40* 45*   PROTIME 20.2* 17.8*  --  17.4*   INR 1.72* 1.47*  --  1.43*             Lab 02/11/23  1622   ABO TYPING O   RH TYPING Positive   ANTIBODY SCREEN Negative         Brief Urine Lab Results  (Last result in the past 365 days)      Color   Clarity   Blood   Leuk Est   Nitrite   Protein   CREAT   Urine HCG        02/10/23 1831             223.0               Microbiology Results Abnormal     Procedure Component Value - Date/Time    Blood Culture - Blood, Hand, Left [[id number]]  (Normal) Collected: 02/10/23 0039    Lab Status: Preliminary result Specimen: Blood from Hand, Left Updated: 02/12/23 0200     Blood Culture No growth at 2 days    Blood Culture - Blood, Hand, Right [[id number]]  (Normal) Collected: 02/10/23 0039    Lab Status: Preliminary result Specimen:  Blood from Hand, Right Updated: 02/12/23 0200     Blood Culture No growth at 2 days    COVID PRE-OP / PRE-PROCEDURE SCREENING ORDER (NO ISOLATION) - Swab, Nasopharynx [[id number]]  (Normal) Collected: 02/11/23 1605    Lab Status: Final result Specimen: Swab from Nasopharynx Updated: 02/11/23 1655    Narrative:      The following orders were created for panel order COVID PRE-OP / PRE-PROCEDURE SCREENING ORDER (NO ISOLATION) - Swab, Nasopharynx.  Procedure                               Abnormality         Status                     ---------                               -----------         ------                     COVID-19, FLU A/B, RSV P...[[id number]]  Normal              Final result                 Please view results for these tests on the individual orders.    COVID-19, FLU A/B, RSV PCR - Swab, Nasopharynx [[id number]]  (Normal) Collected: 02/11/23 1605    Lab Status: Final result Specimen: Swab from Nasopharynx Updated: 02/11/23 1655     COVID19 Not Detected     Influenza A PCR Not Detected     Influenza B PCR Not Detected     RSV, PCR Not Detected    Narrative:      Fact sheet for providers: https://www.fda.gov/media/339824/download    Fact sheet for patients: https://www.fda.gov/media/058626/download    Test performed by PCR.    Urine Culture - Urine, Straight Cath [[id number]]  (Normal) Collected: 02/09/23 2010    Lab Status: Final result Specimen: Urine from Straight Cath Updated: 02/11/23 1024     Urine Culture No growth    Body Fluid Culture - Body Fluid, Peritoneum [[id number]] Collected: 02/10/23 1422    Lab Status: Preliminary result Specimen: Body Fluid from Peritoneum Updated: 02/11/23 0954     Body Fluid Culture No growth     Gram Stain No WBCs or organisms seen          US Paracentesis    Result Date: 2/10/2023  US PARACENTESIS-                                                         History: DIAGNOSTIC PARACENTESIS ONLY; only remove enough fluid for fluid studies; concern for SBP; N17.9-Acute kidney  failure, unspecified; E87.5-Hyperkalemia; E72.20-Disorder of urea cycle metabolism, unspecified; R41.0-Disorientation, unspecified; D64.9-Anemia, unspecified    : Jamar Dickson MD.  Assistant:  None.                                                                             Modality: Ultrasonography                                                                                                          Sedation: None.  Anesthesia: Lidocaine, local infiltration.           Estimated blood loss:  < 5 cc.          Technique: Discussion of risks, benefits, and alternatives were made with the patient. The patient expressed understanding and agreed to proceed. A written informed written consent was obtained. A universal timeout was performed prior to starting the procedure.  A preliminary ultrasonography was performed to assess the target and determine a safe access site. It showed ascites. Pertinent ultrasound images were stored to the PACS for documentation.  Maximal sterile precautions were utilized. The procedure room personnel used personal protective equipment. The operators additionally used sterile surgical gloves. The access site was sterilely prepped and draped. Local anesthesia was administered. A dermatotomy was performed if needed. A catheter over the needle system was advanced into the peritoneal cavity. Straw colored fluid was aspirated and the plastic catheter advanced into the peritoneum. The catheter was then connected to a fluid recovery system. At the end of the procedure, the catheter was withdrawn and an aseptic dressing applied. The patient tolerated the procedure well.  After uneventful recovery recovery, the patient was discharged from the department in stable condition.  The total amount of fluid recovered is given below.  Albumin was infused intravenously if ordered by the referring doctor.  Complications: None immediate.  Specimen: The specimen was labeled and sent to the lab  in appropriate carriers & containers if such was requested by the ordering provider.                                                                 Impression: Impression:                                                              Successful ultrasound-guided paracentesis using a Right upper quadrant access with recovery of 50 cc of fluid for diagnostic purposes as described above.  Thank you for the opportunity to assist in the care of your patient.  This report was finalized on 2/10/2023 8:56 PM by Jamar Dickson MD.            I have reviewed the medications:  Scheduled Meds:albumin human, 25 g, Intravenous, TID  cefTRIAXone, 2 g, Intravenous, Q24H  ferrous sulfate, 325 mg, Oral, Daily With Breakfast  fluticasone, 2 spray, Each Nare, Daily  folic acid, 400 mcg, Oral, Daily  insulin lispro, 0-7 Units, Subcutaneous, TID AC  lactulose, 30 g, Oral, BID  lidocaine PF 1%, 5 mL, Injection, Once  midodrine, 15 mg, Oral, TID AC  octreotide, 100 mcg, Intravenous, TID  sodium bicarbonate, 1,300 mg, Oral, TID  sodium chloride, 10 mL, Intravenous, Q12H  ursodiol, 300 mg, Oral, BID      Continuous Infusions:   PRN Meds:.•  dextrose  •  dextrose  •  glucagon (human recombinant)  •  hydrOXYzine  •  sodium chloride  •  sodium chloride  •  sodium chloride    Assessment & Plan   Assessment & Plan     Active Hospital Problems    Diagnosis  POA   • **Acute renal failure, unspecified acute renal failure type (HCC) [N17.9]  Yes   • Other ascites [R18.8]  Yes   • Coagulopathy (HCC) [D68.9]  Yes   • Thrombocytopenia (HCC) [D69.6]  No   • Decompensated hepatic cirrhosis (HCC) [K72.90, K74.60]  Yes   • Encephalopathy, hepatic [K76.82]  Yes   • Metabolic encephalopathy [G93.41]  Yes   • Metabolic acidosis [E87.20]  Yes   • Elevated troponin [R77.8]  Yes   • PAOLO (acute kidney injury) (HCC) [N17.9]  Yes   • Hyponatremia [E87.1]  Yes   • Hyperkalemia [E87.5]  Yes   • Liver cirrhosis secondary to HAYES (HCC) [K75.81, K74.60]  Yes   •  Hyperbilirubinemia [E80.6]  Yes   • Anemia, chronic disease [D63.8]  Yes   • Hyperammonemia (HCC) [E72.20]  Yes      Resolved Hospital Problems   No resolved problems to display.        Brief Hospital Course to date:  Leoncio Hopson is a 72 y.o. male  with DE LEON cirrhosis, recurrent pleural effusion, HTN, chronic liver lesions, DM2, chronic anemia, who presented to the ED with complaint of altered mental status who was found to have concern for PAOLO, metabolic acidosis, hyperkalemia, hyperammonemia.    Discussed overall poor prognosis with family on 2/11 with Dr. العلي present. All questions were answered.      Hepatic encephalopathy  Decompensated De Leon cirrhosis with associated ascites, hepatic hydrothorax, coagulopathy, macrocytic anemia, hypoalbuminemia, hyperbilirubinemia, acidosis  MELD-Na 28 on admission, Child-Ravi class C  Chronic liver lesions  Esophageal varices  Portal hypertension  Hematochezia  -Lactulose to 30 g 3 times daily   - s/p Lactulose enema x1  - Fall precautions  - Known DE LEON cirrhosis, follows outpatient with GI at   - Duplex ultrasound done on 2/10, result pending, but prelim per GI is not concerning for PVT  - Consulted GI  - Started on rifaximin 550 mg twice daily  - Diagnostic paracentesis done on 2/10 with removal of 50mL of fluid; ANC 36, albumin 0.3, protein <1; per IR attending, there was minimal ascites on the paracentesis; low suspicion for abdominal compartment syndrome  - Trended lactic acid, currently downtrending  - Hold Lasix and spironolactone for now given poor renal function  - Off propanolol  -- Contacted [facility] to see about transfer for hepatology/liver transplant evaluation (age cut off of 73)  -- On 2/11, spoke with Dr. Noe Bruno from  transfer services, he requested COVID screen and UDS; Dr. Bruno stated patient was excepted for transfer, but said that transfer could take several days to a week at the least.  -- IV PPI daily  --IV vitamin K given on 2/11 and  2/12     Lactic acidosis, secondary to above  Elevated procal   - Follow-up urine culture, no growth to date; UA with squamous epithelial cells, concern for possible contamination  - Follow-up blood cultures, currently no growth  -- Follow-up cultures from paracentesis  -- Empiric CTX for now while awaiting cultures    Borderline anuric ARF  - Creatinine recently increased (daughter has lab results from hematology in [place] with most recent creatinine 1.5-1.8)  -- Urine Na < 20  - Likely related partially to dehydration, medications, suspected HRS  - IVF bolus x1 given in the ED  - Albumin, octreotide, midodrine (dose increased to 15mg TID)  -- Making minimal urine; ~55mL from straight cath on 2/10; placed abdul on 2/11 for 24h monitoring of UOP per renal and had 150mL UOP  - Renal consulted  -- Overall, has high chance to progress to needing dialysis, but would likely not tolerate this well    Worsening anemia  Thrombocytopenia  -- All cell lines have dropped compared to 2/10  -- Not on heparin  -- STAT repeat CBC, type and screen ordered; hemoglobin actually improved  -- If truly downtrending, then will obtain CT abd/pelvis  - Follows outpatient with hematology in [place]     Hyperkalemia, resolved  - Albuterol, dextrose/insulin, and Lokelma given in the ED  - CMP in the a.m.     Elevated troponin  - Delta -5, likely related to demand from above and also acute kidney injury  - EKG without acute ischemia     Right pleural effusion  - Has been ongoing with most recent thoracentesis at  on 1/25/2023 negative for malignant growth  -- Gets monthly thoracentesis  - Thought to be secondary to cirrhosis    Diabetes mellitus 2  - Hold glimepiride and Actos  - FSBG 3 times daily  - SS insulin  - Hemoglobin A1c 6.1%     Hypertension  - Hold losartan secondary to PAOLO     Expected Discharge Location and Transportation: home vs rehab vs transfer to   Expected Discharge   Expected Discharge Date and Time     Expected  Discharge Date Expected Discharge Time    Feb 14, 2023            DVT prophylaxis:  Mechanical DVT prophylaxis orders are present.     AM-PAC 6 Clicks Score (PT): 18 (02/11/23 1023)    CODE STATUS:   Code Status and Medical Interventions:   Ordered at: 02/09/23 2242     Code Status (Patient has no pulse and is not breathing):    CPR (Attempt to Resuscitate)     Medical Interventions (Patient has pulse or is breathing):    Full Support       Tracie Sultana MD  02/12/23

## 2023-02-13 LAB
ALBUMIN SERPL-MCNC: 3.8 G/DL (ref 3.5–5.2)
ALBUMIN/GLOB SERPL: 2 G/DL
ALP SERPL-CCNC: 57 U/L (ref 39–117)
ALT SERPL W P-5'-P-CCNC: 10 U/L (ref 1–41)
ANION GAP SERPL CALCULATED.3IONS-SCNC: 14 MMOL/L (ref 5–15)
AST SERPL-CCNC: 21 U/L (ref 1–40)
BACTERIA FLD CULT: NORMAL
BH CV VAS HEPATIC PORTAL SPLEEN LENGTH: 12.5 CM
BH CV VAS HEPATIC PORTAL VEIN DIAMETER: 1.1 CM
BH CV VAS HEPATOPORTAL AORTA AP DIAM: 2.7 CM
BH CV VAS HEPATOPORTAL HEPATIC V LT DIRECTION: NORMAL
BH CV VAS HEPATOPORTAL HEPATIC V MID DIRECTION: NORMAL
BH CV VAS HEPATOPORTAL HEPATIC V RT DIRECTION: NORMAL
BH CV VAS HEPATOPORTAL PORTAL V EXTRAHEPATIC DIRECTION: NORMAL
BH CV VAS HEPATOPORTAL PORTAL V LT INTRA DIRECTION: NORMAL
BH CV VAS HEPATOPORTAL PORTAL V PSV: 37.8 CM/S
BH CV VAS HEPATOPORTAL PORTAL V RT INTRA DIRECTION: NORMAL
BH CV VAS HEPATOPORTAL SMV DIRECTION: NORMAL
BH CV VAS HEPATOPORTAL SMV PSV: 46.1 CM/S
BH CV VAS HEPATOPORTAL SPLENIC DIRECTION: NORMAL
BH CV VAS HEPATOPORTAL SPLENIC V PSV: 17 CM/S
BH CV VAS SMA AORTA EDV: 21.5 CM/S
BH CV VAS SMA AORTA PSV: 120 CM/S
BH CV VAS SMA HEPATIC A RI: 0.73
BH CV VAS SMA HEPATIC EDV: 36.3 CM/S
BH CV VAS SMA HEPATIC PSV: 133 CM/S
BH CV VAS SMA SPLENIC EDV: 22.7 CM/S
BH CV VAS SMA SPLENIC PSV: 104 CM/S
BILIRUB SERPL-MCNC: 1.2 MG/DL (ref 0–1.2)
BUN SERPL-MCNC: 81 MG/DL (ref 8–23)
BUN/CREAT SERPL: 16.1 (ref 7–25)
CALCIUM SPEC-SCNC: 9.3 MG/DL (ref 8.6–10.5)
CHLORIDE SERPL-SCNC: 102 MMOL/L (ref 98–107)
CO2 SERPL-SCNC: 21 MMOL/L (ref 22–29)
CREAT SERPL-MCNC: 5.02 MG/DL (ref 0.76–1.27)
DEPRECATED RDW RBC AUTO: 62.4 FL (ref 37–54)
EGFRCR SERPLBLD CKD-EPI 2021: 11.4 ML/MIN/1.73
ERYTHROCYTE [DISTWIDTH] IN BLOOD BY AUTOMATED COUNT: 16.7 % (ref 12.3–15.4)
GLOBULIN UR ELPH-MCNC: 1.9 GM/DL
GLUCOSE BLDC GLUCOMTR-MCNC: 236 MG/DL (ref 70–130)
GLUCOSE BLDC GLUCOMTR-MCNC: 249 MG/DL (ref 70–130)
GLUCOSE BLDC GLUCOMTR-MCNC: 89 MG/DL (ref 70–130)
GLUCOSE SERPL-MCNC: 92 MG/DL (ref 65–99)
GRAM STN SPEC: NORMAL
HCT VFR BLD AUTO: 20 % (ref 37.5–51)
HCT VFR BLD AUTO: 22.6 % (ref 37.5–51)
HGB BLD-MCNC: 6.7 G/DL (ref 13–17.7)
HGB BLD-MCNC: 7.5 G/DL (ref 13–17.7)
INR PPP: 1.98 (ref 0.84–1.13)
MCH RBC QN AUTO: 34.7 PG (ref 26.6–33)
MCHC RBC AUTO-ENTMCNC: 33.5 G/DL (ref 31.5–35.7)
MCV RBC AUTO: 103.6 FL (ref 79–97)
PLATELET # BLD AUTO: 59 10*3/MM3 (ref 140–450)
PMV BLD AUTO: 9.8 FL (ref 6–12)
POTASSIUM SERPL-SCNC: 3.9 MMOL/L (ref 3.5–5.2)
PROT SERPL-MCNC: 5.7 G/DL (ref 6–8.5)
PROTHROMBIN TIME: 22.5 SECONDS (ref 11.4–14.4)
RBC # BLD AUTO: 1.93 10*6/MM3 (ref 4.14–5.8)
SODIUM SERPL-SCNC: 137 MMOL/L (ref 136–145)
WBC NRBC COR # BLD: 4.94 10*3/MM3 (ref 3.4–10.8)

## 2023-02-13 PROCEDURE — 80053 COMPREHEN METABOLIC PANEL: CPT | Performed by: STUDENT IN AN ORGANIZED HEALTH CARE EDUCATION/TRAINING PROGRAM

## 2023-02-13 PROCEDURE — 25010000002 OCTREOTIDE PER 25 MCG

## 2023-02-13 PROCEDURE — 85014 HEMATOCRIT: CPT | Performed by: STUDENT IN AN ORGANIZED HEALTH CARE EDUCATION/TRAINING PROGRAM

## 2023-02-13 PROCEDURE — 86900 BLOOD TYPING SEROLOGIC ABO: CPT

## 2023-02-13 PROCEDURE — 85018 HEMOGLOBIN: CPT | Performed by: STUDENT IN AN ORGANIZED HEALTH CARE EDUCATION/TRAINING PROGRAM

## 2023-02-13 PROCEDURE — 99232 SBSQ HOSP IP/OBS MODERATE 35: CPT | Performed by: PHYSICIAN ASSISTANT

## 2023-02-13 PROCEDURE — P9047 ALBUMIN (HUMAN), 25%, 50ML: HCPCS | Performed by: INTERNAL MEDICINE

## 2023-02-13 PROCEDURE — 25010000002 CEFTRIAXONE PER 250 MG: Performed by: STUDENT IN AN ORGANIZED HEALTH CARE EDUCATION/TRAINING PROGRAM

## 2023-02-13 PROCEDURE — P9016 RBC LEUKOCYTES REDUCED: HCPCS

## 2023-02-13 PROCEDURE — 63710000001 INSULIN LISPRO (HUMAN) PER 5 UNITS: Performed by: NURSE PRACTITIONER

## 2023-02-13 PROCEDURE — 82962 GLUCOSE BLOOD TEST: CPT

## 2023-02-13 PROCEDURE — 85027 COMPLETE CBC AUTOMATED: CPT | Performed by: STUDENT IN AN ORGANIZED HEALTH CARE EDUCATION/TRAINING PROGRAM

## 2023-02-13 PROCEDURE — 99239 HOSP IP/OBS DSCHRG MGMT >30: CPT | Performed by: STUDENT IN AN ORGANIZED HEALTH CARE EDUCATION/TRAINING PROGRAM

## 2023-02-13 PROCEDURE — 85610 PROTHROMBIN TIME: CPT | Performed by: STUDENT IN AN ORGANIZED HEALTH CARE EDUCATION/TRAINING PROGRAM

## 2023-02-13 PROCEDURE — 36430 TRANSFUSION BLD/BLD COMPNT: CPT

## 2023-02-13 PROCEDURE — 25010000002 ALBUMIN HUMAN 25% PER 50 ML: Performed by: INTERNAL MEDICINE

## 2023-02-13 RX ADMIN — LACTULOSE 30 G: 20 SOLUTION ORAL at 09:28

## 2023-02-13 RX ADMIN — Medication 10 ML: at 20:26

## 2023-02-13 RX ADMIN — FOLIC ACID TAB 400 MCG 400 MCG: 400 TAB at 09:28

## 2023-02-13 RX ADMIN — OCTREOTIDE ACETATE 200 MCG: 100 INJECTION, SOLUTION INTRAVENOUS; SUBCUTANEOUS at 16:35

## 2023-02-13 RX ADMIN — CEFTRIAXONE 2 G: 2 INJECTION, POWDER, FOR SOLUTION INTRAMUSCULAR; INTRAVENOUS at 21:48

## 2023-02-13 RX ADMIN — INSULIN LISPRO 3 UNITS: 100 INJECTION, SOLUTION INTRAVENOUS; SUBCUTANEOUS at 11:43

## 2023-02-13 RX ADMIN — MIDODRINE HYDROCHLORIDE 15 MG: 10 TABLET ORAL at 09:27

## 2023-02-13 RX ADMIN — OCTREOTIDE ACETATE 200 MCG: 100 INJECTION, SOLUTION INTRAVENOUS; SUBCUTANEOUS at 09:29

## 2023-02-13 RX ADMIN — LACTULOSE 30 G: 20 SOLUTION ORAL at 20:25

## 2023-02-13 RX ADMIN — SODIUM BICARBONATE 650 MG TABLET 1300 MG: at 20:25

## 2023-02-13 RX ADMIN — OCTREOTIDE ACETATE 200 MCG: 100 INJECTION, SOLUTION INTRAVENOUS; SUBCUTANEOUS at 20:26

## 2023-02-13 RX ADMIN — Medication 325 MG: at 09:28

## 2023-02-13 RX ADMIN — URSODIOL 300 MG: 300 CAPSULE ORAL at 20:25

## 2023-02-13 RX ADMIN — Medication 10 ML: at 09:33

## 2023-02-13 RX ADMIN — ALBUMIN (HUMAN) 25 G: 0.25 INJECTION, SOLUTION INTRAVENOUS at 20:27

## 2023-02-13 RX ADMIN — PANTOPRAZOLE SODIUM 40 MG: 40 INJECTION, POWDER, FOR SOLUTION INTRAVENOUS at 06:00

## 2023-02-13 RX ADMIN — INSULIN LISPRO 3 UNITS: 100 INJECTION, SOLUTION INTRAVENOUS; SUBCUTANEOUS at 16:34

## 2023-02-13 RX ADMIN — URSODIOL 300 MG: 300 CAPSULE ORAL at 10:09

## 2023-02-13 RX ADMIN — ALBUMIN (HUMAN) 25 G: 0.25 INJECTION, SOLUTION INTRAVENOUS at 16:01

## 2023-02-13 RX ADMIN — SODIUM BICARBONATE 650 MG TABLET 1300 MG: at 09:27

## 2023-02-13 RX ADMIN — SODIUM BICARBONATE 650 MG TABLET 1300 MG: at 16:01

## 2023-02-13 RX ADMIN — ALBUMIN (HUMAN) 25 G: 0.25 INJECTION, SOLUTION INTRAVENOUS at 09:24

## 2023-02-13 RX ADMIN — RIFAXIMIN 550 MG: 550 TABLET ORAL at 20:25

## 2023-02-13 RX ADMIN — FLUTICASONE PROPIONATE 2 SPRAY: 50 SPRAY, METERED NASAL at 09:29

## 2023-02-13 NOTE — PLAN OF CARE
Goal Outcome Evaluation:      Patient is resting in bed with eyes closed, no acute distress noted at this time. No s/s of pain noted at this time. Vital signs stable. Patient received 1 unit of blood, no adverse reaction noted. Call light in reach

## 2023-02-13 NOTE — PLAN OF CARE
Goal Outcome Evaluation:  Plan of Care Reviewed With: patient, son        Progress: no change  Outcome Evaluation: VSS on RA. No complaints of pain or nausea. A&O to self and place. Patient slept throughout the night. Son at bedside. Fall and skin precautions in place. Will continue with plan of care.

## 2023-02-13 NOTE — PROGRESS NOTES
"    [facility] Medicine Services  PROGRESS NOTE    Patient Name: Leoncio Hopson  : 1950  MRN: [id number]    Date of Admission: 2023  Primary Care Physician: Antonio Castro MD    Subjective   Subjective     CC:  Altered mental status    HPI:  3 BMs in the past 24h; UOP 375mL.  Wife, son, and family friend of the patient's wife is at bedside. The patient's friend appeared asleep initially in the room with eyes closed. She then opened her eyes and said she was very tired. Asked several times if patient is a transplant candidate.  Reminded family and their friend that I cannot make that decision since I am not a transplant doctor. Patient snoring in the room, but awakens and answers questions appropriately. He begins to fall asleep if not speaking to him constantly. He denied any pain today. Denied any N/V/D or bleeding. Per the patient's son, he got up out of bed this AM and that he was able to eat breakfast.     Of note, the patient's family friend asked me about transport to  and asked what would happen if \"something happened to him\" during transport. I discussed that although it's a short journey, there are always risks associated with transfer such as car accidents. The patient's family friend then said that there would be a \"shake down\" if something happened. She also stated \"it's not right\", but that's \"what would happen\". She then fell asleep on the couch in the patient room. The patient's wife nodded her head up and down while her friend said this. We then transitioned back to talking about the patient's medical care.     ROS:  Gen- No fevers, chills  CV- No chest pain, palpitations  Resp- No cough, dyspnea  GI- No N/V/D, abd pain, no distension    Objective   Objective     Vital Signs:   Temp:  [97.5 °F (36.4 °C)-98.4 °F (36.9 °C)] 97.5 °F (36.4 °C)  Heart Rate:  [53-78] 69  Resp:  [16-18] 16  BP: (104-138)/(48-85) 138/85     Physical Exam:  Constitutional: Laying in " "bed, NAD, falling asleep during conversation, but less-so compared to 2/12  HENT: NCAT, mucous membranes moist  Respiratory: Clear to auscultation bilaterally, respiratory effort normal   Cardiovascular: RRR, no murmurs, rubs, or gallops  Gastrointestinal: Normoactive bowel sounds, soft, nontender, mildly distended  Musculoskeletal: No bilateral ankle edema  Psychiatric: calm, smiles upon wakening  Neurologic: PERRL, opens eyes to voice, symmetric facies, oriented to self (full name), [facility], knows it's 2023, knows he is in the hospital for his \"liver\", appears hard of hearing  Skin: No rashes, several scabs on his abdomen    Results Reviewed:  LAB RESULTS:      Lab 02/13/23  0508 02/12/23  0848 02/11/23  1127 02/11/23  0506 02/10/23  2040 02/10/23  1232 02/10/23  0655 02/10/23  0301 02/09/23  2317 02/09/23 2012   WBC 4.94 8.94 5.90 5.16  --   --   --  9.41  --  9.83   HEMOGLOBIN 6.7* 7.4* 7.8* 7.2*  --   --   --  9.5*  --  9.7*   HEMATOCRIT 20.0* 22.7* 23.6* 21.6*  --   --   --  28.9*  --  28.5*   PLATELETS 59* 84* 90* 77*  --   --   --  144  --  144   NEUTROS ABS  --   --   --  2.80  --   --   --  5.70  --  5.90   IMMATURE GRANS (ABS)  --   --   --  0.04  --   --   --  0.08*  --  0.10*   LYMPHS ABS  --   --   --  0.76  --   --   --  1.00  --  1.07   MONOS ABS  --   --   --  0.39  --   --   --  0.78  --  0.71   EOS ABS  --   --   --  1.13*  --   --   --  1.76*  --  1.94*   .6* 103.2* 104.0* 102.4*  --   --   --  103.6*  --  102.5*   PROCALCITONIN  --   --   --   --   --   --   --   --   --  0.79*   LACTATE  --   --   --   --  2.8* 3.6* 5.4* 6.7* 6.7* 5.3*   PROTIME 22.5* 24.9*  --  20.2*  --   --   --  17.8*  --  17.4*         Lab 02/13/23  0508 02/12/23  0848 02/11/23  0506 02/10/23  0301 02/09/23  2247 02/09/23 2012   SODIUM 137 136 138 137 134* 134*   POTASSIUM 3.9 4.2 4.6 5.1 5.2 6.0*   CHLORIDE 102 103 105 103 102 101   CO2 21.0* 18.0* 16.0* 14.0* 14.0* 16.0*   ANION GAP 14.0 15.0 " 17.0* 20.0* 18.0* 17.0*   BUN 81* 84* 83* 73* 71* 72*   CREATININE 5.02* 5.76* 5.60* 4.79* 4.56* 4.61*   EGFR 11.4* 9.7* 10.0* 12.1* 12.8* 12.6*   GLUCOSE 92 243* 104* 113* 122* 104*   CALCIUM 9.3 9.1 9.2 9.7 9.6 9.6   MAGNESIUM  --   --   --  2.1  --  2.0   PHOSPHORUS  --   --   --  3.5  --   --    HEMOGLOBIN A1C  --   --   --  6.10*  --   --    TSH  --   --   --   --   --  1.600         Lab 02/13/23  0508 02/12/23  0848 02/11/23  0506 02/10/23  0301 02/09/23 2012   TOTAL PROTEIN 5.7* 5.7* 5.6* 6.5 6.6   ALBUMIN 3.8 3.4* 3.0* 2.9* 3.0*   GLOBULIN 1.9 2.3 2.6 3.6 3.6   ALT (SGPT) 10 14 17 20 22   AST (SGOT) 21 26 27 40 40   BILIRUBIN 1.2 1.4* 1.4* 2.3* 2.6*   ALK PHOS 57 61 76 119* 126*         Lab 02/13/23  0508 02/12/23  0848 02/11/23  0506 02/10/23  0301 02/09/23 2247 02/09/23 2012   HSTROP T  --   --   --   --  40* 45*   PROTIME 22.5* 24.9* 20.2* 17.8*  --  17.4*   INR 1.98* 2.24* 1.72* 1.47*  --  1.43*             Lab 02/11/23  1622   ABO TYPING O   RH TYPING Positive   ANTIBODY SCREEN Negative         Brief Urine Lab Results  (Last result in the past 365 days)      Color   Clarity   Blood   Leuk Est   Nitrite   Protein   CREAT   Urine HCG        02/10/23 1831             223.0               Microbiology Results Abnormal     Procedure Component Value - Date/Time    Body Fluid Culture - Body Fluid, Peritoneum [[id number]] Collected: 02/10/23 1422    Lab Status: Final result Specimen: Body Fluid from Peritoneum Updated: 02/13/23 0850     Body Fluid Culture No growth at 3 days     Gram Stain No WBCs or organisms seen    Anaerobic Culture - Body Fluid, Peritoneum [[id number]]  (Normal) Collected: 02/10/23 1422    Lab Status: Preliminary result Specimen: Body Fluid from Peritoneum Updated: 02/13/23 0820     Anaerobic Culture No anaerobes isolated at 3 days    Blood Culture - Blood, Hand, Left [[id number]]  (Normal) Collected: 02/10/23 0039    Lab Status: Preliminary result Specimen: Blood from Hand, Left Updated:  02/13/23 0200     Blood Culture No growth at 3 days    Blood Culture - Blood, Hand, Right [[id number]]  (Normal) Collected: 02/10/23 0039    Lab Status: Preliminary result Specimen: Blood from Hand, Right Updated: 02/13/23 0200     Blood Culture No growth at 3 days    COVID PRE-OP / PRE-PROCEDURE SCREENING ORDER (NO ISOLATION) - Swab, Nasopharynx [[id number]]  (Normal) Collected: 02/11/23 1605    Lab Status: Final result Specimen: Swab from Nasopharynx Updated: 02/11/23 1655    Narrative:      The following orders were created for panel order COVID PRE-OP / PRE-PROCEDURE SCREENING ORDER (NO ISOLATION) - Swab, Nasopharynx.  Procedure                               Abnormality         Status                     ---------                               -----------         ------                     COVID-19, FLU A/B, RSV P...[[id number]]  Normal              Final result                 Please view results for these tests on the individual orders.    COVID-19, FLU A/B, RSV PCR - Swab, Nasopharynx [[id number]]  (Normal) Collected: 02/11/23 1605    Lab Status: Final result Specimen: Swab from Nasopharynx Updated: 02/11/23 1655     COVID19 Not Detected     Influenza A PCR Not Detected     Influenza B PCR Not Detected     RSV, PCR Not Detected    Narrative:      Fact sheet for providers: https://www.fda.gov/media/973399/download    Fact sheet for patients: https://www.fda.gov/media/929293/download    Test performed by PCR.    Urine Culture - Urine, Straight Cath [[id number]]  (Normal) Collected: 02/09/23 2010    Lab Status: Final result Specimen: Urine from Straight Cath Updated: 02/11/23 1024     Urine Culture No growth          No radiology results from the last 24 hrs        I have reviewed the medications:  Scheduled Meds:albumin human, 25 g, Intravenous, TID  cefTRIAXone, 2 g, Intravenous, Q24H  ferrous sulfate, 325 mg, Oral, Daily With Breakfast  fluticasone, 2 spray, Each Nare, Daily  folic acid, 400 mcg, Oral,  Daily  insulin lispro, 0-7 Units, Subcutaneous, TID AC  lactulose, 30 g, Oral, BID  lidocaine PF 1%, 5 mL, Injection, Once  midodrine, 15 mg, Oral, TID AC  octreotide, 200 mcg, Intravenous, TID  pantoprazole, 40 mg, Intravenous, Q AM  rifAXIMin, 550 mg, Oral, Q12H  sodium bicarbonate, 1,300 mg, Oral, TID  sodium chloride, 10 mL, Intravenous, Q12H  ursodiol, 300 mg, Oral, BID      Continuous Infusions:   PRN Meds:.•  dextrose  •  dextrose  •  glucagon (human recombinant)  •  hydrOXYzine  •  sodium chloride  •  sodium chloride  •  sodium chloride    Assessment & Plan   Assessment & Plan     Active Hospital Problems    Diagnosis  POA   • **Acute renal failure, unspecified acute renal failure type (HCC) [N17.9]  Yes   • Other ascites [R18.8]  Yes   • Coagulopathy (HCC) [D68.9]  Yes   • Thrombocytopenia (HCC) [D69.6]  No   • Decompensated hepatic cirrhosis (HCC) [K72.90, K74.60]  Yes   • Encephalopathy, hepatic [K76.82]  Yes   • Metabolic encephalopathy [G93.41]  Yes   • Metabolic acidosis [E87.20]  Yes   • Elevated troponin [R77.8]  Yes   • PAOLO (acute kidney injury) (HCC) [N17.9]  Yes   • Hyponatremia [E87.1]  Yes   • Hyperkalemia [E87.5]  Yes   • Liver cirrhosis secondary to HAYES (HCC) [K75.81, K74.60]  Yes   • Hyperbilirubinemia [E80.6]  Yes   • Anemia, chronic disease [D63.8]  Yes   • Hyperammonemia (HCC) [E72.20]  Yes      Resolved Hospital Problems   No resolved problems to display.        Brief Hospital Course to date:  Leoncio Hopson is a 72 y.o. male  with HAYES cirrhosis with associated recurrent pleural effusion, HTN, chronic liver lesions, DM2, chronic anemia, who presented to the ED with complaint of altered mental status who was found to have concern for PAOLO, metabolic acidosis, hyperkalemia, hyperammonemia. Family report this was a sudden decompensation, but upon chart review, patient has had at least 3 monthly thoracenteses for hepatic hydrothorax, indicating decompensated cirrhosis and also progressively  worsening renal function/CKD with Cr up to 1.75 prior to admission. Both of these things indicate a progressive decline prior to admission.     Discussed overall poor prognosis with family on 2/11 with Dr. العلي present. All questions were answered.      Hepatic encephalopathy  Decompensated De Leon cirrhosis with associated ascites, hepatic hydrothorax, coagulopathy, macrocytic anemia, thrombocytopenia, hypoalbuminemia, hyperbilirubinemia, acidosis  MELD-Na 28 on admission, Child-Ravi class C  Chronic liver lesions  Esophageal varices  Portal hypertension  Hematochezia  - Known DE LEON cirrhosis, follows outpatient with GI at   - Consulted GI  -- s/p Lactulose enema x1  - Lactulose 30 g 3 times daily   - Fall precautions  - Duplex ultrasound done on 2/10, not concerning for PVT  - Rifaximin 550 mg twice daily  - Diagnostic paracentesis done on 2/10 with removal of 50mL of fluid; ANC 36, albumin 0.3, protein <1; per IR attending, there was minimal ascites on the paracentesis; low suspicion for abdominal compartment syndrome  - Trended lactic acid, currently downtrending  - Hold Lasix and spironolactone for now given poor renal function  - Off propanolol  -- Contacted  to see about transfer for hepatology/liver transplant evaluation (age cut off of 73)  -- On 2/11, spoke with Dr. Noe Bruno from  transfer services, he requested COVID screen and UDS; Dr. Bruno stated patient was excepted for transfer, but said that transfer could take several days to a week at the least.  -- IV PPI daily  --IV vitamin K given on 2/11 and 2/12  --Discussed with JOSE Caceres last on 2/13  --Evening update: contacted by  who said that patient has a bed offer at ; EMS transport set up for 8am     Lactic acidosis, secondary to above  Elevated procal   - Follow-up urine culture, no growth to date; UA with squamous epithelial cells, concern for possible contamination  - Follow-up blood cultures, currently no growth  -- Follow-up cultures  from paracentesis  -- Empiric CTX for now while awaiting cultures    Borderline anuric ARF  - Creatinine recently increased (daughter has lab results from hematology in [place] with most recent creatinine 1.5-1.8)  -- Urine Na < 20  - Likely related partially to dehydration, medications, suspected HRS  - IVF bolus x1 given in the ED  - Albumin, octreotide (dose increased to 200mcg TID), midodrine (dose increased to 15mg TID)  -- Making minimal urine, but increasing; ~55mL from straight cath on 2/10; placed abdul on 2/11 for 24h monitoring of UOP per renal and had 150mL UOP (subsequently removed after 24h period); 2/12: 375mL UOP  - Renal consulted  -- Overall, has high chance to progress to needing dialysis, but would likely not tolerate this well    Worsening anemia  Worsening thrombocytopenia  -- All cell lines have dropped compared to 2/10  -- Not on heparin  -- Transfuse 1 unit RBC on 2/13 for Hgb 6.7, which patient and wife are agreeable to  -- Transfusion completed early this PM; asked nursing to obtain the repeat H&H  - Follows outpatient with hematology in [place]     Hyperkalemia, resolved  - Albuterol, dextrose/insulin, and Lokelma given in the ED  - CMP in the a.m.     Elevated troponin  - Delta -5, likely related to demand from above and also acute kidney injury  - EKG without acute ischemia     Right pleural effusion  Hepatic hydrothorax  - Has been ongoing with most recent thoracentesis at  on 1/25/2023 negative for malignant growth per prior documentation  -- Gets monthly thoracentesis x3 per JOSE Caceres  - Thought to be secondary to cirrhosis    Diabetes mellitus 2  - Hold glimepiride and Actos  - FSBG 3 times daily  - SS insulin  - Hemoglobin A1c 6.1%     Hypertension, now with hypotension requiring midodrine  - Hold losartan      Expected Discharge Location and Transportation: transfer to  on 2/14 at 8am  Expected Discharge   Expected Discharge Date and Time     Expected Discharge Date  Expected Discharge Time    Feb 14, 2023            DVT prophylaxis:  Mechanical DVT prophylaxis orders are present.     AM-PAC 6 Clicks Score (PT): 18 (02/12/23 2000)    CODE STATUS:   Code Status and Medical Interventions:   Ordered at: 02/09/23 2248     Code Status (Patient has no pulse and is not breathing):    CPR (Attempt to Resuscitate)     Medical Interventions (Patient has pulse or is breathing):    Full Support       Tracie Sultana MD  02/13/23

## 2023-02-13 NOTE — PROGRESS NOTES
"GI Daily Progress Note  Subjective:    Chief Complaint:  Follow up liver cirrhosis with hepatic encephalopathy     Patient is more awake and alert today.    Denies any rectal bleeding.   Wife is at the bedside.        Objective:    /85   Pulse 69   Temp 97.5 °F (36.4 °C) (Axillary)   Resp 16   Ht 170 cm (66.93\")   Wt 73 kg (160 lb 14.4 oz)   SpO2 95%   BMI 25.25 kg/m²     Physical Exam  Constitutional:       Comments: Elderly male resting supine, no acute distress    Cardiovascular:      Rate and Rhythm: Normal rate and regular rhythm.   Pulmonary:      Effort: Pulmonary effort is normal. No respiratory distress.   Abdominal:      General: Bowel sounds are normal.      Palpations: Abdomen is soft.      Tenderness: There is no abdominal tenderness.   Musculoskeletal:      Right lower leg: No edema.      Left lower leg: No edema.   Neurological:      Mental Status: He is alert and oriented to person, place, and time.      Comments: No asterixis          Lab  Lab Results   Component Value Date    WBC 4.94 02/13/2023    HGB 6.7 (C) 02/13/2023    HGB 7.4 (L) 02/12/2023    HGB 7.8 (L) 02/11/2023    .6 (H) 02/13/2023    PLT 59 (L) 02/13/2023    INR 1.98 (H) 02/13/2023    INR 2.24 (H) 02/12/2023    INR 1.72 (H) 02/11/2023    INR 1.47 (H) 02/10/2023    INR 1.43 (H) 02/09/2023       Lab Results   Component Value Date    GLUCOSE 92 02/13/2023    BUN 81 (H) 02/13/2023    CREATININE 5.02 (H) 02/13/2023    EGFRIFNONA 59 (L) 04/22/2022    EGFRIFAFRI >60 04/22/2022    BCR 16.1 02/13/2023     02/13/2023    K 3.9 02/13/2023    CO2 21.0 (L) 02/13/2023    CALCIUM 9.3 02/13/2023    PROTENTOTREF 5.8 (L) 05/24/2015    ALBUMIN 3.8 02/13/2023    ALKPHOS 57 02/13/2023    BILITOT 1.2 02/13/2023    ALT 10 02/13/2023    AST 21 02/13/2023       Assessment:    1. Hepatic encephalopathy  2. Decompensated HAYES Cirrhosis with ascites, hepatic hydrothorax.   Receives outpatient monthly thoracentesis.   MELD-Na is 28.  Child " Ravi Class C.  3. Hepatorenal syndrome.     4. Macrocytic anemia, worse  5. Coagulopathy of liver disease    Plan:    Hepatic encephalopathy is improved.       Renal function appears stable.      Worsening anemia but no overt GI bleeding.        >> Agree with transfusion of PRBCs  >> Continue Midodrine and IV Octreotide  >> Lactulose 30 gm BID   >> Xifaxan 550 mg BID     >> High Protein diet     Increase ambulation.  Physical and occupational therapy to see.      Patient awaiting transfer to [facility].       JOSE Gee  02/13/23  14:14 EST

## 2023-02-13 NOTE — CASE MANAGEMENT/SOCIAL WORK
Continued Stay Note  [facility]     Patient Name: Leoncio Hopson  MRN: [id number]  Today's Date: 2/13/2023    Admit Date: 2/9/2023    Plan:    Discharge Plan     Row Name 02/13/23 1804       Plan    Plan [facility]    Plan Comments I received a phone call from  nurse Shobha stating that patient has a clean and ready bed at .  I called and spoke with Dr. Sultana, and she would prefer that patient go via ALS transfer.  I have called [facility] and unfortunately they are unable to transfer patient tonight.  He has been placed on a run at 8:00 AM tomorrow, 2/14/2023. Transport number [id number].  I have filled out a PCS sheet and placed it on his chart.  I have updated Dr. Sultana and patient's primary RN.  I updated  MDs and advised that unfortunately we are unable to arrange transportation until 8:00 AM tomorrow. I received a phone call back from [facility] stating that they will hold the bed, but if anything changes please call  MDs back ([phone] option 2). CM will continue to follow.    Final Discharge Disposition Code 02 - short term hospital for  care               Discharge Codes    No documentation.               Expected Discharge Date and Time     Expected Discharge Date Expected Discharge Time    Feb 17, 2023             Aleta Villa, RN

## 2023-02-13 NOTE — CASE MANAGEMENT/SOCIAL WORK
Continued Stay Note  [facility]     Patient Name: Leoncio Hopson  MRN: [id number]  Today's Date: 2/13/2023    Admit Date: 2/9/2023    Plan: ONGOING   Discharge Plan     Row Name 02/13/23 1112       Plan    Plan ONGOING    Patient/Family in Agreement with Plan yes    Plan Comments Nephro and GI following.  Tranfuse PRBC today.  Cont IV Rocephin.  Awaiting transfer to  (hepatology team).  CM spoke with QUOC Nicole at .  Pt remains on pending admission list.  Transfer could take a week or longer.  CM will cont to follow and assist with the transfer once a bed is available.    Final Discharge Disposition Code 02 - short term hospital for  care               Discharge Codes    No documentation.               Expected Discharge Date and Time     Expected Discharge Date Expected Discharge Time    Feb 17, 2023             Tomeka Butt RN

## 2023-02-13 NOTE — PROGRESS NOTES
" LOS: 3 days   Patient Care Team:  Antonio Castro MD as PCP - General  Antonio Castro MD as PCP - Family Medicine    Chief Complaint: Acute kidney injury     Subjective      More awake and alert today. UOP slightly better. BP much better. Getting 1 unit PRBC. Relatively stable renal function.  Subjective:  Symptoms:  Improved.  No shortness of breath, chest pain or chest pressure.    Diet:  Adequate intake.        History taken from: patient    Objective     Vital Sign Min/Max for last 24 hours  Temp  Min: 97.6 °F (36.4 °C)  Max: 98.4 °F (36.9 °C)   BP  Min: 104/48  Max: 138/70   Pulse  Min: 53  Max: 78   Resp  Min: 16  Max: 18   SpO2  Min: 94 %  Max: 98 %   No data recorded   Weight  Min: 73 kg (160 lb 14.4 oz)  Max: 73 kg (160 lb 14.4 oz)     Flowsheet Rows    Flowsheet Row First Filed Value   Admission Height 172.7 cm (67.99\") Documented at 02/09/2023 1944   Admission Weight 72.6 kg (160 lb) Documented at 02/09/2023 1944          No intake/output data recorded.  I/O last 3 completed shifts:  In: 300 [P.O.:300]  Out: 525 [Urine:525]    Objective:  General Appearance:  Comfortable.    Vital signs: (most recent): Blood pressure 123/70, pulse 67, temperature 98 °F (36.7 °C), resp. rate 16, height 170 cm (66.93\"), weight 73 kg (160 lb 14.4 oz), SpO2 96 %.  Vital signs are normal.    Output: Producing urine.    Lungs:  Normal effort and normal respiratory rate.  Breath sounds clear to auscultation.  He is not in respiratory distress.  No decreased breath sounds or wheezes.    Heart: Normal rate.  Regular rhythm.  S1 normal and S2 normal.  No murmur.   Abdomen: Abdomen is soft and non-distended.  There are no signs of ascites.    Extremities: There is no dependent edema or local swelling.    Neurological: Patient is alert and oriented to person, place and time.    Pupils:  Pupils are equal, round, and reactive to light.            Results Review:     I reviewed the patient's new clinical results.    WBC WBC   Date " Value Ref Range Status   02/13/2023 4.94 3.40 - 10.80 10*3/mm3 Final   02/12/2023 8.94 3.40 - 10.80 10*3/mm3 Final   02/11/2023 5.90 3.40 - 10.80 10*3/mm3 Final   02/11/2023 5.16 3.40 - 10.80 10*3/mm3 Final      HGB Hemoglobin   Date Value Ref Range Status   02/13/2023 6.7 (C) 13.0 - 17.7 g/dL Final   02/12/2023 7.4 (L) 13.0 - 17.7 g/dL Final   02/11/2023 7.8 (L) 13.0 - 17.7 g/dL Final   02/11/2023 7.2 (L) 13.0 - 17.7 g/dL Final      HCT Hematocrit   Date Value Ref Range Status   02/13/2023 20.0 (C) 37.5 - 51.0 % Final   02/12/2023 22.7 (L) 37.5 - 51.0 % Final   02/11/2023 23.6 (L) 37.5 - 51.0 % Final   02/11/2023 21.6 (L) 37.5 - 51.0 % Final      Platlets No results found for: LABPLAT   MCV MCV   Date Value Ref Range Status   02/13/2023 103.6 (H) 79.0 - 97.0 fL Final   02/12/2023 103.2 (H) 79.0 - 97.0 fL Final   02/11/2023 104.0 (H) 79.0 - 97.0 fL Final   02/11/2023 102.4 (H) 79.0 - 97.0 fL Final          Sodium Sodium   Date Value Ref Range Status   02/13/2023 137 136 - 145 mmol/L Final   02/12/2023 136 136 - 145 mmol/L Final   02/11/2023 138 136 - 145 mmol/L Final      Potassium Potassium   Date Value Ref Range Status   02/13/2023 3.9 3.5 - 5.2 mmol/L Final   02/12/2023 4.2 3.5 - 5.2 mmol/L Final   02/11/2023 4.6 3.5 - 5.2 mmol/L Final      Chloride Chloride   Date Value Ref Range Status   02/13/2023 102 98 - 107 mmol/L Final   02/12/2023 103 98 - 107 mmol/L Final   02/11/2023 105 98 - 107 mmol/L Final      CO2 CO2   Date Value Ref Range Status   02/13/2023 21.0 (L) 22.0 - 29.0 mmol/L Final   02/12/2023 18.0 (L) 22.0 - 29.0 mmol/L Final   02/11/2023 16.0 (L) 22.0 - 29.0 mmol/L Final      BUN BUN   Date Value Ref Range Status   02/13/2023 81 (H) 8 - 23 mg/dL Final   02/12/2023 84 (H) 8 - 23 mg/dL Final   02/11/2023 83 (H) 8 - 23 mg/dL Final      Creatinine Creatinine   Date Value Ref Range Status   02/13/2023 5.02 (H) 0.76 - 1.27 mg/dL Final   02/12/2023 5.76 (H) 0.76 - 1.27 mg/dL Final   02/11/2023 5.60 (H) 0.76  - 1.27 mg/dL Final      Calcium Calcium   Date Value Ref Range Status   02/13/2023 9.3 8.6 - 10.5 mg/dL Final   02/12/2023 9.1 8.6 - 10.5 mg/dL Final   02/11/2023 9.2 8.6 - 10.5 mg/dL Final      PO4 No results found for: CAPO4   Albumin Albumin   Date Value Ref Range Status   02/13/2023 3.8 3.5 - 5.2 g/dL Final   02/12/2023 3.4 (L) 3.5 - 5.2 g/dL Final   02/11/2023 3.0 (L) 3.5 - 5.2 g/dL Final      Magnesium No results found for: MG   Uric Acid No results found for: URICACID     Medication Review: yes    Assessment & Plan       Acute renal failure, unspecified acute renal failure type (HCC)    Metabolic encephalopathy    Metabolic acidosis    Elevated troponin    PAOLO (acute kidney injury) (HCC)    Hyponatremia    Hyperkalemia    Liver cirrhosis secondary to HAYES (HCC)    Hyperbilirubinemia    Anemia, chronic disease    Hyperammonemia (HCC)    Other ascites    Coagulopathy (HCC)    Thrombocytopenia (HCC)    Decompensated hepatic cirrhosis (HCC)    Encephalopathy, hepatic      Assessment & Plan     Acute kidney injury: Baseline cr ~ 0.8-1.2mg/dl. Cr on this admission 4.5-4.7mg/dl BUN 72. UA trace ketones, blood and protein. CT didn't show any hydro. Urine Na<20 Urine cl 35     Met acidosis; Due to PAOLO and GI bicarb loss     Hyperkalemia: Due to PAOLO. On admission improved.     AMS: Hepatic encephalopathy     HAYES: Decompensated liver cirrhosis     Hypotension: in the setting of decompensated liver disease     Hyponatremia: improved.    Aneima: CT negative for bleed. Dilutional ?        Recs   Acute kidney injury likely hepato-renal syndrome vs ATN. Stable renal function with some improvement in UOP. Non oliguric now..D.C Albumin.. Continue midodrine to 15 mg TID.  Also on octerotide. Target SBP>120. Transfuse at hb<7 Patient is accepted at  for liver txp eval therefore will consider him for dialysis if needed. No indication of HD at present.  Awaiting transfer to .     Plan of care discussed with patient, wife and  friend at bedside.   Sergei العلي MD  02/13/23  13:38 EST

## 2023-02-14 VITALS
HEIGHT: 67 IN | BODY MASS INDEX: 25.25 KG/M2 | HEART RATE: 71 BPM | TEMPERATURE: 98.7 F | DIASTOLIC BLOOD PRESSURE: 53 MMHG | WEIGHT: 160.9 LBS | RESPIRATION RATE: 19 BRPM | OXYGEN SATURATION: 91 % | SYSTOLIC BLOOD PRESSURE: 116 MMHG

## 2023-02-14 LAB
ALBUMIN SERPL-MCNC: 4 G/DL (ref 3.5–5.2)
ALBUMIN/GLOB SERPL: 2 G/DL
ALP SERPL-CCNC: 71 U/L (ref 39–117)
ALT SERPL W P-5'-P-CCNC: 12 U/L (ref 1–41)
ANION GAP SERPL CALCULATED.3IONS-SCNC: 14 MMOL/L (ref 5–15)
AST SERPL-CCNC: 21 U/L (ref 1–40)
BH BB BLOOD EXPIRATION DATE: NORMAL
BH BB BLOOD TYPE BARCODE: 5100
BH BB DISPENSE STATUS: NORMAL
BH BB PRODUCT CODE: NORMAL
BH BB UNIT NUMBER: NORMAL
BILIRUB SERPL-MCNC: 1.8 MG/DL (ref 0–1.2)
BUN SERPL-MCNC: 78 MG/DL (ref 8–23)
BUN/CREAT SERPL: 14.5 (ref 7–25)
CALCIUM SPEC-SCNC: 9.3 MG/DL (ref 8.6–10.5)
CHLORIDE SERPL-SCNC: 104 MMOL/L (ref 98–107)
CO2 SERPL-SCNC: 20 MMOL/L (ref 22–29)
CREAT SERPL-MCNC: 5.39 MG/DL (ref 0.76–1.27)
CROSSMATCH INTERPRETATION: NORMAL
DEPRECATED RDW RBC AUTO: 62.3 FL (ref 37–54)
EGFRCR SERPLBLD CKD-EPI 2021: 10.5 ML/MIN/1.73
ERYTHROCYTE [DISTWIDTH] IN BLOOD BY AUTOMATED COUNT: 16.9 % (ref 12.3–15.4)
GLOBULIN UR ELPH-MCNC: 2 GM/DL
GLUCOSE BLDC GLUCOMTR-MCNC: 149 MG/DL (ref 70–130)
GLUCOSE SERPL-MCNC: 164 MG/DL (ref 65–99)
HCT VFR BLD AUTO: 21.1 % (ref 37.5–51)
HGB BLD-MCNC: 7.2 G/DL (ref 13–17.7)
INR PPP: 1.98 (ref 0.84–1.13)
MCH RBC QN AUTO: 34.4 PG (ref 26.6–33)
MCHC RBC AUTO-ENTMCNC: 34.1 G/DL (ref 31.5–35.7)
MCV RBC AUTO: 101 FL (ref 79–97)
PLATELET # BLD AUTO: 53 10*3/MM3 (ref 140–450)
PMV BLD AUTO: 9.1 FL (ref 6–12)
POTASSIUM SERPL-SCNC: 3.8 MMOL/L (ref 3.5–5.2)
PROT SERPL-MCNC: 6 G/DL (ref 6–8.5)
PROTHROMBIN TIME: 22.6 SECONDS (ref 11.4–14.4)
QT INTERVAL: 464 MS
QTC INTERVAL: 482 MS
RBC # BLD AUTO: 2.09 10*6/MM3 (ref 4.14–5.8)
SODIUM SERPL-SCNC: 138 MMOL/L (ref 136–145)
UNIT  ABO: NORMAL
UNIT  RH: NORMAL
WBC NRBC COR # BLD: 4.21 10*3/MM3 (ref 3.4–10.8)

## 2023-02-14 PROCEDURE — 85610 PROTHROMBIN TIME: CPT | Performed by: STUDENT IN AN ORGANIZED HEALTH CARE EDUCATION/TRAINING PROGRAM

## 2023-02-14 PROCEDURE — 82962 GLUCOSE BLOOD TEST: CPT

## 2023-02-14 PROCEDURE — 80053 COMPREHEN METABOLIC PANEL: CPT | Performed by: STUDENT IN AN ORGANIZED HEALTH CARE EDUCATION/TRAINING PROGRAM

## 2023-02-14 PROCEDURE — 85027 COMPLETE CBC AUTOMATED: CPT | Performed by: STUDENT IN AN ORGANIZED HEALTH CARE EDUCATION/TRAINING PROGRAM

## 2023-02-14 RX ORDER — OCTREOTIDE ACETATE 100 UG/ML
200 INJECTION, SOLUTION INTRAVENOUS; SUBCUTANEOUS 3 TIMES DAILY
Status: ON HOLD
Start: 2023-02-14 | End: 2023-03-26

## 2023-02-14 RX ORDER — LACTULOSE 10 G/15ML
30 SOLUTION ORAL 2 TIMES DAILY
Status: ON HOLD
Start: 2023-02-14 | End: 2023-03-26

## 2023-02-14 RX ORDER — PANTOPRAZOLE SODIUM 40 MG/10ML
40 INJECTION, POWDER, LYOPHILIZED, FOR SOLUTION INTRAVENOUS
Status: ON HOLD
Start: 2023-02-14 | End: 2023-03-26

## 2023-02-14 RX ORDER — SODIUM BICARBONATE 650 MG/1
1300 TABLET ORAL 3 TIMES DAILY
Status: ON HOLD
Start: 2023-02-14 | End: 2023-03-26

## 2023-02-14 RX ORDER — MIDODRINE HYDROCHLORIDE 5 MG/1
15 TABLET ORAL
Status: ON HOLD
Start: 2023-02-14 | End: 2023-03-26

## 2023-02-14 RX ADMIN — Medication 325 MG: at 07:34

## 2023-02-14 RX ADMIN — PANTOPRAZOLE SODIUM 40 MG: 40 INJECTION, POWDER, FOR SOLUTION INTRAVENOUS at 05:50

## 2023-02-14 RX ADMIN — MIDODRINE HYDROCHLORIDE 15 MG: 10 TABLET ORAL at 07:34

## 2023-02-14 NOTE — PROGRESS NOTES
Enter Query Response Below      Query Response:     Hyponatremia is not clinically significant         If applicable, please update the problem list.   Patient: Leoncio Hopson        : 1950  Account: [id number]           Admit Date: 2023        How to Respond to this query:       a. Click New Note     b. Answer query within the yellow box.                c. Update the Problem List, if applicable.      If you have any questions about this query contact me at: [phone]    Dr. Slutana:    72-year-old patient admitted with hepatic encephalopathy, HAYES, cirrhosis of liver, and possible hepatorenal syndrome on 2023 also has a diagnosis of hyponatremia this admit. Sodium level this admit as follows:  23 20:12-Sodium: 134  23 22:47-Sodium: 134  02/10/23 03:01-Sodium: 137  23 05:06-Sodium: 138  23 08:48-Sodium: 136  23 05:08-Sodium: 137  23 03:38-Sodium: 138  Treatment has included monitoring labs, Lactulose, Sodium Bicarbonate tablets, and NS 1000 ml IV bolus. After study, can the hyponatremia be further specified as:    Clinically significant hyponatremia  Hyponatremia is not clinically significant  Other, please specify: ____________  Unable to clinically determine     By submitting this query, we are merely seeking further clarification of documentation to accurately reflect all conditions that you are monitoring, evaluating, treating or that extend the hospitalization or utilize additional resources of care. Please utilize your independent clinical judgment when addressing the question(s) above.     This query and your response, once completed, will be entered into the legal medical record.    Sincerely,  Yen Vogel, RN, BSN, CCDS  Clinical Documentation Integrity Program   [email or web address]

## 2023-02-14 NOTE — PLAN OF CARE
Goal Outcome Evaluation:               AO x4 this AM . VSS. Room air. No complaints of pain. Pt had multiple bowel movements. Pt rested well during night. Family @ bedside. D/C to [facility] today.

## 2023-02-14 NOTE — CASE MANAGEMENT/SOCIAL WORK
Case Management Discharge Note      Final Note: Patient transferring to /[facility] room# 427, today 2-14. [facility] cancelled run for 8am this morning but never called us and I found out at 0835 about [facility]. Reached out to  and she made decision to let patient transfer with our [facility] [place], operated by [facility]. Called patient's RN and she called report and then called [facility] and they will be here around 0930 to pick patient up and transfer to .         Selected Continued Care - Admitted Since 2/9/2023     Destination Coordination complete.    Service Provider Selected Services Address Phone Fax Patient Preferred    [address] [id number] [phone] -- --          Durable Medical Equipment    No services have been selected for the patient.              Dialysis/Infusion    No services have been selected for the patient.              Home Medical Care    No services have been selected for the patient.              Therapy    No services have been selected for the patient.              Community Resources    No services have been selected for the patient.              Community & DME    No services have been selected for the patient.                  Transportation Services  Ambulance: [facility]    Final Discharge Disposition Code: 02 - [facility] for [facility]

## 2023-02-15 LAB
BACTERIA SPEC AEROBE CULT: NORMAL
BACTERIA SPEC AEROBE CULT: NORMAL
BACTERIA SPEC ANAEROBE CULT: NORMAL

## 2023-03-25 ENCOUNTER — APPOINTMENT (OUTPATIENT)
Dept: GENERAL RADIOLOGY | Facility: HOSPITAL | Age: 73
DRG: 246 | End: 2023-03-25
Payer: MEDICARE

## 2023-03-25 ENCOUNTER — APPOINTMENT (OUTPATIENT)
Dept: CT IMAGING | Facility: HOSPITAL | Age: 73
DRG: 246 | End: 2023-03-25
Payer: MEDICARE

## 2023-03-25 ENCOUNTER — APPOINTMENT (OUTPATIENT)
Dept: ULTRASOUND IMAGING | Facility: HOSPITAL | Age: 73
DRG: 246 | End: 2023-03-25
Payer: MEDICARE

## 2023-03-25 ENCOUNTER — APPOINTMENT (OUTPATIENT)
Dept: CARDIOLOGY | Facility: HOSPITAL | Age: 73
DRG: 246 | End: 2023-03-25
Payer: MEDICARE

## 2023-03-25 ENCOUNTER — HOSPITAL ENCOUNTER (INPATIENT)
Facility: HOSPITAL | Age: 73
LOS: 10 days | Discharge: HOSPICE | DRG: 246 | End: 2023-04-04
Attending: INTERNAL MEDICINE | Admitting: INTERNAL MEDICINE
Payer: MEDICARE

## 2023-03-25 DIAGNOSIS — I21.11 ST ELEVATION MYOCARDIAL INFARCTION INVOLVING RIGHT CORONARY ARTERY: ICD-10-CM

## 2023-03-25 DIAGNOSIS — E72.20 HYPERAMMONEMIA: Primary | ICD-10-CM

## 2023-03-25 DIAGNOSIS — K74.60 DECOMPENSATED HEPATIC CIRRHOSIS: ICD-10-CM

## 2023-03-25 DIAGNOSIS — K74.60 LIVER CIRRHOSIS SECONDARY TO NASH: ICD-10-CM

## 2023-03-25 DIAGNOSIS — K72.90 DECOMPENSATED HEPATIC CIRRHOSIS: ICD-10-CM

## 2023-03-25 DIAGNOSIS — K75.81 LIVER CIRRHOSIS SECONDARY TO NASH: ICD-10-CM

## 2023-03-25 DIAGNOSIS — N18.5 CKD (CHRONIC KIDNEY DISEASE) STAGE 5, GFR LESS THAN 15 ML/MIN: ICD-10-CM

## 2023-03-25 PROBLEM — E87.1 HYPONATREMIA: Status: RESOLVED | Noted: 2023-02-09 | Resolved: 2023-03-25

## 2023-03-25 PROBLEM — N17.9 ACUTE RENAL FAILURE, UNSPECIFIED ACUTE RENAL FAILURE TYPE: Status: RESOLVED | Noted: 2023-02-10 | Resolved: 2023-03-25

## 2023-03-25 PROBLEM — R18.8 OTHER ASCITES: Status: RESOLVED | Noted: 2023-02-11 | Resolved: 2023-03-25

## 2023-03-25 PROBLEM — E87.5 HYPERKALEMIA: Status: RESOLVED | Noted: 2023-02-09 | Resolved: 2023-03-25

## 2023-03-25 PROBLEM — E87.20 METABOLIC ACIDOSIS: Status: RESOLVED | Noted: 2023-02-09 | Resolved: 2023-03-25

## 2023-03-25 PROBLEM — K76.82 ENCEPHALOPATHY, HEPATIC: Status: RESOLVED | Noted: 2023-02-11 | Resolved: 2023-03-25

## 2023-03-25 PROBLEM — I21.3 STEMI (ST ELEVATION MYOCARDIAL INFARCTION): Status: ACTIVE | Noted: 2023-03-25

## 2023-03-25 PROBLEM — G93.41 METABOLIC ENCEPHALOPATHY: Status: RESOLVED | Noted: 2023-02-09 | Resolved: 2023-03-25

## 2023-03-25 PROBLEM — R77.8 ELEVATED TROPONIN: Status: RESOLVED | Noted: 2023-02-09 | Resolved: 2023-03-25

## 2023-03-25 PROBLEM — N17.9 AKI (ACUTE KIDNEY INJURY): Status: RESOLVED | Noted: 2023-02-09 | Resolved: 2023-03-25

## 2023-03-25 LAB
ACT BLD: 311 SECONDS (ref 82–152)
ALBUMIN SERPL-MCNC: 3.2 G/DL (ref 3.5–5.2)
ALBUMIN/GLOB SERPL: 0.9 G/DL
ALP SERPL-CCNC: 90 U/L (ref 39–117)
ALT SERPL W P-5'-P-CCNC: 27 U/L (ref 1–41)
ANION GAP SERPL CALCULATED.3IONS-SCNC: 38 MMOL/L (ref 5–15)
ANION GAP SERPL CALCULATED.3IONS-SCNC: 38 MMOL/L (ref 5–15)
ANISOCYTOSIS BLD QL: NORMAL
APTT PPP: 124.4 SECONDS (ref 22–39)
ARTERIAL PATENCY WRIST A: ABNORMAL
ASCENDING AORTA: 4.6 CM
AST SERPL-CCNC: 65 U/L (ref 1–40)
ATMOSPHERIC PRESS: ABNORMAL MM[HG]
BACTERIA UR QL AUTO: ABNORMAL /HPF
BASE EXCESS BLDA CALC-SCNC: <-20 MMOL/L (ref 0–2)
BASOPHILS # BLD AUTO: 0.1 10*3/MM3 (ref 0–0.2)
BASOPHILS NFR BLD AUTO: 0.7 % (ref 0–1.5)
BDY SITE: ABNORMAL
BH CV ECHO MEAS - AO MAX PG: 20.6 MMHG
BH CV ECHO MEAS - AO MEAN PG: 12 MMHG
BH CV ECHO MEAS - AO ROOT DIAM: 4.4 CM
BH CV ECHO MEAS - AO V2 MAX: 227 CM/SEC
BH CV ECHO MEAS - AO V2 VTI: 34.4 CM
BH CV ECHO MEAS - AVA(I,D): 2.04 CM2
BH CV ECHO MEAS - EDV(CUBED): 132.7 ML
BH CV ECHO MEAS - EDV(MOD-SP2): 162 ML
BH CV ECHO MEAS - EDV(MOD-SP4): 164 ML
BH CV ECHO MEAS - EF(MOD-BP): 52 %
BH CV ECHO MEAS - EF(MOD-SP2): 50.7 %
BH CV ECHO MEAS - EF(MOD-SP4): 55.5 %
BH CV ECHO MEAS - ESV(CUBED): 50.7 ML
BH CV ECHO MEAS - ESV(MOD-SP2): 79.8 ML
BH CV ECHO MEAS - ESV(MOD-SP4): 73 ML
BH CV ECHO MEAS - FS: 27.5 %
BH CV ECHO MEAS - IVS/LVPW: 1 CM
BH CV ECHO MEAS - IVSD: 1 CM
BH CV ECHO MEAS - LA DIMENSION: 5.2 CM
BH CV ECHO MEAS - LAT PEAK E' VEL: 14.5 CM/SEC
BH CV ECHO MEAS - LV DIASTOLIC VOL/BSA (35-75): 88.9 CM2
BH CV ECHO MEAS - LV MASS(C)D: 188 GRAMS
BH CV ECHO MEAS - LV MAX PG: 8.9 MMHG
BH CV ECHO MEAS - LV MEAN PG: 5 MMHG
BH CV ECHO MEAS - LV SYSTOLIC VOL/BSA (12-30): 39.6 CM2
BH CV ECHO MEAS - LV V1 MAX: 149 CM/SEC
BH CV ECHO MEAS - LV V1 VTI: 22.3 CM
BH CV ECHO MEAS - LVIDD: 5.1 CM
BH CV ECHO MEAS - LVIDS: 3.7 CM
BH CV ECHO MEAS - LVOT AREA: 3.1 CM2
BH CV ECHO MEAS - LVOT DIAM: 2 CM
BH CV ECHO MEAS - LVPWD: 1 CM
BH CV ECHO MEAS - MED PEAK E' VEL: 9.2 CM/SEC
BH CV ECHO MEAS - MV A MAX VEL: 148 CM/SEC
BH CV ECHO MEAS - MV DEC SLOPE: 1022 CM/SEC2
BH CV ECHO MEAS - MV DEC TIME: 0.14 MSEC
BH CV ECHO MEAS - MV E MAX VEL: 124 CM/SEC
BH CV ECHO MEAS - MV E/A: 0.84
BH CV ECHO MEAS - MV MAX PG: 8.2 MMHG
BH CV ECHO MEAS - MV MEAN PG: 5 MMHG
BH CV ECHO MEAS - MV P1/2T: 34.4 MSEC
BH CV ECHO MEAS - MV V2 VTI: 24.6 CM
BH CV ECHO MEAS - MVA(P1/2T): 6.4 CM2
BH CV ECHO MEAS - MVA(VTI): 2.8 CM2
BH CV ECHO MEAS - PA ACC TIME: 0.12 SEC
BH CV ECHO MEAS - PA PR(ACCEL): 24.6 MMHG
BH CV ECHO MEAS - PA V2 MAX: 181 CM/SEC
BH CV ECHO MEAS - RAP SYSTOLE: 3 MMHG
BH CV ECHO MEAS - RVSP: 49 MMHG
BH CV ECHO MEAS - SI(MOD-SP2): 44.6 ML/M2
BH CV ECHO MEAS - SI(MOD-SP4): 49.3 ML/M2
BH CV ECHO MEAS - SV(LVOT): 70.1 ML
BH CV ECHO MEAS - SV(MOD-SP2): 82.2 ML
BH CV ECHO MEAS - SV(MOD-SP4): 91 ML
BH CV ECHO MEAS - TAPSE (>1.6): 2.7 CM
BH CV ECHO MEAS - TR MAX PG: 46.2 MMHG
BH CV ECHO MEAS - TR MAX VEL: 340 CM/SEC
BH CV ECHO MEASUREMENTS AVERAGE E/E' RATIO: 10.46
BH CV XLRA - RV BASE: 4.1 CM
BH CV XLRA - RV LENGTH: 7 CM
BH CV XLRA - RV MID: 3 CM
BH CV XLRA - TDI S': 22.7 CM/SEC
BILIRUB SERPL-MCNC: 3.3 MG/DL (ref 0–1.2)
BILIRUB UR QL STRIP: NEGATIVE
BODY TEMPERATURE: 37 C
BUN SERPL-MCNC: 43 MG/DL (ref 8–23)
BUN SERPL-MCNC: 48 MG/DL (ref 8–23)
BUN/CREAT SERPL: 10.6 (ref 7–25)
BUN/CREAT SERPL: 12.2 (ref 7–25)
CA-I SERPL ISE-MCNC: 1.21 MMOL/L (ref 1.12–1.32)
CALCIUM SPEC-SCNC: 9.5 MG/DL (ref 8.6–10.5)
CALCIUM SPEC-SCNC: 9.6 MG/DL (ref 8.6–10.5)
CHLORIDE SERPL-SCNC: 89 MMOL/L (ref 98–107)
CHLORIDE SERPL-SCNC: 91 MMOL/L (ref 98–107)
CHOLEST SERPL-MCNC: 94 MG/DL (ref 0–200)
CLARITY UR: ABNORMAL
CO2 BLDA-SCNC: 5.9 MMOL/L (ref 22–33)
CO2 SERPL-SCNC: 6 MMOL/L (ref 22–29)
CO2 SERPL-SCNC: 6 MMOL/L (ref 22–29)
COHGB MFR BLD: 0.9 % (ref 0–2)
COLOR UR: ABNORMAL
CREAT BLDA-MCNC: 4.5 MG/DL (ref 0.6–1.3)
CREAT SERPL-MCNC: 3.94 MG/DL (ref 0.76–1.27)
CREAT SERPL-MCNC: 4.07 MG/DL (ref 0.76–1.27)
D-LACTATE SERPL-SCNC: 17.6 MMOL/L (ref 0.5–2)
D-LACTATE SERPL-SCNC: 18.8 MMOL/L (ref 0.5–2)
D-LACTATE SERPL-SCNC: 20.5 MMOL/L (ref 0.5–2)
DEPRECATED RDW RBC AUTO: 65.5 FL (ref 37–54)
DEPRECATED RDW RBC AUTO: 65.9 FL (ref 37–54)
EGFRCR SERPLBLD CKD-EPI 2021: 14.7 ML/MIN/1.73
EGFRCR SERPLBLD CKD-EPI 2021: 15.2 ML/MIN/1.73
EOSINOPHIL # BLD AUTO: 1.85 10*3/MM3 (ref 0–0.4)
EOSINOPHIL NFR BLD AUTO: 13.8 % (ref 0.3–6.2)
EPAP: 0
ERYTHROCYTE [DISTWIDTH] IN BLOOD BY AUTOMATED COUNT: 17 % (ref 12.3–15.4)
ERYTHROCYTE [DISTWIDTH] IN BLOOD BY AUTOMATED COUNT: 17 % (ref 12.3–15.4)
GEN 5 2HR TROPONIN T REFLEX: 792 NG/L
GLOBULIN UR ELPH-MCNC: 3.4 GM/DL
GLUCOSE BLDC GLUCOMTR-MCNC: 199 MG/DL (ref 70–130)
GLUCOSE BLDC GLUCOMTR-MCNC: 204 MG/DL (ref 70–130)
GLUCOSE SERPL-MCNC: 158 MG/DL (ref 65–99)
GLUCOSE SERPL-MCNC: 168 MG/DL (ref 65–99)
GLUCOSE UR STRIP-MCNC: ABNORMAL MG/DL
HBA1C MFR BLD: 5.6 % (ref 4.8–5.6)
HCO3 BLDA-SCNC: 5.4 MMOL/L (ref 20–26)
HCT VFR BLD AUTO: 30.2 % (ref 37.5–51)
HCT VFR BLD AUTO: 31.9 % (ref 37.5–51)
HCT VFR BLD CALC: 29.3 % (ref 38–51)
HGB BLD-MCNC: 10.5 G/DL (ref 13–17.7)
HGB BLD-MCNC: 9.8 G/DL (ref 13–17.7)
HGB BLDA-MCNC: 9.6 G/DL (ref 13.5–17.5)
HGB UR QL STRIP.AUTO: ABNORMAL
HYALINE CASTS UR QL AUTO: ABNORMAL /LPF
IMM GRANULOCYTES # BLD AUTO: 0.28 10*3/MM3 (ref 0–0.05)
IMM GRANULOCYTES NFR BLD AUTO: 2.1 % (ref 0–0.5)
INHALED O2 CONCENTRATION: 21 %
INR PPP: 2.58 (ref 0.84–1.13)
IPAP: 0
KETONES UR QL STRIP: ABNORMAL
LEFT ATRIUM VOLUME INDEX: 42.1 ML/M2
LEUKOCYTE ESTERASE UR QL STRIP.AUTO: ABNORMAL
LV EF 2D ECHO EST: 65 %
LYMPHOCYTES # BLD AUTO: 0.68 10*3/MM3 (ref 0.7–3.1)
LYMPHOCYTES NFR BLD AUTO: 5.1 % (ref 19.6–45.3)
Lab: ABNORMAL
MACROCYTES BLD QL SMEAR: NORMAL
MAGNESIUM SERPL-MCNC: 1.9 MG/DL (ref 1.6–2.4)
MAXIMAL PREDICTED HEART RATE: 146 BPM
MCH RBC QN AUTO: 34.3 PG (ref 26.6–33)
MCH RBC QN AUTO: 34.8 PG (ref 26.6–33)
MCHC RBC AUTO-ENTMCNC: 32.5 G/DL (ref 31.5–35.7)
MCHC RBC AUTO-ENTMCNC: 32.9 G/DL (ref 31.5–35.7)
MCV RBC AUTO: 105.6 FL (ref 79–97)
MCV RBC AUTO: 105.6 FL (ref 79–97)
METHGB BLD QL: 0.3 % (ref 0–1.5)
MODALITY: ABNORMAL
MONOCYTES # BLD AUTO: 0.71 10*3/MM3 (ref 0.1–0.9)
MONOCYTES NFR BLD AUTO: 5.3 % (ref 5–12)
NEUTROPHILS NFR BLD AUTO: 73 % (ref 42.7–76)
NEUTROPHILS NFR BLD AUTO: 9.77 10*3/MM3 (ref 1.7–7)
NITRITE UR QL STRIP: NEGATIVE
NOTE: ABNORMAL
NOTIFIED BY: ABNORMAL
NOTIFIED WHO: ABNORMAL
NRBC BLD AUTO-RTO: 0 /100 WBC (ref 0–0.2)
OXYHGB MFR BLDV: 97.9 % (ref 94–99)
PAW @ PEAK INSP FLOW SETTING VENT: 0 CMH2O
PCO2 BLDA: 15.5 MM HG (ref 35–45)
PCO2 TEMP ADJ BLD: 15.5 MM HG (ref 35–48)
PH BLDA: 7.15 PH UNITS (ref 7.35–7.45)
PH UR STRIP.AUTO: <=5 [PH] (ref 5–8)
PH, TEMP CORRECTED: 7.15 PH UNITS
PHOSPHATE SERPL-MCNC: 3.9 MG/DL (ref 2.5–4.5)
PLAT MORPH BLD: NORMAL
PLATELET # BLD AUTO: 111 10*3/MM3 (ref 140–450)
PLATELET # BLD AUTO: 134 10*3/MM3 (ref 140–450)
PMV BLD AUTO: 9.7 FL (ref 6–12)
PMV BLD AUTO: 9.8 FL (ref 6–12)
PO2 BLDA: 128 MM HG (ref 83–108)
PO2 TEMP ADJ BLD: 128 MM HG (ref 83–108)
POTASSIUM SERPL-SCNC: 3.8 MMOL/L (ref 3.5–5.2)
POTASSIUM SERPL-SCNC: 4.1 MMOL/L (ref 3.5–5.2)
PROCALCITONIN SERPL-MCNC: 0.82 NG/ML (ref 0–0.25)
PROT ?TM UR-MCNC: 283.6 MG/DL
PROT SERPL-MCNC: 6.6 G/DL (ref 6–8.5)
PROT UR QL STRIP: ABNORMAL
PROTHROMBIN TIME: 27.8 SECONDS (ref 11.4–14.4)
QT INTERVAL: 292 MS
QTC INTERVAL: 405 MS
RBC # BLD AUTO: 2.86 10*6/MM3 (ref 4.14–5.8)
RBC # BLD AUTO: 3.02 10*6/MM3 (ref 4.14–5.8)
RBC # UR STRIP: ABNORMAL /HPF
REF LAB TEST METHOD: ABNORMAL
SODIUM SERPL-SCNC: 133 MMOL/L (ref 136–145)
SODIUM SERPL-SCNC: 135 MMOL/L (ref 136–145)
SODIUM UR-SCNC: 44 MMOL/L
SP GR UR STRIP: 1.03 (ref 1–1.03)
SQUAMOUS #/AREA URNS HPF: ABNORMAL /HPF
STRESS TARGET HR: 124 BPM
TOTAL RATE: 0 BREATHS/MINUTE
TROPONIN T DELTA: 53 NG/L
TROPONIN T SERPL HS-MCNC: 739 NG/L
UROBILINOGEN UR QL STRIP: ABNORMAL
WBC # UR STRIP: ABNORMAL /HPF
WBC MORPH BLD: NORMAL
WBC NRBC COR # BLD: 12.89 10*3/MM3 (ref 3.4–10.8)
WBC NRBC COR # BLD: 13.39 10*3/MM3 (ref 3.4–10.8)

## 2023-03-25 PROCEDURE — C9606 PERC D-E COR REVASC W AMI S: HCPCS | Performed by: INTERNAL MEDICINE

## 2023-03-25 PROCEDURE — 71250 CT THORAX DX C-: CPT

## 2023-03-25 PROCEDURE — 72192 CT PELVIS W/O DYE: CPT

## 2023-03-25 PROCEDURE — C1757 CATH, THROMBECTOMY/EMBOLECT: HCPCS | Performed by: INTERNAL MEDICINE

## 2023-03-25 PROCEDURE — 83036 HEMOGLOBIN GLYCOSYLATED A1C: CPT | Performed by: INTERNAL MEDICINE

## 2023-03-25 PROCEDURE — 25510000001 IOPAMIDOL PER 1 ML: Performed by: INTERNAL MEDICINE

## 2023-03-25 PROCEDURE — 92921 PR PRQ TRLUML CORONARY ANGIOPLASTY ADDL BRANCH: CPT | Performed by: INTERNAL MEDICINE

## 2023-03-25 PROCEDURE — P9041 ALBUMIN (HUMAN),5%, 50ML: HCPCS | Performed by: INTERNAL MEDICINE

## 2023-03-25 PROCEDURE — 92941 PRQ TRLML REVSC TOT OCCL AMI: CPT | Performed by: INTERNAL MEDICINE

## 2023-03-25 PROCEDURE — 99223 1ST HOSP IP/OBS HIGH 75: CPT | Performed by: NURSE PRACTITIONER

## 2023-03-25 PROCEDURE — 82375 ASSAY CARBOXYHB QUANT: CPT

## 2023-03-25 PROCEDURE — 0 PHYTONADIONE 10 MG/ML SOLUTION 1 ML AMPULE: Performed by: NURSE PRACTITIONER

## 2023-03-25 PROCEDURE — 84100 ASSAY OF PHOSPHORUS: CPT | Performed by: NURSE PRACTITIONER

## 2023-03-25 PROCEDURE — 81001 URINALYSIS AUTO W/SCOPE: CPT | Performed by: INTERNAL MEDICINE

## 2023-03-25 PROCEDURE — 85007 BL SMEAR W/DIFF WBC COUNT: CPT | Performed by: INTERNAL MEDICINE

## 2023-03-25 PROCEDURE — 25010000002 PIPERACILLIN SOD-TAZOBACTAM PER 1 G: Performed by: INTERNAL MEDICINE

## 2023-03-25 PROCEDURE — 76775 US EXAM ABDO BACK WALL LIM: CPT

## 2023-03-25 PROCEDURE — 93458 L HRT ARTERY/VENTRICLE ANGIO: CPT | Performed by: INTERNAL MEDICINE

## 2023-03-25 PROCEDURE — 83605 ASSAY OF LACTIC ACID: CPT | Performed by: INTERNAL MEDICINE

## 2023-03-25 PROCEDURE — 93306 TTE W/DOPPLER COMPLETE: CPT | Performed by: INTERNAL MEDICINE

## 2023-03-25 PROCEDURE — C1874 STENT, COATED/COV W/DEL SYS: HCPCS | Performed by: INTERNAL MEDICINE

## 2023-03-25 PROCEDURE — 93306 TTE W/DOPPLER COMPLETE: CPT

## 2023-03-25 PROCEDURE — 84300 ASSAY OF URINE SODIUM: CPT | Performed by: INTERNAL MEDICINE

## 2023-03-25 PROCEDURE — 87040 BLOOD CULTURE FOR BACTERIA: CPT | Performed by: INTERNAL MEDICINE

## 2023-03-25 PROCEDURE — 85025 COMPLETE CBC W/AUTO DIFF WBC: CPT | Performed by: INTERNAL MEDICINE

## 2023-03-25 PROCEDURE — C1769 GUIDE WIRE: HCPCS | Performed by: INTERNAL MEDICINE

## 2023-03-25 PROCEDURE — 85730 THROMBOPLASTIN TIME PARTIAL: CPT | Performed by: INTERNAL MEDICINE

## 2023-03-25 PROCEDURE — C1894 INTRO/SHEATH, NON-LASER: HCPCS | Performed by: INTERNAL MEDICINE

## 2023-03-25 PROCEDURE — 84156 ASSAY OF PROTEIN URINE: CPT | Performed by: INTERNAL MEDICINE

## 2023-03-25 PROCEDURE — 82330 ASSAY OF CALCIUM: CPT | Performed by: NURSE PRACTITIONER

## 2023-03-25 PROCEDURE — 4A023N7 MEASUREMENT OF CARDIAC SAMPLING AND PRESSURE, LEFT HEART, PERCUTANEOUS APPROACH: ICD-10-PCS | Performed by: INTERNAL MEDICINE

## 2023-03-25 PROCEDURE — 25010000002 THIAMINE PER 100 MG: Performed by: INTERNAL MEDICINE

## 2023-03-25 PROCEDURE — 25010000002 OCTREOTIDE PER 25 MCG: Performed by: INTERNAL MEDICINE

## 2023-03-25 PROCEDURE — 87086 URINE CULTURE/COLONY COUNT: CPT | Performed by: INTERNAL MEDICINE

## 2023-03-25 PROCEDURE — 80053 COMPREHEN METABOLIC PANEL: CPT | Performed by: INTERNAL MEDICINE

## 2023-03-25 PROCEDURE — 84145 PROCALCITONIN (PCT): CPT | Performed by: NURSE PRACTITIONER

## 2023-03-25 PROCEDURE — 99233 SBSQ HOSP IP/OBS HIGH 50: CPT | Performed by: INTERNAL MEDICINE

## 2023-03-25 PROCEDURE — 25010000002 ALBUMIN HUMAN 5% PER 50 ML: Performed by: INTERNAL MEDICINE

## 2023-03-25 PROCEDURE — 85347 COAGULATION TIME ACTIVATED: CPT

## 2023-03-25 PROCEDURE — 93005 ELECTROCARDIOGRAM TRACING: CPT | Performed by: INTERNAL MEDICINE

## 2023-03-25 PROCEDURE — 82805 BLOOD GASES W/O2 SATURATION: CPT

## 2023-03-25 PROCEDURE — C1887 CATHETER, GUIDING: HCPCS | Performed by: INTERNAL MEDICINE

## 2023-03-25 PROCEDURE — B2111ZZ FLUOROSCOPY OF MULTIPLE CORONARY ARTERIES USING LOW OSMOLAR CONTRAST: ICD-10-PCS | Performed by: INTERNAL MEDICINE

## 2023-03-25 PROCEDURE — 25010000002 HEPARIN (PORCINE) PER 1000 UNITS: Performed by: INTERNAL MEDICINE

## 2023-03-25 PROCEDURE — 027034Z DILATION OF CORONARY ARTERY, ONE ARTERY WITH DRUG-ELUTING INTRALUMINAL DEVICE, PERCUTANEOUS APPROACH: ICD-10-PCS | Performed by: INTERNAL MEDICINE

## 2023-03-25 PROCEDURE — 85027 COMPLETE CBC AUTOMATED: CPT | Performed by: NURSE PRACTITIONER

## 2023-03-25 PROCEDURE — 85610 PROTHROMBIN TIME: CPT | Performed by: INTERNAL MEDICINE

## 2023-03-25 PROCEDURE — 63710000001 INSULIN LISPRO (HUMAN) PER 5 UNITS: Performed by: INTERNAL MEDICINE

## 2023-03-25 PROCEDURE — 83050 HGB METHEMOGLOBIN QUAN: CPT

## 2023-03-25 PROCEDURE — 36600 WITHDRAWAL OF ARTERIAL BLOOD: CPT

## 2023-03-25 PROCEDURE — 83605 ASSAY OF LACTIC ACID: CPT | Performed by: NURSE PRACTITIONER

## 2023-03-25 PROCEDURE — 82565 ASSAY OF CREATININE: CPT

## 2023-03-25 PROCEDURE — 74150 CT ABDOMEN W/O CONTRAST: CPT

## 2023-03-25 PROCEDURE — C1725 CATH, TRANSLUMIN NON-LASER: HCPCS | Performed by: INTERNAL MEDICINE

## 2023-03-25 PROCEDURE — 71045 X-RAY EXAM CHEST 1 VIEW: CPT

## 2023-03-25 PROCEDURE — 82570 ASSAY OF URINE CREATININE: CPT | Performed by: INTERNAL MEDICINE

## 2023-03-25 PROCEDURE — 82962 GLUCOSE BLOOD TEST: CPT

## 2023-03-25 PROCEDURE — 99291 CRITICAL CARE FIRST HOUR: CPT | Performed by: INTERNAL MEDICINE

## 2023-03-25 PROCEDURE — 74018 RADEX ABDOMEN 1 VIEW: CPT

## 2023-03-25 PROCEDURE — 25010000002 METHYLPREDNISOLONE PER 125 MG: Performed by: INTERNAL MEDICINE

## 2023-03-25 PROCEDURE — 83735 ASSAY OF MAGNESIUM: CPT | Performed by: NURSE PRACTITIONER

## 2023-03-25 PROCEDURE — 84484 ASSAY OF TROPONIN QUANT: CPT | Performed by: INTERNAL MEDICINE

## 2023-03-25 PROCEDURE — 92921: CPT | Performed by: INTERNAL MEDICINE

## 2023-03-25 PROCEDURE — 25010000002 FENTANYL CITRATE (PF) 50 MCG/ML SOLUTION: Performed by: INTERNAL MEDICINE

## 2023-03-25 PROCEDURE — 82465 ASSAY BLD/SERUM CHOLESTEROL: CPT | Performed by: INTERNAL MEDICINE

## 2023-03-25 PROCEDURE — 25010000002 DIPHENHYDRAMINE PER 50 MG: Performed by: INTERNAL MEDICINE

## 2023-03-25 PROCEDURE — 87205 SMEAR GRAM STAIN: CPT | Performed by: INTERNAL MEDICINE

## 2023-03-25 PROCEDURE — 25010000002 PHENYLEPHRINE 10 MG/ML SOLUTION: Performed by: INTERNAL MEDICINE

## 2023-03-25 DEVICE — XIENCE SKYPOINT™ EVEROLIMUS ELUTING CORONARY STENT SYSTEM 4.00 MM X 23 MM / RAPID-EXCHANGE
Type: IMPLANTABLE DEVICE | Site: CORONARY | Status: FUNCTIONAL
Brand: XIENCE SKYPOINT™

## 2023-03-25 RX ORDER — PANTOPRAZOLE SODIUM 40 MG/10ML
40 INJECTION, POWDER, LYOPHILIZED, FOR SOLUTION INTRAVENOUS
Status: DISCONTINUED | OUTPATIENT
Start: 2023-03-25 | End: 2023-04-04 | Stop reason: HOSPADM

## 2023-03-25 RX ORDER — FENTANYL CITRATE 50 UG/ML
INJECTION, SOLUTION INTRAMUSCULAR; INTRAVENOUS
Status: DISCONTINUED | OUTPATIENT
Start: 2023-03-25 | End: 2023-03-25 | Stop reason: HOSPADM

## 2023-03-25 RX ORDER — METHYLPREDNISOLONE SODIUM SUCCINATE 125 MG/2ML
INJECTION, POWDER, LYOPHILIZED, FOR SOLUTION INTRAMUSCULAR; INTRAVENOUS
Status: DISCONTINUED | OUTPATIENT
Start: 2023-03-25 | End: 2023-03-25 | Stop reason: HOSPADM

## 2023-03-25 RX ORDER — SODIUM BICARBONATE 650 MG/1
1300 TABLET ORAL 3 TIMES DAILY
Status: DISCONTINUED | OUTPATIENT
Start: 2023-03-25 | End: 2023-03-25

## 2023-03-25 RX ORDER — NICARDIPINE HCL-0.9% SOD CHLOR 1 MG/10 ML
SYRINGE (ML) INTRAVENOUS
Status: DISCONTINUED | OUTPATIENT
Start: 2023-03-25 | End: 2023-03-25 | Stop reason: HOSPADM

## 2023-03-25 RX ORDER — DEXTROSE MONOHYDRATE 25 G/50ML
25 INJECTION, SOLUTION INTRAVENOUS
Status: DISCONTINUED | OUTPATIENT
Start: 2023-03-25 | End: 2023-04-04 | Stop reason: HOSPADM

## 2023-03-25 RX ORDER — CLOPIDOGREL BISULFATE 75 MG/1
TABLET ORAL
Status: DISCONTINUED | OUTPATIENT
Start: 2023-03-25 | End: 2023-03-25 | Stop reason: HOSPADM

## 2023-03-25 RX ORDER — HEPARIN SODIUM 1000 [USP'U]/ML
INJECTION, SOLUTION INTRAVENOUS; SUBCUTANEOUS
Status: DISCONTINUED | OUTPATIENT
Start: 2023-03-25 | End: 2023-03-25 | Stop reason: HOSPADM

## 2023-03-25 RX ORDER — ALBUMIN, HUMAN INJ 5% 5 %
1000 SOLUTION INTRAVENOUS ONCE
Status: COMPLETED | OUTPATIENT
Start: 2023-03-25 | End: 2023-03-25

## 2023-03-25 RX ORDER — LIDOCAINE HYDROCHLORIDE 10 MG/ML
INJECTION, SOLUTION EPIDURAL; INFILTRATION; INTRACAUDAL; PERINEURAL
Status: DISCONTINUED | OUTPATIENT
Start: 2023-03-25 | End: 2023-03-25 | Stop reason: HOSPADM

## 2023-03-25 RX ORDER — FERROUS SULFATE 325(65) MG
325 TABLET ORAL
Status: DISCONTINUED | OUTPATIENT
Start: 2023-03-25 | End: 2023-04-04 | Stop reason: HOSPADM

## 2023-03-25 RX ORDER — SODIUM BICARBONATE 650 MG/1
650 TABLET ORAL 3 TIMES DAILY
Status: DISCONTINUED | OUTPATIENT
Start: 2023-03-25 | End: 2023-03-29

## 2023-03-25 RX ORDER — OCTREOTIDE ACETATE 100 UG/ML
200 INJECTION, SOLUTION INTRAVENOUS; SUBCUTANEOUS 3 TIMES DAILY
Status: DISCONTINUED | OUTPATIENT
Start: 2023-03-25 | End: 2023-03-26

## 2023-03-25 RX ORDER — LIDOCAINE HYDROCHLORIDE 20 MG/ML
JELLY TOPICAL AS NEEDED
Status: DISCONTINUED | OUTPATIENT
Start: 2023-03-25 | End: 2023-04-04 | Stop reason: HOSPADM

## 2023-03-25 RX ORDER — LANOLIN ALCOHOL/MO/W.PET/CERES
400 CREAM (GRAM) TOPICAL DAILY
Status: DISCONTINUED | OUTPATIENT
Start: 2023-03-25 | End: 2023-04-04 | Stop reason: HOSPADM

## 2023-03-25 RX ORDER — SODIUM CHLORIDE 9 MG/ML
40 INJECTION, SOLUTION INTRAVENOUS AS NEEDED
Status: DISCONTINUED | OUTPATIENT
Start: 2023-03-25 | End: 2023-04-04 | Stop reason: HOSPADM

## 2023-03-25 RX ORDER — DEXTROSE, SODIUM CHLORIDE, SODIUM LACTATE, POTASSIUM CHLORIDE, AND CALCIUM CHLORIDE 5; .6; .31; .03; .02 G/100ML; G/100ML; G/100ML; G/100ML; G/100ML
100 INJECTION, SOLUTION INTRAVENOUS CONTINUOUS
Status: DISCONTINUED | OUTPATIENT
Start: 2023-03-25 | End: 2023-03-25

## 2023-03-25 RX ORDER — INSULIN LISPRO 100 [IU]/ML
0-7 INJECTION, SOLUTION INTRAVENOUS; SUBCUTANEOUS
Status: DISCONTINUED | OUTPATIENT
Start: 2023-03-25 | End: 2023-04-01

## 2023-03-25 RX ORDER — URSODIOL 300 MG/1
300 CAPSULE ORAL 2 TIMES DAILY
Status: DISCONTINUED | OUTPATIENT
Start: 2023-03-25 | End: 2023-04-04 | Stop reason: HOSPADM

## 2023-03-25 RX ORDER — SODIUM CHLORIDE 0.9 % (FLUSH) 0.9 %
10 SYRINGE (ML) INJECTION EVERY 12 HOURS SCHEDULED
Status: DISCONTINUED | OUTPATIENT
Start: 2023-03-25 | End: 2023-04-04 | Stop reason: HOSPADM

## 2023-03-25 RX ORDER — IBUPROFEN 600 MG/1
1 TABLET ORAL
Status: DISCONTINUED | OUTPATIENT
Start: 2023-03-25 | End: 2023-04-04 | Stop reason: HOSPADM

## 2023-03-25 RX ORDER — NICOTINE POLACRILEX 4 MG
15 LOZENGE BUCCAL
Status: DISCONTINUED | OUTPATIENT
Start: 2023-03-25 | End: 2023-04-04 | Stop reason: HOSPADM

## 2023-03-25 RX ORDER — LACTULOSE 10 G/15ML
30 SOLUTION ORAL 2 TIMES DAILY
Status: DISCONTINUED | OUTPATIENT
Start: 2023-03-25 | End: 2023-03-28

## 2023-03-25 RX ORDER — DIPHENHYDRAMINE HYDROCHLORIDE 50 MG/ML
INJECTION INTRAMUSCULAR; INTRAVENOUS
Status: DISCONTINUED | OUTPATIENT
Start: 2023-03-25 | End: 2023-03-25 | Stop reason: HOSPADM

## 2023-03-25 RX ORDER — SODIUM CHLORIDE 0.9 % (FLUSH) 0.9 %
10 SYRINGE (ML) INJECTION AS NEEDED
Status: DISCONTINUED | OUTPATIENT
Start: 2023-03-25 | End: 2023-04-04 | Stop reason: HOSPADM

## 2023-03-25 RX ORDER — PANTOPRAZOLE SODIUM 40 MG/10ML
40 INJECTION, POWDER, LYOPHILIZED, FOR SOLUTION INTRAVENOUS
Status: DISCONTINUED | OUTPATIENT
Start: 2023-03-26 | End: 2023-03-25 | Stop reason: SDUPTHER

## 2023-03-25 RX ORDER — PHENYLEPHRINE HYDROCHLORIDE 10 MG/ML
INJECTION INTRAVENOUS
Status: DISCONTINUED | OUTPATIENT
Start: 2023-03-25 | End: 2023-03-25 | Stop reason: HOSPADM

## 2023-03-25 RX ORDER — OXYMETAZOLINE HYDROCHLORIDE 0.05 G/100ML
3 SPRAY NASAL ONCE
Status: COMPLETED | OUTPATIENT
Start: 2023-03-25 | End: 2023-03-25

## 2023-03-25 RX ORDER — OCTREOTIDE ACETATE 100 UG/ML
200 INJECTION, SOLUTION INTRAVENOUS; SUBCUTANEOUS 3 TIMES DAILY
Status: DISCONTINUED | OUTPATIENT
Start: 2023-03-25 | End: 2023-03-25

## 2023-03-25 RX ORDER — FLUTICASONE PROPIONATE 50 MCG
2 SPRAY, SUSPENSION (ML) NASAL DAILY
Status: DISCONTINUED | OUTPATIENT
Start: 2023-03-25 | End: 2023-04-04 | Stop reason: HOSPADM

## 2023-03-25 RX ADMIN — URSODIOL 300 MG: 300 CAPSULE ORAL at 22:47

## 2023-03-25 RX ADMIN — SODIUM BICARBONATE 150 MEQ: 84 INJECTION, SOLUTION INTRAVENOUS at 14:58

## 2023-03-25 RX ADMIN — OXYMETAZOLINE HCL 3 SPRAY: 0.05 SPRAY NASAL at 20:42

## 2023-03-25 RX ADMIN — THIAMINE HYDROCHLORIDE 500 MG: 100 INJECTION, SOLUTION INTRAMUSCULAR; INTRAVENOUS at 14:50

## 2023-03-25 RX ADMIN — LIDOCAINE HYDROCHLORIDE: 20 JELLY TOPICAL at 16:45

## 2023-03-25 RX ADMIN — PANTOPRAZOLE SODIUM 40 MG: 40 INJECTION, POWDER, LYOPHILIZED, FOR SOLUTION INTRAVENOUS at 12:53

## 2023-03-25 RX ADMIN — INSULIN LISPRO 2 UNITS: 100 INJECTION, SOLUTION INTRAVENOUS; SUBCUTANEOUS at 21:24

## 2023-03-25 RX ADMIN — INSULIN LISPRO 3 UNITS: 100 INJECTION, SOLUTION INTRAVENOUS; SUBCUTANEOUS at 18:44

## 2023-03-25 RX ADMIN — SODIUM BICARBONATE 650 MG TABLET 1300 MG: at 12:53

## 2023-03-25 RX ADMIN — OCTREOTIDE ACETATE 200 MCG: 100 INJECTION, SOLUTION INTRAVENOUS; SUBCUTANEOUS at 21:18

## 2023-03-25 RX ADMIN — SODIUM BICARBONATE 100 MEQ: 84 INJECTION, SOLUTION INTRAVENOUS at 18:43

## 2023-03-25 RX ADMIN — PHYTONADIONE 5 MG: 10 INJECTION, EMULSION INTRAMUSCULAR; INTRAVENOUS; SUBCUTANEOUS at 23:43

## 2023-03-25 RX ADMIN — RIFAXIMIN 550 MG: 550 TABLET ORAL at 12:53

## 2023-03-25 RX ADMIN — RIFAXIMIN 550 MG: 550 TABLET ORAL at 22:47

## 2023-03-25 RX ADMIN — ALBUMIN (HUMAN) 1000 ML: 12.5 INJECTION, SOLUTION INTRAVENOUS at 20:55

## 2023-03-25 RX ADMIN — FERROUS SULFATE TAB 325 MG (65 MG ELEMENTAL FE) 325 MG: 325 (65 FE) TAB at 12:53

## 2023-03-25 RX ADMIN — TAZOBACTAM SODIUM AND PIPERACILLIN SODIUM 3.38 G: 375; 3 INJECTION, SOLUTION INTRAVENOUS at 17:43

## 2023-03-25 RX ADMIN — LACTULOSE 30 G: 20 SOLUTION ORAL at 22:47

## 2023-03-25 RX ADMIN — Medication 400 MCG: at 15:05

## 2023-03-25 RX ADMIN — OCTREOTIDE ACETATE 200 MCG: 100 INJECTION, SOLUTION INTRAVENOUS; SUBCUTANEOUS at 18:25

## 2023-03-25 RX ADMIN — Medication 10 ML: at 12:53

## 2023-03-25 RX ADMIN — URSODIOL 300 MG: 300 CAPSULE ORAL at 15:05

## 2023-03-25 RX ADMIN — TAZOBACTAM SODIUM AND PIPERACILLIN SODIUM 3.38 G: 375; 3 INJECTION, SOLUTION INTRAVENOUS at 22:00

## 2023-03-25 RX ADMIN — SODIUM BICARBONATE 650 MG TABLET 650 MG: at 22:47

## 2023-03-25 NOTE — CONSULTS
Consults      Cardiology Consult         Reason for consultation:  · Code AMI    Requesting provider: Zach Cheung MD  Consulting provider: Kj Nieto MD    IDENTIFICATION: 72-year-old from [place], [place]      Active Hospital Problems    Diagnosis  POA   • **ST elevation myocardial infarction involving right coronary artery (HCC) [I21.11]  Yes     Priority: High   • CKD (chronic kidney disease) stage 5, GFR less than 15 ml/min (HCC) [N18.5]  Yes   • Diabetes mellitus (HCC) [E11.9]  Unknown   • Decompensated hepatic cirrhosis (HCC) [K72.90, K74.60]  Yes   • Coagulopathy (HCC) [D68.9]  Yes   • Liver cirrhosis secondary to HAYES (HCC) [K75.81, K74.60]  Yes   • Hyperbilirubinemia [E80.6]  Yes   • PAOLO (acute kidney injury) (HCC) [N17.9]  Yes              72-year-old gentleman with end-stage renal disease not on hemodialysis, end-stage liver disease with recent hospital admission from 2/14 to 2/23/2023 to  for decompensated cirrhosis.  The patient is presently on the transplant list at  for both liver and kidney transplant.  The patient presented to [place] ER earlier this morning with shortness of breath which he felt was related to his ascites and hydrothorax history.  He was hoping to get paracentesis performed.  EKG performed in the ER demonstrated inferior ST elevation myocardial infarction.  The patient was loaded with ticagrelor and transferred here under the auspices of code AMI.  The referring ER provider did not indicate at the time the patient had been hospitalized at  or had end-stage renal disease on no hemodialysis at the time of the call.      (After receiving the referring phone call and being able to flush out information from care everywhere after, I reach back out to the referring provider and recommended patient be redirected to the [facility] due to his end-stage liver disease and kidney disease undergoing transplant evaluations.  The referring physician stated he was  "unable.)    On arrival to the Cath Lab, the patient was short of breath but not experiencing specific chest pain.  History was difficult to obtain due to poor hearing.    Cardiac catheterization was performed via the right radial approach.  He was found to have a thrombotic lesion in the proximal RCA which was treated with a drug-eluting stent.  There was embolized occlusive thrombi in both the RPDA and posterior lateral branch which did not resolve with angioplasty or thrombectomy.  Stents were not placed in these vessels due to distal nature and unclear anatomy distal to the occlusions.  80 mL of IV contrast utilized.        Allergies   Allergen Reactions   • Contrast Dye (Echo Or Unknown Ct/Mr) Hives     Single hive in substernal/epigastric abdominal area.  Pt. states this rxn has occurred \"the last couple of times I had MRI contrast.   • Gadobutrol Unknown (See Comments)       Prior to Admission medications    Medication Sig Start Date End Date Taking? Authorizing Provider   Fergon 240 (27 Fe) MG tablet Take 1 tablet by mouth Daily. 1/10/23   Onel Peraza MD   fluticasone (FLONASE) 50 MCG/ACT nasal spray 2 sprays by Each Nare route Daily. 12/19/22   Onel Peraza MD   folic acid (FOLVITE) 400 MCG tablet Take 400 mcg by mouth Daily.    ProviderOnel MD   lactulose (CHRONULAC) 10 GM/15ML solution Take 45 mL by mouth 2 (Two) Times a Day. 2/14/23   Tracie Sultana MD   midodrine (PROAMATINE) 5 MG tablet Take 3 tablets by mouth 3 (Three) Times a Day Before Meals. 2/14/23   Tracie Sultana MD   octreotide (sandoSTATIN) 100 MCG/ML injection Infuse 2 mL into a venous catheter 3 (Three) Times a Day. 2/14/23   Tracie Sultana MD   pantoprazole (PROTONIX) 40 MG injection Infuse 10 mL into a venous catheter Every Morning. Indications: Gastrointestinal (GI) Bleed 2/14/23   Tracie Sultana MD   riFAXIMin (XIFAXAN) 550 MG tablet Take 1 tablet by mouth Every 12 (Twelve) Hours for 730 doses. Indications: " Impaired Brain Function due to Liver Disease 2/14/23 2/14/24  Tracie Sultana MD   sodium bicarbonate 650 MG tablet Take 2 tablets by mouth 3 (Three) Times a Day. 2/14/23   Tracie Sultana MD   ursodiol (ACTIGALL) 300 MG capsule Take 300 mg by mouth 2 (Two) Times a Day.    Provider, MD Onel       Past Medical History:   Diagnosis Date   • Anemia    • Coagulopathy (HCC) 2/11/2023   • Decompensated hepatic cirrhosis (HCC) 2/11/2023   • Diabetes mellitus (HCC)    • Encephalopathy, hepatic 2/11/2023   • Hypertension    • Liver cirrhosis secondary to HAYES (HCC)    • Liver lesion    • Other ascites 2/11/2023   • Thrombocytopenia (HCC) 2/11/2023       Past Surgical History:   Procedure Laterality Date   • ANKLE SURGERY     • EYE SURGERY         Family History   Problem Relation Age of Onset   • Rheum arthritis Mother    • Diabetes Mother    • COPD Mother    • Thyroid disease Mother    • Heart disease Father    • COPD Father    • Cancer Father    • Diabetes Father        Social History     Tobacco Use   Smoking Status Never   Smokeless Tobacco Never       Social History     Substance and Sexual Activity   Alcohol Use Not Currently         Review of Systems:   Review of Systems   Unable to perform ROS: acuity of condition            Vital Sign Min/Max for last 24 hours  No data recorded   BP  Min: 106/55  Max: 136/84   Pulse  Min: 83  Max: 111   Resp  Min: 16  Max: 16   SpO2  Min: 99 %  Max: 100 %   Flow (L/min)  Min: 4  Max: 4    No intake or output data in the 24 hours ending 03/25/23 1217        Tele: Sinus    Constitutional:       Appearance: Chronically ill-appearing.   Eyes:      General: No scleral icterus.  Neck:      Thyroid: No thyroid mass.      Vascular: No carotid bruit or JVD. JVD normal.   Pulmonary:      Effort: Pulmonary effort is normal.      Breath sounds: Normal breath sounds.   Cardiovascular:      Normal rate. Regular rhythm.      Murmurs: There is no murmur.      No gallop.   Edema:     Peripheral  edema absent.   Abdominal:      General: Abdomen is protuberant. There is distension.   Skin:     General: Skin is warm. There is no cyanosis.   Neurological:      General: No focal deficit present.      Mental Status: Alert.   Psychiatric:         Attention and Perception: Attention normal.              DATA REVIEW:    EKG (3/25/2023): Sinus rhythm.  Inferior STEMI with associated Q waves        Results from last 7 days   Lab Units 03/25/23  1112 03/25/23  1103   SODIUM mmol/L  --  133*   POTASSIUM mmol/L  --  3.8   CHLORIDE mmol/L  --  89*   BUN mg/dL  --  43*   CREATININE mg/dL 4.50* 4.07*     Results from last 7 days   Lab Units 03/25/23  1103   HSTROP T ng/L 739*     Results from last 7 days   Lab Units 03/25/23  1103   WBC 10*3/mm3 13.39*   HEMOGLOBIN g/dL 9.8*   HEMATOCRIT % 30.2*   PLATELETS 10*3/mm3 134*     Lab Results   Component Value Date    HGBA1C 5.60 03/25/2023     Lab Results   Component Value Date    CHOL 94 03/25/2023    AST 65 (H) 03/25/2023    ALT 27 03/25/2023       Lab Results   Component Value Date    TROPONINT 739 (C) 03/25/2023    TROPONINT 40 (H) 02/09/2023    TROPONINT 45 (H) 02/09/2023            STEMI involving the RCA  · Status post HARDEEP to proximal RCA with residual thrombotic occlusions in the distal vessels not able to be adequately revascularized  · Continue aspirin  · Will not continue ticagrelor due to high bleeding risk.  Clopidogrel 300 administered in lab and 75 mg daily to be administered here after  · Carvedilol  · No statin due to ESLD    Decompensated cirrhosis due to HAYES  · Consult GI  · Inpatient transfer to  likely    Stage V chronic kidney disease, not on dialysis  · Consult renal         · Admit to ICU.  Dr. Cheung has been gracious enough to assume attending role  · Consult GI and renal  · Medical therapy for MI to include aspirin 81+ clopidogrel 75 mg daily, beta-blocker if blood pressure allows.  No statin due to ESLD.  · Paracentesis/thoracentesis per  GI/pulmonary      Electronically signed by Nithin Nieto IV, MD, 03/25/23, 12:17 PM EDT.

## 2023-03-25 NOTE — Clinical Note
First balloon inflation max pressure = 18 jennifer. First balloon inflation duration = 15 seconds. Second inflation of balloon - Max pressure = 18 jennifer. 2nd Inflation of balloon - Duration = 15 seconds. 2nd inflation was done at 11:41 EDT. The balloon is positioned in the Proximal segment of the vessel. Third inflation of balloon - Max pressure = 18 jennifer. 3rd Inflation of balloon - Duration = 10 seconds. 3rd inflation was done at 11:41 EDT. Th e balloon is positioned in the Proximal segment of the vessel.

## 2023-03-25 NOTE — CONSULTS
Referring Provider: QUOC Tompkins  Reason for Consultation: Acute on chronic renal failure    Subjective     Chief complaint: Shortness of breath    History of present illness:    72-year-old male with past medical history of Hayes, liver cirrhosis, prerenal acute kidney failure was last seen a month ago with a creatinine of 5.0 when he was transferred to  for further management.  Dialysis was not started at that time.  His baseline creatinine 0.8-1.2, on previous admission creatinine was 4.7, urine sodium was less than 20, there was some improvement in urine output.  Patient was hypotensive and was started on midodrine 15 mg 3 times daily and on octreotide.  Patient did receive blood transfusion that we have time.  Patient presented to [facility] ED with shortness of breath, felt to be secondary to ascites and hydrothorax, EKG was done which shows ST elevation with myocardial infarction patient was transferred to [facility] for further treatment.  Cardiac cath was performed and is found to have thrombotic lesion in the proximal RCA which was treated with drug-eluting stent.  Patient received 80 mL of IV contrast.  Patient blood pressure is low normal.  Labs showed a creatinine 3.94, BUN 48, sodium 135, potassium 4.1, CO2 6.0, calcium 9.6, EGFR 15 mL/min, troponin high 53, hemoglobin 10.5, platelets 111 K, lactate high 17.6, phosphorus 3.9 magnesium 1.9, bilirubin 4.6 other liver enzymes were normal.  History  Past Medical History:   Diagnosis Date   • Anemia    • Coagulopathy (HCC) 2/11/2023   • Decompensated hepatic cirrhosis (HCC) 2/11/2023   • Diabetes mellitus (HCC)    • Encephalopathy, hepatic 2/11/2023   • Hypertension    • Liver cirrhosis secondary to HAYES (HCC)    • Liver lesion    • Other ascites 2/11/2023   • Thrombocytopenia (HCC) 2/11/2023   ,   Past Surgical History:   Procedure Laterality Date   • ANKLE SURGERY     • EYE SURGERY     ,   Family History   Problem Relation Age of Onset   • Rheum  arthritis Mother    • Diabetes Mother    • COPD Mother    • Thyroid disease Mother    • Heart disease Father    • COPD Father    • Cancer Father    • Diabetes Father    ,   Social History     Socioeconomic History   • Marital status:    Tobacco Use   • Smoking status: Never   • Smokeless tobacco: Never   Vaping Use   • Vaping Use: Never used   Substance and Sexual Activity   • Alcohol use: Not Currently   • Drug use: Never     E-cigarette/Vaping   • E-cigarette/Vaping Use Never User      E-cigarette/Vaping Substances     E-cigarette/Vaping Devices       ,   Medications Prior to Admission   Medication Sig Dispense Refill Last Dose   • Fergon 240 (27 Fe) MG tablet Take 1 tablet by mouth Daily.      • fluticasone (FLONASE) 50 MCG/ACT nasal spray 2 sprays by Each Nare route Daily.      • folic acid (FOLVITE) 400 MCG tablet Take 400 mcg by mouth Daily.      • lactulose (CHRONULAC) 10 GM/15ML solution Take 45 mL by mouth 2 (Two) Times a Day.      • midodrine (PROAMATINE) 5 MG tablet Take 3 tablets by mouth 3 (Three) Times a Day Before Meals.      • octreotide (sandoSTATIN) 100 MCG/ML injection Infuse 2 mL into a venous catheter 3 (Three) Times a Day.      • pantoprazole (PROTONIX) 40 MG injection Infuse 10 mL into a venous catheter Every Morning. Indications: Gastrointestinal (GI) Bleed      • riFAXIMin (XIFAXAN) 550 MG tablet Take 1 tablet by mouth Every 12 (Twelve) Hours for 730 doses. Indications: Impaired Brain Function due to Liver Disease 60 tablet 11    • sodium bicarbonate 650 MG tablet Take 2 tablets by mouth 3 (Three) Times a Day.      • ursodiol (ACTIGALL) 300 MG capsule Take 300 mg by mouth 2 (Two) Times a Day.      , Scheduled Meds:  ferrous sulfate, 325 mg, Oral, Daily With Breakfast  fluticasone, 2 spray, Each Nare, Daily  folic acid, 400 mcg, Oral, Daily  lactulose, 30 g, Oral, BID  midodrine, 15 mg, Oral, TID AC  octreotide, 200 mcg, Intravenous, TID  [START ON 3/26/2023] pantoprazole, 40 mg,  Intravenous, Q AM  pantoprazole, 40 mg, Intravenous, Q AM  [START ON 3/26/2023] pharmacy consult - [place], , Does not apply, Daily  piperacillin-tazobactam, 3.375 g, Intravenous, Once  piperacillin-tazobactam, 3.375 g, Intravenous, Q12H  riFAXIMin, 550 mg, Oral, Q12H  sodium bicarbonate, 1,300 mg, Oral, TID  sodium chloride, 10 mL, Intravenous, Q12H  thiamine (B-1) IV, 500 mg, Intravenous, Q8H  ursodiol, 300 mg, Oral, BID    , Continuous Infusions:  sodium bicarbonate drip (greater than 75 mEq/bag), 150 mEq    , PRN Meds:  •  sodium chloride  •  sodium chloride and Allergies:  Contrast dye (echo or unknown ct/mr) and Gadobutrol    Review of Systems  Pertinent items are noted in HPI, all other systems reviewed and negative    Objective     Vital Signs  Heart Rate:  [] 120  Resp:  [16-22] 22  BP: (106-136)/(55-84) 114/70    No intake/output data recorded.  No intake/output data recorded.    Physical Exam:  General appearance: Sick looking  male mild distress laying in bed.  HEENT: Atraumatic normocephalic head, eyes pupil reactive, extraocular muscle intact, nose no bleed, oropharynx clear, neck is supple, no JVD, no lymph node enlargement, trachea midline.  Lungs: Few rhonchi's and rails heard, equal chest movement, no crepitation.  Heart: Normal S1, S2, no gallop, murmur, RRR.  Abdomen: Abdomen distended, positive thrill soft, nontender, positive bowel sounds.  Extremities: Positive lower extremity edema, no cyanosis, no joint swelling.  Neuro: Alert, oriented x4, no focal deficit.  Psych: Mood and affect are normal and appropriate.  Skin: Skin is warm and dry.  : No suprapubic fullness or tenderness, no Gallegos catheter.    Results Review:   I reviewed the patient's new clinical results.  I reviewed the patient's new imaging results and agree with the interpretation.    WBC WBC   Date Value Ref Range Status   03/25/2023 12.89 (H) 3.40 - 10.80 10*3/mm3 Final   03/25/2023 13.39 (H) 3.40 - 10.80 10*3/mm3  Final      HGB Hemoglobin   Date Value Ref Range Status   03/25/2023 10.5 (L) 13.0 - 17.7 g/dL Final   03/25/2023 9.8 (L) 13.0 - 17.7 g/dL Final      HCT Hematocrit   Date Value Ref Range Status   03/25/2023 31.9 (L) 37.5 - 51.0 % Final   03/25/2023 30.2 (L) 37.5 - 51.0 % Final      Platlets No results found for: LABPLAT   MCV MCV   Date Value Ref Range Status   03/25/2023 105.6 (H) 79.0 - 97.0 fL Final   03/25/2023 105.6 (H) 79.0 - 97.0 fL Final          Sodium Sodium   Date Value Ref Range Status   03/25/2023 135 (L) 136 - 145 mmol/L Final   03/25/2023 133 (L) 136 - 145 mmol/L Final      Potassium Potassium   Date Value Ref Range Status   03/25/2023 4.1 3.5 - 5.2 mmol/L Final   03/25/2023 3.8 3.5 - 5.2 mmol/L Final      Chloride Chloride   Date Value Ref Range Status   03/25/2023 91 (L) 98 - 107 mmol/L Final   03/25/2023 89 (L) 98 - 107 mmol/L Final      CO2 CO2   Date Value Ref Range Status   03/25/2023 6.0 (L) 22.0 - 29.0 mmol/L Final   03/25/2023 6.0 (L) 22.0 - 29.0 mmol/L Final      BUN BUN   Date Value Ref Range Status   03/25/2023 48 (H) 8 - 23 mg/dL Final   03/25/2023 43 (H) 8 - 23 mg/dL Final      Creatinine Creatinine   Date Value Ref Range Status   03/25/2023 3.94 (H) 0.76 - 1.27 mg/dL Final   03/25/2023 4.50 (H) 0.60 - 1.30 mg/dL Final     Comment:     Serial Number: [id number]:  [id number]   03/25/2023 4.07 (H) 0.76 - 1.27 mg/dL Final      Calcium Calcium   Date Value Ref Range Status   03/25/2023 9.6 8.6 - 10.5 mg/dL Final   03/25/2023 9.5 8.6 - 10.5 mg/dL Final      PO4 No results found for: CAPO4   Albumin Albumin   Date Value Ref Range Status   03/25/2023 3.2 (L) 3.5 - 5.2 g/dL Final      Magnesium Magnesium   Date Value Ref Range Status   03/25/2023 1.9 1.6 - 2.4 mg/dL Final      Uric Acid No results found for: URICACID         ferrous sulfate, 325 mg, Oral, Daily With Breakfast  fluticasone, 2 spray, Each Nare, Daily  folic acid, 400 mcg, Oral, Daily  lactulose, 30 g, Oral, BID  midodrine, 15 mg,  Oral, TID AC  octreotide, 200 mcg, Intravenous, TID  [START ON 3/26/2023] pantoprazole, 40 mg, Intravenous, Q AM  pantoprazole, 40 mg, Intravenous, Q AM  [START ON 3/26/2023] pharmacy consult - MTM, , Does not apply, Daily  piperacillin-tazobactam, 3.375 g, Intravenous, Once  piperacillin-tazobactam, 3.375 g, Intravenous, Q12H  riFAXIMin, 550 mg, Oral, Q12H  sodium bicarbonate, 1,300 mg, Oral, TID  sodium chloride, 10 mL, Intravenous, Q12H  thiamine (B-1) IV, 500 mg, Intravenous, Q8H  ursodiol, 300 mg, Oral, BID      sodium bicarbonate drip (greater than 75 mEq/bag), 150 mEq        Assessment & Plan       ST elevation myocardial infarction involving right coronary artery (HCC)    PAOLO (acute kidney injury) (HCC)    Liver cirrhosis secondary to HAYES (HCC)    Hyperbilirubinemia    Coagulopathy (HCC)    Decompensated hepatic cirrhosis (HCC)    CKD (chronic kidney disease) stage 5, GFR less than 15 ml/min (HCC)    1.  Acute kidney injury: Patient was last seen a month ago with creatinine 4.5- 5.0 range.  Prior to that baseline was 0.8-1.2, likely diagnosis was hepatorenal versus ATN.  At that time patient was transferred to  for further management.  Patient has been admitted and underwent a cardiac catheterization received 80 mL of IV contrast.  2.  S/p right RCA stenting for the clot 3/25/2023 today  3.  HAYES: Decompensated liver cirrhosis.  4.  Hypotension: In the setting of liver disease.  5.  Anemia  6.  Thrombocytopenia   7.  Thoracentesis: 3 weeks back  8.  Paracentesis: On 3/17/2023 at [facility]  Recommendations:  Check urine labs  Check ammonia level in a.m.  Check CMP in a.m.  Avoid nephrotoxic medications.  Keep MAP greater than 70  Check volume status.  Check labs in the morning.  Daily evaluation for renal replacement therapy will be done.  Adjust medication for the new GFR.  Case discussed with the medical staff taking care of the patient.  Discussed with the wife and the daughter in the  room.  Luis Quintanilla MD  03/25/23  @[facility]

## 2023-03-25 NOTE — Clinical Note
First balloon inflation max pressure = 8 jennifer. First balloon inflation duration = 5 seconds. Second inflation of balloon - Max pressure = 8 jennifer. 2nd Inflation of balloon - Duration = 5 seconds. 2nd inflation was done at 11:17 EDT. The balloon is positioned in the Distal segment of the vessel. Third inflation of balloon - Max pressure = 8 jennifer. 3rd Inflation of balloon - Duration = 5 seconds. 3rd inflation was done at 11:18 EDT. The balloo n is positioned in the Distal segment of the vessel.

## 2023-03-25 NOTE — SIGNIFICANT NOTE
"Discussed CT Abdomen / Pelvis with Dr. Brunner, who also discussed it with Dr. Chung.  Does not appear to have any clear evidence of mesenteric ischemia or \"abdominal catastrophe\".  Though distended, his abdomen was non-tender.    Overall, I suspect secondary to cirrhosis he has poor clearance of lactic acid.  Likely had \"stunned RV\" at some point during his inferior STEMI w/ hepatic congestion / shock liver exacerbating lactic acidosis.  Could still have some component of \"Type B\" with recent poor po intake.  I suspect this will improve slowly with continued dextrose containing IVF (right now IV bicarb), high dose thiamine, etc.  Will also give 1 liter of 5% albumin.  Otherwise continue plan per H&P unless something changes.    Zach Cheung MD  Pulmonology and Critical Care Medicine  03/25/23 18:54 EDT  Electronically Signed    "

## 2023-03-25 NOTE — H&P
"INTENSIVIST / PULMONARY INITIAL VISIT (CONSULT / H&P) NOTE     Hospital:  LOS: 0 days   Mr. Leoncio Hopson, 72 y.o. male is followed for:   No chief complaint on file.      ST elevation myocardial infarction involving right coronary artery (HCC)    PAOLO (acute kidney injury) (HCC)    Liver cirrhosis secondary to HAYES (HCC)    Hyperbilirubinemia    Coagulopathy (HCC)    Decompensated hepatic cirrhosis (HCC)    CKD (chronic kidney disease) stage 5, GFR less than 15 ml/min (HCC)         History of Present Illness   Mr. Hopson is a 73 yo male with PMH Stage V CKD not on HD, decompensated HAYES cirrhosis with ascites (rejected for Liver-Kidney Txp at , being evaluated at  - not on transplant list), varices and recurrent hepatic hydrothorax (undergone 4 thoracentesis), prior Paracentesis x 1, diabetes, HTN, anemia, thrombocytopenia who presents to the ICU as a transfer from the Cath Lab s/p [facility] with PCI with Dr. Nieto. According to reports patient initially presented to [facility] today with shortness of breath and hopes to obtain a thoracentesis and paracentesis. While there he noted he had been having tachycardia prompting EKG. This demonstrated ST elevation so he was loaded with Ticagrelor and transferred to [facility] for evaluation/management.     On arrival patient was taken to cath lab where a right radial approach LHC was performed. He was found to have a thrombotic lesion in the proximal RCA which was treated with HARDEEP. There was also embolized occlusive thrombi in both the RPDA and the posterior lateral branch which did not resolve with angioplasty or thrombectomy. \"Stents were not placed in these vessels due to distal nature and unclear anatomy distal to the occlusions\". Following procedure he was loaded with ASA and Plavix and transferred to ICU for management.     Of note patient was recently admitted to  for HRS and hepatic encephalopathy. During that admission he had AoC Kidney failure with " creatinine as high as 5. He was unresponsive to albumin challenge and was started on midodrine/octreotide for HRS. He was transferred to [facility] for transplant evaluation. At that time transplant team did not identify him as a candidate for acute liver txp eval and advised he continued treatment of HRS. As his renal function improved his discharge was planned. Unfortunately his octreotide was denied by insurance. He ultimately got weaned from Octreotide. At time of discharge his renal function was almost back to his baseline.    While hospitalized, it's noted he had acute on chronic anemia with hematochezia, although patient does not recall any GIB. No indication for inpatient EGD/colonoscopy. He received 1 unit PRBC. Hospitalization as also complicated by reaccumulation of hepatic hydrothorax which was causing worsening SOA. This is typically drained as an outpatient by IR but was ultimately drained by Pulmonary while inpatient.      I spent 15 minutes of time on this.  SRI Tompkins, AGACNP-BC, APRN    Electronically signed by QUOC Johnson, 03/25/23, 12:58 PM EDT.        Past Medical History:   Diagnosis Date   • Anemia    • Coagulopathy (HCC) 2/11/2023   • Decompensated hepatic cirrhosis (HCC) 2/11/2023   • Diabetes mellitus (HCC)    • Encephalopathy, hepatic 2/11/2023   • Hypertension    • Liver cirrhosis secondary to HAYES (HCC)    • Liver lesion    • Other ascites 2/11/2023   • Thrombocytopenia (HCC) 2/11/2023     Past Surgical History:   Procedure Laterality Date   • ANKLE SURGERY     • EYE SURGERY       Family History   Problem Relation Age of Onset   • Rheum arthritis Mother    • Diabetes Mother    • COPD Mother    • Thyroid disease Mother    • Heart disease Father    • COPD Father    • Cancer Father    • Diabetes Father      Social History     Socioeconomic History   • Marital status:    Tobacco Use   • Smoking status: Never   • Smokeless tobacco: Never   Vaping Use   • Vaping Use: Never  "used   Substance and Sexual Activity   • Alcohol use: Not Currently   • Drug use: Never     Allergies   Allergen Reactions   • Contrast Dye (Echo Or Unknown Ct/Mr) Hives     Single hive in substernal/epigastric abdominal area.  Pt. states this rxn has occurred \"the last couple of times I had MRI contrast.   • Gadobutrol Unknown (See Comments)     No current facility-administered medications on file prior to encounter.     Current Outpatient Medications on File Prior to Encounter   Medication Sig Dispense Refill   • Fergon 240 (27 Fe) MG tablet Take 1 tablet by mouth Daily.     • fluticasone (FLONASE) 50 MCG/ACT nasal spray 2 sprays by Each Nare route Daily.     • folic acid (FOLVITE) 400 MCG tablet Take 400 mcg by mouth Daily.     • lactulose (CHRONULAC) 10 GM/15ML solution Take 45 mL by mouth 2 (Two) Times a Day.     • midodrine (PROAMATINE) 5 MG tablet Take 3 tablets by mouth 3 (Three) Times a Day Before Meals.     • octreotide (sandoSTATIN) 100 MCG/ML injection Infuse 2 mL into a venous catheter 3 (Three) Times a Day.     • pantoprazole (PROTONIX) 40 MG injection Infuse 10 mL into a venous catheter Every Morning. Indications: Gastrointestinal (GI) Bleed     • riFAXIMin (XIFAXAN) 550 MG tablet Take 1 tablet by mouth Every 12 (Twelve) Hours for 730 doses. Indications: Impaired Brain Function due to Liver Disease 60 tablet 11   • sodium bicarbonate 650 MG tablet Take 2 tablets by mouth 3 (Three) Times a Day.     • ursodiol (ACTIGALL) 300 MG capsule Take 300 mg by mouth 2 (Two) Times a Day.         ROS:    Per HPI, all other systems were reviewed and were negative          OBJECTIVE     Vital Sign Min/Max for last 24 hours  No data recorded   BP  Min: 106/55  Max: 136/84   Pulse  Min: 83  Max: 120   Resp  Min: 16  Max: 22   SpO2  Min: 99 %  Max: 100 %   No data recorded     Telemetry:              General Appearance:  No acute distress  Eyes:  No scleral icterus or pallor, pupils normal  Ears, Nose, Mouth, Throat:  " Atraumatic, oropharynx clear  Neck:  Trachea midline, thyroid normal  Respiratory:  Clear to auscultation bilaterally, normal effort  Cardiovascular:  Regular rate and rhythm, no murmurs, no peripheral edema  Gastrointestinal: tense, distended, +fluid wave  Skin:  Normal temperature, no rash  Psychiatric:  No agitation  Neuro:  No new focal neurologic deficits observed    SpO2: 100 % SpO2  Min: 99 %  Max: 100 %   Device:      Flow Rate:   No data recorded     Mechanical Ventilator Settings:                                         Intake/Ouptut 24 hrs (7:00AM - 6:59 AM)  Intake & Output (last 3 days)     None            Lines, Drains & Airways     Active LDAs     Name Placement date Placement time Site Days    Peripheral IV 02/11/23 0930 Left Antecubital 02/11/23  0930  Antecubital  42                Hematology:  Results from last 7 days   Lab Units 03/25/23  1310 03/25/23  1103   WBC 10*3/mm3 12.89* 13.39*   HEMOGLOBIN g/dL 10.5* 9.8*   HEMATOCRIT % 31.9* 30.2*   PLATELETS 10*3/mm3 111* 134*     Electrolytes, Magnesium and Phosphorus:  Results from last 7 days   Lab Units 03/25/23  1317 03/25/23  1103   SODIUM mmol/L 135* 133*   POTASSIUM mmol/L 4.1 3.8   CO2 mmol/L 6.0* 6.0*   MAGNESIUM mg/dL  --  1.9   PHOSPHORUS mg/dL  --  3.9     Renal:  Results from last 7 days   Lab Units 03/25/23  1317 03/25/23  1112 03/25/23  1103   CREATININE mg/dL 3.94* 4.50* 4.07*   BUN mg/dL 48*  --  43*     Estimated Creatinine Clearance: 16.6 mL/min (A) (by C-G formula based on SCr of 3.94 mg/dL (H)).  Estimated Creatinine Clearance: 16.6 mL/min (A) (by C-G formula based on SCr of 3.94 mg/dL (H)).  Hepatic:  Results from last 7 days   Lab Units 03/25/23  1103   ALK PHOS U/L 90   BILIRUBIN mg/dL 3.3*   ALT (SGPT) U/L 27   AST (SGOT) U/L 65*     Arterial Blood Gases:        Results from last 7 days   Lab Units 03/25/23  1103   HEMOGLOBIN A1C % 5.60       Lab Results   Component Value Date    LACTATE 17.6 (C) 03/25/2023       Cardiac  Catheterization/Vascular Study    Result Date: 3/25/2023  Narrative: •  Severe 2-vessel CAD (RCA, diagonal branch of the LAD) •  Successful PCI of the proximal RCA with placement of a Xience 4 x 23 mm drug-eluting stent •  Unsuccessful balloon angioplasty of 100% occluded right posterior lateral branch •  Unsuccessful manual thrombectomy of 100% occluded RPDA •  Normal LV filling pressure •  Medical therapy recommended for diagonal branch stenosis. •  DAPT (aspirin 81 mg + clopidogrel 75 mg daily) for 1 month due to high bleed risk.       Relevant imaging studies and labs from 03/25/23 were reviewed    Medications (drips):  sodium bicarbonate drip (greater than 75 mEq/bag)        ferrous sulfate, 325 mg, Oral, Daily With Breakfast  fluticasone, 2 spray, Each Nare, Daily  folic acid, 400 mcg, Oral, Daily  lactulose, 30 g, Oral, BID  midodrine, 15 mg, Oral, TID AC  octreotide, 200 mcg, Intravenous, TID  [START ON 3/26/2023] pantoprazole, 40 mg, Intravenous, Q AM  pantoprazole, 40 mg, Intravenous, Q AM  [START ON 3/26/2023] pharmacy consult - MTM, , Does not apply, Daily  piperacillin-tazobactam, 3.375 g, Intravenous, Once  piperacillin-tazobactam, 3.375 g, Intravenous, Q12H  riFAXIMin, 550 mg, Oral, Q12H  sodium bicarbonate, 1,300 mg, Oral, TID  sodium chloride, 10 mL, Intravenous, Q12H  thiamine (B-1) IV, 500 mg, Intravenous, Q8H  ursodiol, 300 mg, Oral, BID        •  sodium chloride  •  sodium chloride     Assessment & Plan   IMPRESSION / PLAN     Inpatient Problem List:  72 y.o.male:  Active Hospital Problems    Diagnosis    • **ST elevation myocardial infarction involving right coronary artery (HCC)    • CKD (chronic kidney disease) stage 5, GFR less than 15 ml/min (HCC)    • Decompensated hepatic cirrhosis (HCC)    • Coagulopathy (HCC)    • Liver cirrhosis secondary to HAYES (HCC)    • Hyperbilirubinemia    • PAOLO (acute kidney injury) (HCC)         Impression:  72 y.o.male with relevant PMH of Stage V CKD not on  HD, decompensated HAYES cirrhosis with ascites (rejected for Liver-Kidney Txp at , being evaluated at  - not on transplant list), varices and recurrent hepatic hydrothorax (undergone 4 thoracentesis), prior Paracentesis x 1, diabetes, HTN, anemia, thrombocytopenia admitted 3/25/2023 from OSH w/ STEMI.  He had presented there for increased SOA and was hoping to get thoracentesis or paracentesis.    Emergent LHC showed severe 2-vessel CAD (RCA & LAD).  Had PCI proximal RCA w/ HARDEEP and Unsuccessful balloon of 100% RPL / Unsuccessful thrombectomy 100% occluded RPDA.    In addition to above, patient noted to have profound lactic acidosis.  This seem out of proportion to current vitals and appearance - I.e. does not appear that he is in overwhelming shock with poor perfusion.  Has had poor po intake recently.  Suspect there is strong component of Type B LA.    Plan:    STEMI  [facility] as above, plan per Cardiology    Severe Lactic Acidosis  R/o Severe Sepsis  Pan culture, empiric Pip-tazo, Give 1 liter Bicarb gtt, start high dose thiamine.  Serial Lactic acid. If this doesn't improve rapidly will need CT abdomen / pelvis.    Acute Hypoxemic Respiratory Failure  Hepatic Hydrothorax s/p Thora x 4  Stat CXR.  If recurrent effusion could consider thora in a few days (just loaded with DAPT).    ESLD w/ Cirrhosis  Ascites  Stage V CKD w/ PAOLO  Has tense ascites.  Was just loaded w/ DAPT.  Will ask GI to follow.      Cr was 2.19 on 2/23. Expect this could worsen after contrast.  1 liter IV bicarb as above, place abdul, check UA / UCx / Urine studies / renal ultrasound.  Will ask Nephrology to follow.  As above - rejected by  for Liver-Kidney Txp, being evaluated at  but not listed.      SCDs/PPI    Nutrition  Dietary Orders (From admission, onward)     Start     Ordered    03/25/23 1211  Diet: Cardiac Diets, Diabetic Diets; Healthy Heart (2-3 Na+); Consistent Carbohydrate; Texture: Regular Texture (IDDSI 7); Fluid Consistency:  Thin (IDDSI 0)  Diet Effective Now        References:    Diet Order [place]   Question Answer Comment   Diets: Cardiac Diets    Diets: Diabetic Diets    Cardiac Diet: Healthy Heart (2-3 Na+)    Diabetic Diet: Consistent Carbohydrate    Texture: Regular Texture (IDDSI 7)    Fluid Consistency: Thin (IDDSI 0)        03/25/23 1211                Critical Care time spent in direct patient care: 45 minutes (excluding procedure time, if applicable) including high complexity decision making to assess, manipulate, and support vital organ system failure in this individual who has impairment of one or more vital organ systems such that there is a high probability of imminent or life threatening deterioration in the patient’s condition.       Zach Cheung MD  Intensive Care Medicine  03/25/23 14:37 EDT

## 2023-03-25 NOTE — Clinical Note
Emergency staff delivered patient to lab.  Consents and History and Physical not obtained due to patient condition.  Point of car creatine drawn

## 2023-03-25 NOTE — CONSULTS
[facility]    Referring Provider: Honorio Paul DO    PCP: Antonio Castro MD    Reason for Consultation: Decompensated liver cirrhosis    Chief complaint: Shortness of breath    History of present illness:    Leoncio Hopson is a 72 y.o. male who is admitted as transfer from outside hospital for STEMI.  GI consult received for concerns of decompensated liver cirrhosis.  Known to the service having seen him in past for his HAYES liver cirrhosis.  His family notes that he had been doing well regarding his cirrhosis having recently just seen the [facility] transplant surgeon a few weeks ago who recommended a formal transplant evaluation in their multidisciplinary clinic.  Daughter notes that patient has had some worsening shortness of breath and abdominal distention over the past few weeks; did have an outpatient paracentesis in [place] a few days ago with 3 L off.  Reports a prolonged admission at [facility] for hepatorenal syndrome but was unable to continue taking octreotide as it was not covered by insurance.  Reports patient began having some chest pain and pressure and shortness of air when presenting to outside hospital was found to be having a STEMI at that time.  Does not report any N/V/D, change in urine pattern, or F/C with onset.  Meld calculated at time of admission and is 35 points.  Had heart catheter earlier today with HARDEEP placement and attempted thrombectomy. Past medical, surgical, social, and family histories are reviewed for accuracy.  No documented alleviating or exacerbating factors.  Does not endorse pain at time of exam.    Allergies:  Contrast dye (echo or unknown ct/mr) and Gadobutrol    Scheduled Meds:  albumin human, 1,000 mL, Intravenous, Once  ferrous sulfate, 325 mg, Oral, Daily With Breakfast  fluticasone, 2 spray, Each Nare, Daily  folic acid, 400 mcg, Oral, Daily  insulin lispro, 0-7 Units, Subcutaneous, 4x Daily With Meals &  Nightly  lactulose, 30 g, Oral, BID  midodrine, 15 mg, Oral, TID AC  octreotide, 200 mcg, Subcutaneous, TID  oxymetazoline, 3 spray, Each Nare, Once  pantoprazole, 40 mg, Intravenous, Q AM  [START ON 3/26/2023] pharmacy consult - [facility], , Does not apply, Daily  piperacillin-tazobactam, 3.375 g, Intravenous, Q12H  riFAXIMin, 550 mg, Oral, Q12H  sodium bicarbonate, 650 mg, Oral, TID  sodium chloride, 10 mL, Intravenous, Q12H  thiamine (B-1) IV, 500 mg, Intravenous, Q8H  ursodiol, 300 mg, Oral, BID         Infusions:  sodium bicarbonate drip (greater than 75 mEq/bag), 150 mEq, Last Rate: 150 mEq (03/25/23 1458)        PRN Meds:  •  dextrose  •  dextrose  •  glucagon (human recombinant)  •  Lidocaine HCl gel  •  sodium chloride  •  sodium chloride    Home Meds:  Medications Prior to Admission   Medication Sig Dispense Refill Last Dose   • Fergon 240 (27 Fe) MG tablet Take 1 tablet by mouth Daily.      • fluticasone (FLONASE) 50 MCG/ACT nasal spray 2 sprays by Each Nare route Daily.      • folic acid (FOLVITE) 400 MCG tablet Take 400 mcg by mouth Daily.      • lactulose (CHRONULAC) 10 GM/15ML solution Take 45 mL by mouth 2 (Two) Times a Day.      • midodrine (PROAMATINE) 5 MG tablet Take 3 tablets by mouth 3 (Three) Times a Day Before Meals.      • octreotide (sandoSTATIN) 100 MCG/ML injection Infuse 2 mL into a venous catheter 3 (Three) Times a Day.      • pantoprazole (PROTONIX) 40 MG injection Infuse 10 mL into a venous catheter Every Morning. Indications: Gastrointestinal (GI) Bleed      • riFAXIMin (XIFAXAN) 550 MG tablet Take 1 tablet by mouth Every 12 (Twelve) Hours for 730 doses. Indications: Impaired Brain Function due to Liver Disease 60 tablet 11    • sodium bicarbonate 650 MG tablet Take 2 tablets by mouth 3 (Three) Times a Day.      • ursodiol (ACTIGALL) 300 MG capsule Take 300 mg by mouth 2 (Two) Times a Day.          ROS: Review of Systems   Constitutional: Positive for activity change and fatigue. Negative  for appetite change, chills, diaphoresis, fever and unexpected weight change.   HENT: Negative for sore throat, trouble swallowing and voice change.    Eyes: Negative.    Respiratory: Positive for shortness of breath. Negative for apnea, cough, choking, chest tightness, wheezing and stridor.    Cardiovascular: Positive for chest pain. Negative for palpitations and leg swelling.   Gastrointestinal: Positive for abdominal distention and abdominal pain. Negative for anal bleeding, blood in stool, constipation, diarrhea, nausea, rectal pain and vomiting.   Endocrine: Negative.    Genitourinary: Negative.    Musculoskeletal: Negative.    Skin: Positive for pallor.   Allergic/Immunologic: Negative.    Neurological: Negative.    Hematological: Bruises/bleeds easily.   Psychiatric/Behavioral: Positive for confusion.   All other systems reviewed and are negative.      PAST MED HX:  Past Medical History:   Diagnosis Date   • Anemia    • Coagulopathy (HCC) 2/11/2023   • Decompensated hepatic cirrhosis (HCC) 2/11/2023   • Diabetes mellitus (HCC)    • Encephalopathy, hepatic 2/11/2023   • Hypertension    • Liver cirrhosis secondary to HAYES (HCC)    • Liver lesion    • Other ascites 2/11/2023   • Thrombocytopenia (HCC) 2/11/2023       PAST SURG HX:  Past Surgical History:   Procedure Laterality Date   • ANKLE SURGERY     • EYE SURGERY         FAM HX:  Family History   Problem Relation Age of Onset   • Rheum arthritis Mother    • Diabetes Mother    • COPD Mother    • Thyroid disease Mother    • Heart disease Father    • COPD Father    • Cancer Father    • Diabetes Father        SOC HX:  Social History     Socioeconomic History   • Marital status:    Tobacco Use   • Smoking status: Never   • Smokeless tobacco: Never   Vaping Use   • Vaping Use: Never used   Substance and Sexual Activity   • Alcohol use: Not Currently   • Drug use: Never       PHYSICAL EXAM  /61   Pulse 115   Temp 95.1 °F (35.1 °C) (Axillary)   Resp  "24   Ht 172.7 cm (67.99\")   Wt 71.4 kg (157 lb 6.5 oz)   SpO2 100%   BMI 23.94 kg/m²   Wt Readings from Last 3 Encounters:   03/25/23 71.4 kg (157 lb 6.5 oz)   02/14/23 73 kg (160 lb 14.4 oz)   07/10/15 69.4 kg (153 lb)   ,body mass index is 23.94 kg/m².  Physical Exam  Vitals and nursing note reviewed.   Constitutional:       General: He is not in acute distress.     Appearance: Normal appearance. He is normal weight. He is ill-appearing. He is not toxic-appearing.   HENT:      Head: Normocephalic and atraumatic.   Eyes:      General: No scleral icterus.     Extraocular Movements: Extraocular movements intact.      Conjunctiva/sclera: Conjunctivae normal.      Pupils: Pupils are equal, round, and reactive to light.   Cardiovascular:      Rate and Rhythm: Normal rate and regular rhythm.      Pulses: Normal pulses.      Heart sounds: Normal heart sounds.   Pulmonary:      Effort: Pulmonary effort is normal. No respiratory distress.      Breath sounds: Normal breath sounds.   Abdominal:      General: Abdomen is flat. Bowel sounds are normal. There is distension.      Palpations: Abdomen is soft. There is no mass.      Tenderness: There is abdominal tenderness. There is no guarding or rebound.      Hernia: No hernia is present.      Comments: Positive fluid wave.  Tympany to percussion along midline; dullness over the lateralmost aspect   Genitourinary:     Comments: defer  Musculoskeletal:         General: Normal range of motion.      Cervical back: Normal range of motion and neck supple. No rigidity or tenderness.      Right lower leg: No edema.      Left lower leg: No edema.   Lymphadenopathy:      Cervical: No cervical adenopathy.   Skin:     General: Skin is warm and dry.      Capillary Refill: Capillary refill takes less than 2 seconds.      Coloration: Skin is pale. Skin is not jaundiced.   Neurological:      Mental Status: He is alert and oriented to person, place, and time. Mental status is at baseline.    "   Comments: Asterixis noted on exam   Psychiatric:         Mood and Affect: Mood normal.         Behavior: Behavior normal.         Thought Content: Thought content normal.         Judgment: Judgment normal.         Results Review:   I reviewed the patient's new clinical results.  I reviewed the patient's new imaging results and agree with the interpretation.  I personally viewed and interpreted the patient's EKG/Telemetry data    Lab Results   Component Value Date    WBC 12.89 (H) 03/25/2023    HGB 10.5 (L) 03/25/2023    HGB 9.8 (L) 03/25/2023    HGB 9.7 (L) 03/15/2023    HCT 31.9 (L) 03/25/2023    .6 (H) 03/25/2023     (L) 03/25/2023       Lab Results   Component Value Date    INR 2.58 (H) 03/25/2023    INR 1.6 (H) 03/15/2023    INR 1.9 (H) 02/17/2023       Lab Results   Component Value Date    GLUCOSE 158 (H) 03/25/2023    BUN 48 (H) 03/25/2023    CREATININE 3.94 (H) 03/25/2023    EGFRIFNONA 59 (L) 04/22/2022    EGFRIFAFRI >60 04/22/2022    BCR 12.2 03/25/2023     (L) 03/25/2023    K 4.1 03/25/2023    CO2 6.0 (L) 03/25/2023    CALCIUM 9.6 03/25/2023    PROTENTOTREF 5.8 (L) 05/24/2015    ALBUMIN 3.2 (L) 03/25/2023    ALKPHOS 90 03/25/2023    BILITOT 3.3 (H) 03/25/2023    BILIDIR 1.91 (H) 03/15/2023    ALT 27 03/25/2023    AST 65 (H) 03/25/2023       Adult Transthoracic Echo Complete W/ Cont if Necessary Per Protocol    Result Date: 3/25/2023  •  Left ventricular systolic function is normal. Estimated left ventricular EF = 65% •  The aortic valve is mildly calcified.  There is minimal aortic stenosis present. •  Moderate pulmonary hypertension is present. Estimated right ventricular systolic pressure from tricuspid regurgitation is moderately elevated (49 mmHg). •  Moderate dilation of the aortic root is present. Aneurysmal dilation of the ascending aorta is present. •  There is a right pleural effusion.     Cardiac Catheterization/Vascular Study    Result Date: 3/25/2023  •  Severe 2-vessel CAD  (RCA, diagonal branch of the LAD) •  Successful PCI of the proximal RCA with placement of a Xience 4 x 23 mm drug-eluting stent •  Unsuccessful balloon angioplasty of 100% occluded right posterior lateral branch •  Unsuccessful manual thrombectomy of 100% occluded RPDA •  Normal LV filling pressure •  Medical therapy recommended for diagonal branch stenosis. •  DAPT (aspirin 81 mg + clopidogrel 75 mg daily) for 1 month due to high bleed risk.     XR Chest 1 View    Result Date: 3/25/2023  XR CHEST 1 VW Date of Exam: 3/25/2023 3:23 PM EDT Indication: h/o  hepatic hydrothorax. Comparison: CT chest February 9, 2023 Findings: The remains a right pleural effusion which appears small. There is a linear line in the more peripheral aspect left hemithorax which could reflect soft tissue shadow. The heart looks enlarged. The visualized osseous structures do not appear unusual.     Impression: 1.Small right pleural effusion. 2.Cardiomegaly. Electronically Signed: Gordy Adams  3/25/2023 3:28 PM EDT  Workstation ID: [id number]      COVID19   Date Value Ref Range Status   02/11/2023 Not Detected Not Detected - Ref. Range Final     ASSESSMENTS/PLANS  1.  STEMI, s/p LHC with stent deployment on 3/25/2023  2.  Severe lactic acidosis, sepsis  3.  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  Hepatorenal syndrome, recent improvement on albumin, octreotide, and midodrine    Leoncio Hopson is a 72 y.o. male presenting to hospital as transfer from outside facility for STEMI.  GI consult received for decompensated De Leon liver cirrhosis with ascites, hepatic hydrothorax, hepatic encephalopathy, and hepatorenal syndrome.  Patient has been [facility] for transplant at the [facility] and is currently in process of seeking second opinion at the [facility], though they have not undergone their multidisciplinary round which are scheduled for  next month.  MELD score is 35 point Child-Ravi classification of C locating overall poor prognosis and high mortality.  Patient found to have severe lactic acidosis which is likely multifactorial secondary to poor hepatic clearance that was worsened in setting of decreased cardiac output from STEMI.    >>> Bedside ultrasound utilized given tense ascites; fluid noted to be in all quadrants with left greater than right; of note, ascites in this quadrant had speckled appearance of unknown clinical significance and/or etiology.  >>> Given above, STAT CT Abdomen and Pelvis w/o contrast ordered  >>> Given recent hepatorenal syndrome and worsening creatinine, will begin management for HRS this admission.   -SQ Octreotide TID   -IV albumin 1g/kg daily infusion   -Midodrine; low threshold to start levophed to increase MAP   >>> Consider paracentesis tomorrow  >>> Given asterixis on exam   -Continue lactulose 30 g BID    -if no BM, administered PRN dose   -Rifaximin 550 mg p.o. twice daily    I discussed the patient's findings and my recommendations with patient, family and nursing staff.    Christian Camops, APRN  03/25/23  19:08 EDT     Addendum:  CT scan reviewed which shows an atypical pneumonia with infiltrates as well as significant mesenteric edema.  No bowel wall thickening was appreciated and there was evidence of gastric distention.  Moderate to severe ascites noted in all quadrants.

## 2023-03-25 NOTE — LETTER
[facility]  [address] [address]  [phone]        March 31, 2023      Patient: Leoncio Hopson  YOB: 1950  Date of Visit: 3/25/2023              Shobha Osborn RN

## 2023-03-26 ENCOUNTER — APPOINTMENT (OUTPATIENT)
Dept: GENERAL RADIOLOGY | Facility: HOSPITAL | Age: 73
DRG: 246 | End: 2023-03-26
Payer: MEDICARE

## 2023-03-26 PROBLEM — E78.5 HYPERLIPIDEMIA LDL GOAL <70: Status: ACTIVE | Noted: 2023-03-26

## 2023-03-26 PROBLEM — I25.10 CORONARY ARTERY DISEASE INVOLVING NATIVE CORONARY ARTERY OF NATIVE HEART: Status: ACTIVE | Noted: 2023-03-26

## 2023-03-26 LAB
ALBUMIN FLD-MCNC: <0.2 G/DL
ALBUMIN SERPL-MCNC: 2.8 G/DL (ref 3.5–5.2)
ALBUMIN/GLOB SERPL: 1 G/DL
ALP SERPL-CCNC: 64 U/L (ref 39–117)
ALT SERPL W P-5'-P-CCNC: 23 U/L (ref 1–41)
AMMONIA BLD-SCNC: 106 UMOL/L (ref 16–60)
ANION GAP SERPL CALCULATED.3IONS-SCNC: 38 MMOL/L (ref 5–15)
ANISOCYTOSIS BLD QL: NORMAL
APPEARANCE FLD: CLEAR
ARTERIAL PATENCY WRIST A: ABNORMAL
AST SERPL-CCNC: 106 U/L (ref 1–40)
ATMOSPHERIC PRESS: ABNORMAL MM[HG]
BASE EXCESS BLDA CALC-SCNC: -9 MMOL/L (ref 0–2)
BASOPHILS # BLD AUTO: 0.01 10*3/MM3 (ref 0–0.2)
BASOPHILS NFR BLD AUTO: 0.2 % (ref 0–1.5)
BDY SITE: ABNORMAL
BILIRUB SERPL-MCNC: 2.7 MG/DL (ref 0–1.2)
BODY TEMPERATURE: 37 C
BUN SERPL-MCNC: 50 MG/DL (ref 8–23)
BUN/CREAT SERPL: 12.1 (ref 7–25)
BURR CELLS BLD QL SMEAR: NORMAL
CALCIUM SPEC-SCNC: 8.2 MG/DL (ref 8.6–10.5)
CHLORIDE SERPL-SCNC: 89 MMOL/L (ref 98–107)
CO2 BLDA-SCNC: 15.5 MMOL/L (ref 22–33)
CO2 SERPL-SCNC: 9 MMOL/L (ref 22–29)
COHGB MFR BLD: 1.3 % (ref 0–2)
COLOR FLD: YELLOW
CREAT SERPL-MCNC: 4.14 MG/DL (ref 0.76–1.27)
CREAT UR-MCNC: 91.5 MG/DL
D-LACTATE SERPL-SCNC: 16.7 MMOL/L (ref 0.5–2)
DEPRECATED RDW RBC AUTO: 65.3 FL (ref 37–54)
EGFRCR SERPLBLD CKD-EPI 2021: 14.4 ML/MIN/1.73
EOSINOPHIL # BLD AUTO: 0.11 10*3/MM3 (ref 0–0.4)
EOSINOPHIL NFR BLD AUTO: 2.1 % (ref 0.3–6.2)
EOSINOPHIL SPEC QL MICRO: 0 % EOS/100 CELLS (ref 0–0)
EPAP: 0
ERYTHROCYTE [DISTWIDTH] IN BLOOD BY AUTOMATED COUNT: 17.1 % (ref 12.3–15.4)
GLOBULIN UR ELPH-MCNC: 2.7 GM/DL
GLUCOSE BLDC GLUCOMTR-MCNC: 234 MG/DL (ref 70–130)
GLUCOSE BLDC GLUCOMTR-MCNC: 240 MG/DL (ref 70–130)
GLUCOSE BLDC GLUCOMTR-MCNC: 255 MG/DL (ref 70–130)
GLUCOSE BLDC GLUCOMTR-MCNC: 272 MG/DL (ref 70–130)
GLUCOSE SERPL-MCNC: 182 MG/DL (ref 65–99)
HCO3 BLDA-SCNC: 14.8 MMOL/L (ref 20–26)
HCT VFR BLD AUTO: 23.4 % (ref 37.5–51)
HCT VFR BLD CALC: 23.2 % (ref 38–51)
HGB BLD-MCNC: 7.5 G/DL (ref 13–17.7)
HGB BLDA-MCNC: 7.6 G/DL (ref 13.5–17.5)
IMM GRANULOCYTES # BLD AUTO: 0.27 10*3/MM3 (ref 0–0.05)
IMM GRANULOCYTES NFR BLD AUTO: 5.2 % (ref 0–0.5)
INHALED O2 CONCENTRATION: 21 %
INR PPP: 2.71 (ref 0.84–1.13)
IPAP: 0
LDH FLD-CCNC: 49 U/L
LYMPHOCYTES # BLD AUTO: 0.49 10*3/MM3 (ref 0.7–3.1)
LYMPHOCYTES NFR BLD AUTO: 9.4 % (ref 19.6–45.3)
LYMPHOCYTES NFR FLD MANUAL: 4 %
MACROPHAGE FLUID: 8 %
MAGNESIUM SERPL-MCNC: 1.8 MG/DL (ref 1.6–2.4)
MCH RBC QN AUTO: 33.8 PG (ref 26.6–33)
MCHC RBC AUTO-ENTMCNC: 32.1 G/DL (ref 31.5–35.7)
MCV RBC AUTO: 105.4 FL (ref 79–97)
MESOTHL CELL NFR FLD MANUAL: 84 %
METHGB BLD QL: 0.5 % (ref 0–1.5)
MODALITY: ABNORMAL
MONOCYTES # BLD AUTO: 0.21 10*3/MM3 (ref 0.1–0.9)
MONOCYTES NFR BLD AUTO: 4 % (ref 5–12)
MONOCYTES NFR FLD: 4 %
NEUTROPHILS NFR BLD AUTO: 4.15 10*3/MM3 (ref 1.7–7)
NEUTROPHILS NFR BLD AUTO: 79.1 % (ref 42.7–76)
NOTE: ABNORMAL
NRBC BLD AUTO-RTO: 0 /100 WBC (ref 0–0.2)
OXYHGB MFR BLDV: 96.4 % (ref 94–99)
PAW @ PEAK INSP FLOW SETTING VENT: 0 CMH2O
PCO2 BLDA: 24.2 MM HG (ref 35–45)
PCO2 TEMP ADJ BLD: 24.2 MM HG (ref 35–48)
PH BLDA: 7.39 PH UNITS (ref 7.35–7.45)
PH, TEMP CORRECTED: 7.39 PH UNITS
PHOSPHATE SERPL-MCNC: 3.8 MG/DL (ref 2.5–4.5)
PLAT MORPH BLD: NORMAL
PLATELET # BLD AUTO: 107 10*3/MM3 (ref 140–450)
PMV BLD AUTO: 9.3 FL (ref 6–12)
PO2 BLDA: 84.6 MM HG (ref 83–108)
PO2 TEMP ADJ BLD: 84.6 MM HG (ref 83–108)
POTASSIUM SERPL-SCNC: 4.1 MMOL/L (ref 3.5–5.2)
PROCALCITONIN SERPL-MCNC: 0.95 NG/ML (ref 0–0.25)
PROT FLD-MCNC: <1 G/DL
PROT SERPL-MCNC: 5.5 G/DL (ref 6–8.5)
PROTHROMBIN TIME: 28.9 SECONDS (ref 11.4–14.4)
RBC # BLD AUTO: 2.22 10*6/MM3 (ref 4.14–5.8)
RBC # FLD AUTO: <2000 /MM3
SODIUM SERPL-SCNC: 136 MMOL/L (ref 136–145)
TOTAL RATE: 0 BREATHS/MINUTE
WBC # FLD AUTO: 27 /MM3
WBC MORPH BLD: NORMAL
WBC NRBC COR # BLD: 5.24 10*3/MM3 (ref 3.4–10.8)

## 2023-03-26 PROCEDURE — 25010000002 PIPERACILLIN SOD-TAZOBACTAM PER 1 G: Performed by: INTERNAL MEDICINE

## 2023-03-26 PROCEDURE — 99291 CRITICAL CARE FIRST HOUR: CPT | Performed by: INTERNAL MEDICINE

## 2023-03-26 PROCEDURE — 84478 ASSAY OF TRIGLYCERIDES: CPT | Performed by: NURSE PRACTITIONER

## 2023-03-26 PROCEDURE — 25010000002 THIAMINE PER 100 MG: Performed by: INTERNAL MEDICINE

## 2023-03-26 PROCEDURE — 85007 BL SMEAR W/DIFF WBC COUNT: CPT | Performed by: INTERNAL MEDICINE

## 2023-03-26 PROCEDURE — 84145 PROCALCITONIN (PCT): CPT | Performed by: INTERNAL MEDICINE

## 2023-03-26 PROCEDURE — 25010000002 DIPHENHYDRAMINE PER 50 MG: Performed by: NURSE PRACTITIONER

## 2023-03-26 PROCEDURE — 63710000001 INSULIN DETEMIR PER 5 UNITS: Performed by: INTERNAL MEDICINE

## 2023-03-26 PROCEDURE — 83615 LACTATE (LD) (LDH) ENZYME: CPT | Performed by: NURSE PRACTITIONER

## 2023-03-26 PROCEDURE — B548ZZA ULTRASONOGRAPHY OF SUPERIOR VENA CAVA, GUIDANCE: ICD-10-PCS | Performed by: NURSE PRACTITIONER

## 2023-03-26 PROCEDURE — 80053 COMPREHEN METABOLIC PANEL: CPT | Performed by: INTERNAL MEDICINE

## 2023-03-26 PROCEDURE — C1751 CATH, INF, PER/CENT/MIDLINE: HCPCS

## 2023-03-26 PROCEDURE — 82375 ASSAY CARBOXYHB QUANT: CPT

## 2023-03-26 PROCEDURE — 25010000002 OCTREOTIDE PER 25 MCG: Performed by: NURSE PRACTITIONER

## 2023-03-26 PROCEDURE — 82962 GLUCOSE BLOOD TEST: CPT

## 2023-03-26 PROCEDURE — 82140 ASSAY OF AMMONIA: CPT | Performed by: INTERNAL MEDICINE

## 2023-03-26 PROCEDURE — 63710000001 INSULIN LISPRO (HUMAN) PER 5 UNITS: Performed by: INTERNAL MEDICINE

## 2023-03-26 PROCEDURE — 82805 BLOOD GASES W/O2 SATURATION: CPT

## 2023-03-26 PROCEDURE — 25010000002 OCTREOTIDE PER 25 MCG: Performed by: INTERNAL MEDICINE

## 2023-03-26 PROCEDURE — 82042 OTHER SOURCE ALBUMIN QUAN EA: CPT | Performed by: NURSE PRACTITIONER

## 2023-03-26 PROCEDURE — 36556 INSERT NON-TUNNEL CV CATH: CPT | Performed by: NURSE PRACTITIONER

## 2023-03-26 PROCEDURE — 76937 US GUIDE VASCULAR ACCESS: CPT | Performed by: NURSE PRACTITIONER

## 2023-03-26 PROCEDURE — 83050 HGB METHEMOGLOBIN QUAN: CPT

## 2023-03-26 PROCEDURE — 36600 WITHDRAWAL OF ARTERIAL BLOOD: CPT

## 2023-03-26 PROCEDURE — 02HV33Z INSERTION OF INFUSION DEVICE INTO SUPERIOR VENA CAVA, PERCUTANEOUS APPROACH: ICD-10-PCS | Performed by: NURSE PRACTITIONER

## 2023-03-26 PROCEDURE — 89051 BODY FLUID CELL COUNT: CPT | Performed by: NURSE PRACTITIONER

## 2023-03-26 PROCEDURE — 83735 ASSAY OF MAGNESIUM: CPT | Performed by: INTERNAL MEDICINE

## 2023-03-26 PROCEDURE — 0W9G3ZZ DRAINAGE OF PERITONEAL CAVITY, PERCUTANEOUS APPROACH: ICD-10-PCS | Performed by: NURSE PRACTITIONER

## 2023-03-26 PROCEDURE — 71045 X-RAY EXAM CHEST 1 VIEW: CPT

## 2023-03-26 PROCEDURE — 84157 ASSAY OF PROTEIN OTHER: CPT | Performed by: NURSE PRACTITIONER

## 2023-03-26 PROCEDURE — 84100 ASSAY OF PHOSPHORUS: CPT | Performed by: INTERNAL MEDICINE

## 2023-03-26 PROCEDURE — 85025 COMPLETE CBC W/AUTO DIFF WBC: CPT | Performed by: INTERNAL MEDICINE

## 2023-03-26 PROCEDURE — 49083 ABD PARACENTESIS W/IMAGING: CPT | Performed by: NURSE PRACTITIONER

## 2023-03-26 PROCEDURE — 99232 SBSQ HOSP IP/OBS MODERATE 35: CPT | Performed by: NURSE PRACTITIONER

## 2023-03-26 PROCEDURE — 85610 PROTHROMBIN TIME: CPT | Performed by: INTERNAL MEDICINE

## 2023-03-26 RX ORDER — NOREPINEPHRINE BIT/0.9 % NACL 8 MG/250ML
.02-.3 INFUSION BOTTLE (ML) INTRAVENOUS
Status: DISCONTINUED | OUTPATIENT
Start: 2023-03-26 | End: 2023-03-30

## 2023-03-26 RX ORDER — PROPRANOLOL HYDROCHLORIDE 10 MG/1
10 TABLET ORAL 2 TIMES DAILY
Status: ON HOLD | COMMUNITY
End: 2023-03-26

## 2023-03-26 RX ORDER — PIOGLITAZONEHYDROCHLORIDE 45 MG/1
45 TABLET ORAL DAILY
Status: ON HOLD | COMMUNITY
End: 2023-03-26

## 2023-03-26 RX ORDER — SPIRONOLACTONE 50 MG/1
100 TABLET, FILM COATED ORAL DAILY
Status: ON HOLD | COMMUNITY
End: 2023-03-26

## 2023-03-26 RX ORDER — ALBUTEROL SULFATE 90 UG/1
2 AEROSOL, METERED RESPIRATORY (INHALATION) EVERY 4 HOURS PRN
Status: ON HOLD | COMMUNITY
End: 2023-03-26

## 2023-03-26 RX ORDER — LACTULOSE 10 G/15ML
10 SOLUTION ORAL 3 TIMES DAILY
COMMUNITY
End: 2023-04-04 | Stop reason: HOSPADM

## 2023-03-26 RX ORDER — TRIAMCINOLONE ACETONIDE 1 MG/G
1 CREAM TOPICAL 2 TIMES DAILY
Status: ON HOLD | COMMUNITY
End: 2023-03-26

## 2023-03-26 RX ORDER — DIPHENHYDRAMINE HYDROCHLORIDE 50 MG/ML
25 INJECTION INTRAMUSCULAR; INTRAVENOUS ONCE
Status: COMPLETED | OUTPATIENT
Start: 2023-03-26 | End: 2023-03-26

## 2023-03-26 RX ORDER — CIPROFLOXACIN 500 MG/1
500 TABLET, FILM COATED ORAL DAILY
COMMUNITY
End: 2023-04-04 | Stop reason: HOSPADM

## 2023-03-26 RX ORDER — DIPHENHYDRAMINE HYDROCHLORIDE, ZINC ACETATE 2; .1 G/100G; G/100G
1 CREAM TOPICAL AS NEEDED
COMMUNITY

## 2023-03-26 RX ORDER — MIDODRINE HYDROCHLORIDE 10 MG/1
10 TABLET ORAL 3 TIMES DAILY
COMMUNITY
End: 2023-04-04 | Stop reason: HOSPADM

## 2023-03-26 RX ORDER — SIMVASTATIN 5 MG
5 TABLET ORAL NIGHTLY
COMMUNITY
End: 2023-04-04 | Stop reason: HOSPADM

## 2023-03-26 RX ORDER — LOSARTAN POTASSIUM 100 MG/1
100 TABLET ORAL DAILY
Status: ON HOLD | COMMUNITY
End: 2023-03-26

## 2023-03-26 RX ORDER — HYDROXYZINE HYDROCHLORIDE 25 MG/1
25 TABLET, FILM COATED ORAL NIGHTLY PRN
Status: ON HOLD | COMMUNITY
End: 2023-03-26

## 2023-03-26 RX ORDER — SODIUM BICARBONATE 650 MG/1
1300 TABLET ORAL 2 TIMES DAILY
COMMUNITY
End: 2023-04-04 | Stop reason: HOSPADM

## 2023-03-26 RX ORDER — ALBUMIN, HUMAN INJ 5% 5 %
500 SOLUTION INTRAVENOUS ONCE
Status: DISCONTINUED | OUTPATIENT
Start: 2023-03-26 | End: 2023-03-26

## 2023-03-26 RX ADMIN — Medication 400 MCG: at 08:47

## 2023-03-26 RX ADMIN — INSULIN LISPRO 3 UNITS: 100 INJECTION, SOLUTION INTRAVENOUS; SUBCUTANEOUS at 20:26

## 2023-03-26 RX ADMIN — MIDODRINE HYDROCHLORIDE 15 MG: 5 TABLET ORAL at 11:32

## 2023-03-26 RX ADMIN — THIAMINE HYDROCHLORIDE 500 MG: 100 INJECTION, SOLUTION INTRAMUSCULAR; INTRAVENOUS at 17:25

## 2023-03-26 RX ADMIN — URSODIOL 300 MG: 300 CAPSULE ORAL at 08:48

## 2023-03-26 RX ADMIN — THIAMINE HYDROCHLORIDE 500 MG: 100 INJECTION, SOLUTION INTRAMUSCULAR; INTRAVENOUS at 09:17

## 2023-03-26 RX ADMIN — TAZOBACTAM SODIUM AND PIPERACILLIN SODIUM 3.38 G: 375; 3 INJECTION, SOLUTION INTRAVENOUS at 08:49

## 2023-03-26 RX ADMIN — SODIUM BICARBONATE 650 MG TABLET 650 MG: at 08:48

## 2023-03-26 RX ADMIN — OCTREOTIDE ACETATE 50 MCG/HR: 500 INJECTION, SOLUTION INTRAVENOUS; SUBCUTANEOUS at 13:13

## 2023-03-26 RX ADMIN — SODIUM BICARBONATE 150 MEQ: 84 INJECTION, SOLUTION INTRAVENOUS at 23:34

## 2023-03-26 RX ADMIN — Medication 10 ML: at 20:10

## 2023-03-26 RX ADMIN — URSODIOL 300 MG: 300 CAPSULE ORAL at 20:09

## 2023-03-26 RX ADMIN — INSULIN LISPRO 4 UNITS: 100 INJECTION, SOLUTION INTRAVENOUS; SUBCUTANEOUS at 17:26

## 2023-03-26 RX ADMIN — LACTULOSE 30 G: 20 SOLUTION ORAL at 08:48

## 2023-03-26 RX ADMIN — MIDODRINE HYDROCHLORIDE 15 MG: 5 TABLET ORAL at 08:49

## 2023-03-26 RX ADMIN — PANTOPRAZOLE SODIUM 40 MG: 40 INJECTION, POWDER, LYOPHILIZED, FOR SOLUTION INTRAVENOUS at 05:06

## 2023-03-26 RX ADMIN — OCTREOTIDE ACETATE 200 MCG: 100 INJECTION, SOLUTION INTRAVENOUS; SUBCUTANEOUS at 08:48

## 2023-03-26 RX ADMIN — THIAMINE HYDROCHLORIDE 500 MG: 100 INJECTION, SOLUTION INTRAMUSCULAR; INTRAVENOUS at 02:09

## 2023-03-26 RX ADMIN — SODIUM BICARBONATE 150 MEQ: 84 INJECTION, SOLUTION INTRAVENOUS at 13:11

## 2023-03-26 RX ADMIN — RIFAXIMIN 550 MG: 550 TABLET ORAL at 08:49

## 2023-03-26 RX ADMIN — MIDODRINE HYDROCHLORIDE 15 MG: 5 TABLET ORAL at 17:26

## 2023-03-26 RX ADMIN — LACTULOSE 30 G: 20 SOLUTION ORAL at 20:09

## 2023-03-26 RX ADMIN — INSULIN DETEMIR 10 UNITS: 100 INJECTION, SOLUTION SUBCUTANEOUS at 16:09

## 2023-03-26 RX ADMIN — RIFAXIMIN 550 MG: 550 TABLET ORAL at 20:09

## 2023-03-26 RX ADMIN — Medication 10 ML: at 08:49

## 2023-03-26 RX ADMIN — SODIUM BICARBONATE 650 MG TABLET 650 MG: at 20:09

## 2023-03-26 RX ADMIN — DIPHENHYDRAMINE HYDROCHLORIDE 25 MG: 50 INJECTION INTRAMUSCULAR; INTRAVENOUS at 00:44

## 2023-03-26 RX ADMIN — FERROUS SULFATE TAB 325 MG (65 MG ELEMENTAL FE) 325 MG: 325 (65 FE) TAB at 08:48

## 2023-03-26 RX ADMIN — TAZOBACTAM SODIUM AND PIPERACILLIN SODIUM 3.38 G: 375; 3 INJECTION, SOLUTION INTRAVENOUS at 20:10

## 2023-03-26 RX ADMIN — INSULIN LISPRO 3 UNITS: 100 INJECTION, SOLUTION INTRAVENOUS; SUBCUTANEOUS at 08:48

## 2023-03-26 RX ADMIN — Medication 2 PATCH: at 01:56

## 2023-03-26 RX ADMIN — FLUTICASONE PROPIONATE 2 SPRAY: 50 SPRAY, METERED NASAL at 08:50

## 2023-03-26 RX ADMIN — SODIUM BICARBONATE 650 MG TABLET 650 MG: at 16:09

## 2023-03-26 RX ADMIN — INSULIN LISPRO 4 UNITS: 100 INJECTION, SOLUTION INTRAVENOUS; SUBCUTANEOUS at 11:31

## 2023-03-26 RX ADMIN — OCTREOTIDE ACETATE 50 MCG/HR: 500 INJECTION, SOLUTION INTRAVENOUS; SUBCUTANEOUS at 21:15

## 2023-03-26 RX ADMIN — Medication 0.02 MCG/KG/MIN: at 13:13

## 2023-03-26 RX ADMIN — SODIUM BICARBONATE 150 MEQ: 84 INJECTION, SOLUTION INTRAVENOUS at 01:59

## 2023-03-26 NOTE — PROCEDURES
"Insert Central Line At Bedside    Date/Time: 3/26/2023 1:29 AM  Performed by: Shivam Joaquin APRN  Authorized by: Shivam Joaquin APRN   Consent: The procedure was performed in an emergent situation. Verbal consent obtained.  Required items: required blood products, implants, devices, and special equipment available  Patient identity confirmed: arm band, provided demographic data and hospital-assigned identification number  Time out: Immediately prior to procedure a \"time out\" was called to verify the correct patient, procedure, equipment, support staff and site/side marked as required.  Indications: vascular access  Anesthesia: local infiltration    Anesthesia:  Local Anesthetic: lidocaine 1% without epinephrine  Anesthetic total: 5 mL    Sedation:  Patient sedated: no    Preparation: skin prepped with 2% chlorhexidine  Skin prep agent dried: skin prep agent completely dried prior to procedure  Sterile barriers: all five maximum sterile barriers used - cap, mask, sterile gown, sterile gloves, and large sterile sheet  Hand hygiene: hand hygiene performed prior to central venous catheter insertion  Location details: right internal jugular  Patient position: flat  Catheter type: triple lumen  Catheter size: 7 Fr  Pre-procedure: landmarks identified  Ultrasound guidance: yes  Sterile ultrasound techniques: sterile gel and sterile probe covers were used  Number of attempts: 1  Successful placement: yes  Post-procedure: line sutured and dressing applied  Assessment: blood return through all ports,  free fluid flow,  placement verified by x-ray and no pneumothorax on x-ray  Patient tolerance: patient tolerated the procedure well with no immediate complications  Comments: Patient unable to hold still due to pruritis after placement of sterile field.  25mg benadryl IVP during procedure to aid w/ itching.        "

## 2023-03-26 NOTE — PROGRESS NOTES
Cardiology Progress Note      Reason for visit:    · Code AMI/inferior STEMI    IDENTIFICATION: 72-year-old gentleman who resides in [place], [place]    Active Hospital Problems    Diagnosis  POA   • **ST elevation myocardial infarction involving right coronary artery (HCC) [I21.11]  Yes     Priority: High   • Coronary artery disease involving native coronary artery of native heart [I25.10]  Yes     Priority: High     · Cardiac cath for STEMI (3/25/2023): Severe two-vessel disease RCA and diagonal of LAD.  PCI of RCA.  Unsuccessful manual thrombectomy of 100% occluded RPDA.  Unsuccessful angioplasty to right posterior lateral branch.  Medical therapy recommended for diagonal branch.       • CKD (chronic kidney disease) stage 5, GFR less than 15 ml/min (HCC) [N18.5]  Yes     Priority: High   • PAOLO (acute kidney injury) (HCC) [N17.9]  Yes     Priority: High   • Hyperlipidemia LDL goal <70 [E78.5]  Yes     Priority: Medium   • Decompensated hepatic cirrhosis (HCC) [K72.90, K74.60]  Yes     Priority: Medium   • Coagulopathy (HCC) [D68.9]  Yes     Priority: Medium   • Liver cirrhosis secondary to HAYES (HCC) [K75.81, K74.60]  Yes     Priority: Medium   • Hyperbilirubinemia [E80.6]  Yes     Priority: Medium          Patient was admitted yesterday as a code AMI.  He underwent emergent cardiac catheterization receiving PCI to the RCA.  Patient had 100% occluded right posterior lateral branch and angioplasty was attempted but unsuccessful.  He also had 100% occluded right posterior descending artery for which manual thrombectomy was attempted but unsuccessful.  There was severe disease of the diagonal branch for which medical therapy was recommended.  He was transferred to the intensive care unit as the patient has multiple issues including stage V chronic kidney disease not on hemodialysis and decompensated liver cirrhosis.  GI and nephrology are following.  Patient is currently sitting up in the bed sleeping but arouses  easily.  He denies any chest pain.  He is receiving IV fluids.           Vital Sign Min/Max for last 24 hours  Temp  Min: 95.1 °F (35.1 °C)  Max: 98 °F (36.7 °C)   BP  Min: 99/71  Max: 136/84   Pulse  Min: 83  Max: 134   Resp  Min: 16  Max: 26   SpO2  Min: 85 %  Max: 100 %   Flow (L/min)  Min: 2  Max: 4      Intake/Output Summary (Last 24 hours) at 3/26/2023 0730  Last data filed at 3/26/2023 0500  Gross per 24 hour   Intake 653.33 ml   Output 110 ml   Net 543.33 ml           Physical Exam  Constitutional:       General: He is sleeping.   Cardiovascular:      Rate and Rhythm: Regular rhythm. Tachycardia present.      Comments: Trace lower extremity edema  Pulmonary:      Effort: Pulmonary effort is normal.      Breath sounds: Decreased breath sounds present.   Abdominal:      General: There is distension.      Palpations: There is fluid wave.      Comments: Ascites present   Skin:     General: Skin is warm and dry.   Neurological:      Mental Status: He is easily aroused.   Psychiatric:         Behavior: Behavior is cooperative.         Tele: Normal sinus rhythm    Results Review (reviewed the patient's recent labs in the electronic medical record):     EKG (3/25/2023): Sinus tachycardia with first-degree AV block, anterior infarct and inferior injury pattern    CXR (3/26/2023): Small right pleural effusion and patchy interstitial changes with mild cardiomegaly compatible with mild pulmonary edema    ECHO (3/25/2023): LVEF 65%.  Calcification aortic valve with minimal stenosis.  Moderate pulmonary hypertension with RVSP 49 mmHg.  Moderate dilation aortic root and aneurysmal dilation of ascending aorta.  Right pleural effusion    Results from last 7 days   Lab Units 03/26/23  0054 03/25/23  1317 03/25/23  1112 03/25/23  1103   SODIUM mmol/L 136 135*  --  133*   POTASSIUM mmol/L 4.1 4.1  --  3.8   CHLORIDE mmol/L 89* 91*  --  89*   BUN mg/dL 50* 48*  --  43*   CREATININE mg/dL 4.14* 3.94* 4.50* 4.07*   MAGNESIUM mg/dL  1.8  --   --  1.9     Results from last 7 days   Lab Units 03/25/23  1317 03/25/23  1103   HSTROP T ng/L 792* 739*     Results from last 7 days   Lab Units 03/26/23  0054 03/25/23  1310 03/25/23  1103   WBC 10*3/mm3 5.24 12.89* 13.39*   HEMOGLOBIN g/dL 7.5* 10.5* 9.8*   HEMATOCRIT % 23.4* 31.9* 30.2*   PLATELETS 10*3/mm3 107* 111* 134*       Lab Results   Component Value Date    HGBA1C 5.60 03/25/2023       Lab Results   Component Value Date    CHOL 94 03/25/2023              STEMI involving right coronary artery  · Status post HARDEEP to proximal RCA with residual thrombotic occlusions in the distal vessels not able to be adequately revascularized  · Aspirin 81 mg daily  · No ticagrelor due to high bleeding risk from decompensated liver cirrhosis  · Loaded with clopidogrel 300 mg on 3/26 5/2023   · Clopidogrel 75 mg daily  · Unable to tolerate beta-blocker due to hypotension    Hypotension  · Midodrine 15 mg 3 times daily      Hyperlipidemia  · No statin due to decompensated liver cirrhosis      Stage V chronic kidney disease  · Rejected for transplant at  currently being evaluated at   · Nephrology following  · Current creatinine 4.14  · Anemic with H&H of 7.5 and 23.4    End-stage liver disease/decompensated liver cirrhosis  · Rejected for transplant at  and currently being evaluated at   · Ascites present.  Bedside ultrasound showed fluid in all quadrants.  · ALT 23 and   · Thrombocytopenic with platelets of 107  · Gastroenterology following and considering paracentesis today       · Continue dual antiplatelet therapy with aspirin and Plavix  · Defer statin therapy due to decompensated liver cirrhosis/end-stage liver disease  · Appreciate GI and nephrology assistance    Electronically signed by QUOC Montalvo, 03/26/23, 7:16 AM EDT.

## 2023-03-26 NOTE — PROGRESS NOTES
INTENSIVIST / PULMONARY FOLLOW UP NOTE     Hospital:  LOS: 1 day   Mr. Leoncio Hopson, 72 y.o. 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)    Hyperbilirubinemia    Coagulopathy (HCC)    Decompensated hepatic cirrhosis (HCC)    CKD (chronic kidney disease) stage 5, GFR less than 15 ml/min (HCC)    Hyperlipidemia LDL goal <70    Coronary artery disease involving native coronary artery of native heart          SUBJECTIVE   PH improved, eating without difficulty    The patient's relevant past medical, surgical, family, and social history were reviewed    Allergies and medications were reviewed    ROS:    Per subjective, all other systems were reviewed and were negative        OBJECTIVE     Vital Sign Min/Max for last 24 hours:  Temp  Min: 98 °F (36.7 °C)  Max: 98.3 °F (36.8 °C)   BP  Min: 99/71  Max: 128/70   Pulse  Min: 98  Max: 134   Resp  Min: 16  Max: 26   SpO2  Min: 85 %  Max: 100 %   No data recorded     Physical Exam:  General Appearance:  Mild acute distress  Eyes:  No scleral icterus or pallor, pupils normal  Ears, Nose, Mouth, Throat:  Atraumatic, oropharynx clear  Neck:  Trachea midline, thyroid normal  Respiratory:  Clear to auscultation bilaterally, normal effort  Cardiovascular:  Regular rate and rhythm, no murmurs, no peripheral edema  Gastrointestinal:  Distended, +fluid wave, non tender  Skin:  Normal temperature, no rash  Psychiatric:  No agitation  Neuro:  No new focal neurologic deficits observed    Telemetry:              Hemodynamics:   CVP:     PAP:     PAOP:     CO:     CI:     SVI:     SVR:       SpO2: 98 % SpO2  Min: 85 %  Max: 100 %   Device:      Flow Rate:   No data recorded     Mechanical Ventilator Settings:                                         Intake/Ouptut 24 hrs (7:00AM - 6:59 AM)  Intake & Output (last 3 days)       03/23 0701  03/24 0700 03/24  0701  03/25 0700 03/25 0701  03/26 0700 03/26 0701 03/27 0700    I.V. (mL/kg)   1627.5 (22.8) 337.9 (4.7)    IV Piggyback   300 150    Total Intake(mL/kg)   1927.5 (27) 487.9 (6.8)    Urine (mL/kg/hr)   110 180 (0.4)    Total Output   110 180    Net   +1817.5 +307.9                  Lines, Drains & Airways     Active LDAs     Name Placement date Placement time Site Days    CVC Triple Lumen 03/26/23 Non-tunneled Right Internal jugular 03/26/23  0130  Internal jugular  less than 1    Peripheral IV 02/11/23 0930 Left Antecubital 02/11/23  0930  Antecubital  43    Peripheral IV 03/25/23 1230 Right;Anterior Antecubital 03/25/23  1230  Antecubital  1    Urethral Catheter Coude 14 Fr. 03/25/23  1800  -- less than 1                Hematology:  Results from last 7 days   Lab Units 03/26/23  0054 03/25/23  1310 03/25/23  1103   WBC 10*3/mm3 5.24 12.89* 13.39*   HEMOGLOBIN g/dL 7.5* 10.5* 9.8*   HEMATOCRIT % 23.4* 31.9* 30.2*   PLATELETS 10*3/mm3 107* 111* 134*     Electrolytes, Magnesium and Phosphorus:  Results from last 7 days   Lab Units 03/26/23  0054 03/25/23  1317 03/25/23  1103   SODIUM mmol/L 136 135* 133*   CHLORIDE mmol/L 89* 91* 89*   POTASSIUM mmol/L 4.1 4.1 3.8   CO2 mmol/L 9.0* 6.0* 6.0*   MAGNESIUM mg/dL 1.8  --  1.9   PHOSPHORUS mg/dL 3.8  --  3.9     Renal:  Results from last 7 days   Lab Units 03/26/23  0054 03/25/23  1317 03/25/23  1112 03/25/23  1103   CREATININE mg/dL 4.14* 3.94* 4.50* 4.07*   BUN mg/dL 50* 48*  --  43*     Estimated Creatinine Clearance: 15.8 mL/min (A) (by C-G formula based on SCr of 4.14 mg/dL (H)).  Hepatic:  Results from last 7 days   Lab Units 03/26/23  0054 03/25/23  1103   ALK PHOS U/L 64 90   BILIRUBIN mg/dL 2.7* 3.3*   ALT (SGPT) U/L 23 27   AST (SGOT) U/L 106* 65*     Arterial Blood Gases:  Results from last 7 days   Lab Units 03/26/23  0509 03/25/23  1456   PH, ARTERIAL pH units 7.394 7.152*   PCO2, ARTERIAL mm Hg 24.2* 15.5*   PO2 ART mm Hg 84.6 128.0*   FIO2 % 21 21        Results from last 7 days   Lab Units 03/25/23  1103   HEMOGLOBIN A1C % 5.60       Lab Results   Component Value Date    LACTATE 16.7 (C) 03/25/2023       Relevant imaging studies and labs from 03/26/23 were reviewed    Medications (drips):  octreotide (SandoSTATIN) infusion  phenylephrine  sodium bicarbonate drip (greater than 75 mEq/bag), Last Rate: 150 mEq (03/26/23 0159)        ferrous sulfate, 325 mg, Oral, Daily With Breakfast  fluticasone, 2 spray, Each Nare, Daily  folic acid, 400 mcg, Oral, Daily  insulin lispro, 0-7 Units, Subcutaneous, 4x Daily With Meals & Nightly  lactulose, 30 g, Oral, BID  midodrine, 15 mg, Oral, TID AC  pantoprazole, 40 mg, Intravenous, Q AM  pharmacy consult - MTM, , Does not apply, Daily  piperacillin-tazobactam, 3.375 g, Intravenous, Q12H  riFAXIMin, 550 mg, Oral, Q12H  sodium bicarbonate, 650 mg, Oral, TID  sodium chloride, 10 mL, Intravenous, Q12H  thiamine (B-1) IV, 500 mg, Intravenous, Q8H  ursodiol, 300 mg, Oral, BID        •  dextrose  •  dextrose  •  glucagon (human recombinant)  •  Lidocaine HCl gel  •  sodium chloride  •  sodium chloride    Assessment & Plan   IMPRESSION / PLAN     Inpatient Problem List:  72 y.o.male:  Active Hospital Problems    Diagnosis    • **ST elevation myocardial infarction involving right coronary artery (HCC)    • Hyperlipidemia LDL goal <70    • Coronary artery disease involving native coronary artery of native heart    • CKD (chronic kidney disease) stage 5, GFR less than 15 ml/min (HCC)    • Decompensated hepatic cirrhosis (HCC)    • Coagulopathy (HCC)    • 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  He had presented there for increased SOA and was hoping to get thoracentesis or paracentesis.     Emergent LHC showed severe 2-vessel CAD (RCA & LAD).  Had PCI proximal RCA w/ HARDEEP and Unsuccessful balloon of 100% RPL / Unsuccessful thrombectomy 100% occluded RPDA.     In addition to above, patient noted to have profound lactic acidosis.  This seem out of proportion to current vitals and appearance - I.e. does not appear that he is in overwhelming shock with poor perfusion.  Has had poor po intake recently.  Suspect there is strong component of Type B LA.    Impression:  Still w/ profound anion gap acidosis but pH improved today, and patient looks and feels better    Plan:    STEMI  LHC as above, plan per Cardiology     Severe Lactic Acidosis  R/o Severe Sepsis  Pan cultured, empiric Pip-tazo, received Bicarb gtt, high dose thiamine.  Has been very slow to clear.  CT abdomen / pelvis does not appear to have any clear evidence of mesenteric ischemia or \"abdominal catastrophe\".  Though distended, his abdomen was non-tender.     Overall, I suspect secondary to cirrhosis he has poor clearance of lactic acid.  Likely had \"stunned RV\" at some point during his inferior STEMI w/ hepatic congestion / shock liver exacerbating lactic acidosis.       Small Right Effusion  Hepatic Hydrothorax s/p Thora x 4  On Room Air  Has mild airspace disease in bases.  Has recently had poor po intake / vomiting at home, suspect aspiration.  Pip-tazo should cover.     ESLD w/ Cirrhosis (HAYES)  Ascites  Stage V CKD w/ PAOLO (Hepatorenal)  Has tense ascites.  Was just loaded w/ DAPT.  Appreciate GI help.  Will need para when safe.     Cr was 2.19 on 2/23. Expect this could worsen after contrast.  Received Bicarb gtt and 1 liter 5% albumin overnight, placed abdul, F/u final UA / UCx / Urine studies / renal ultrasound.  Levophed to keep MAP > " 80-85.  On octreotide infusion.    Appreciate Nephrology help.      As above - rejected by  for Liver-Kidney Txp, being evaluated at  but not listed.    Hyperglycemia  A1c 5.6  Titrate SQ insulin      SCDs/PPI    Nutrition  Dietary Orders (From admission, onward)     Start     Ordered    03/25/23 1211  Diet: Cardiac Diets, Diabetic Diets; Healthy Heart (2-3 Na+); Consistent Carbohydrate; Texture: Regular Texture (IDDSI 7); Fluid Consistency: Thin (IDDSI 0)  Diet Effective Now        References:    Diet Order [facility]   Question Answer Comment   Diets: Cardiac Diets    Diets: Diabetic Diets    Cardiac Diet: Healthy Heart (2-3 Na+)    Diabetic Diet: Consistent Carbohydrate    Texture: Regular Texture (IDDSI 7)    Fluid Consistency: Thin (IDDSI 0)        03/25/23 1211                  Plan of care and goals reviewed with multidisciplinary team at daily rounds    Critical Care time spent in direct patient care: 40 minutes (excluding procedure time, if applicable) including high complexity decision making to assess, manipulate, and support vital organ system failure in this individual who has impairment of one or more vital organ systems such that there is a high probability of imminent or life threatening deterioration in the patient’s condition.       Zach Cheung MD  Intensive Care Medicine  03/26/23 12:52 EDT

## 2023-03-26 NOTE — PROGRESS NOTES
"   LOS: 1 day    Patient Care Team:  Antonio Castro MD as PCP - General  Antonio Castro MD as PCP - Family Medicine    Chief Complaint: Shortness of breath     72-year-old male with past medical history of De Leon, liver cirrhosis, prerenal acute kidney failure was last seen a month ago with a creatinine of 5.0 when he was transferred to  for further management for liver transplant.  Dialysis was not started at that time.    Prior to the above baseline creatinine 0.8-1.2.  On previous admission creatinine was 4.7, before discharge. Labs showed a creatinine 3.94, BUN 48, sodium 135, potassium 4.1, CO2 6.0, calcium 9.6, EGFR 15 mL/min, troponin high 53, hemoglobin 10.5, platelets 111 K, lactate high 17.6, phosphorus 3.9 magnesium 1.9, bilirubin 4.6 other liver enzymes were normal.  Subjective     Interval History:   Critically ill in ICU.  Mild decreasing renal function.  Ammonia level is high.    Review of Systems:   Hard of hearing, abdominal distention, mild shortness of breath, generalized weakness.  All other negative    Objective     Vital Sign Min/Max for last 24 hours  Temp  Min: 95.1 °F (35.1 °C)  Max: 98.1 °F (36.7 °C)   BP  Min: 99/71  Max: 136/84   Pulse  Min: 83  Max: 134   Resp  Min: 16  Max: 26   SpO2  Min: 85 %  Max: 100 %   Flow (L/min)  Min: 2  Max: 4   Weight  Min: 71.4 kg (157 lb 6.5 oz)  Max: 72.6 kg (160 lb)     Flowsheet Rows    Flowsheet Row First Filed Value   Admission Height 172.7 cm (68\") Documented at 03/25/2023 1055   Admission Weight 72.6 kg (160 lb) Documented at 03/25/2023 1055          No intake/output data recorded.  I/O last 3 completed shifts:  In: 1927.5 [I.V.:1627.5; IV Piggyback:300]  Out: 110 [Urine:110]    Physical Exam:  General appearance: Sick looking  male mild distress laying in bed.    HEENT: Hard of hearing, oral mucosa moist, neck is supple  Lungs: Few rhonchi's and rails heard, equal chest movement, no crepitation.  Heart: Normal S1, S2, no gallop, " murmur, RRR.  Abdomen: Abdomen distended, positive thrill soft, nontender, positive bowel sounds.  Extremities: Positive lower extremity edema, no cyanosis, no joint swelling.  Neuro: Alert, oriented x4, no focal deficit.  Psych: Mood and affect are normal and appropriate.  Skin: Skin is warm and dry.  : No suprapubic fullness or tenderness.    WBC WBC   Date Value Ref Range Status   03/26/2023 5.24 3.40 - 10.80 10*3/mm3 Final   03/25/2023 12.89 (H) 3.40 - 10.80 10*3/mm3 Final   03/25/2023 13.39 (H) 3.40 - 10.80 10*3/mm3 Final      HGB Hemoglobin   Date Value Ref Range Status   03/26/2023 7.5 (L) 13.0 - 17.7 g/dL Final   03/25/2023 10.5 (L) 13.0 - 17.7 g/dL Final   03/25/2023 9.8 (L) 13.0 - 17.7 g/dL Final      HCT Hematocrit   Date Value Ref Range Status   03/26/2023 23.4 (L) 37.5 - 51.0 % Final   03/25/2023 31.9 (L) 37.5 - 51.0 % Final   03/25/2023 30.2 (L) 37.5 - 51.0 % Final      Platlets No results found for: LABPLAT   MCV MCV   Date Value Ref Range Status   03/26/2023 105.4 (H) 79.0 - 97.0 fL Final   03/25/2023 105.6 (H) 79.0 - 97.0 fL Final   03/25/2023 105.6 (H) 79.0 - 97.0 fL Final          Sodium Sodium   Date Value Ref Range Status   03/26/2023 136 136 - 145 mmol/L Final   03/25/2023 135 (L) 136 - 145 mmol/L Final   03/25/2023 133 (L) 136 - 145 mmol/L Final      Potassium Potassium   Date Value Ref Range Status   03/26/2023 4.1 3.5 - 5.2 mmol/L Final   03/25/2023 4.1 3.5 - 5.2 mmol/L Final   03/25/2023 3.8 3.5 - 5.2 mmol/L Final      Chloride Chloride   Date Value Ref Range Status   03/26/2023 89 (L) 98 - 107 mmol/L Final   03/25/2023 91 (L) 98 - 107 mmol/L Final   03/25/2023 89 (L) 98 - 107 mmol/L Final      CO2 CO2   Date Value Ref Range Status   03/26/2023 9.0 (L) 22.0 - 29.0 mmol/L Final   03/25/2023 6.0 (L) 22.0 - 29.0 mmol/L Final   03/25/2023 6.0 (L) 22.0 - 29.0 mmol/L Final      BUN BUN   Date Value Ref Range Status   03/26/2023 50 (H) 8 - 23 mg/dL Final   03/25/2023 48 (H) 8 - 23 mg/dL Final    03/25/2023 43 (H) 8 - 23 mg/dL Final      Creatinine Creatinine   Date Value Ref Range Status   03/26/2023 4.14 (H) 0.76 - 1.27 mg/dL Final   03/25/2023 3.94 (H) 0.76 - 1.27 mg/dL Final   03/25/2023 4.50 (H) 0.60 - 1.30 mg/dL Final     Comment:     Serial Number: [id number]:  [id number]   03/25/2023 4.07 (H) 0.76 - 1.27 mg/dL Final      Calcium Calcium   Date Value Ref Range Status   03/26/2023 8.2 (L) 8.6 - 10.5 mg/dL Final   03/25/2023 9.6 8.6 - 10.5 mg/dL Final   03/25/2023 9.5 8.6 - 10.5 mg/dL Final      PO4 No results found for: CAPO4   Albumin Albumin   Date Value Ref Range Status   03/26/2023 2.8 (L) 3.5 - 5.2 g/dL Final   03/25/2023 3.2 (L) 3.5 - 5.2 g/dL Final      Magnesium Magnesium   Date Value Ref Range Status   03/26/2023 1.8 1.6 - 2.4 mg/dL Final   03/25/2023 1.9 1.6 - 2.4 mg/dL Final      Uric Acid No results found for: URICACID        Results Review:     I reviewed the patient's new clinical results.    ferrous sulfate, 325 mg, Oral, Daily With Breakfast  fluticasone, 2 spray, Each Nare, Daily  folic acid, 400 mcg, Oral, Daily  insulin lispro, 0-7 Units, Subcutaneous, 4x Daily With Meals & Nightly  lactulose, 30 g, Oral, BID  midodrine, 15 mg, Oral, TID AC  octreotide, 200 mcg, Subcutaneous, TID  pantoprazole, 40 mg, Intravenous, Q AM  pharmacy consult - MTM, , Does not apply, Daily  piperacillin-tazobactam, 3.375 g, Intravenous, Q12H  riFAXIMin, 550 mg, Oral, Q12H  sodium bicarbonate, 650 mg, Oral, TID  sodium chloride, 10 mL, Intravenous, Q12H  thiamine (B-1) IV, 500 mg, Intravenous, Q8H  ursodiol, 300 mg, Oral, BID      phenylephrine, 0.5-3 mcg/kg/min  sodium bicarbonate drip (greater than 75 mEq/bag), 150 mEq, Last Rate: 150 mEq (03/26/23 0159)        Medication Review: Reviewed    Assessment & Plan       ST elevation myocardial infarction involving right coronary artery (HCC)    PAOLO (acute kidney injury) (HCC)    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  Acute kidney injury: Patient was last seen a month ago with creatinine 4.5- 5.0 range.  Prior to that baseline was 0.8-1.2, likely diagnosis was hepatorenal versus ATN.  At that time patient was transferred to  for further management.  Patient has been admitted and underwent a cardiac catheterization received 80 mL of IV contrast.  2.  S/p right RCA stenting for the clot 3/25/2023 today  3.  HAYES: Decompensated liver cirrhosis.  Ammonia level 106  4.  Hypotension: In the setting of liver disease.  5.  Anemia  6.  Thrombocytopenia   7.  Thoracentesis: 3 weeks back  8.  Paracentesis: On 3/17/2023 at [facility]  Recommendations:  Continue with the bicarb drip, pH 7.39, bicarb 15.  Ammonia level 106, getting lactulose  Mild deterioration renal function creatinine 4.14 BUN 50, potassium 4.1, albumin 2.8, SGOT 106, bilirubin 2.7, INR 2.71, hemoglobin is 7.5 platelets 107  Avoid nephrotoxic medications.  Keep MAP greater than 70  Monitor volume status  Check labs in the morning.  Daily evaluation for renal replacement therapy will be done.  Adjust medication for the new GFR.  Case discussed with the medical staff taking care of the patient    Luis Quintanilla MD  03/26/23  08:53 EDT

## 2023-03-26 NOTE — PROCEDURES
Education Record    Learner:  Patient    Disease / Diagnosis: breast    Barriers / Limitations:  None   Comments:    Method:  Discussion and Printed material   Comments:    General Topics:  Diet, Infection, Medication, Pain, Precautions, Procedure, Side ef Patient with history of decompensated De Leon liver cirrhosis with ascites and known hepatic hydrothorax.  Abdomen is markedly taut and distended with positive fluid wave; recommend bedside paracentesis for alleviation of ascites.  Risk and benefits of procedure discussed with patient and spouse; voiced understanding and would like to proceed.    Bedside point-of-care ultrasound utilized to eluate all quadrants of abdomen for appropriate paracentesis site; largest collection of ascites noted in the left lower quadrant with no loops of bowel identified in region of puncture site.  Site was then marked with sterile pen.  After informed consent was obtained and timeout performed, the patient was prepped and draped in the standard sterile fashion.  Local anesthesia was achieved with 5 mL of 1% Xylocaine that was inserted in a wheal-like fashion and extended to the level of the peritoneum where a flash of ascites fluid was noted in barrel of syringe.  After adequate anesthesia was obtained, a small skin nick was made in the left lateral abdomen with a scalpel and the paracentesis trocar was introduced in the peritoneum without difficulty.  60 mL of clear yellow ascites fluid was obtained and sent to lab for cell count, culture, and differential.  The trocar was then connected to drainage tubing and passed off of sterile field to Omni drug to drain via gravity.  Albumin infusing during procedure.  See nursing I&O's for volume.  Patient tolerated the procedure well with no immediate complications.  EBL 0.    >>> Primary RN instructed to apply pressure to puncture site following removal of paracentesis trocar.  May then apply bandage; if bleeding after paracentesis, may apply Johnie patch for hemostasis.  >>> If leaking ascites, may apply ostomy appliance and keep strict I&O.  >>> Additionally, primary RN discussed with me that patient does not like his subcu octreotide; we will switch this order to continuous infusion.

## 2023-03-26 NOTE — PLAN OF CARE
Goal Outcome Evaluation:           Progress: no change  Outcome Evaluation: Bicarb gtt continued. Levo started to keep MAP > 80. Octreotide changed to IV d/t pt discomfort. Paracentesis done at bedside- 4500 ml of serous drainage; labs sent. Levemir added. UOP < 100ml for shift.

## 2023-03-26 NOTE — PLAN OF CARE
Goal Outcome Evaluation:  Plan of Care Reviewed With: patient        Progress: no change  Outcome Evaluation: .    - pt was oriented x 4  - Remained sinus tachycardia per monitor  - BP remained stable   - O2 sat stable on RA  - Abdomen distended and taut, pt denies any pain. Bowel sounds hypoactive. NG tube attempted, met large amount of resistance around 50cm and tube wouldn't advance. Charge nurse attempted to advance and still couldn't advance after multiple attempts. APRN notified and NG tube removed.  - Urine output still significantly low tonight. About 40 mL this shift. Creatinine trending up. No BM  - TR band removed at 2200. There was some bruising noted at beginning of shift, area marked and not growing. Tissue remained soft.   - New triple lumen IJ placed around 0100. Verified by X-ray. All lumens currently infusing.   - Afebrile

## 2023-03-26 NOTE — PLAN OF CARE
Goal Outcome Evaluation:      Patient arrived from cath lab at 1230. TR band remains in place with 2 ml of air left; unable to remove sooner due to bleeding. CXR done. Echo done. Renal US done. Coude catheter placed; only 75 ml's out for the shift. CT scans done; will place NG tube on night shift. Patient remains alert and oriented with intermittent confusion for the shift. Stomach remains extremely rounded and taut. Bicarb gtt started at 100 ml/hr and 2 amps of bicarb given. Patient cool and warming blanket placed. VSS. Family updated at bedside.

## 2023-03-27 LAB
ALBUMIN SERPL-MCNC: 2.9 G/DL (ref 3.5–5.2)
ALBUMIN/GLOB SERPL: 1.1 G/DL
ALP SERPL-CCNC: 62 U/L (ref 39–117)
ALT SERPL W P-5'-P-CCNC: 28 U/L (ref 1–41)
ANION GAP SERPL CALCULATED.3IONS-SCNC: 20 MMOL/L (ref 5–15)
ARTERIAL PATENCY WRIST A: ABNORMAL
AST SERPL-CCNC: 157 U/L (ref 1–40)
ATMOSPHERIC PRESS: ABNORMAL MM[HG]
BACTERIA SPEC AEROBE CULT: NO GROWTH
BASE EXCESS BLDA CALC-SCNC: 7.5 MMOL/L (ref 0–2)
BASOPHILS # BLD AUTO: 0.02 10*3/MM3 (ref 0–0.2)
BASOPHILS NFR BLD AUTO: 0.1 % (ref 0–1.5)
BDY SITE: ABNORMAL
BILIRUB SERPL-MCNC: 2.4 MG/DL (ref 0–1.2)
BODY TEMPERATURE: 37 C
BUN SERPL-MCNC: 63 MG/DL (ref 8–23)
BUN/CREAT SERPL: 13.4 (ref 7–25)
CALCIUM SPEC-SCNC: 8.4 MG/DL (ref 8.6–10.5)
CHLORIDE SERPL-SCNC: 87 MMOL/L (ref 98–107)
CO2 BLDA-SCNC: 32.2 MMOL/L (ref 22–33)
CO2 SERPL-SCNC: 27 MMOL/L (ref 22–29)
COHGB MFR BLD: 1.3 % (ref 0–2)
CREAT SERPL-MCNC: 4.7 MG/DL (ref 0.76–1.27)
D-LACTATE SERPL-SCNC: 6 MMOL/L (ref 0.5–2)
DEPRECATED RDW RBC AUTO: 61 FL (ref 37–54)
EGFRCR SERPLBLD CKD-EPI 2021: 12.3 ML/MIN/1.73
EOSINOPHIL # BLD AUTO: 0.58 10*3/MM3 (ref 0–0.4)
EOSINOPHIL NFR BLD AUTO: 4.1 % (ref 0.3–6.2)
ERYTHROCYTE [DISTWIDTH] IN BLOOD BY AUTOMATED COUNT: 16.9 % (ref 12.3–15.4)
GLOBULIN UR ELPH-MCNC: 2.6 GM/DL
GLUCOSE BLDC GLUCOMTR-MCNC: 105 MG/DL (ref 70–130)
GLUCOSE BLDC GLUCOMTR-MCNC: 111 MG/DL (ref 70–130)
GLUCOSE BLDC GLUCOMTR-MCNC: 137 MG/DL (ref 70–130)
GLUCOSE BLDC GLUCOMTR-MCNC: 140 MG/DL (ref 70–130)
GLUCOSE BLDC GLUCOMTR-MCNC: 56 MG/DL (ref 70–130)
GLUCOSE BLDC GLUCOMTR-MCNC: 66 MG/DL (ref 70–130)
GLUCOSE SERPL-MCNC: 55 MG/DL (ref 65–99)
HCO3 BLDA-SCNC: 31 MMOL/L (ref 20–26)
HCT VFR BLD AUTO: 22.7 % (ref 37.5–51)
HCT VFR BLD CALC: 24.6 % (ref 38–51)
HGB BLD-MCNC: 7.7 G/DL (ref 13–17.7)
HGB BLDA-MCNC: 8 G/DL (ref 13.5–17.5)
IMM GRANULOCYTES # BLD AUTO: 0.16 10*3/MM3 (ref 0–0.05)
IMM GRANULOCYTES NFR BLD AUTO: 1.1 % (ref 0–0.5)
INHALED O2 CONCENTRATION: 28 %
INR PPP: 3.09 (ref 0.84–1.13)
LYMPHOCYTES # BLD AUTO: 0.98 10*3/MM3 (ref 0.7–3.1)
LYMPHOCYTES NFR BLD AUTO: 7 % (ref 19.6–45.3)
MAGNESIUM SERPL-MCNC: 2.2 MG/DL (ref 1.6–2.4)
MCH RBC QN AUTO: 34.2 PG (ref 26.6–33)
MCHC RBC AUTO-ENTMCNC: 33.9 G/DL (ref 31.5–35.7)
MCV RBC AUTO: 100.9 FL (ref 79–97)
METHGB BLD QL: 0.8 % (ref 0–1.5)
MODALITY: ABNORMAL
MONOCYTES # BLD AUTO: 1.18 10*3/MM3 (ref 0.1–0.9)
MONOCYTES NFR BLD AUTO: 8.4 % (ref 5–12)
NEUTROPHILS NFR BLD AUTO: 11.09 10*3/MM3 (ref 1.7–7)
NEUTROPHILS NFR BLD AUTO: 79.3 % (ref 42.7–76)
NOTE: ABNORMAL
NRBC BLD AUTO-RTO: 0 /100 WBC (ref 0–0.2)
OXYHGB MFR BLDV: 94.2 % (ref 94–99)
PCO2 BLDA: 38.3 MM HG (ref 35–45)
PCO2 TEMP ADJ BLD: 38.3 MM HG (ref 35–48)
PH BLDA: 7.52 PH UNITS (ref 7.35–7.45)
PH, TEMP CORRECTED: 7.52 PH UNITS
PHOSPHATE SERPL-MCNC: 4 MG/DL (ref 2.5–4.5)
PLATELET # BLD AUTO: 101 10*3/MM3 (ref 140–450)
PMV BLD AUTO: 10 FL (ref 6–12)
PO2 BLDA: 72.3 MM HG (ref 83–108)
PO2 TEMP ADJ BLD: 72.3 MM HG (ref 83–108)
POTASSIUM SERPL-SCNC: 3.3 MMOL/L (ref 3.5–5.2)
PROT SERPL-MCNC: 5.5 G/DL (ref 6–8.5)
PROTHROMBIN TIME: 32.1 SECONDS (ref 11.4–14.4)
RBC # BLD AUTO: 2.25 10*6/MM3 (ref 4.14–5.8)
SODIUM SERPL-SCNC: 134 MMOL/L (ref 136–145)
VENTILATOR MODE: ABNORMAL
WBC NRBC COR # BLD: 14.01 10*3/MM3 (ref 3.4–10.8)

## 2023-03-27 PROCEDURE — 83050 HGB METHEMOGLOBIN QUAN: CPT

## 2023-03-27 PROCEDURE — 25010000002 PIPERACILLIN SOD-TAZOBACTAM PER 1 G: Performed by: INTERNAL MEDICINE

## 2023-03-27 PROCEDURE — 84100 ASSAY OF PHOSPHORUS: CPT | Performed by: INTERNAL MEDICINE

## 2023-03-27 PROCEDURE — 82375 ASSAY CARBOXYHB QUANT: CPT

## 2023-03-27 PROCEDURE — 25010000002 OCTREOTIDE PER 25 MCG: Performed by: NURSE PRACTITIONER

## 2023-03-27 PROCEDURE — 83735 ASSAY OF MAGNESIUM: CPT | Performed by: INTERNAL MEDICINE

## 2023-03-27 PROCEDURE — 99232 SBSQ HOSP IP/OBS MODERATE 35: CPT | Performed by: INTERNAL MEDICINE

## 2023-03-27 PROCEDURE — 85610 PROTHROMBIN TIME: CPT | Performed by: INTERNAL MEDICINE

## 2023-03-27 PROCEDURE — 36600 WITHDRAWAL OF ARTERIAL BLOOD: CPT

## 2023-03-27 PROCEDURE — 99291 CRITICAL CARE FIRST HOUR: CPT | Performed by: INTERNAL MEDICINE

## 2023-03-27 PROCEDURE — 85025 COMPLETE CBC W/AUTO DIFF WBC: CPT | Performed by: INTERNAL MEDICINE

## 2023-03-27 PROCEDURE — 82962 GLUCOSE BLOOD TEST: CPT

## 2023-03-27 PROCEDURE — 25010000002 THIAMINE PER 100 MG: Performed by: INTERNAL MEDICINE

## 2023-03-27 PROCEDURE — 80053 COMPREHEN METABOLIC PANEL: CPT | Performed by: INTERNAL MEDICINE

## 2023-03-27 PROCEDURE — 82805 BLOOD GASES W/O2 SATURATION: CPT

## 2023-03-27 PROCEDURE — 83605 ASSAY OF LACTIC ACID: CPT | Performed by: INTERNAL MEDICINE

## 2023-03-27 RX ORDER — CLOPIDOGREL BISULFATE 75 MG/1
75 TABLET ORAL DAILY
Status: DISCONTINUED | OUTPATIENT
Start: 2023-03-27 | End: 2023-04-04 | Stop reason: HOSPADM

## 2023-03-27 RX ORDER — ASPIRIN 81 MG/1
81 TABLET, CHEWABLE ORAL DAILY
Status: DISCONTINUED | OUTPATIENT
Start: 2023-03-28 | End: 2023-04-04 | Stop reason: HOSPADM

## 2023-03-27 RX ORDER — POTASSIUM CHLORIDE 750 MG/1
20 CAPSULE, EXTENDED RELEASE ORAL ONCE
Status: COMPLETED | OUTPATIENT
Start: 2023-03-27 | End: 2023-03-27

## 2023-03-27 RX ADMIN — POTASSIUM CHLORIDE 20 MEQ: 10 CAPSULE, COATED, EXTENDED RELEASE ORAL at 13:25

## 2023-03-27 RX ADMIN — OCTREOTIDE ACETATE 50 MCG/HR: 500 INJECTION, SOLUTION INTRAVENOUS; SUBCUTANEOUS at 19:15

## 2023-03-27 RX ADMIN — Medication 0.1 MCG/KG/MIN: at 09:02

## 2023-03-27 RX ADMIN — THIAMINE HYDROCHLORIDE 500 MG: 100 INJECTION, SOLUTION INTRAMUSCULAR; INTRAVENOUS at 02:06

## 2023-03-27 RX ADMIN — Medication 400 MCG: at 08:56

## 2023-03-27 RX ADMIN — OCTREOTIDE ACETATE 50 MCG/HR: 500 INJECTION, SOLUTION INTRAVENOUS; SUBCUTANEOUS at 08:16

## 2023-03-27 RX ADMIN — FLUTICASONE PROPIONATE 2 SPRAY: 50 SPRAY, METERED NASAL at 08:54

## 2023-03-27 RX ADMIN — MIDODRINE HYDROCHLORIDE 15 MG: 5 TABLET ORAL at 11:35

## 2023-03-27 RX ADMIN — SODIUM BICARBONATE 650 MG TABLET 650 MG: at 16:59

## 2023-03-27 RX ADMIN — SODIUM BICARBONATE 650 MG TABLET 650 MG: at 20:06

## 2023-03-27 RX ADMIN — Medication 10 ML: at 10:12

## 2023-03-27 RX ADMIN — RIFAXIMIN 550 MG: 550 TABLET ORAL at 20:06

## 2023-03-27 RX ADMIN — CLOPIDOGREL BISULFATE 75 MG: 75 TABLET ORAL at 11:35

## 2023-03-27 RX ADMIN — FERROUS SULFATE TAB 325 MG (65 MG ELEMENTAL FE) 325 MG: 325 (65 FE) TAB at 08:53

## 2023-03-27 RX ADMIN — Medication 10 ML: at 20:06

## 2023-03-27 RX ADMIN — DEXTROSE MONOHYDRATE 25 G: 25 INJECTION, SOLUTION INTRAVENOUS at 06:41

## 2023-03-27 RX ADMIN — SODIUM BICARBONATE 650 MG TABLET 650 MG: at 08:53

## 2023-03-27 RX ADMIN — RIFAXIMIN 550 MG: 550 TABLET ORAL at 08:53

## 2023-03-27 RX ADMIN — LACTULOSE 30 G: 20 SOLUTION ORAL at 20:07

## 2023-03-27 RX ADMIN — URSODIOL 300 MG: 300 CAPSULE ORAL at 20:06

## 2023-03-27 RX ADMIN — TAZOBACTAM SODIUM AND PIPERACILLIN SODIUM 3.38 G: 375; 3 INJECTION, SOLUTION INTRAVENOUS at 20:06

## 2023-03-27 RX ADMIN — TAZOBACTAM SODIUM AND PIPERACILLIN SODIUM 3.38 G: 375; 3 INJECTION, SOLUTION INTRAVENOUS at 10:12

## 2023-03-27 RX ADMIN — THIAMINE HYDROCHLORIDE 500 MG: 100 INJECTION, SOLUTION INTRAMUSCULAR; INTRAVENOUS at 09:01

## 2023-03-27 RX ADMIN — MIDODRINE HYDROCHLORIDE 15 MG: 5 TABLET ORAL at 16:58

## 2023-03-27 RX ADMIN — URSODIOL 300 MG: 300 CAPSULE ORAL at 08:56

## 2023-03-27 RX ADMIN — MIDODRINE HYDROCHLORIDE 15 MG: 5 TABLET ORAL at 08:53

## 2023-03-27 RX ADMIN — PANTOPRAZOLE SODIUM 40 MG: 40 INJECTION, POWDER, LYOPHILIZED, FOR SOLUTION INTRAVENOUS at 05:38

## 2023-03-27 RX ADMIN — THIAMINE HYDROCHLORIDE 500 MG: 100 INJECTION, SOLUTION INTRAMUSCULAR; INTRAVENOUS at 17:44

## 2023-03-27 NOTE — PROGRESS NOTES
Cardiology Progress Note      Reason for visit:    · Code AMI/inferior STEMI    IDENTIFICATION: 72-year-old gentleman who resides in [place], [place]    Active Hospital Problems    Diagnosis  POA   • **ST elevation myocardial infarction involving right coronary artery (HCC) [I21.11]  Yes     Priority: High   • Hyperlipidemia LDL goal <70 [E78.5]  Yes   • Coronary artery disease involving native coronary artery of native heart [I25.10]  Yes     · Cardiac cath for STEMI (3/25/2023): Severe two-vessel disease RCA and diagonal of LAD.  PCI of RCA.  Unsuccessful manual thrombectomy of 100% occluded RPDA.  Unsuccessful angioplasty to right posterior lateral branch.  Medical therapy recommended for diagonal branch.       • CKD (chronic kidney disease) stage 5, GFR less than 15 ml/min (HCC) [N18.5]  Yes   • Decompensated hepatic cirrhosis (HCC) [K72.90, K74.60]  Yes   • Coagulopathy (HCC) [D68.9]  Yes   • Liver cirrhosis secondary to HAYES (HCC) [K75.81, K74.60]  Yes   • Hyperbilirubinemia [E80.6]  Yes   • PAOLO (acute kidney injury) (HCC) [N17.9]  Yes          · No complaints  · Poor urine output           Vital Sign Min/Max for last 24 hours  Temp  Min: 98.1 °F (36.7 °C)  Max: 98.3 °F (36.8 °C)   BP  Min: 103/70  Max: 134/72   Pulse  Min: 78  Max: 120   Resp  Min: 16  Max: 22   SpO2  Min: 89 %  Max: 99 %   Flow (L/min)  Min: 2  Max: 2      Intake/Output Summary (Last 24 hours) at 3/27/2023 1717  Last data filed at 3/27/2023 1200  Gross per 24 hour   Intake 2539.5 ml   Output 215 ml   Net 2324.5 ml           Physical Exam  Constitutional:       General: He is sleeping.   Cardiovascular:      Rate and Rhythm: Regular rhythm. Tachycardia present.      Comments: Trace lower extremity edema  Pulmonary:      Effort: Pulmonary effort is normal.      Breath sounds: Decreased breath sounds present.   Abdominal:      General: There is distension.      Palpations: There is fluid wave.      Comments: Ascites present   Skin:      General: Skin is warm and dry.   Neurological:      Mental Status: He is easily aroused.   Psychiatric:         Behavior: Behavior is cooperative.         Tele: Normal sinus rhythm    Results Review (reviewed the patient's recent labs in the electronic medical record):     EKG (3/25/2023): Sinus tachycardia with first-degree AV block, anterior infarct and inferior injury pattern    CXR (3/26/2023): Small right pleural effusion and patchy interstitial changes with mild cardiomegaly compatible with mild pulmonary edema    ECHO (3/25/2023): LVEF 65%.  Calcification aortic valve with minimal stenosis.  Moderate pulmonary hypertension with RVSP 49 mmHg.  Moderate dilation aortic root and aneurysmal dilation of ascending aorta.  Right pleural effusion    Results from last 7 days   Lab Units 03/27/23  0406 03/26/23  0054 03/25/23  1317 03/25/23  1112 03/25/23  1103   SODIUM mmol/L 134* 136 135*  --  133*   POTASSIUM mmol/L 3.3* 4.1 4.1  --  3.8   CHLORIDE mmol/L 87* 89* 91*  --  89*   BUN mg/dL 63* 50* 48*  --  43*   CREATININE mg/dL 4.70* 4.14* 3.94*   < > 4.07*   MAGNESIUM mg/dL 2.2 1.8  --   --  1.9    < > = values in this interval not displayed.     Results from last 7 days   Lab Units 03/25/23  1317 03/25/23  1103   HSTROP T ng/L 792* 739*     Results from last 7 days   Lab Units 03/27/23  0406 03/26/23  0054 03/25/23  1310   WBC 10*3/mm3 14.01* 5.24 12.89*   HEMOGLOBIN g/dL 7.7* 7.5* 10.5*   HEMATOCRIT % 22.7* 23.4* 31.9*   PLATELETS 10*3/mm3 101* 107* 111*       Lab Results   Component Value Date    HGBA1C 5.60 03/25/2023       Lab Results   Component Value Date    CHOL 94 03/25/2023              STEMI involving right coronary artery  · Status post HARDEEP to proximal RCA with residual thrombotic occlusions in the distal vessels not able to be adequately revascularized  · Continue aspirin 81+ clopidogrel 75 mg daily  · No beta-blocker due to hypotension    Hypotension  · Midodrine 15 mg 3 times  daily    Hyperlipidemia  · No statin due to decompensated liver cirrhosis    Stage V chronic kidney disease  · Rejected for transplant at  currently being evaluated at   · Nephrology following  · Current creatinine 4.14  · Anemic with H&H of 7.5 and 23.4    End-stage liver disease/decompensated liver cirrhosis  · Rejected for transplant at  and currently being evaluated at   · Ascites present.  Bedside ultrasound showed fluid in all quadrants.  · ALT 23 and   · Thrombocytopenic with platelets of 107  · Gastroenterology following and considering paracentesis today       · Continue DAPT  · No beta-blocker due to hypotension  · No statin due to ESLD      Electronically signed by iNthin Nieto IV, MD, 03/27/23, 5:18 PM EDT.

## 2023-03-27 NOTE — PROGRESS NOTES
"   LOS: 2 days    Patient Care Team:  Antonio Castro MD as PCP - General  Antonio Castro MD as PCP - Family Medicine    Chief Complaint: Shortness of breath     72-year-old male with past medical history of [place], liver cirrhosis, prerenal acute kidney failure was last seen a month ago with a creatinine of 5.0 when he was transferred to  for further management for liver transplant.  Dialysis was not started at that time.    Prior to the above baseline creatinine 0.8-1.2.  On previous admission creatinine was 4.7, before discharge. Labs showed a creatinine 3.94, BUN 48, sodium 135, potassium 4.1, CO2 6.0, calcium 9.6, EGFR 15 mL/min, troponin high 53, hemoglobin 10.5, platelets 111 K, lactate high 17.6, phosphorus 3.9 magnesium 1.9, bilirubin 4.6 other liver enzymes were normal.  Subjective : F/U PAOLO ON CKD.    Interval History:   Critically ill in ICU.  Mild decreasing renal function.  Ammonia level is high.    Review of Systems:   Hard of hearing, abdominal distention, mild shortness of breath, generalized weakness.  All other negative    Objective     Vital Sign Min/Max for last 24 hours  Temp  Min: 97.7 °F (36.5 °C)  Max: 98.3 °F (36.8 °C)   BP  Min: 95/56  Max: 131/72   Pulse  Min: 78  Max: 120   Resp  Min: 16  Max: 22   SpO2  Min: 89 %  Max: 99 %   Flow (L/min)  Min: 2  Max: 2   No data recorded     Flowsheet Rows    Flowsheet Row First Filed Value   Admission Height 172.7 cm (68\") Documented at 03/25/2023 1055   Admission Weight 72.6 kg (160 lb) Documented at 03/25/2023 1055          I/O this shift:  In: 229.1 [I.V.:79.1; IV Piggyback:150]  Out: 85 [Urine:85]  I/O last 3 completed shifts:  In: 5374 [P.O.:120; I.V.:4504; IV Piggyback:750]  Out: 4740 [Urine:240; Other:4500]    Physical Exam:  General appearance: Sick looking  male mild distress laying in bed.    HEENT: Hard of hearing, oral mucosa moist, neck is supple  Lungs: Few rhonchi's and rails heard, equal chest movement, no " crepitation.  Heart: Normal S1, S2, no gallop, murmur, RRR.  Abdomen: Abdomen distended, positive thrill soft, nontender, positive bowel sounds.  Extremities: Positive lower extremity edema, no cyanosis, no joint swelling.  Neuro: Alert, oriented x4, no focal deficit.  Psych: Mood and affect are normal and appropriate.  Skin: Skin is warm and dry.  : No suprapubic fullness or tenderness. TORRE.    WBC WBC   Date Value Ref Range Status   03/27/2023 14.01 (H) 3.40 - 10.80 10*3/mm3 Final   03/26/2023 5.24 3.40 - 10.80 10*3/mm3 Final   03/25/2023 12.89 (H) 3.40 - 10.80 10*3/mm3 Final   03/25/2023 13.39 (H) 3.40 - 10.80 10*3/mm3 Final      HGB Hemoglobin   Date Value Ref Range Status   03/27/2023 7.7 (L) 13.0 - 17.7 g/dL Final   03/26/2023 7.5 (L) 13.0 - 17.7 g/dL Final   03/25/2023 10.5 (L) 13.0 - 17.7 g/dL Final   03/25/2023 9.8 (L) 13.0 - 17.7 g/dL Final      HCT Hematocrit   Date Value Ref Range Status   03/27/2023 22.7 (L) 37.5 - 51.0 % Final   03/26/2023 23.4 (L) 37.5 - 51.0 % Final   03/25/2023 31.9 (L) 37.5 - 51.0 % Final   03/25/2023 30.2 (L) 37.5 - 51.0 % Final      Platlets No results found for: LABPLAT   MCV MCV   Date Value Ref Range Status   03/27/2023 100.9 (H) 79.0 - 97.0 fL Final   03/26/2023 105.4 (H) 79.0 - 97.0 fL Final   03/25/2023 105.6 (H) 79.0 - 97.0 fL Final   03/25/2023 105.6 (H) 79.0 - 97.0 fL Final          Sodium Sodium   Date Value Ref Range Status   03/27/2023 134 (L) 136 - 145 mmol/L Final   03/26/2023 136 136 - 145 mmol/L Final   03/25/2023 135 (L) 136 - 145 mmol/L Final   03/25/2023 133 (L) 136 - 145 mmol/L Final      Potassium Potassium   Date Value Ref Range Status   03/27/2023 3.3 (L) 3.5 - 5.2 mmol/L Final   03/26/2023 4.1 3.5 - 5.2 mmol/L Final   03/25/2023 4.1 3.5 - 5.2 mmol/L Final   03/25/2023 3.8 3.5 - 5.2 mmol/L Final      Chloride Chloride   Date Value Ref Range Status   03/27/2023 87 (L) 98 - 107 mmol/L Final   03/26/2023 89 (L) 98 - 107 mmol/L Final   03/25/2023 91 (L) 98  - 107 mmol/L Final   03/25/2023 89 (L) 98 - 107 mmol/L Final      CO2 CO2   Date Value Ref Range Status   03/27/2023 27.0 22.0 - 29.0 mmol/L Final   03/26/2023 9.0 (L) 22.0 - 29.0 mmol/L Final   03/25/2023 6.0 (L) 22.0 - 29.0 mmol/L Final   03/25/2023 6.0 (L) 22.0 - 29.0 mmol/L Final      BUN BUN   Date Value Ref Range Status   03/27/2023 63 (H) 8 - 23 mg/dL Final   03/26/2023 50 (H) 8 - 23 mg/dL Final   03/25/2023 48 (H) 8 - 23 mg/dL Final   03/25/2023 43 (H) 8 - 23 mg/dL Final      Creatinine Creatinine   Date Value Ref Range Status   03/27/2023 4.70 (H) 0.76 - 1.27 mg/dL Final   03/26/2023 4.14 (H) 0.76 - 1.27 mg/dL Final   03/25/2023 3.94 (H) 0.76 - 1.27 mg/dL Final   03/25/2023 4.50 (H) 0.60 - 1.30 mg/dL Final     Comment:     Serial Number: [id number]:  [id number]   03/25/2023 4.07 (H) 0.76 - 1.27 mg/dL Final      Calcium Calcium   Date Value Ref Range Status   03/27/2023 8.4 (L) 8.6 - 10.5 mg/dL Final   03/26/2023 8.2 (L) 8.6 - 10.5 mg/dL Final   03/25/2023 9.6 8.6 - 10.5 mg/dL Final   03/25/2023 9.5 8.6 - 10.5 mg/dL Final      PO4 No results found for: CAPO4   Albumin Albumin   Date Value Ref Range Status   03/27/2023 2.9 (L) 3.5 - 5.2 g/dL Final   03/26/2023 2.8 (L) 3.5 - 5.2 g/dL Final   03/25/2023 3.2 (L) 3.5 - 5.2 g/dL Final      Magnesium Magnesium   Date Value Ref Range Status   03/27/2023 2.2 1.6 - 2.4 mg/dL Final   03/26/2023 1.8 1.6 - 2.4 mg/dL Final   03/25/2023 1.9 1.6 - 2.4 mg/dL Final      Uric Acid No results found for: URICACID        Results Review:     I reviewed the patient's new clinical results.    clopidogrel, 75 mg, Oral, Daily  ferrous sulfate, 325 mg, Oral, Daily With Breakfast  fluticasone, 2 spray, Each Nare, Daily  folic acid, 400 mcg, Oral, Daily  insulin lispro, 0-7 Units, Subcutaneous, 4x Daily With Meals & Nightly  lactulose, 30 g, Oral, BID  midodrine, 15 mg, Oral, TID AC  pantoprazole, 40 mg, Intravenous, Q AM  pharmacy consult - MTM, , Does not apply,  Daily  piperacillin-tazobactam, 3.375 g, Intravenous, Q12H  potassium chloride, 20 mEq, Oral, Once  riFAXIMin, 550 mg, Oral, Q12H  sodium bicarbonate, 650 mg, Oral, TID  sodium chloride, 10 mL, Intravenous, Q12H  thiamine (B-1) IV, 500 mg, Intravenous, Q8H  ursodiol, 300 mg, Oral, BID      norepinephrine, 0.02-0.3 mcg/kg/min, Last Rate: 0.1 mcg/kg/min (03/27/23 0902)  octreotide (SandoSTATIN) infusion, 50 mcg/hr, Last Rate: 50 mcg/hr (03/27/23 0816)        Medication Review: Reviewed    Assessment & Plan       ST elevation myocardial infarction involving right coronary artery (HCC)    PAOLO (acute kidney injury) (HCC)    Liver cirrhosis secondary to HAYES (HCC)    Hyperbilirubinemia    Coagulopathy (HCC)    Decompensated hepatic cirrhosis (HCC)    CKD (chronic kidney disease) stage 5, GFR less than 15 ml/min (HCC)    Hyperlipidemia LDL goal <70    Coronary artery disease involving native coronary artery of native heart    1.  Acute kidney injury: Patient was last seen a month ago with creatinine 4.5- 5.0 range.  Prior to that baseline was 0.8-1.2, likely diagnosis was hepatorenal versus ATN.  At that time patient was transferred to  for further management.  Patient has been admitted and underwent a cardiac catheterization received 80 mL of IV contrast. GFR WORSE. PROBABLY HEADED TOWARD RRT SOON.   2.  S/p right RCA stenting for the clot 3/25/2023 today  3.  HAYES: Decompensated liver cirrhosis.  Ammonia level 106  4.  Hypotension: In the setting of liver disease.  5.  Anemia  6.  Thrombocytopenia   7.  Thoracentesis: 3 weeks back  8.  Paracentesis: On 3/17/2023 at [facility]. 9. HYPOKALEMIA.   Recommendations:  Continue with the bicarb drip, pH 7.39, bicarb 15.  Ammonia level 106, getting lactulose  Mild deterioration renal function creatinine 4.14 BUN 50, potassium 4.1, albumin 2.8, SGOT 106, bilirubin 2.7, INR 2.71, hemoglobin is 7.5 platelets 107  Avoid nephrotoxic medications.  Keep MAP greater than  70  Monitor volume status  Check labs in the morning.  Daily evaluation for renal replacement therapy will be done.  Adjust medication for the new GFR.  Case discussed with the medical staff taking care of the patient  SUPPLEMENT K. ALTAF/W DR RON AND ICU NURSE.   Austin Ng MD  03/27/23  11:33 EDT

## 2023-03-27 NOTE — PLAN OF CARE
Goal Outcome Evaluation:           Progress: no change   Patient oriented x4, sinus rhythm to sinus tachycardia with first degree AV block per monitor, lung sounds clear, audible heart tones, palpable pulses, edema noted in upper arms and feet with weeping in openings of skin, abdominal dressing changed x1 with moderate serosanguineous output noted on dressing, urine output minimal, 80mL this shift, Bmx1, turned q2 and heels offloaded, hyperactive bowel sounds, rounded and firm abdomen with no pain reported on palpation.

## 2023-03-27 NOTE — PLAN OF CARE
Goal Outcome Evaluation:           Progress: improving  Outcome Evaluation: Pt having frequent loose, liquid BMs. UOP 255ml. Levo & octreotide gtt continued. Potassium replacement given. ASA and plavix added. Pt oriented, but slightly drowsy for shift.

## 2023-03-27 NOTE — PROGRESS NOTES
Critical Care Note     LOS: 2 days   Patient Care Team:  Antonio Castro MD as PCP - General  Antonio Castro MD as PCP - Family Medicine    Chief Complaint/Reason for visit:     ST elevation myocardial infarction involving right coronary artery (HCC)    PAOLO (acute kidney injury) (HCC)    Liver cirrhosis secondary to HAYES (HCC)    Hyperbilirubinemia    Coagulopathy (HCC)    Decompensated hepatic cirrhosis (HCC)    CKD (chronic kidney disease) stage 5, GFR less than 15 ml/min (HCC)    Hyperlipidemia LDL goal <70    Coronary artery disease involving native coronary artery of native heart    Subjective     Interval History:     He remains afebrile.  Currently on 2 L saturations 97%.  4.5 L paracentesis yesterday.  Urine output total yesterday was only 40 mL.  This morning he at least had 80 out over 2 hours.  He remains in sinus rhythm.  He remains on norepinephrine for blood pressure support and octreotide.  He is extremely hard of hearing.  He denies chest pain but does feel symptomatically short of air with mobility in the bed.    History taken from: patient,nursing,chart    Review of Systems:    All systems were reviewed and negative except as noted in subjective.    Medical history, surgical history, social history, family history reviewed    Objective     Intake/Output:    Intake/Output Summary (Last 24 hours) at 3/27/2023 1459  Last data filed at 3/27/2023 1200  Gross per 24 hour   Intake 3018.5 ml   Output 240 ml   Net 2778.5 ml       Nutrition:  Diet: Cardiac Diets, Diabetic Diets; Healthy Heart (2-3 Na+); Consistent Carbohydrate; Texture: Regular Texture (IDDSI 7); Fluid Consistency: Thin (IDDSI 0)    Infusions:  norepinephrine, 0.02-0.3 mcg/kg/min, Last Rate: 0.09 mcg/kg/min (03/27/23 1135)  octreotide (SandoSTATIN) infusion, 50 mcg/hr, Last Rate: 50 mcg/hr (03/27/23 4916)        Mechanical Ventilator Settings:                                                Telemetry: Sinus tachycardia             Vital  "Signs  Blood pressure 126/70, pulse 101, temperature 98.1 °F (36.7 °C), temperature source Axillary, resp. rate 16, height 172.7 cm (67.99\"), weight 71.4 kg (157 lb 6.5 oz), SpO2 97 %.    Physical Exam:  General Appearance:   Semiupright in bed in no distress.   Head:   Atraumatic   Eyes:          Jaundice   Ears:   External ear normal   Throat:  Oral mucosa moist   Neck:  Trachea midline, no palpable thyroid, no crepitus   Back:    Spine is straight   Lungs:    Symmetric chest excursion.  Breath sounds are bilateral, diminished at the bases, clear anteriorly    Heart:   Regular rhythm, S1, S2 auscultated   Abdomen:    Persistent ascites with fluid wave, nontender   Rectal:   Deferred   Extremities:  Bilateral lower extremity edema, improving   Pulses:  Palpable radial pulses   Skin:  Warm and dry   Lymph nodes:  No cervical adenopathy   Neurologic:  He is awake and oriented to name and hospital      Results Review:     I reviewed the patient's new clinical results.   Results from last 7 days   Lab Units 03/27/23  0406 03/26/23 0054 03/25/23  1317 03/25/23  1112 03/25/23  1103   SODIUM mmol/L 134* 136 135*  --  133*   POTASSIUM mmol/L 3.3* 4.1 4.1  --  3.8   CHLORIDE mmol/L 87* 89* 91*  --  89*   CO2 mmol/L 27.0 9.0* 6.0*  --  6.0*   BUN mg/dL 63* 50* 48*  --  43*   CREATININE mg/dL 4.70* 4.14* 3.94*   < > 4.07*   CALCIUM mg/dL 8.4* 8.2* 9.6  --  9.5   BILIRUBIN mg/dL 2.4* 2.7*  --   --  3.3*   ALK PHOS U/L 62 64  --   --  90   ALT (SGPT) U/L 28 23  --   --  27   AST (SGOT) U/L 157* 106*  --   --  65*   GLUCOSE mg/dL 55* 182* 158*  --  168*    < > = values in this interval not displayed.     Results from last 7 days   Lab Units 03/27/23  0406 03/26/23  0054 03/25/23  1310   WBC 10*3/mm3 14.01* 5.24 12.89*   HEMOGLOBIN g/dL 7.7* 7.5* 10.5*   HEMATOCRIT % 22.7* 23.4* 31.9*   PLATELETS 10*3/mm3 101* 107* 111*     Results from last 7 days   Lab Units 03/27/23  0322   PH, ARTERIAL pH units 7.516*   PO2 ART mm Hg 72.3* "   PCO2, ARTERIAL mm Hg 38.3   HCO3 ART mmol/L 31.0*     Lab Results   Component Value Date    BLOODCX No growth at 24 hours 03/25/2023     Lab Results   Component Value Date    URINECX No growth 03/25/2023       I reviewed the patient's new imaging including images and reports.    IMPRESSION:     There is a right internal jugular central venous catheter with the catheter tip near the atrial caval junction.     There is a small right pleural effusion and patchy interstitial changes with mild cardiomegaly which is compatible with mild pulmonary edema.     No focal consolidation is otherwise seen.     Electronically signed by:  Lalo Elizondo D.O.    3/25/2023 11:46 PM Mountain Time     Findings:  CT SCAN OF THE CHEST WITHOUT CONTRAST: There is a moderate right pleural effusion, decreased from 2/9/2023. No left effusion or pericardial effusion is seen. There is ectasia of the ascending aorta is approximately 4.4 cm, minimally if at all increased   from the prior study. There is dense coronary artery calcification. No mediastinal adenopathy is seen. Dense peripheral groundglass changes of the left lower lobe are very similar to appearance of Covid 19 pneumonia from the initial months the scan.   There are much milder multifocal interstitial changes the left upper lobe. Small elongated inferior right upper lobe nodule or nodular scar is stable. Airways appear normally patent. Bony structures appear to be intact.     IMPRESSION:  1. Moderate right pleural effusion decreased from 2/9/2023.     2. Left lower lobe pneumonia and much milder left upper lobe pneumonia, which may represent Covid 19 or other atypical pneumonia.     3. Stable small inferior right upper lobe pulmonary nodule.     4. Ectatic ascending aorta to 4.4 cm, stable only minimally increased.           CT SCAN OF THE ABDOMEN AND PELVIS WITHOUT CONTRAST: Separate scans of the abdomen and pelvis are performed, as the initial scan of the abdomen was performed  prior to pelvic ct scan being ordered. The studies are dictated together however.     There is extensive ascites, and dense diffuse edema of subcutaneous tissues and particularly dense edema throughout the small bowel mesentery. There is a small cirrhotic appearing liver with no obvious lesion. Spleen is not enlarged. Pancreas and adrenal   glands appear unremarkable. Concentrated contrast in the renal collecting systems resembles renal calculi. There is a small left lower pole renal cyst measuring near water density. No evidence of obstructive uropathy is seen. There is no evidence of   free air or adenopathy. There is marked distention of stomach with fluid and air, and relatively decompressed duodenum but no evidence of obstructive lesion.     Regarding lower abdomen pelvis, dense small bowel mesenteric edema and extensive ascites are again noted. No abnormally dilated bowel loops, evidence of pneumatosis or significant bowel wall edema is appreciated. Bladder is normally distended and normal   in appearance. No mass or adenopathy is seen. Review of the bony structures shows grade 1 anterolisthesis of L5 on S1 with bilateral pars defects. There is a somewhat transitional appearance of S1 with a rudimentary S1-S2 disc space.     Impression:     1. Extensive ascites and extensive dense anasarca throughout the small bowel mesentery and remaining soft tissues.     2. Small cirrhotic liver.     3. Fluid and air distended stomach, to the level of the duodenum. No obvious obstructing lesion or inflammation, possibly gastric ileus.     4. Incidentally noted grade 1 anterolisthesis of L5 on S1, with bilateral pars defects.     Electronically Signed: Blayne Chung    3/25/2023 8:56 PM EDT    Workstation ID: [id number]    Conclusion  Left heart cath, March 25, 2023     •  Severe 2-vessel CAD (RCA, diagonal branch of the LAD)  •  Successful PCI of the proximal RCA with placement of a Xience 4 x 23 mm drug-eluting stent  •   Unsuccessful balloon angioplasty of 100% occluded right posterior lateral branch  •  Unsuccessful manual thrombectomy of 100% occluded RPDA  •  Normal LV filling pressure  •  Medical therapy recommended for diagonal branch stenosis.  •  DAPT (aspirin 81 mg + clopidogrel 75 mg daily) for 1 month due to high bleed risk.       All medications reviewed.   clopidogrel, 75 mg, Oral, Daily  ferrous sulfate, 325 mg, Oral, Daily With Breakfast  fluticasone, 2 spray, Each Nare, Daily  folic acid, 400 mcg, Oral, Daily  insulin lispro, 0-7 Units, Subcutaneous, 4x Daily With Meals & Nightly  lactulose, 30 g, Oral, BID  midodrine, 15 mg, Oral, TID AC  pantoprazole, 40 mg, Intravenous, Q AM  pharmacy consult - MTM, , Does not apply, Daily  piperacillin-tazobactam, 3.375 g, Intravenous, Q12H  riFAXIMin, 550 mg, Oral, Q12H  sodium bicarbonate, 650 mg, Oral, TID  sodium chloride, 10 mL, Intravenous, Q12H  thiamine (B-1) IV, 500 mg, Intravenous, Q8H  ursodiol, 300 mg, Oral, BID          Assessment & Plan       ST elevation myocardial infarction involving right coronary artery (HCC)    PAOLO (acute kidney injury) (HCC)    Liver cirrhosis secondary to HAYES (HCC)    Hyperbilirubinemia    Coagulopathy (HCC)    Decompensated hepatic cirrhosis (HCC)    CKD (chronic kidney disease) stage 5, GFR less than 15 ml/min (HCC)    Hyperlipidemia LDL goal <70    Coronary artery disease involving native coronary artery of native heart     72 y.o.male with relevant PMH of Stage V CKD not on HD, decompensated HAYES cirrhosis with ascites (rejected for Liver-Kidney Txp at , being evaluated at  - not on transplant list), varices and recurrent hepatic hydrothorax (undergone 4 thoracentesis), prior Paracentesis x 1, diabetes, HTN, anemia, thrombocytopenia admitted 3/25/2023 from OSH w/ STEMI.  He had presented there for increased SOA and was hoping to get thoracentesis or paracentesis.     Emergent LHC showed severe 2-vessel CAD (RCA & LAD).  Had PCI  proximal RCA w/ HARDEEP and Unsuccessful balloon of 100% RPL / Unsuccessful thrombectomy 100% occluded RPDA.     In addition to above, patient noted to have profound lactic acidosis.  This seem out of proportion to current vitals and appearance - I.e. does not appear that he is in overwhelming shock with poor perfusion.  Has had poor po intake recently.  Suspect there is strong component of Type B LA .  Today lactic acid is 6 compared to 20 on admission.  Large-volume paracentesis performed and fluid with protein less than 1, LDH 49, only 27 white cells.  Bilirubin remains mildly elevated at 2.4.  Pro time INR is 3.  Creatinine continues to climb and today is 4.7.  He remains oliguric with total urine output yesterday of 130 MLS.  Anticipating need for dialysis soon.  He continues to require norepinephrine at 0.09 mics per kilogram per minute.  Blood cultures are negative, urine culture is negative, peritoneal fluid was not sent but he had a low white count.  He remains empirically on Zosyn.  He remains encephalopathic and lethargic.  His last ammonia, yesterday was 106.        PLAN:  For his coronary disease and drug-eluting stent despite  His coagulopathy I have to start Plavix so that he will not call office stent.    Rifaximin, lactulose for hepatic encephalopathy    Continue Zosyn for empiric antibiotic coverage as procalcitonin is mildly elevated    Agree that elevated lactic acid is likely from poor liver function and poor clearance as well as some hypoperfusion from his hypotension    Monitor serum bicarb off bicarb drip; PO bicarb started    Continue octreotide drip    I would consider transfer to [facility] where he is being evaluated for liver transplant    Continue to monitor electrolyte panel, liver functions, H&H, platelets, white count and lactic acid    Anticipate potential need for hemodialysis in the next 24 to 48 hours    VTE Prophylaxis:SCDS    Stress Ulcer  Prophylaxis:protonix    Patient remains critically ill with end-stage liver disease, worsening renal failure, hypotension requiring vasopressors, encephalopathy secondary to liver disease and the need of ongoing ICU monitoring and care    Yen Rodrigues MD  03/27/23  14:59 EDT      Time: Critical care 40 min  I personally provided care to this critically ill patient as documented above.  Critical care time does not include time spent on separately billed procedures.  None of my critical care time was concurrent with other critical care providers.

## 2023-03-27 NOTE — CASE MANAGEMENT/SOCIAL WORK
Discharge Planning Assessment  [facility]     Patient Name: Leoncio Hopson  MRN: [id number]  Today's Date: 3/27/2023    Admit Date: 3/25/2023    Plan: Home with Home Health Services   Discharge Needs Assessment     Row Name 03/27/23 1724       Living Environment    People in Home spouse    Name(s) of People in Home Capri hightower    Current Living Arrangements home    Primary Care Provided by self    Provides Primary Care For no one    Family Caregiver if Needed spouse    Family Caregiver Names Capri Hopson- wife    Quality of Family Relationships helpful;involved;supportive    Able to Return to Prior Arrangements yes       Transition Planning    Patient/Family Anticipates Transition to home with help/services    Patient/Family Anticipated Services at Transition home health care    Transportation Anticipated family or friend will provide       Discharge Needs Assessment    Readmission Within the Last 30 Days no previous admission in last 30 days    Equipment Currently Used at Home none    Concerns to be Addressed discharge planning    Outpatient/Agency/Support Group Needs homecare agency    Discharge Facility/Level of Care Needs home with home health    Current Discharge Risk chronically ill               Discharge Plan     Row Name 03/27/23 1726       Plan    Plan Home with Home Health Services    Patient/Family in Agreement with Plan yes    Plan Comments I have met with Mr. Hopson, his wife and daughter at the bedside today to initiate a discharge plan.  He is sleeping at present.  His wife, Capri states that they live together in a home in [place].  She reports that her  is independent with activities of daily living and mobility prior to this hospitalization.  She denies use of DME and current receipt of Home Health/outpatient services.  She states that she anticipates that he will be able to return home at discharge.  She is agreeable to home health services if needed.  She states that he  has an upcoming appt/ panel review at [facility] on 4/10 regarding possible Liver transplant.  CM will cont to follow the evolving plan of care and assist with discharge needs as recommendations become available.    Final Discharge Disposition Code 06 - home with home health care              Continued Care and Services - Admitted Since 3/25/2023    Coordination has not been started for this encounter.     Selected Continued Care - Prior Encounters Includes continued care and service providers with selected services from prior encounters from 12/25/2022 to 3/27/2023    Discharged on 2/14/2023 Admission date: 2/9/2023 - Discharge disposition: [facility] ([facility] - External)    Destination     Service Provider Selected Services Address Phone Fax Patient Preferred    [address] [address] [phone] -- --                       Demographic Summary     Row Name 03/27/23 1723       General Information    Arrived From home    Row Name 03/27/23 1552       General Information    Admission Type inpatient    Arrived From home    Referral Source admission list    Reason for Consult discharge planning    General Information Comments I have confirmed with Mr. Hopson's wife, Capri that his PCP is Antonio Castro MD and his insurance is Medicare and [email or web address].       Contact Information    Permission Granted to Share Info With                Functional Status     Row Name 03/27/23 1724       Functional Status, IADL    Medications independent    Meal Preparation independent    Housekeeping independent    Laundry independent    Shopping independent       Employment/    Employment Status retired               Psychosocial    No documentation.                Abuse/Neglect    No documentation.                Legal    No documentation.                Substance Abuse    No documentation.                Patient Forms    No  documentation.                   Zenobia Olsen RN

## 2023-03-28 LAB
ALBUMIN SERPL-MCNC: 2.7 G/DL (ref 3.5–5.2)
ALBUMIN/GLOB SERPL: 1 G/DL
ALP SERPL-CCNC: 60 U/L (ref 39–117)
ALT SERPL W P-5'-P-CCNC: 25 U/L (ref 1–41)
ANION GAP SERPL CALCULATED.3IONS-SCNC: 18 MMOL/L (ref 5–15)
AST SERPL-CCNC: 109 U/L (ref 1–40)
BILIRUB SERPL-MCNC: 2.7 MG/DL (ref 0–1.2)
BUN SERPL-MCNC: 77 MG/DL (ref 8–23)
BUN/CREAT SERPL: 15.7 (ref 7–25)
CALCIUM SPEC-SCNC: 8.2 MG/DL (ref 8.6–10.5)
CHLORIDE SERPL-SCNC: 90 MMOL/L (ref 98–107)
CO2 SERPL-SCNC: 29 MMOL/L (ref 22–29)
CREAT SERPL-MCNC: 4.92 MG/DL (ref 0.76–1.27)
DEPRECATED RDW RBC AUTO: 61.1 FL (ref 37–54)
EGFRCR SERPLBLD CKD-EPI 2021: 11.7 ML/MIN/1.73
ERYTHROCYTE [DISTWIDTH] IN BLOOD BY AUTOMATED COUNT: 16.9 % (ref 12.3–15.4)
GLOBULIN UR ELPH-MCNC: 2.6 GM/DL
GLUCOSE BLDC GLUCOMTR-MCNC: 161 MG/DL (ref 70–130)
GLUCOSE BLDC GLUCOMTR-MCNC: 196 MG/DL (ref 70–130)
GLUCOSE BLDC GLUCOMTR-MCNC: 199 MG/DL (ref 70–130)
GLUCOSE BLDC GLUCOMTR-MCNC: 221 MG/DL (ref 70–130)
GLUCOSE SERPL-MCNC: 151 MG/DL (ref 65–99)
HCT VFR BLD AUTO: 23.7 % (ref 37.5–51)
HGB BLD-MCNC: 8 G/DL (ref 13–17.7)
MCH RBC QN AUTO: 33.9 PG (ref 26.6–33)
MCHC RBC AUTO-ENTMCNC: 33.8 G/DL (ref 31.5–35.7)
MCV RBC AUTO: 100.4 FL (ref 79–97)
PHOSPHATE SERPL-MCNC: 3.8 MG/DL (ref 2.5–4.5)
PLATELET # BLD AUTO: 46 10*3/MM3 (ref 140–450)
PMV BLD AUTO: 11 FL (ref 6–12)
POTASSIUM SERPL-SCNC: 3.1 MMOL/L (ref 3.5–5.2)
POTASSIUM SERPL-SCNC: 3.5 MMOL/L (ref 3.5–5.2)
PROT SERPL-MCNC: 5.3 G/DL (ref 6–8.5)
RBC # BLD AUTO: 2.36 10*6/MM3 (ref 4.14–5.8)
SODIUM SERPL-SCNC: 137 MMOL/L (ref 136–145)
TRIGL FLD-MCNC: 15 MG/DL
WBC NRBC COR # BLD: 5.66 10*3/MM3 (ref 3.4–10.8)

## 2023-03-28 PROCEDURE — 25010000002 PIPERACILLIN SOD-TAZOBACTAM PER 1 G: Performed by: INTERNAL MEDICINE

## 2023-03-28 PROCEDURE — 63710000001 INSULIN LISPRO (HUMAN) PER 5 UNITS: Performed by: INTERNAL MEDICINE

## 2023-03-28 PROCEDURE — 99232 SBSQ HOSP IP/OBS MODERATE 35: CPT | Performed by: NURSE PRACTITIONER

## 2023-03-28 PROCEDURE — 84100 ASSAY OF PHOSPHORUS: CPT | Performed by: INTERNAL MEDICINE

## 2023-03-28 PROCEDURE — 25010000002 FUROSEMIDE PER 20 MG: Performed by: INTERNAL MEDICINE

## 2023-03-28 PROCEDURE — 85027 COMPLETE CBC AUTOMATED: CPT | Performed by: INTERNAL MEDICINE

## 2023-03-28 PROCEDURE — 25010000002 THIAMINE PER 100 MG: Performed by: INTERNAL MEDICINE

## 2023-03-28 PROCEDURE — 0 POTASSIUM CHLORIDE PER 2 MEQ: Performed by: NURSE PRACTITIONER

## 2023-03-28 PROCEDURE — 99291 CRITICAL CARE FIRST HOUR: CPT | Performed by: INTERNAL MEDICINE

## 2023-03-28 PROCEDURE — 84132 ASSAY OF SERUM POTASSIUM: CPT | Performed by: INTERNAL MEDICINE

## 2023-03-28 PROCEDURE — 99232 SBSQ HOSP IP/OBS MODERATE 35: CPT | Performed by: INTERNAL MEDICINE

## 2023-03-28 PROCEDURE — 25010000002 OCTREOTIDE PER 25 MCG: Performed by: NURSE PRACTITIONER

## 2023-03-28 PROCEDURE — 82962 GLUCOSE BLOOD TEST: CPT

## 2023-03-28 PROCEDURE — 80053 COMPREHEN METABOLIC PANEL: CPT | Performed by: INTERNAL MEDICINE

## 2023-03-28 RX ORDER — LACTULOSE 10 G/15ML
20 SOLUTION ORAL DAILY
Status: DISCONTINUED | OUTPATIENT
Start: 2023-03-29 | End: 2023-04-04 | Stop reason: HOSPADM

## 2023-03-28 RX ORDER — POTASSIUM CHLORIDE 29.8 MG/ML
20 INJECTION INTRAVENOUS
Status: COMPLETED | OUTPATIENT
Start: 2023-03-28 | End: 2023-03-28

## 2023-03-28 RX ORDER — FUROSEMIDE 10 MG/ML
80 INJECTION INTRAMUSCULAR; INTRAVENOUS ONCE
Status: COMPLETED | OUTPATIENT
Start: 2023-03-28 | End: 2023-03-28

## 2023-03-28 RX ADMIN — MIDODRINE HYDROCHLORIDE 15 MG: 5 TABLET ORAL at 08:09

## 2023-03-28 RX ADMIN — POTASSIUM CHLORIDE 20 MEQ: 29.8 INJECTION, SOLUTION INTRAVENOUS at 08:09

## 2023-03-28 RX ADMIN — MIDODRINE HYDROCHLORIDE 15 MG: 5 TABLET ORAL at 16:30

## 2023-03-28 RX ADMIN — ASPIRIN 81 MG CHEWABLE TABLET 81 MG: 81 TABLET CHEWABLE at 08:09

## 2023-03-28 RX ADMIN — SODIUM BICARBONATE 650 MG TABLET 650 MG: at 08:09

## 2023-03-28 RX ADMIN — INSULIN LISPRO 2 UNITS: 100 INJECTION, SOLUTION INTRAVENOUS; SUBCUTANEOUS at 08:10

## 2023-03-28 RX ADMIN — URSODIOL 300 MG: 300 CAPSULE ORAL at 20:02

## 2023-03-28 RX ADMIN — INSULIN LISPRO 3 UNITS: 100 INJECTION, SOLUTION INTRAVENOUS; SUBCUTANEOUS at 21:10

## 2023-03-28 RX ADMIN — TAZOBACTAM SODIUM AND PIPERACILLIN SODIUM 3.38 G: 375; 3 INJECTION, SOLUTION INTRAVENOUS at 20:02

## 2023-03-28 RX ADMIN — OCTREOTIDE ACETATE 50 MCG/HR: 500 INJECTION, SOLUTION INTRAVENOUS; SUBCUTANEOUS at 05:19

## 2023-03-28 RX ADMIN — FLUTICASONE PROPIONATE 2 SPRAY: 50 SPRAY, METERED NASAL at 08:24

## 2023-03-28 RX ADMIN — PANTOPRAZOLE SODIUM 40 MG: 40 INJECTION, POWDER, LYOPHILIZED, FOR SOLUTION INTRAVENOUS at 05:16

## 2023-03-28 RX ADMIN — INSULIN LISPRO 2 UNITS: 100 INJECTION, SOLUTION INTRAVENOUS; SUBCUTANEOUS at 12:04

## 2023-03-28 RX ADMIN — Medication 400 MCG: at 08:27

## 2023-03-28 RX ADMIN — CLOPIDOGREL BISULFATE 75 MG: 75 TABLET ORAL at 08:09

## 2023-03-28 RX ADMIN — RIFAXIMIN 550 MG: 550 TABLET ORAL at 20:02

## 2023-03-28 RX ADMIN — SODIUM BICARBONATE 650 MG TABLET 650 MG: at 20:02

## 2023-03-28 RX ADMIN — THIAMINE HYDROCHLORIDE 500 MG: 100 INJECTION, SOLUTION INTRAMUSCULAR; INTRAVENOUS at 02:27

## 2023-03-28 RX ADMIN — OCTREOTIDE ACETATE 50 MCG/HR: 500 INJECTION, SOLUTION INTRAVENOUS; SUBCUTANEOUS at 16:12

## 2023-03-28 RX ADMIN — RIFAXIMIN 550 MG: 550 TABLET ORAL at 08:09

## 2023-03-28 RX ADMIN — THIAMINE HYDROCHLORIDE 500 MG: 100 INJECTION, SOLUTION INTRAMUSCULAR; INTRAVENOUS at 10:17

## 2023-03-28 RX ADMIN — FUROSEMIDE 80 MG: 10 INJECTION, SOLUTION INTRAMUSCULAR; INTRAVENOUS at 12:04

## 2023-03-28 RX ADMIN — POTASSIUM CHLORIDE 20 MEQ: 29.8 INJECTION, SOLUTION INTRAVENOUS at 05:16

## 2023-03-28 RX ADMIN — Medication 10 ML: at 20:02

## 2023-03-28 RX ADMIN — LACTULOSE 30 G: 20 SOLUTION ORAL at 08:23

## 2023-03-28 RX ADMIN — SODIUM BICARBONATE 650 MG TABLET 650 MG: at 16:12

## 2023-03-28 RX ADMIN — POTASSIUM CHLORIDE 20 MEQ: 29.8 INJECTION, SOLUTION INTRAVENOUS at 06:18

## 2023-03-28 RX ADMIN — TAZOBACTAM SODIUM AND PIPERACILLIN SODIUM 3.38 G: 375; 3 INJECTION, SOLUTION INTRAVENOUS at 08:09

## 2023-03-28 RX ADMIN — URSODIOL 300 MG: 300 CAPSULE ORAL at 10:16

## 2023-03-28 RX ADMIN — Medication 10 ML: at 08:25

## 2023-03-28 RX ADMIN — INSULIN LISPRO 2 UNITS: 100 INJECTION, SOLUTION INTRAVENOUS; SUBCUTANEOUS at 18:17

## 2023-03-28 RX ADMIN — FERROUS SULFATE TAB 325 MG (65 MG ELEMENTAL FE) 325 MG: 325 (65 FE) TAB at 08:09

## 2023-03-28 RX ADMIN — MIDODRINE HYDROCHLORIDE 15 MG: 5 TABLET ORAL at 10:30

## 2023-03-28 NOTE — PLAN OF CARE
Goal Outcome Evaluation:   VSS. Levophed remained off, MAP kept >70. Lasix given, adequate UOP. Multiple loose BM. Potassium replaced, in NR. Octreotide therapy continued. Pt up in chair for 4 hours. Wife at bedside.

## 2023-03-28 NOTE — PROGRESS NOTES
"GI Daily Progress Note  Subjective:    Chief Complaint: Follow-up decompensated cirrhosis.    Patient's appetite is poor.  His nutritional intake is poor.  Denies abdominal pain or nausea.  No confusion or signs of GI bleeding.  Multiple bowel movements yesterday with lactulose.    Objective:    /72 (BP Location: Left arm, Patient Position: Lying)   Pulse 92   Temp 97.7 °F (36.5 °C) (Oral)   Resp 17   Ht 172.7 cm (67.99\")   Wt 73.1 kg (161 lb 2.5 oz)   SpO2 90%   BMI 24.51 kg/m²     Physical Exam  Constitutional:       General: He is not in acute distress.     Appearance: He is ill-appearing. He is not toxic-appearing or diaphoretic.   HENT:      Head: Normocephalic.      Nose: Nose normal. No congestion.      Mouth/Throat:      Mouth: Mucous membranes are moist.      Pharynx: No oropharyngeal exudate.   Eyes:      General: Scleral icterus present.      Conjunctiva/sclera: Conjunctivae normal.   Cardiovascular:      Rate and Rhythm: Normal rate.      Pulses: Normal pulses.   Pulmonary:      Effort: Pulmonary effort is normal. No respiratory distress.      Breath sounds: Normal breath sounds. No stridor. No wheezing, rhonchi or rales.   Abdominal:      General: Bowel sounds are normal. There is distension.      Palpations: Abdomen is soft.      Tenderness: There is no abdominal tenderness. There is no guarding.      Comments: Tympany to percussion in the central abdomen.  Mild ascites.   Genitourinary:     Comments: Deferred  Musculoskeletal:         General: Normal range of motion.      Cervical back: Normal range of motion.      Right lower leg: No edema.      Left lower leg: No edema.   Skin:     General: Skin is warm and dry.      Capillary Refill: Capillary refill takes less than 2 seconds.      Coloration: Skin is not jaundiced or pale.      Findings: No erythema or rash.   Neurological:      General: No focal deficit present.      Mental Status: He is alert and oriented to person, place, and time. "   Psychiatric:         Behavior: Behavior normal.     Lab  Lab Results   Component Value Date    WBC 5.66 03/28/2023    HGB 8.0 (L) 03/28/2023    HGB 7.7 (L) 03/27/2023    HGB 7.5 (L) 03/26/2023    .4 (H) 03/28/2023    PLT 46 (C) 03/28/2023    INR 3.09 (H) 03/27/2023    INR 2.71 (H) 03/26/2023    INR 2.58 (H) 03/25/2023    INR 1.6 (H) 03/15/2023    INR 1.9 (H) 02/17/2023       Lab Results   Component Value Date    GLUCOSE 151 (H) 03/28/2023    BUN 77 (H) 03/28/2023    CREATININE 4.92 (H) 03/28/2023    EGFRIFNONA 59 (L) 04/22/2022    EGFRIFAFRI >60 04/22/2022    BCR 15.7 03/28/2023     03/28/2023    K 3.1 (L) 03/28/2023    CO2 29.0 03/28/2023    CALCIUM 8.2 (L) 03/28/2023    PROTENTOTREF 5.8 (L) 05/24/2015    ALBUMIN 2.7 (L) 03/28/2023    ALKPHOS 60 03/28/2023    BILITOT 2.7 (H) 03/28/2023    BILIDIR 1.91 (H) 03/15/2023    ALT 25 03/28/2023     (H) 03/28/2023      Latest Reference Range & Units 03/26/23 00:54   Ammonia 16 - 60 umol/L 106 (H)      Latest Reference Range & Units 03/25/23 22:41   Sodium, Urine mmol/L 44     Assessment:    1.  HAYES cirrhosis. MELD-Na = 36.  2.  Ascites. S/p 4.5 L paracentesis on 3/26/2023.  No evidence for SBP.  3.  Hepatic hydrothorax.  4.  Chronic kidney disease.  Treated for hepatorenal syndrome last month at .  As urine sodium is currently not low, favor PAOLO from [facility] in possible background of type II HRS.  Urine output slightly improved overnight.  5.  STEMI, status post PCI/HARDEEP RCA on 3/25/23.Unsuccessful balloon angioplasty of 100% occluded right posterior lateral branch, and unsuccessful manual thrombectomy of 100% occluded RPDA. Preserved EF at 65%.    Plan:    >> Continue Xifaxan.  Decrease dose of lactulose due to diarrhea and no clinical signs of hepatic encephalopathy.  >> Encourage p.o. intake.  >> Needs out of bed, mobility, ambulate, etc.  >> Nephrology following and monitoring for need of renal replacement therapy.  >> Keep 4/10/2023 appointment with  liver transplant [facility].  This appointment may be rescheduled given recent events.  I discussed the patient's current condition with his transplant coordinator at [facility], Kimo, at [phone].      Mark I. Brunner, MD  03/28/23  10:12 EDT

## 2023-03-28 NOTE — PROGRESS NOTES
Clinical Nutrition     Reason for Visit: MDR, Identified at risk by screening criteria      Patient Name: Leoncio Hopson  YOB: 1950  MRN: [id number]  Date of Encounter: 03/28/23 13:20 EDT  Admission date: 3/25/2023      Nutrition Assessment       Problem List:    ST elevation myocardial infarction involving right coronary artery (HCC)    PAOLO (acute kidney injury) (HCC)    Liver cirrhosis secondary to HAYES (HCC)    Hyperbilirubinemia    Coagulopathy (HCC)    Decompensated hepatic cirrhosis (HCC)    CKD (chronic kidney disease) stage 5, GFR less than 15 ml/min (HCC)    Hyperlipidemia LDL goal <70    Coronary artery disease involving native coronary artery of native heart     s/p RCA stent 3-25-23    s/p Paracentesis 3-26-23    PMH:   He  has a past medical history of Anemia, Coagulopathy (HCC) (2/11/2023), Decompensated hepatic cirrhosis (HCC) (2/11/2023), Diabetes mellitus (HCC), Encephalopathy, hepatic (2/11/2023), Hypertension, Liver cirrhosis secondary to HAYES (HCC), Liver lesion, Other ascites (2/11/2023), and Thrombocytopenia (HCC) (2/11/2023).    PSH:  He  has a past surgical history that includes Ankle surgery; Eye surgery; Cardiac catheterization (N/A, 3/25/2023); Cardiac catheterization (3/25/2023); and Cardiac catheterization (N/A, 3/25/2023).      Applicable Nutrition Concerns:   Skin: abdominal puncture wound  GI: ascites, bm x5      Reported/Observed/Food/Nutrition Related History:     Per RN: pt off levophed, plan to start PT, OT, poor appetite    Pt sitting up in chair, on room air, tired, very [place]    Wife reports pt did not really eat lunch, he was drinking boost at home, but is burnt out on them, has early satiety    Labs    Labs Reviewed: Yes     Results from last 7 days   Lab Units 03/28/23  0300 03/27/23  0406 03/26/23  0054 03/25/23  1112 03/25/23  1103   GLUCOSE mg/dL 151* 55* 182*   < > 168*   BUN mg/dL 77* 63* 50*   < > 43*   CREATININE mg/dL 4.92* 4.70* 4.14*    < > 4.07*   SODIUM mmol/L 137 134* 136   < > 133*   CHLORIDE mmol/L 90* 87* 89*   < > 89*   POTASSIUM mmol/L 3.1* 3.3* 4.1   < > 3.8   PHOSPHORUS mg/dL 3.8 4.0 3.8  --  3.9   MAGNESIUM mg/dL  --  2.2 1.8  --  1.9   ALT (SGPT) U/L 25 28 23  --  27    < > = values in this interval not displayed.       Results from last 7 days   Lab Units 03/28/23  0300 03/27/23  0406 03/26/23  0054 03/25/23  1310 03/25/23  1103   ALBUMIN g/dL 2.7* 2.9* 2.8*  --  3.2*   IONIZED CALCIUM mmol/L  --   --   --  1.21  --    CHOLESTEROL mg/dL  --   --   --   --  94       Results from last 7 days   Lab Units 03/28/23  1137 03/28/23  0658 03/27/23  1939 03/27/23  1702 03/27/23  1112 03/27/23  0707   GLUCOSE mg/dL 199* 161* 137* 140* 111 105     Lab Results   Lab Value Date/Time    HGBA1C 5.60 03/25/2023 1103    HGBA1C 6.10 (H) 02/10/2023 0301    HGBA1C 5.9 (H) 04/22/2022 0912                 Medications    Medications Reviewed: Yes  Pertinent: abx, thiamine, iron, folate, insulin, lactulose, rifaximin, midodrine, protonix  Infusion: octreotide        Intake/Ouptut 24 hrs (0701 - 0700)   I&O's Reviewed: Yes     Intake & Output (last day)       03/27 0701 03/28 0700 03/28 0701 03/29 0700    P.O.      I.V. (mL/kg) 579.1 (7.8) 107.1 (1.5)    IV Piggyback 450 213.8    Total Intake(mL/kg) 1029.1 (13.8) 320.9 (4.4)    Urine (mL/kg/hr) 645 (0.4) 110 (0.2)    Other      Stool 0 0    Total Output 645 110    Net +384.1 +210.9          Stool Unmeasured Occurrence 6 x 2 x            Anthropometrics       Height: 68in  Weight: 157lb bedscale on admit  BMI: 23.9  BMI classification: Normal: 18.5-24.9kg/m2    Last 15 Recorded Weights  View Complete Flowsheet  Weight Weight (kg) Weight (lbs) Weight Method   3/28/2023 73.1 kg 161 lb 2.5 oz Bed scale   3/28/2023 74.5 kg 164 lb 3.9 oz Bed scale   3/28/2023 71.2 kg 156 lb 15.5 oz Bed scale   3/25/2023 71.4 kg 157 lb 6.5 oz -   3/25/2023 71.4 kg 157 lb 6.5 oz Bed scale   3/25/2023 72.576 kg 160 lb -   2/14/2023  72.984 kg 160 lb 14.4 oz Bed scale   2/13/2023 72.984 kg 160 lb 14.4 oz Bed scale   2/12/2023 71.714 kg 158 lb 1.6 oz Bed scale   2/11/2023 72.576 kg 160 lb Bed scale   2/10/2023 71.215 kg 157 lb -   2/10/2023 71.532 kg 157 lb 11.2 oz Bed scale   2/9/2023 72.122 kg 159 lb Bed scale   2/9/2023 72.576 kg 160 lb          Current Nutrition Prescription     PO: Diet: Cardiac Diets, Diabetic Diets; Healthy Heart (2-3 Na+); Consistent Carbohydrate; Texture: Regular Texture (IDDSI 7); Fluid Consistency: Thin (IDDSI 0)  Oral Nutrition Supplement: Boost GC 3x day    Intake: 3% of 3 meals      Nutrition Diagnosis   Date: 3-28-23 Updated:   Problem Inadequate oral intake   Etiology Per Clinical Status   Signs/Symptoms Po intake 3%     Goal:   General: Nutrition support treatment  PO: Increase intake      Nutrition Intervention      Follow treatment progress, Advised available snacks, Interview for preferences, Menu provided, Menu adjusted, Adjusted supplement    Change supplement to [employer] 3x/day    Monitoring/Evaluation:   Per protocol, I&O, PO intake, Supplement intake, Pertinent labs, Weight, Skin status, GI status, Symptoms, Hemodynamic stability      Daisy Hassan RD  Time Spent: 30min

## 2023-03-28 NOTE — PROGRESS NOTES
Cardiology Progress Note      Reason for visit:    · Code AMI/inferior    IDENTIFICATION: 72-year-old gentleman who resides in [place], [place]    Active Hospital Problems    Diagnosis  POA   • **ST elevation myocardial infarction involving right coronary artery (HCC) [I21.11]  Yes     Priority: High   • Coronary artery disease involving native coronary artery of native heart [I25.10]  Yes     Priority: High     · Cardiac cath for STEMI (3/25/2023): Severe two-vessel disease RCA and diagonal of LAD.  PCI of RCA.  Unsuccessful manual thrombectomy of 100% occluded RPDA.  Unsuccessful angioplasty to right posterior lateral branch.  Medical therapy recommended for diagonal branch.       • CKD (chronic kidney disease) stage 5, GFR less than 15 ml/min (HCC) [N18.5]  Yes     Priority: High   • PAOLO (acute kidney injury) (HCC) [N17.9]  Yes     Priority: High   • Hyperlipidemia LDL goal <70 [E78.5]  Yes     Priority: Medium   • Decompensated hepatic cirrhosis (HCC) [K72.90, K74.60]  Yes     Priority: Medium   • Coagulopathy (HCC) [D68.9]  Yes     Priority: Medium   • Liver cirrhosis secondary to HAYES (HCC) [K75.81, K74.60]  Yes     Priority: Medium   • Hyperbilirubinemia [E80.6]  Yes     Priority: Medium            No events noted overnight.  A.m. platelets decreased from 101 to 46.  Hemoglobin hematocrit are stable.  Creatinine continues to worsen and currently 4.92.  He is sitting up in the bed his wife is at bedside.  He denies any chest pain/pressure/tightness or heaviness.  Levophed drip was weaned overnight.  Blood pressure stable at 108/57           Vital Sign Min/Max for last 24 hours  Temp  Min: 97.9 °F (36.6 °C)  Max: 98.5 °F (36.9 °C)   BP  Min: 90/60  Max: 134/72   Pulse  Min: 78  Max: 124   Resp  Min: 14  Max: 18   SpO2  Min: 89 %  Max: 100 %   Flow (L/min)  Min: 2  Max: 2      Intake/Output Summary (Last 24 hours) at 3/28/2023 0750  Last data filed at 3/28/2023 0617  Gross per 24 hour   Intake 1029.1 ml    Output 645 ml   Net 384.1 ml           Physical Exam  Constitutional:       General: He is awake.   Cardiovascular:      Rate and Rhythm: Normal rate and regular rhythm.      Heart sounds: Murmur heard.   Pulmonary:      Effort: Pulmonary effort is normal.      Breath sounds: Normal breath sounds.   Abdominal:      General: There is distension.   Skin:     General: Skin is warm and dry.   Neurological:      Mental Status: He is alert.   Psychiatric:         Behavior: Behavior is cooperative.         Tele: Normal sinus rhythm     Results Review (reviewed the patient's recent labs in the electronic medical record):      EKG (3/25/2023): Sinus tachycardia with first-degree AV block, anterior infarct and inferior injury pattern     CXR (3/26/2023): Small right pleural effusion and patchy interstitial changes with mild cardiomegaly compatible with mild pulmonary edema     ECHO (3/25/2023): LVEF 65%.  Calcification aortic valve with minimal stenosis.  Moderate pulmonary hypertension with RVSP 49 mmHg.  Moderate dilation aortic root and aneurysmal dilation of ascending aorta.  Right pleural effusion  Results from last 7 days   Lab Units 03/28/23  0300 03/27/23  0406 03/26/23  0054 03/25/23  1317 03/25/23  1112 03/25/23  1103   SODIUM mmol/L 137 134* 136 135*  --  133*   POTASSIUM mmol/L 3.1* 3.3* 4.1 4.1  --  3.8   CHLORIDE mmol/L 90* 87* 89* 91*  --  89*   BUN mg/dL 77* 63* 50* 48*  --  43*   CREATININE mg/dL 4.92* 4.70* 4.14* 3.94*   < > 4.07*   MAGNESIUM mg/dL  --  2.2 1.8  --   --  1.9    < > = values in this interval not displayed.     Results from last 7 days   Lab Units 03/25/23  1317 03/25/23  1103   HSTROP T ng/L 792* 739*     Results from last 7 days   Lab Units 03/28/23  0300 03/27/23  0406 03/26/23  0054   WBC 10*3/mm3 5.66 14.01* 5.24   HEMOGLOBIN g/dL 8.0* 7.7* 7.5*   HEMATOCRIT % 23.7* 22.7* 23.4*   PLATELETS 10*3/mm3 46* 101* 107*       Lab Results   Component Value Date    HGBA1C 5.60 03/25/2023        Lab Results   Component Value Date    CHOL 94 03/25/2023              STEMI involving right coronary artery  • Status post HARDEEP to proximal RCA with residual thrombotic occlusions in the distal vessels not able to be adequately revascularized  • Continue aspirin 81+ clopidogrel 75 mg daily  • No beta-blocker due to hypotension     Hypotension  • Midodrine 15 mg 3 times daily   • Levophed drip weaned 3/27/2023  • Current /57     Hyperlipidemia  • No statin due to decompensated liver cirrhosis     Stage V chronic kidney disease  • Rejected for transplant at  currently being evaluated at   • Current creatinine 4.90  • Anemic with H&H of 8 and 23.7  • Nephrology managing     End-stage liver disease/decompensated liver cirrhosis  • Rejected for transplant at  and currently being evaluated at   • Ascites present.  Bedside ultrasound showed fluid in all quadrants.  • ALT 23 and   • Paracentesis performed 3/26/2023  • Thrombocytopenic, platelets have decreased from 10 1-46  • Management per GI                   · Continue dual antiplatelet therapy and give Plavix today.  Continue to watch platelets.  Complex case with end-stage liver disease and coagulopathy  · Continue midodrine for hypotension  · No beta-blocker due to hypotension  · No statin due to end-stage liver disease/liver cirrhosis    Electronically signed by QUOC Montalvo, 03/28/23, 7:50 AM EDT.

## 2023-03-28 NOTE — PROGRESS NOTES
"   LOS: 3 days    Patient Care Team:  Antonio Castro MD as PCP - General  Antonio Castro MD as PCP - Family Medicine    Chief Complaint: Shortness of breath     72-year-old male with past medical history of [place], liver cirrhosis, prerenal acute kidney failure was last seen a month ago with a creatinine of 5.0 when he was transferred to  for further management for liver transplant.  Dialysis was not started at that time.    Prior to the above baseline creatinine 0.8-1.2.  On previous admission creatinine was 4.7, before discharge. Labs showed a creatinine 3.94, BUN 48, sodium 135, potassium 4.1, CO2 6.0, calcium 9.6, EGFR 15 mL/min, troponin high 53, hemoglobin 10.5, platelets 111 K, lactate high 17.6, phosphorus 3.9 magnesium 1.9, bilirubin 4.6 other liver enzymes were normal.  Subjective : F/U PAOLO ON CKD.    Interval History:   Critically ill in ICU.  Mild decreasing renal function.  Ammonia level is high.    Review of Systems:   Hard of hearing, abdominal distention, mild shortness of breath, generalized weakness. + COUGH.  All other negative    Objective     Vital Sign Min/Max for last 24 hours  Temp  Min: 97.7 °F (36.5 °C)  Max: 98.5 °F (36.9 °C)   BP  Min: 90/60  Max: 134/72   Pulse  Min: 78  Max: 124   Resp  Min: 14  Max: 18   SpO2  Min: 89 %  Max: 100 %   Flow (L/min)  Min: 2  Max: 2   Weight  Min: 71.2 kg (156 lb 15.5 oz)  Max: 74.5 kg (164 lb 3.9 oz)     Flowsheet Rows    Flowsheet Row First Filed Value   Admission Height 172.7 cm (68\") Documented at 03/25/2023 1055   Admission Weight 72.6 kg (160 lb) Documented at 03/25/2023 1055          I/O this shift:  In: 173.9 [I.V.:82.8; IV Piggyback:91.1]  Out: 75 [Urine:75]  I/O last 3 completed shifts:  In: 3179.3 [P.O.:120; I.V.:2409.3; IV Piggyback:650]  Out: 735 [Urine:735]    Physical Exam:  General appearance: Sick looking  male mild distress laying in bed.    HEENT: Hard of hearing, oral mucosa moist, neck is supple  Lungs: Few rhonchi's and " rails heard, equal chest movement, no crepitation.  Heart: Normal S1, S2, no gallop, murmur, RRR.  Abdomen: Abdomen distended, positive thrill soft, nontender, positive bowel sounds.  Extremities: Positive lower extremity edema, no cyanosis, no joint swelling.  Neuro: Alert, oriented x4, no focal deficit.  Psych: Mood and affect are normal and appropriate.  Skin: Skin is warm and dry.  : No suprapubic fullness or tenderness. TORRE.    WBC WBC   Date Value Ref Range Status   03/28/2023 5.66 3.40 - 10.80 10*3/mm3 Final   03/27/2023 14.01 (H) 3.40 - 10.80 10*3/mm3 Final   03/26/2023 5.24 3.40 - 10.80 10*3/mm3 Final   03/25/2023 12.89 (H) 3.40 - 10.80 10*3/mm3 Final   03/25/2023 13.39 (H) 3.40 - 10.80 10*3/mm3 Final      HGB Hemoglobin   Date Value Ref Range Status   03/28/2023 8.0 (L) 13.0 - 17.7 g/dL Final   03/27/2023 7.7 (L) 13.0 - 17.7 g/dL Final   03/26/2023 7.5 (L) 13.0 - 17.7 g/dL Final   03/25/2023 10.5 (L) 13.0 - 17.7 g/dL Final   03/25/2023 9.8 (L) 13.0 - 17.7 g/dL Final      HCT Hematocrit   Date Value Ref Range Status   03/28/2023 23.7 (L) 37.5 - 51.0 % Final   03/27/2023 22.7 (L) 37.5 - 51.0 % Final   03/26/2023 23.4 (L) 37.5 - 51.0 % Final   03/25/2023 31.9 (L) 37.5 - 51.0 % Final   03/25/2023 30.2 (L) 37.5 - 51.0 % Final      Platlets No results found for: LABPLAT   MCV MCV   Date Value Ref Range Status   03/28/2023 100.4 (H) 79.0 - 97.0 fL Final   03/27/2023 100.9 (H) 79.0 - 97.0 fL Final   03/26/2023 105.4 (H) 79.0 - 97.0 fL Final   03/25/2023 105.6 (H) 79.0 - 97.0 fL Final   03/25/2023 105.6 (H) 79.0 - 97.0 fL Final          Sodium Sodium   Date Value Ref Range Status   03/28/2023 137 136 - 145 mmol/L Final   03/27/2023 134 (L) 136 - 145 mmol/L Final   03/26/2023 136 136 - 145 mmol/L Final   03/25/2023 135 (L) 136 - 145 mmol/L Final   03/25/2023 133 (L) 136 - 145 mmol/L Final      Potassium Potassium   Date Value Ref Range Status   03/28/2023 3.1 (L) 3.5 - 5.2 mmol/L Final   03/27/2023 3.3 (L) 3.5 -  5.2 mmol/L Final   03/26/2023 4.1 3.5 - 5.2 mmol/L Final   03/25/2023 4.1 3.5 - 5.2 mmol/L Final   03/25/2023 3.8 3.5 - 5.2 mmol/L Final      Chloride Chloride   Date Value Ref Range Status   03/28/2023 90 (L) 98 - 107 mmol/L Final   03/27/2023 87 (L) 98 - 107 mmol/L Final   03/26/2023 89 (L) 98 - 107 mmol/L Final   03/25/2023 91 (L) 98 - 107 mmol/L Final   03/25/2023 89 (L) 98 - 107 mmol/L Final      CO2 CO2   Date Value Ref Range Status   03/28/2023 29.0 22.0 - 29.0 mmol/L Final   03/27/2023 27.0 22.0 - 29.0 mmol/L Final   03/26/2023 9.0 (L) 22.0 - 29.0 mmol/L Final   03/25/2023 6.0 (L) 22.0 - 29.0 mmol/L Final   03/25/2023 6.0 (L) 22.0 - 29.0 mmol/L Final      BUN BUN   Date Value Ref Range Status   03/28/2023 77 (H) 8 - 23 mg/dL Final   03/27/2023 63 (H) 8 - 23 mg/dL Final   03/26/2023 50 (H) 8 - 23 mg/dL Final   03/25/2023 48 (H) 8 - 23 mg/dL Final   03/25/2023 43 (H) 8 - 23 mg/dL Final      Creatinine Creatinine   Date Value Ref Range Status   03/28/2023 4.92 (H) 0.76 - 1.27 mg/dL Final   03/27/2023 4.70 (H) 0.76 - 1.27 mg/dL Final   03/26/2023 4.14 (H) 0.76 - 1.27 mg/dL Final   03/25/2023 3.94 (H) 0.76 - 1.27 mg/dL Final   03/25/2023 4.50 (H) 0.60 - 1.30 mg/dL Final     Comment:     Serial Number: [id number]:  [id number]   03/25/2023 4.07 (H) 0.76 - 1.27 mg/dL Final      Calcium Calcium   Date Value Ref Range Status   03/28/2023 8.2 (L) 8.6 - 10.5 mg/dL Final   03/27/2023 8.4 (L) 8.6 - 10.5 mg/dL Final   03/26/2023 8.2 (L) 8.6 - 10.5 mg/dL Final   03/25/2023 9.6 8.6 - 10.5 mg/dL Final   03/25/2023 9.5 8.6 - 10.5 mg/dL Final      PO4 No results found for: CAPO4   Albumin Albumin   Date Value Ref Range Status   03/28/2023 2.7 (L) 3.5 - 5.2 g/dL Final   03/27/2023 2.9 (L) 3.5 - 5.2 g/dL Final   03/26/2023 2.8 (L) 3.5 - 5.2 g/dL Final   03/25/2023 3.2 (L) 3.5 - 5.2 g/dL Final      Magnesium Magnesium   Date Value Ref Range Status   03/27/2023 2.2 1.6 - 2.4 mg/dL Final   03/26/2023 1.8 1.6 - 2.4 mg/dL Final    03/25/2023 1.9 1.6 - 2.4 mg/dL Final      Uric Acid No results found for: URICACID        Results Review:     I reviewed the patient's new clinical results.    aspirin, 81 mg, Oral, Daily  clopidogrel, 75 mg, Oral, Daily  ferrous sulfate, 325 mg, Oral, Daily With Breakfast  fluticasone, 2 spray, Each Nare, Daily  folic acid, 400 mcg, Oral, Daily  furosemide, 80 mg, Intravenous, Once  insulin lispro, 0-7 Units, Subcutaneous, 4x Daily With Meals & Nightly  lactulose, 30 g, Oral, BID  midodrine, 15 mg, Oral, TID AC  Johnie, 1 patch, Topical, Once  pantoprazole, 40 mg, Intravenous, Q AM  pharmacy consult - MTM, , Does not apply, Daily  piperacillin-tazobactam, 3.375 g, Intravenous, Q12H  riFAXIMin, 550 mg, Oral, Q12H  sodium bicarbonate, 650 mg, Oral, TID  sodium chloride, 10 mL, Intravenous, Q12H  thiamine (B-1) IV, 500 mg, Intravenous, Q8H  ursodiol, 300 mg, Oral, BID      norepinephrine, 0.02-0.3 mcg/kg/min, Last Rate: Stopped (03/28/23 0325)  octreotide (SandoSTATIN) infusion, 50 mcg/hr, Last Rate: 50 mcg/hr (03/28/23 0519)        Medication Review: Reviewed    Assessment & Plan       ST elevation myocardial infarction involving right coronary artery (HCC)    PAOLO (acute kidney injury) (HCC)    Liver cirrhosis secondary to HAYES (HCC)    Hyperbilirubinemia    Coagulopathy (HCC)    Decompensated hepatic cirrhosis (HCC)    CKD (chronic kidney disease) stage 5, GFR less than 15 ml/min (HCC)    Hyperlipidemia LDL goal <70    Coronary artery disease involving native coronary artery of native heart    1.  Acute kidney injury: Patient was last seen a month ago with creatinine 4.5- 5.0 range.  Prior to that baseline was 0.8-1.2, likely diagnosis was hepatorenal versus ATN.  At that time patient was transferred to  for further management.  Patient has been admitted and underwent a cardiac catheterization received 80 mL of IV contrast. GFR WORSE. PROBABLY HEADED TOWARD RRT SOON. NONOLIGURIC. LIKELY FROM KAROL AND HRS.   2.   S/p right RCA stenting for the clot 3/25/2023 today  3.  HAYES: Decompensated liver cirrhosis.  Ammonia level 106  4.  Hypotension: In the setting of liver disease.  5.  Anemia  6.  Thrombocytopenia   7.  Thoracentesis: 3 weeks back  8.  Paracentesis: On 3/17/2023 at [facility]. 9. HYPOKALEMIA.   Recommendations:  Continue with the bicarb drip, pH 7.39, bicarb 15.  Ammonia level 106, getting lactulose  Avoid nephrotoxic medications.  Keep MAP greater than 70  Monitor volume status  Check labs in the morning.  Case discussed with the medical staff taking care of the patient  SUPPLEMENT K. D/W DR RON AND ICU NURSE. GIVE IV LASIX TODAY.   Austin Ng MD  03/28/23  10:44 EDT

## 2023-03-28 NOTE — PLAN OF CARE
Goal Outcome Evaluation:   - VSS  - Levophed turned off and maintaining a map above 70  - BM x 2   - Potassium 3.1. Replacement orders in place  - Central line dressing changed   - Continued therapy with octreotide   - Resting comfortably

## 2023-03-28 NOTE — PROGRESS NOTES
Critical Care Note     LOS: 3 days   Patient Care Team:  Antonio Castro MD as PCP - General  Antonio Castro MD as PCP - Family Medicine    Chief Complaint/Reason for visit:     ST elevation myocardial infarction involving right coronary artery (HCC)    PAOLO (acute kidney injury) (HCC)    Liver cirrhosis secondary to HAYES (HCC)    Hyperbilirubinemia    Coagulopathy (HCC)    Decompensated hepatic cirrhosis (HCC)    CKD (chronic kidney disease) stage 5, GFR less than 15 ml/min (HCC)    Hyperlipidemia LDL goal <70    Coronary artery disease involving native coronary artery of native heart    Subjective     Interval History:     Norepinephrine successfully weaned off.  Potassium was replaced overnight.  Oral intake remains poor.  He is receiving a bicarbonate drip as well as octreotide.  He is on room air with a saturation of 92%.  He remains in sinus rhythm.  He is not having any chest pain.  Platelet count has decreased to 46 from 100.  Renal function continues to worsen with creatinine of 4.92 today.  He continues to have lower extremity edema.  Urine output yesterday 645 mL.    History taken from: patient,nursing,chart    Review of Systems:    All systems were reviewed and negative except as noted in subjective.    Medical history, surgical history, social history, family history reviewed    Objective     Intake/Output:    Intake/Output Summary (Last 24 hours) at 3/28/2023 1533  Last data filed at 3/28/2023 1400  Gross per 24 hour   Intake 1244.83 ml   Output 725 ml   Net 519.83 ml       Nutrition:  Diet: Cardiac Diets, Diabetic Diets; Healthy Heart (2-3 Na+); Consistent Carbohydrate; Texture: Regular Texture (IDDSI 7); Fluid Consistency: Thin (IDDSI 0)    Infusions:  norepinephrine, 0.02-0.3 mcg/kg/min, Last Rate: Stopped (03/28/23 0325)  octreotide (SandoSTATIN) infusion, 50 mcg/hr, Last Rate: 50 mcg/hr (03/28/23 8559)        Mechanical Ventilator Settings:                                             "    Telemetry: Sinus tachycardia, sinus rhythm             Vital Signs  Blood pressure 122/75, pulse 82, temperature 97.9 °F (36.6 °C), temperature source Oral, resp. rate 18, height 172.7 cm (67.99\"), weight 73.1 kg (161 lb 2.5 oz), SpO2 (!) 89 %.    Physical Exam:  General Appearance:   Semiupright in bed in no distress.   Head:   Atraumatic   Eyes:          Jaundice   Ears:   External ear normal, hard of hearing   Throat:  Oral mucosa moist   Neck:  Trachea midline, no palpable thyroid, no crepitus   Back:    Spine is straight   Lungs:    Symmetric chest excursion.  Breath sounds are bilateral, diminished at the bases, right greater than left, clear anteriorly    Heart:   Regular rhythm, S1, S2 auscultated   Abdomen:    Persistent ascites with fluid wave, nontender   Rectal:   Deferred   Extremities:  Bilateral lower extremity edema   Pulses:  Palpable radial pulses   Skin:  Warm and dry   Lymph nodes:  No cervical adenopathy   Neurologic:  He is awake and oriented to name and hospital      Results Review:     I reviewed the patient's new clinical results.   Results from last 7 days   Lab Units 03/28/23  1337 03/28/23 0300 03/27/23  0406 03/26/23  0054   SODIUM mmol/L  --  137 134* 136   POTASSIUM mmol/L 3.5 3.1* 3.3* 4.1   CHLORIDE mmol/L  --  90* 87* 89*   CO2 mmol/L  --  29.0 27.0 9.0*   BUN mg/dL  --  77* 63* 50*   CREATININE mg/dL  --  4.92* 4.70* 4.14*   CALCIUM mg/dL  --  8.2* 8.4* 8.2*   BILIRUBIN mg/dL  --  2.7* 2.4* 2.7*   ALK PHOS U/L  --  60 62 64   ALT (SGPT) U/L  --  25 28 23   AST (SGOT) U/L  --  109* 157* 106*   GLUCOSE mg/dL  --  151* 55* 182*     Results from last 7 days   Lab Units 03/28/23  0300 03/27/23  0406 03/26/23  0054   WBC 10*3/mm3 5.66 14.01* 5.24   HEMOGLOBIN g/dL 8.0* 7.7* 7.5*   HEMATOCRIT % 23.7* 22.7* 23.4*   PLATELETS 10*3/mm3 46* 101* 107*     Results from last 7 days   Lab Units 03/27/23  0322   PH, ARTERIAL pH units 7.516*   PO2 ART mm Hg 72.3*   PCO2, ARTERIAL mm Hg 38.3 "   HCO3 ART mmol/L 31.0*     Lab Results   Component Value Date    BLOODCX No growth at 2 days 03/25/2023     Lab Results   Component Value Date    URINECX No growth 03/25/2023       I reviewed the patient's new imaging including images and reports.    IMPRESSION:     There is a right internal jugular central venous catheter with the catheter tip near the atrial caval junction.     There is a small right pleural effusion and patchy interstitial changes with mild cardiomegaly which is compatible with mild pulmonary edema.     No focal consolidation is otherwise seen.     Electronically signed by:  Lalo Elizondo D.O.    3/25/2023 11:46 PM Mountain Time     Findings:  CT SCAN OF THE CHEST WITHOUT CONTRAST: There is a moderate right pleural effusion, decreased from 2/9/2023. No left effusion or pericardial effusion is seen. There is ectasia of the ascending aorta is approximately 4.4 cm, minimally if at all increased   from the prior study. There is dense coronary artery calcification. No mediastinal adenopathy is seen. Dense peripheral groundglass changes of the left lower lobe are very similar to appearance of Covid 19 pneumonia from the initial months the scan.   There are much milder multifocal interstitial changes the left upper lobe. Small elongated inferior right upper lobe nodule or nodular scar is stable. Airways appear normally patent. Bony structures appear to be intact.     IMPRESSION:  1. Moderate right pleural effusion decreased from 2/9/2023.     2. Left lower lobe pneumonia and much milder left upper lobe pneumonia, which may represent Covid 19 or other atypical pneumonia.     3. Stable small inferior right upper lobe pulmonary nodule.     4. Ectatic ascending aorta to 4.4 cm, stable only minimally increased.           CT SCAN OF THE ABDOMEN AND PELVIS WITHOUT CONTRAST: Separate scans of the abdomen and pelvis are performed, as the initial scan of the abdomen was performed prior to pelvic ct scan being  ordered. The studies are dictated together however.     There is extensive ascites, and dense diffuse edema of subcutaneous tissues and particularly dense edema throughout the small bowel mesentery. There is a small cirrhotic appearing liver with no obvious lesion. Spleen is not enlarged. Pancreas and adrenal   glands appear unremarkable. Concentrated contrast in the renal collecting systems resembles renal calculi. There is a small left lower pole renal cyst measuring near water density. No evidence of obstructive uropathy is seen. There is no evidence of   free air or adenopathy. There is marked distention of stomach with fluid and air, and relatively decompressed duodenum but no evidence of obstructive lesion.     Regarding lower abdomen pelvis, dense small bowel mesenteric edema and extensive ascites are again noted. No abnormally dilated bowel loops, evidence of pneumatosis or significant bowel wall edema is appreciated. Bladder is normally distended and normal   in appearance. No mass or adenopathy is seen. Review of the bony structures shows grade 1 anterolisthesis of L5 on S1 with bilateral pars defects. There is a somewhat transitional appearance of S1 with a rudimentary S1-S2 disc space.     Impression:     1. Extensive ascites and extensive dense anasarca throughout the small bowel mesentery and remaining soft tissues.     2. Small cirrhotic liver.     3. Fluid and air distended stomach, to the level of the duodenum. No obvious obstructing lesion or inflammation, possibly gastric ileus.     4. Incidentally noted grade 1 anterolisthesis of L5 on S1, with bilateral pars defects.     Electronically Signed: Blayne Chung    3/25/2023 8:56 PM EDT    Workstation ID: [id number]    Conclusion  Left heart cath, March 25, 2023     •  Severe 2-vessel CAD (RCA, diagonal branch of the LAD)  •  Successful PCI of the proximal RCA with placement of a Xience 4 x 23 mm drug-eluting stent  •  Unsuccessful balloon angioplasty of  100% occluded right posterior lateral branch  •  Unsuccessful manual thrombectomy of 100% occluded RPDA  •  Normal LV filling pressure  •  Medical therapy recommended for diagonal branch stenosis.  •  DAPT (aspirin 81 mg + clopidogrel 75 mg daily) for 1 month due to high bleed risk.       All medications reviewed.   aspirin, 81 mg, Oral, Daily  clopidogrel, 75 mg, Oral, Daily  ferrous sulfate, 325 mg, Oral, Daily With Breakfast  fluticasone, 2 spray, Each Nare, Daily  folic acid, 400 mcg, Oral, Daily  insulin lispro, 0-7 Units, Subcutaneous, 4x Daily With Meals & Nightly  [START ON 3/29/2023] lactulose, 20 g, Oral, Daily  midodrine, 15 mg, Oral, TID AC  Johnie, 1 patch, Topical, Once  pantoprazole, 40 mg, Intravenous, Q AM  pharmacy consult - [place], , Does not apply, Daily  piperacillin-tazobactam, 3.375 g, Intravenous, Q12H  riFAXIMin, 550 mg, Oral, Q12H  sodium bicarbonate, 650 mg, Oral, TID  sodium chloride, 10 mL, Intravenous, Q12H  ursodiol, 300 mg, Oral, BID          Assessment & Plan       ST elevation myocardial infarction involving right coronary artery (HCC)    PAOLO (acute kidney injury) (HCC)    Liver cirrhosis secondary to HAYES (HCC)    Hyperbilirubinemia    Coagulopathy (HCC)    Decompensated hepatic cirrhosis (HCC)    CKD (chronic kidney disease) stage 5, GFR less than 15 ml/min (HCC)    Hyperlipidemia LDL goal <70    Coronary artery disease involving native coronary artery of native heart     72 y.o.male with relevant PMH of Stage V CKD not on HD, decompensated HAYES cirrhosis with ascites (rejected for Liver-Kidney Txp at , being evaluated at  - not on transplant list), varices and recurrent hepatic hydrothorax (undergone 4 thoracentesis), prior Paracentesis x 1, diabetes, HTN, anemia, thrombocytopenia admitted 3/25/2023 from OSH w/ STEMI.  He had presented there for increased SOA and was hoping to get thoracentesis or paracentesis.     Emergent LHC showed severe 2-vessel CAD (RCA & LAD).  Had PCI  proximal RCA w/ HARDEEP and Unsuccessful balloon of 100% RPL / Unsuccessful thrombectomy 100% occluded RPDA.  He received a loading dose of Plavix in the Cath Lab.  Maintenance dose was started March 27.     In addition to above, patient noted to have profound lactic acidosis.  This seem out of proportion to current vitals and appearance - I.e. does not appear that he is in overwhelming shock with poor perfusion.  Has had poor po intake recently.  Suspect there is strong component of Type B LA .  March 27 lactic acid was down to 6 compared to 20 on admission.    He also underwent a large-volume paracentesis 2 days ago with 4.5 L removed.  Fluid did not appear infected.  Bilirubin today is 2.7 compared to 9 earlier in the week.  Unfortunately his creatinine continues to climb and today is 4.92.  Norepinephrine has been successfully weaned off and he is maintaining an adequate blood pressure.  Hemoglobin has also remained stable.  Platelet count has dropped from 100-46.      PLAN:    Plavix restarted because of his drug-eluting stent  We will hold aspirin and statin because of liver disease  Monitor electrolytes and replace as needed  Monitor for arrhythmias    Continue Zosyn for mildly elevated procalcitonin, possible infection    Rifaximin, lactulose for hepatic encephalopathy and elevated ammonia  Continue octreotide  Monitor liver function test, coagulation studies, H&H    Repeat lactic acid tomorrow  Bicarbonate will transition from a drip to p.o.    If renal function continues to deteriorate will have little choice but to initiate dialysis  Nephrology gave him a dose of furosemide  Repeat chest x-ray to reevaluate hepatohydrothorax  Continue midodrine for orthostatic hypotension    Encourage p.o. intake      VTE Prophylaxis:SCDS    Stress Ulcer Prophylaxis:protonix    Patient remains critically ill with end-stage liver disease, worsening renal failure, hypotension requiring vasopressors, encephalopathy secondary to  liver disease and the need of ongoing ICU monitoring and care    Yen Rodrigues MD  03/28/23  15:33 EDT      Time: Critical care 35 min  I personally provided care to this critically ill patient as documented above.  Critical care time does not include time spent on separately billed procedures.  None of my critical care time was concurrent with other critical care providers.

## 2023-03-29 LAB
ABO GROUP BLD: NORMAL
ALBUMIN SERPL-MCNC: 2.7 G/DL (ref 3.5–5.2)
ALBUMIN/GLOB SERPL: 1.1 G/DL
ALP SERPL-CCNC: 63 U/L (ref 39–117)
ALT SERPL W P-5'-P-CCNC: 21 U/L (ref 1–41)
ANION GAP SERPL CALCULATED.3IONS-SCNC: 16 MMOL/L (ref 5–15)
AST SERPL-CCNC: 68 U/L (ref 1–40)
BASOPHILS # BLD AUTO: 0.01 10*3/MM3 (ref 0–0.2)
BASOPHILS NFR BLD AUTO: 0.1 % (ref 0–1.5)
BILIRUB SERPL-MCNC: 2.9 MG/DL (ref 0–1.2)
BLD GP AB SCN SERPL QL: NEGATIVE
BUN SERPL-MCNC: 71 MG/DL (ref 8–23)
BUN/CREAT SERPL: 13.8 (ref 7–25)
CALCIUM SPEC-SCNC: 8.2 MG/DL (ref 8.6–10.5)
CHLORIDE SERPL-SCNC: 94 MMOL/L (ref 98–107)
CO2 SERPL-SCNC: 28 MMOL/L (ref 22–29)
CREAT SERPL-MCNC: 5.14 MG/DL (ref 0.76–1.27)
DEPRECATED RDW RBC AUTO: 64.2 FL (ref 37–54)
EGFRCR SERPLBLD CKD-EPI 2021: 11.1 ML/MIN/1.73
EOSINOPHIL # BLD AUTO: 1.44 10*3/MM3 (ref 0–0.4)
EOSINOPHIL NFR BLD AUTO: 17.4 % (ref 0.3–6.2)
ERYTHROCYTE [DISTWIDTH] IN BLOOD BY AUTOMATED COUNT: 17 % (ref 12.3–15.4)
GLOBULIN UR ELPH-MCNC: 2.4 GM/DL
GLUCOSE BLDC GLUCOMTR-MCNC: 126 MG/DL (ref 70–130)
GLUCOSE BLDC GLUCOMTR-MCNC: 140 MG/DL (ref 70–130)
GLUCOSE BLDC GLUCOMTR-MCNC: 179 MG/DL (ref 70–130)
GLUCOSE BLDC GLUCOMTR-MCNC: 215 MG/DL (ref 70–130)
GLUCOSE SERPL-MCNC: 121 MG/DL (ref 65–99)
HCT VFR BLD AUTO: 24.7 % (ref 37.5–51)
HGB BLD-MCNC: 8.2 G/DL (ref 13–17.7)
IMM GRANULOCYTES # BLD AUTO: 0.09 10*3/MM3 (ref 0–0.05)
IMM GRANULOCYTES NFR BLD AUTO: 1.1 % (ref 0–0.5)
INR PPP: 2.05 (ref 0.84–1.13)
LYMPHOCYTES # BLD AUTO: 0.63 10*3/MM3 (ref 0.7–3.1)
LYMPHOCYTES NFR BLD AUTO: 7.6 % (ref 19.6–45.3)
MAGNESIUM SERPL-MCNC: 1.7 MG/DL (ref 1.6–2.4)
MCH RBC QN AUTO: 33.9 PG (ref 26.6–33)
MCHC RBC AUTO-ENTMCNC: 33.2 G/DL (ref 31.5–35.7)
MCV RBC AUTO: 102.1 FL (ref 79–97)
MONOCYTES # BLD AUTO: 0.41 10*3/MM3 (ref 0.1–0.9)
MONOCYTES NFR BLD AUTO: 5 % (ref 5–12)
NEUTROPHILS NFR BLD AUTO: 5.69 10*3/MM3 (ref 1.7–7)
NEUTROPHILS NFR BLD AUTO: 68.8 % (ref 42.7–76)
NRBC BLD AUTO-RTO: 0 /100 WBC (ref 0–0.2)
PLATELET # BLD AUTO: 44 10*3/MM3 (ref 140–450)
PMV BLD AUTO: 10.8 FL (ref 6–12)
POTASSIUM SERPL-SCNC: 3.2 MMOL/L (ref 3.5–5.2)
POTASSIUM SERPL-SCNC: 3.4 MMOL/L (ref 3.5–5.2)
PROT SERPL-MCNC: 5.1 G/DL (ref 6–8.5)
PROTHROMBIN TIME: 23.1 SECONDS (ref 11.4–14.4)
RBC # BLD AUTO: 2.42 10*6/MM3 (ref 4.14–5.8)
RH BLD: POSITIVE
SODIUM SERPL-SCNC: 138 MMOL/L (ref 136–145)
T&S EXPIRATION DATE: NORMAL
WBC NRBC COR # BLD: 8.27 10*3/MM3 (ref 3.4–10.8)

## 2023-03-29 PROCEDURE — 85610 PROTHROMBIN TIME: CPT | Performed by: INTERNAL MEDICINE

## 2023-03-29 PROCEDURE — 80053 COMPREHEN METABOLIC PANEL: CPT | Performed by: INTERNAL MEDICINE

## 2023-03-29 PROCEDURE — 86900 BLOOD TYPING SEROLOGIC ABO: CPT | Performed by: INTERNAL MEDICINE

## 2023-03-29 PROCEDURE — 25010000002 OCTREOTIDE PER 25 MCG: Performed by: NURSE PRACTITIONER

## 2023-03-29 PROCEDURE — 97530 THERAPEUTIC ACTIVITIES: CPT

## 2023-03-29 PROCEDURE — 85025 COMPLETE CBC W/AUTO DIFF WBC: CPT | Performed by: INTERNAL MEDICINE

## 2023-03-29 PROCEDURE — 84132 ASSAY OF SERUM POTASSIUM: CPT | Performed by: INTERNAL MEDICINE

## 2023-03-29 PROCEDURE — 82962 GLUCOSE BLOOD TEST: CPT

## 2023-03-29 PROCEDURE — 63710000001 INSULIN LISPRO (HUMAN) PER 5 UNITS: Performed by: INTERNAL MEDICINE

## 2023-03-29 PROCEDURE — 99232 SBSQ HOSP IP/OBS MODERATE 35: CPT | Performed by: NURSE PRACTITIONER

## 2023-03-29 PROCEDURE — 83735 ASSAY OF MAGNESIUM: CPT | Performed by: INTERNAL MEDICINE

## 2023-03-29 PROCEDURE — 86850 RBC ANTIBODY SCREEN: CPT | Performed by: INTERNAL MEDICINE

## 2023-03-29 PROCEDURE — 25010000002 MAGNESIUM SULFATE 2 GM/50ML SOLUTION: Performed by: INTERNAL MEDICINE

## 2023-03-29 PROCEDURE — 25010000002 PIPERACILLIN SOD-TAZOBACTAM PER 1 G: Performed by: INTERNAL MEDICINE

## 2023-03-29 PROCEDURE — 86901 BLOOD TYPING SEROLOGIC RH(D): CPT | Performed by: INTERNAL MEDICINE

## 2023-03-29 PROCEDURE — 99233 SBSQ HOSP IP/OBS HIGH 50: CPT | Performed by: INTERNAL MEDICINE

## 2023-03-29 PROCEDURE — 97162 PT EVAL MOD COMPLEX 30 MIN: CPT

## 2023-03-29 RX ORDER — POTASSIUM CHLORIDE 750 MG/1
40 CAPSULE, EXTENDED RELEASE ORAL ONCE
Status: COMPLETED | OUTPATIENT
Start: 2023-03-29 | End: 2023-03-29

## 2023-03-29 RX ORDER — POTASSIUM CHLORIDE 750 MG/1
40 CAPSULE, EXTENDED RELEASE ORAL EVERY 6 HOURS
Status: COMPLETED | OUTPATIENT
Start: 2023-03-29 | End: 2023-03-29

## 2023-03-29 RX ORDER — MAGNESIUM SULFATE HEPTAHYDRATE 40 MG/ML
2 INJECTION, SOLUTION INTRAVENOUS ONCE
Status: COMPLETED | OUTPATIENT
Start: 2023-03-29 | End: 2023-03-29

## 2023-03-29 RX ADMIN — TAZOBACTAM SODIUM AND PIPERACILLIN SODIUM 3.38 G: 375; 3 INJECTION, SOLUTION INTRAVENOUS at 08:21

## 2023-03-29 RX ADMIN — POTASSIUM CHLORIDE 40 MEQ: 10 CAPSULE, COATED, EXTENDED RELEASE ORAL at 18:14

## 2023-03-29 RX ADMIN — FERROUS SULFATE TAB 325 MG (65 MG ELEMENTAL FE) 325 MG: 325 (65 FE) TAB at 08:20

## 2023-03-29 RX ADMIN — ASPIRIN 81 MG CHEWABLE TABLET 81 MG: 81 TABLET CHEWABLE at 08:20

## 2023-03-29 RX ADMIN — OCTREOTIDE ACETATE 50 MCG/HR: 500 INJECTION, SOLUTION INTRAVENOUS; SUBCUTANEOUS at 01:46

## 2023-03-29 RX ADMIN — LACTULOSE 20 G: 20 SOLUTION ORAL at 08:21

## 2023-03-29 RX ADMIN — Medication 400 MCG: at 08:21

## 2023-03-29 RX ADMIN — INSULIN LISPRO 3 UNITS: 100 INJECTION, SOLUTION INTRAVENOUS; SUBCUTANEOUS at 12:36

## 2023-03-29 RX ADMIN — TAZOBACTAM SODIUM AND PIPERACILLIN SODIUM 3.38 G: 375; 3 INJECTION, SOLUTION INTRAVENOUS at 21:31

## 2023-03-29 RX ADMIN — PANTOPRAZOLE SODIUM 40 MG: 40 INJECTION, POWDER, LYOPHILIZED, FOR SOLUTION INTRAVENOUS at 05:15

## 2023-03-29 RX ADMIN — MIDODRINE HYDROCHLORIDE 15 MG: 5 TABLET ORAL at 08:20

## 2023-03-29 RX ADMIN — RIFAXIMIN 550 MG: 550 TABLET ORAL at 21:31

## 2023-03-29 RX ADMIN — MIDODRINE HYDROCHLORIDE 15 MG: 5 TABLET ORAL at 10:30

## 2023-03-29 RX ADMIN — Medication 10 ML: at 22:29

## 2023-03-29 RX ADMIN — RIFAXIMIN 550 MG: 550 TABLET ORAL at 08:20

## 2023-03-29 RX ADMIN — Medication 10 ML: at 08:21

## 2023-03-29 RX ADMIN — OCTREOTIDE ACETATE 50 MCG/HR: 500 INJECTION, SOLUTION INTRAVENOUS; SUBCUTANEOUS at 12:37

## 2023-03-29 RX ADMIN — POTASSIUM CHLORIDE 40 MEQ: 10 CAPSULE, COATED, EXTENDED RELEASE ORAL at 05:15

## 2023-03-29 RX ADMIN — INSULIN LISPRO 2 UNITS: 100 INJECTION, SOLUTION INTRAVENOUS; SUBCUTANEOUS at 18:14

## 2023-03-29 RX ADMIN — URSODIOL 300 MG: 300 CAPSULE ORAL at 08:20

## 2023-03-29 RX ADMIN — CLOPIDOGREL BISULFATE 75 MG: 75 TABLET ORAL at 08:21

## 2023-03-29 RX ADMIN — SODIUM BICARBONATE 650 MG TABLET 650 MG: at 08:20

## 2023-03-29 RX ADMIN — URSODIOL 300 MG: 300 CAPSULE ORAL at 21:31

## 2023-03-29 RX ADMIN — POTASSIUM CHLORIDE 40 MEQ: 10 CAPSULE, COATED, EXTENDED RELEASE ORAL at 12:36

## 2023-03-29 RX ADMIN — MAGNESIUM SULFATE HEPTAHYDRATE 2 G: 2 INJECTION, SOLUTION INTRAVENOUS at 10:29

## 2023-03-29 RX ADMIN — FLUTICASONE PROPIONATE 2 SPRAY: 50 SPRAY, METERED NASAL at 08:20

## 2023-03-29 RX ADMIN — OCTREOTIDE ACETATE 50 MCG/HR: 500 INJECTION, SOLUTION INTRAVENOUS; SUBCUTANEOUS at 22:30

## 2023-03-29 RX ADMIN — MIDODRINE HYDROCHLORIDE 15 MG: 5 TABLET ORAL at 18:14

## 2023-03-29 NOTE — PLAN OF CARE
Goal Outcome Evaluation:  Plan of Care Reviewed With: (P) patient, spouse        Progress: (P) no change  Outcome Evaluation: (P) Pt ambulated 250' with min A x1 showing limitations of decreased endurance and balance deficits. Rec skilled PT to increase ind with mobility and d/c to home with assist, HHPT, and SPC.

## 2023-03-29 NOTE — THERAPY EVALUATION
Patient Name: Leoncio Hopson  : 1950    MRN: [id number]                              Today's Date: 3/29/2023       Admit Date: 3/25/2023    Visit Dx:     ICD-10-CM ICD-9-CM   1. Hyperammonemia (HCC)  E72.20 270.6     Patient Active Problem List   Diagnosis   • PAOLO (acute kidney injury) (HCC)   • Liver cirrhosis secondary to HAYES (HCC)   • Hyperbilirubinemia   • Anemia, chronic disease   • Hyperammonemia (HCC)   • Coagulopathy (HCC)   • Thrombocytopenia (HCC)   • Decompensated hepatic cirrhosis (HCC)   • ST elevation myocardial infarction involving right coronary artery (HCC)   • CKD (chronic kidney disease) stage 5, GFR less than 15 ml/min (HCC)   • Hyperlipidemia LDL goal <70   • Coronary artery disease involving native coronary artery of native heart     Past Medical History:   Diagnosis Date   • Anemia    • Coagulopathy (HCC) 2023   • Decompensated hepatic cirrhosis (HCC) 2023   • Diabetes mellitus (HCC)    • Encephalopathy, hepatic 2023   • Hypertension    • Liver cirrhosis secondary to HAYES (HCC)    • Liver lesion    • Other ascites 2023   • Thrombocytopenia (HCC) 2023     Past Surgical History:   Procedure Laterality Date   • ANKLE SURGERY     • CARDIAC CATHETERIZATION N/A 3/25/2023    Procedure: Left Heart Cath;  Surgeon: Nithin Nieto IV, MD;  Location:  [facility];  Service: Cardiovascular;  Laterality: N/A;   • CARDIAC CATHETERIZATION  3/25/2023    Procedure: Percutaneous Manual Thrombectomy;  Surgeon: Nithin Nieto IV, MD;  Location:  [facility]IVE LOCATION;  Service: Cardiovascular;;   • CARDIAC CATHETERIZATION N/A 3/25/2023    Procedure: Stent HARDEEP coronary;  Surgeon: Nithin Nieto IV, MD;  Location:  [facility]IVE LOCATION;  Service: Cardiovascular;  Laterality: N/A;   • EYE SURGERY        General Information     Row Name 23 0923          Physical Therapy Time and Intention    Document Type  evaluation (P)   -HT     Mode of Treatment physical therapy (P)   -HT     Row Name 03/29/23 0923          General Information    Patient Profile Reviewed yes (P)   -HT     Prior Level of Function independent:;all household mobility;community mobility;gait;transfer;bed mobility (P)   uses walking stick at baseline for house and community ambulation.  -HT     Existing Precautions/Restrictions fall (P)   -HT     Barriers to Rehab medically complex;hearing deficit (P)   -HT     Row Name 03/29/23 0923          Living Environment    People in Home spouse (P)   -HT     Row Name 03/29/23 0923          Home Main Entrance    Number of Stairs, Main Entrance one (P)   -HT     Stair Railings, Main Entrance none (P)   -HT     Row Name 03/29/23 0923          Stairs Within Home, Primary    Number of Stairs, Within Home, Primary none (P)   -HT     Row Name 03/29/23 0923          Cognition    Orientation Status (Cognition) oriented x 3 (P)   -HT     Row Name 03/29/23 0923          Safety Issues, Functional Mobility    Safety Issues Affecting Function (Mobility) awareness of need for assistance;insight into deficits/self-awareness;safety precaution awareness;safety precautions follow-through/compliance (P)   -HT     Impairments Affecting Function (Mobility) balance;endurance/activity tolerance (P)   -HT           User Key  (r) = Recorded By, (t) = Taken By, (c) = Cosigned By    Initials Name Provider Type    HT Barbara Oconnell, PT Student PT Student               Mobility     Row Name 03/29/23 0926          Bed Mobility    Bed Mobility supine-sit (P)   -HT     Supine-Sit Adrian (Bed Mobility) minimum assist (75% patient effort);1 person assist (P)   -HT     Assistive Device (Bed Mobility) bed rails;head of bed elevated (P)   -HT     Comment, (Bed Mobility) needed assistance with trunk but performed correctly without cues. (P)   -HT     Row Name 03/29/23 0926          Sit-Stand Transfer    Sit-Stand Adrian (Transfers)  contact guard (P)   -HT     Comment, (Sit-Stand Transfer) 1x STS from EOB (P)   -HT     Row Name 03/29/23 0926          Gait/Stairs (Locomotion)    [place] Level (Gait) minimum assist (75% patient effort);1 person assist;1 person to manage equipment;verbal cues (P)   -HT     Distance in Feet (Gait) 250 (P)   -HT     Deviations/Abnormal Patterns (Gait) right sided deviations;selena decreased;stride length decreased (P)   -HT     Right Sided Gait Deviations heel strike decreased (P)   -HT     [place] Level (Stairs) minimum assist (75% patient effort);1 person assist (P)   -HT     Assistive Device (Stairs) other (see comments) (P)   1 HHA  -HT     Number of Steps (Stairs) 1+1 (P)   -HT     Comment, (Gait/Stairs) Pt ambulated with step through gait pattern showing decreased selena, decreased stride length, and decreased R heel strike as well as occasional swaying and near LOB requiring min A to correct. Pt showed unsteadiness on stairs requiring min A to prevent LOB requiring cues for sequencing. Gait limited by balance deficits and decreased endurance. (P)   -HT           User Key  (r) = Recorded By, (t) = Taken By, (c) = Cosigned By    Initials Name Provider Type    HT Barbara Oconnell, PT Student PT Student               Obj/Interventions     Row Name 03/29/23 0930          Range of Motion Comprehensive    General Range of Motion bilateral lower extremity ROM WFL (P)   -HT     Row Name 03/29/23 0930          Strength Comprehensive (MMT)    General Manual Muscle Testing (MMT) Assessment lower extremity strength deficits identified (P)   -HT     Comment, General Manual Muscle Testing (MMT) Assessment R DF 3+/5, all other LE MMT grossly 4/5 (P)   -HT     Row Name 03/29/23 0930          Balance    Balance Assessment sitting static balance;sitting dynamic balance;standing static balance;standing dynamic balance (P)   -HT     Static Sitting Balance standby assist (P)   -HT     Dynamic Sitting Balance standby  assist (P)   -HT     Position, Sitting Balance sitting edge of bed (P)   -HT     Static Standing Balance contact guard (P)   -HT     Dynamic Standing Balance minimal assist;1-person assist (P)   -HT     Position/Device Used, Standing Balance supported (P)   -HT     Row Name 03/29/23 0930          Sensory Assessment (Somatosensory)    Sensory Assessment (Somatosensory) LE sensation intact (P)   -HT           User Key  (r) = Recorded By, (t) = Taken By, (c) = Cosigned By    Initials Name Provider Type    HT Barbara Oconnell, PT Student PT Student               Goals/Plan     Row Name 03/29/23 0938          Bed Mobility Goal 1 (PT)    Activity/Assistive Device (Bed Mobility Goal 1, PT) sit to supine/supine to sit (P)   -HT     [place] Level/Cues Needed (Bed Mobility Goal 1, PT) independent (P)   -HT     Time Frame (Bed Mobility Goal 1, PT) long term goal (LTG);10 days (P)   -HT     Row Name 03/29/23 0938          Transfer Goal 1 (PT)    Activity/Assistive Device (Transfer Goal 1, PT) sit-to-stand/stand-to-sit;bed-to-chair/chair-to-bed;cane, straight (P)   -HT     [place] Level/Cues Needed (Transfer Goal 1, PT) modified independence (P)   -HT     Time Frame (Transfer Goal 1, PT) long term goal (LTG);10 days (P)   -HT     Row Name 03/29/23 0938          Gait Training Goal 1 (PT)    Activity/Assistive Device (Gait Training Goal 1, PT) gait (walking locomotion);assistive device use;cane, straight (P)   -HT     [place] Level (Gait Training Goal 1, PT) modified independence (P)   -HT     Distance (Gait Training Goal 1, PT) 500 (P)   -HT     Time Frame (Gait Training Goal 1, PT) long term goal (LTG);10 days (P)   -HT     Row Name 03/29/23 0938          Stairs Goal 1 (PT)    Activity/Assistive Device (Stairs Goal 1, PT) ascending stairs;descending stairs;cane, straight (P)   -HT     [place] Level/Cues Needed (Stairs Goal 1, PT) modified independence (P)   -HT     Number of Stairs (Stairs Goal 1, PT) 1 (P)    -HT     Time Frame (Stairs Goal 1, PT) long term goal (LTG);10 days (P)   -HT     Row Name 03/29/23 0938          Therapy Assessment/Plan (PT)    Planned Therapy Interventions (PT) balance training;bed mobility training;gait training;home exercise program;patient/family education;neuromuscular re-education;stair training;strengthening;transfer training (P)   -HT           User Key  (r) = Recorded By, (t) = Taken By, (c) = Cosigned By    Initials Name Provider Type    HT Barbara Oconnell, PT Student PT Student               Clinical Impression     Row Name 03/29/23 0933          Pain    Pretreatment Pain Rating 0/10 - no pain (P)   -HT     Posttreatment Pain Rating 0/10 - no pain (P)   -HT     Additional Documentation Pain Scale: Numbers Pre/Post-Treatment (Group) (P)   -HT     Row Name 03/29/23 0933          Plan of Care Review    Plan of Care Reviewed With patient;spouse (P)   -HT     Progress no change (P)   -HT     Outcome Evaluation Pt ambulated 250' with min A x1 showing limitations of decreased endurance and balance deficits. Rec skilled PT to increase ind with mobility and d/c to home with assist, HHPT, and SPC. (P)   -HT     Row Name 03/29/23 0933          Therapy Assessment/Plan (PT)    Patient/Family Therapy Goals Statement (PT) return to PLOF (P)   -HT     Rehab Potential (PT) good, to achieve stated therapy goals (P)   -HT     Criteria for Skilled Interventions Met (PT) yes;meets criteria;skilled treatment is necessary (P)   -HT     Therapy Frequency (PT) daily (P)   -HT     Row Name 03/29/23 0933          Vital Signs    Pre Systolic BP Rehab 132 (P)   -HT     Pre Treatment Diastolic BP 70 (P)   -HT     Post Systolic BP Rehab 138 (P)   -HT     Post Treatment Diastolic BP 79 (P)   -HT     Pretreatment Heart Rate (beats/min) 112 (P)   -HT     Posttreatment Heart Rate (beats/min) 102 (P)   -HT     Pre SpO2 (%) 94 (P)   -HT     O2 Delivery Pre Treatment room air (P)   -HT     O2 Delivery Intra Treatment room  air (P)   -HT     Post SpO2 (%) 97 (P)   -HT     O2 Delivery Post Treatment room air (P)   -HT     Pre Patient Position Supine (P)   -HT     Intra Patient Position Standing (P)   -HT     Post Patient Position Sitting (P)   -HT     Row Name 03/29/23 0933          Positioning and Restraints    Pre-Treatment Position in bed (P)   -HT     Post Treatment Position chair (P)   -HT     In Chair notified nsg;reclined;sitting;call light within reach;encouraged to call for assist;exit alarm on;with family/caregiver;LUE elevated;RUE elevated;legs elevated;waffle cushion (P)   -HT           User Key  (r) = Recorded By, (t) = Taken By, (c) = Cosigned By    Initials Name Provider Type    HT Barbara Oconnell, PT Student PT Student               Outcome Measures     Row Name 03/29/23 0940          How much help from another person do you currently need...    Turning from your back to your side while in flat bed without using bedrails? 3 (P)   -HT     Moving from lying on back to sitting on the side of a flat bed without bedrails? 3 (P)   -HT     Moving to and from a bed to a chair (including a wheelchair)? 3 (P)   -HT     Standing up from a chair using your arms (e.g., wheelchair, bedside chair)? 3 (P)   -HT     Climbing 3-5 steps with a railing? 3 (P)   -HT     To walk in hospital room? 3 (P)   -HT     AM-PAC 6 Clicks Score (PT) 18 (P)   -HT     Highest level of mobility 6 --> Walked 10 steps or more (P)   -HT     Row Name 03/29/23 0940          Functional Assessment    Outcome Measure Options AM-PAC 6 Clicks Basic Mobility (PT) (P)   -HT           User Key  (r) = Recorded By, (t) = Taken By, (c) = Cosigned By    Initials Name Provider Type    HT Barbara Oconnell, PT Student PT Student                             Physical Therapy Education     Title: PT OT SLP Therapies (In Progress)     Topic: Physical Therapy (In Progress)     Point: Mobility training (Done)     Learning Progress Summary           Patient Acceptance, E, VU,NR by  HT at 3/29/2023 0940                   Point: Home exercise program (Not Started)     Learner Progress:  Not documented in this visit.          Point: Body mechanics (Done)     Learning Progress Summary           Patient Acceptance, E, VU,NR by HT at 3/29/2023 0940                   Point: Precautions (Done)     Learning Progress Summary           Patient Acceptance, E, VU,NR by HT at 3/29/2023 0940                               User Key     Initials Effective Dates Name Provider Type Discipline     01/12/23 -  Barbara Oconnell, PT Student PT Student PT              PT Recommendation and Plan  Planned Therapy Interventions (PT): (P) balance training, bed mobility training, gait training, home exercise program, patient/family education, neuromuscular re-education, stair training, strengthening, transfer training  Plan of Care Reviewed With: (P) patient, spouse  Progress: (P) no change  Outcome Evaluation: (P) Pt ambulated 250' with min A x1 showing limitations of decreased endurance and balance deficits. Rec skilled PT to increase ind with mobility and d/c to home with assist, HHPT, and SPC.     Time Calculation:    PT Charges     Row Name 03/29/23 0941             Time Calculation    Start Time 0839 (P)   -HT      PT Received On 03/29/23 (P)   -HT      PT Goal Re-Cert Due Date 04/08/23 (P)   -HT         Timed Charges    02525 - PT Therapeutic Activity Minutes 8 (P)   -HT         Untimed Charges    PT Eval/Re-eval Minutes 41 (P)   -HT         Total Minutes    Timed Charges Total Minutes 8 (P)   -HT      Untimed Charges Total Minutes 41 (P)   -HT       Total Minutes 49 (P)   -HT            User Key  (r) = Recorded By, (t) = Taken By, (c) = Cosigned By    Initials Name Provider Type     Barbara Oconnell, PT Student PT Student              Therapy Charges for Today     Code Description Service Date Service Provider Modifiers Qty    60036670162 HC PT THERAPEUTIC ACT EA 15 MIN 3/29/2023 Barbara Oconnell, PT Student GP  1    [id number]  PT EVAL MOD COMPLEXITY 3 3/29/2023 Barbara Oconnell, PT Student GP 1          PT G-Codes  Outcome Measure Options: (P) AM-PAC 6 Clicks Basic Mobility (PT)  AM-PAC 6 Clicks Score (PT): (P) 18  PT Discharge Summary  Anticipated Discharge Disposition (PT): (P) home with home health, home with assist    Barbara Oconnell, PT Student  3/29/2023

## 2023-03-29 NOTE — PLAN OF CARE
Goal Outcome Evaluation:   VSS, MAP > 70 maintained. Potassium & Magnesium replaced. Octreotide therapy continued. Patient resting comfortably in bed. Up in chair with PT today. Daughter at bedside.

## 2023-03-29 NOTE — PROGRESS NOTES
"   LOS: 4 days    Patient Care Team:  Antonio Castro MD as PCP - General  Antonio Castro MD as PCP - Family Medicine    Chief Complaint: Shortness of breath     72-year-old male with past medical history of [place], liver cirrhosis, prerenal acute kidney failure was last seen a month ago with a creatinine of 5.0 when he was transferred to  for further management for liver transplant.  Dialysis was not started at that time.    Prior to the above baseline creatinine 0.8-1.2.  On previous admission creatinine was 4.7, before discharge. Labs showed a creatinine 3.94, BUN 48, sodium 135, potassium 4.1, CO2 6.0, calcium 9.6, EGFR 15 mL/min, troponin high 53, hemoglobin 10.5, platelets 111 K, lactate high 17.6, phosphorus 3.9 magnesium 1.9, bilirubin 4.6 other liver enzymes were normal.  Subjective : F/U PAOLO ON CKD.    Interval History:   Critically ill in ICU.  Mild decreasing renal function.  Ammonia level is high.    Review of Systems:   Hard of hearing, abdominal distention, mild shortness of breath, generalized weakness. + COUGH.  All other negative    Objective     Vital Sign Min/Max for last 24 hours  Temp  Min: 97.7 °F (36.5 °C)  Max: 98.3 °F (36.8 °C)   BP  Min: 99/62  Max: 138/79   Pulse  Min: 79  Max: 118   Resp  Min: 15  Max: 18   SpO2  Min: 83 %  Max: 99 %   Flow (L/min)  Min: 2  Max: 2   Weight  Min: 72.7 kg (160 lb 4.4 oz)  Max: 72.7 kg (160 lb 4.4 oz)     Flowsheet Rows    Flowsheet Row First Filed Value   Admission Height 172.7 cm (68\") Documented at 03/25/2023 1055   Admission Weight 72.6 kg (160 lb) Documented at 03/25/2023 1055          I/O this shift:  In: 408.9 [P.O.:240; I.V.:142.7; IV Piggyback:26.2]  Out: 325 [Urine:325]  I/O last 3 completed shifts:  In: 1493.9 [P.O.:418; I.V.:607.5; IV Piggyback:468.4]  Out: 1905 [Urine:1905]    Physical Exam:  General appearance: Sick looking  male mild distress laying in bed.    HEENT: Hard of hearing, oral mucosa moist, neck is supple  Lungs: Few " rhonchi's and rails heard, equal chest movement, no crepitation.  Heart: Normal S1, S2, no gallop, murmur, RRR.  Abdomen: Abdomen distended, positive thrill soft, nontender, positive bowel sounds.  Extremities: Positive lower extremity edema, no cyanosis, no joint swelling.  Neuro: Alert, oriented x4, no focal deficit.  Psych: Mood and affect are normal and appropriate.  Skin: Skin is warm and dry.  : No suprapubic fullness or tenderness. TORRE.    WBC WBC   Date Value Ref Range Status   03/29/2023 8.27 3.40 - 10.80 10*3/mm3 Final   03/28/2023 5.66 3.40 - 10.80 10*3/mm3 Final   03/27/2023 14.01 (H) 3.40 - 10.80 10*3/mm3 Final      HGB Hemoglobin   Date Value Ref Range Status   03/29/2023 8.2 (L) 13.0 - 17.7 g/dL Final   03/28/2023 8.0 (L) 13.0 - 17.7 g/dL Final   03/27/2023 7.7 (L) 13.0 - 17.7 g/dL Final      HCT Hematocrit   Date Value Ref Range Status   03/29/2023 24.7 (L) 37.5 - 51.0 % Final   03/28/2023 23.7 (L) 37.5 - 51.0 % Final   03/27/2023 22.7 (L) 37.5 - 51.0 % Final      Platlets No results found for: LABPLAT   MCV MCV   Date Value Ref Range Status   03/29/2023 102.1 (H) 79.0 - 97.0 fL Final   03/28/2023 100.4 (H) 79.0 - 97.0 fL Final   03/27/2023 100.9 (H) 79.0 - 97.0 fL Final          Sodium Sodium   Date Value Ref Range Status   03/29/2023 138 136 - 145 mmol/L Final   03/28/2023 137 136 - 145 mmol/L Final   03/27/2023 134 (L) 136 - 145 mmol/L Final      Potassium Potassium   Date Value Ref Range Status   03/29/2023 3.2 (L) 3.5 - 5.2 mmol/L Final   03/28/2023 3.5 3.5 - 5.2 mmol/L Final   03/28/2023 3.1 (L) 3.5 - 5.2 mmol/L Final   03/27/2023 3.3 (L) 3.5 - 5.2 mmol/L Final      Chloride Chloride   Date Value Ref Range Status   03/29/2023 94 (L) 98 - 107 mmol/L Final   03/28/2023 90 (L) 98 - 107 mmol/L Final   03/27/2023 87 (L) 98 - 107 mmol/L Final      CO2 CO2   Date Value Ref Range Status   03/29/2023 28.0 22.0 - 29.0 mmol/L Final   03/28/2023 29.0 22.0 - 29.0 mmol/L Final   03/27/2023 27.0 22.0 -  29.0 mmol/L Final      BUN BUN   Date Value Ref Range Status   03/29/2023 71 (H) 8 - 23 mg/dL Final   03/28/2023 77 (H) 8 - 23 mg/dL Final   03/27/2023 63 (H) 8 - 23 mg/dL Final      Creatinine Creatinine   Date Value Ref Range Status   03/29/2023 5.14 (H) 0.76 - 1.27 mg/dL Final   03/28/2023 4.92 (H) 0.76 - 1.27 mg/dL Final   03/27/2023 4.70 (H) 0.76 - 1.27 mg/dL Final      Calcium Calcium   Date Value Ref Range Status   03/29/2023 8.2 (L) 8.6 - 10.5 mg/dL Final   03/28/2023 8.2 (L) 8.6 - 10.5 mg/dL Final   03/27/2023 8.4 (L) 8.6 - 10.5 mg/dL Final      PO4 No results found for: CAPO4   Albumin Albumin   Date Value Ref Range Status   03/29/2023 2.7 (L) 3.5 - 5.2 g/dL Final   03/28/2023 2.7 (L) 3.5 - 5.2 g/dL Final   03/27/2023 2.9 (L) 3.5 - 5.2 g/dL Final      Magnesium Magnesium   Date Value Ref Range Status   03/29/2023 1.7 1.6 - 2.4 mg/dL Final   03/27/2023 2.2 1.6 - 2.4 mg/dL Final      Uric Acid No results found for: URICACID        Results Review:     I reviewed the patient's new clinical results.    aspirin, 81 mg, Oral, Daily  clopidogrel, 75 mg, Oral, Daily  ferrous sulfate, 325 mg, Oral, Daily With Breakfast  fluticasone, 2 spray, Each Nare, Daily  folic acid, 400 mcg, Oral, Daily  insulin lispro, 0-7 Units, Subcutaneous, 4x Daily With Meals & Nightly  lactulose, 20 g, Oral, Daily  midodrine, 15 mg, Oral, TID AC  pantoprazole, 40 mg, Intravenous, Q AM  pharmacy consult - MTM, , Does not apply, Daily  piperacillin-tazobactam, 3.375 g, Intravenous, Q12H  riFAXIMin, 550 mg, Oral, Q12H  sodium bicarbonate, 650 mg, Oral, TID  sodium chloride, 10 mL, Intravenous, Q12H  ursodiol, 300 mg, Oral, BID      norepinephrine, 0.02-0.3 mcg/kg/min, Last Rate: Stopped (03/28/23 0325)  octreotide (SandoSTATIN) infusion, 50 mcg/hr, Last Rate: 50 mcg/hr (03/29/23 1237)        Medication Review: Reviewed    Assessment & Plan       ST elevation myocardial infarction involving right coronary artery (HCC)    PAOLO (acute kidney  injury) (HCC)    Liver cirrhosis secondary to HAYES (HCC)    Hyperbilirubinemia    Coagulopathy (HCC)    Decompensated hepatic cirrhosis (HCC)    CKD (chronic kidney disease) stage 5, GFR less than 15 ml/min (HCC)    Hyperlipidemia LDL goal <70    Coronary artery disease involving native coronary artery of native heart    1.  Acute kidney injury: Patient was last seen a month ago with creatinine 4.5- 5.0 range.  Prior to that baseline was 0.8-1.2, likely diagnosis was hepatorenal versus ATN.  At that time patient was transferred to  for further management.  Patient has been admitted and underwent a cardiac catheterization received 80 mL of IV contrast.   2.  S/p right RCA stenting for the clot 3/25/2023 today  3.  HAYES: Decompensated liver cirrhosis.  Ammonia level 106  4.  Hypotension: In the setting of liver disease.  5.  Anemia  6.  Thrombocytopenia   7.  Thoracentesis: 3 weeks back  8.  Paracentesis: On 3/17/2023 at [facility]. 9. HYPOKALEMIA.     Recommendations:  So far patient has not demonstrated meaningful renal recovery since last admission in Feb and developed acute kidney disease with persistent cr elevation. Niurka any uremic symptoms. Therefore no indication of dialysis. However he is at risk for needing dialysis in near future. He is undergoing txp evaluation at  therefore dialysis is in equation.   - D/C bicarb supplementation   - Will need albumin supp if undergoing paracentesis >3 liter/day  - Hold off on additional diuretics today        Sergei العلي MD  03/29/23  13:03 EDT

## 2023-03-29 NOTE — PROGRESS NOTES
Critical Care Note     LOS: 4 days   Patient Care Team:  Antonio Castro MD as PCP - General  Antonio Castro MD as PCP - Family Medicine    Chief Complaint/Reason for visit:     ST elevation myocardial infarction involving right coronary artery (HCC)    PAOLO (acute kidney injury) (HCC)    Liver cirrhosis secondary to HAYES (HCC)    Hyperbilirubinemia    Coagulopathy (HCC)    Decompensated hepatic cirrhosis (HCC)    CKD (chronic kidney disease) stage 5, GFR less than 15 ml/min (HCC)    Hyperlipidemia LDL goal <70    Coronary artery disease involving native coronary artery of native heart    Subjective     Interval History:     Remains off norepinephrine.  Potassium replaced this morning.  Room air saturation 96%.  Urine output yesterday 1.5 L.  Octreotide day 5.  Decrease number of stools to 3 yesterday.  He denies chest pain or nausea.  Appetite remains poor.  He is in sinus tachycardia.    History taken from: patient,nursing,chart    Review of Systems:    All systems were reviewed and negative except as noted in subjective.    Medical history, surgical history, social history, family history reviewed    Objective     Intake/Output:    Intake/Output Summary (Last 24 hours) at 3/29/2023 1306  Last data filed at 3/29/2023 1200  Gross per 24 hour   Intake 1081.48 ml   Output 1730 ml   Net -648.52 ml       Nutrition:  Diet: Cardiac Diets, Diabetic Diets; Healthy Heart (2-3 Na+); Consistent Carbohydrate; Texture: Regular Texture (IDDSI 7); Fluid Consistency: Thin (IDDSI 0)    Infusions:  norepinephrine, 0.02-0.3 mcg/kg/min, Last Rate: Stopped (03/28/23 0325)  octreotide (SandoSTATIN) infusion, 50 mcg/hr, Last Rate: 50 mcg/hr (03/29/23 1237)        Mechanical Ventilator Settings:                                                Telemetry: Sinus tachycardia, sinus rhythm             Vital Signs  Blood pressure 120/70, pulse 84, temperature 97.7 °F (36.5 °C), temperature source Oral, resp. rate 17, height 172.7 cm  "(67.99\"), weight 72.7 kg (160 lb 4.4 oz), SpO2 96 %.    Physical Exam:  General Appearance:   Semiupright in bed in no distress.   Head:   Atraumatic   Eyes:          Jaundice   Ears:   External ear normal, hard of hearing, hearing aids in place   Throat:  Oral mucosa moist   Neck:  Trachea midline, no palpable thyroid, no crepitus   Back:    Spine is straight   Lungs:    Symmetric chest excursion.  Breath sounds are bilateral, diminished at the bases, right greater than left, clear anteriorly    Heart:   Regular rhythm, S1, S2 auscultated   Abdomen:    Persistent ascites with fluid wave, nontender   Rectal:   Deferred   Extremities:  Bilateral lower extremity edema   Pulses:  Palpable radial pulses   Skin:  Warm and dry   Lymph nodes:  No cervical adenopathy   Neurologic:  He is awake and oriented to name and hospital      Results Review:     I reviewed the patient's new clinical results.   Results from last 7 days   Lab Units 03/29/23  0200 03/28/23  1337 03/28/23  0300 03/27/23  0406   SODIUM mmol/L 138  --  137 134*   POTASSIUM mmol/L 3.2* 3.5 3.1* 3.3*   CHLORIDE mmol/L 94*  --  90* 87*   CO2 mmol/L 28.0  --  29.0 27.0   BUN mg/dL 71*  --  77* 63*   CREATININE mg/dL 5.14*  --  4.92* 4.70*   CALCIUM mg/dL 8.2*  --  8.2* 8.4*   BILIRUBIN mg/dL 2.9*  --  2.7* 2.4*   ALK PHOS U/L 63  --  60 62   ALT (SGPT) U/L 21  --  25 28   AST (SGOT) U/L 68*  --  109* 157*   GLUCOSE mg/dL 121*  --  151* 55*     Results from last 7 days   Lab Units 03/29/23  0328 03/28/23  0300 03/27/23  0406   WBC 10*3/mm3 8.27 5.66 14.01*   HEMOGLOBIN g/dL 8.2* 8.0* 7.7*   HEMATOCRIT % 24.7* 23.7* 22.7*   PLATELETS 10*3/mm3 44* 46* 101*     Results from last 7 days   Lab Units 03/27/23  0322   PH, ARTERIAL pH units 7.516*   PO2 ART mm Hg 72.3*   PCO2, ARTERIAL mm Hg 38.3   HCO3 ART mmol/L 31.0*     Lab Results   Component Value Date    BLOODCX No growth at 3 days 03/25/2023     Lab Results   Component Value Date    URINECX No growth 03/25/2023 "       I reviewed the patient's new imaging including images and reports.    IMPRESSION:     There is a right internal jugular central venous catheter with the catheter tip near the atrial caval junction.     There is a small right pleural effusion and patchy interstitial changes with mild cardiomegaly which is compatible with mild pulmonary edema.     No focal consolidation is otherwise seen.     Electronically signed by:  Lalo Elizondo D.O.    3/25/2023 11:46 PM Mountain Time     Findings:  CT SCAN OF THE CHEST WITHOUT CONTRAST: There is a moderate right pleural effusion, decreased from 2/9/2023. No left effusion or pericardial effusion is seen. There is ectasia of the ascending aorta is approximately 4.4 cm, minimally if at all increased   from the prior study. There is dense coronary artery calcification. No mediastinal adenopathy is seen. Dense peripheral groundglass changes of the left lower lobe are very similar to appearance of Covid 19 pneumonia from the initial months the scan.   There are much milder multifocal interstitial changes the left upper lobe. Small elongated inferior right upper lobe nodule or nodular scar is stable. Airways appear normally patent. Bony structures appear to be intact.     IMPRESSION:  1. Moderate right pleural effusion decreased from 2/9/2023.     2. Left lower lobe pneumonia and much milder left upper lobe pneumonia, which may represent Covid 19 or other atypical pneumonia.     3. Stable small inferior right upper lobe pulmonary nodule.     4. Ectatic ascending aorta to 4.4 cm, stable only minimally increased.           CT SCAN OF THE ABDOMEN AND PELVIS WITHOUT CONTRAST: Separate scans of the abdomen and pelvis are performed, as the initial scan of the abdomen was performed prior to pelvic ct scan being ordered. The studies are dictated together however.     There is extensive ascites, and dense diffuse edema of subcutaneous tissues and particularly dense edema throughout the  small bowel mesentery. There is a small cirrhotic appearing liver with no obvious lesion. Spleen is not enlarged. Pancreas and adrenal   glands appear unremarkable. Concentrated contrast in the renal collecting systems resembles renal calculi. There is a small left lower pole renal cyst measuring near water density. No evidence of obstructive uropathy is seen. There is no evidence of   free air or adenopathy. There is marked distention of stomach with fluid and air, and relatively decompressed duodenum but no evidence of obstructive lesion.     Regarding lower abdomen pelvis, dense small bowel mesenteric edema and extensive ascites are again noted. No abnormally dilated bowel loops, evidence of pneumatosis or significant bowel wall edema is appreciated. Bladder is normally distended and normal   in appearance. No mass or adenopathy is seen. Review of the bony structures shows grade 1 anterolisthesis of L5 on S1 with bilateral pars defects. There is a somewhat transitional appearance of S1 with a rudimentary S1-S2 disc space.     Impression:     1. Extensive ascites and extensive dense anasarca throughout the small bowel mesentery and remaining soft tissues.     2. Small cirrhotic liver.     3. Fluid and air distended stomach, to the level of the duodenum. No obvious obstructing lesion or inflammation, possibly gastric ileus.     4. Incidentally noted grade 1 anterolisthesis of L5 on S1, with bilateral pars defects.     Electronically Signed: Blayne Chung    3/25/2023 8:56 PM EDT    Workstation ID: [id number]    Interpretation Summary echocardiogram March 25, 2023       •  Left ventricular systolic function is normal. Estimated left ventricular EF = 65%  •  The aortic valve is mildly calcified.  There is minimal aortic stenosis present.  •  Moderate pulmonary hypertension is present. Estimated right ventricular systolic pressure from tricuspid regurgitation is moderately elevated (49 mmHg).  •  Moderate dilation of the  aortic root is present. Aneurysmal dilation of the ascending aorta is present.  •  There is a right pleural effusion      Conclusion  Left heart cath, March 25, 2023     •  Severe 2-vessel CAD (RCA, diagonal branch of the LAD)  •  Successful PCI of the proximal RCA with placement of a Xience 4 x 23 mm drug-eluting stent  •  Unsuccessful balloon angioplasty of 100% occluded right posterior lateral branch  •  Unsuccessful manual thrombectomy of 100% occluded RPDA  •  Normal LV filling pressure  •  Medical therapy recommended for diagonal branch stenosis.  •  DAPT (aspirin 81 mg + clopidogrel 75 mg daily) for 1 month due to high bleed risk.       All medications reviewed.   aspirin, 81 mg, Oral, Daily  clopidogrel, 75 mg, Oral, Daily  ferrous sulfate, 325 mg, Oral, Daily With Breakfast  fluticasone, 2 spray, Each Nare, Daily  folic acid, 400 mcg, Oral, Daily  insulin lispro, 0-7 Units, Subcutaneous, 4x Daily With Meals & Nightly  lactulose, 20 g, Oral, Daily  midodrine, 15 mg, Oral, TID AC  pantoprazole, 40 mg, Intravenous, Q AM  pharmacy consult - MTM, , Does not apply, Daily  piperacillin-tazobactam, 3.375 g, Intravenous, Q12H  riFAXIMin, 550 mg, Oral, Q12H  sodium bicarbonate, 650 mg, Oral, TID  sodium chloride, 10 mL, Intravenous, Q12H  ursodiol, 300 mg, Oral, BID          Assessment & Plan       ST elevation myocardial infarction involving right coronary artery (HCC)    PAOLO (acute kidney injury) (HCC)    Liver cirrhosis secondary to HAYES (HCC)    Hyperbilirubinemia    Coagulopathy (HCC)    Decompensated hepatic cirrhosis (HCC)    CKD (chronic kidney disease) stage 5, GFR less than 15 ml/min (HCC)    Hyperlipidemia LDL goal <70    Coronary artery disease involving native coronary artery of native heart     72 y.o.male with relevant PMH of Stage V CKD not on HD, decompensated HAYES cirrhosis with ascites (rejected for Liver-Kidney Txp at , being evaluated at  - not on transplant list), varices and recurrent  hepatic hydrothorax (undergone 4 thoracentesis), prior Paracentesis x 1, diabetes, HTN, anemia, thrombocytopenia admitted 3/25/2023 from OSH w/ STEMI.  He had presented there for increased SOA and was hoping to get thoracentesis or paracentesis.     Emergent LHC showed severe 2-vessel CAD (RCA & LAD).  Had PCI proximal RCA w/ HARDEEP and Unsuccessful balloon of 100% RPL / Unsuccessful thrombectomy 100% occluded RPDA.  He received a loading dose of Plavix in the Cath Lab.  Maintenance dose was started March 27.  Echo revealed an EF of 65%.     In addition to above, patient noted to have profound lactic acidosis.  This seem out of proportion to his clinical appearance appearance - I.e. does not appear that he is in overwhelming shock with poor perfusion.  Has had poor po intake recently.  Suspect there is strong component of Type B LA .  March 27 lactic acid was down to 6 compared to 20 on admission.  Today serum bicarbonate is 28.    He also underwent a large-volume paracentesis March 26 with 4.5 L removed.  Fluid did not appear infected.  Bilirubin today is 2.9 compared to 9 earlier in the week.  Albumin is low at 2.7.  Unfortunately his creatinine continues to climb and today is 5.12.  Urine output remains adequate.  He is maintaining an adequate blood pressure off norepinephrine for 2 days.  Hemoglobin has also remained stable, 8.2.  Platelet count has dropped from 100 to 44.  Pro time INR is mildly elevated at 2.05.  He does not have evidence of active bleeding currently.    I personally feel we should not consider hemodialysis in this individual given his heart disease, severe liver disease and denial for liver transplant.  I think the patient and the family are having a difficult time with this end-stage disease.  Creatinine today is 5.12.  On February 19 his creatinine was 3.  Last November his creatinine was 1.14.  In January 2022 his creatinine was 0.98.  Clearly in the last 4 months his renal function has  dramatically declined.  CT scan of the abdomen in February 2023 revealed a right renal cyst but no hydronephrosis.  Unfortunately, I do not clinically feel the family or the patient have reached a point where they would consider limiting care    PLAN:    For his coronary artery disease and drug-eluting stent, Plavix and aspirin restarted, but no statin because of his liver disease.    Potassium was 3.1 and being replaced.  Magnesium also slightly decreased and being replaced with low-dose renal dosing.    Continue Zosyn for mildly elevated procalcitonin, possible infection, will complete an empiric 7-day course    Rifaximin, lactulose for hepatic encephalopathy and elevated ammonia  Continue octreotide, for now but consider changing to subcutaneous as this is day 5  Monitor liver function test, coagulation studies, H&H      If renal function continues to deteriorate will have little choice but to initiate dialysis, or transition to palliative  Nephrology gave him a dose of furosemide yesterday and he did have improved urine output  Repeat chest x-ray to reevaluate hepatohydrothorax  If pleural fluid has reaccumulated there is little benefit in repeated thoracentesis as his ascites is severe enough that effusion will rapidly reaccumulate    Continue midodrine for orthostatic hypotension      Encourage p.o. intake      VTE Prophylaxis:SCDS    Stress Ulcer Prophylaxis:protonix    Patient remains critically ill with end-stage liver disease, worsening renal failure, hypotension requiring vasopressors, encephalopathy secondary to liver disease and the need of ongoing ICU monitoring and care    Yen Rodrigues MD  03/29/23  13:06 EDT      Time: Critical care 35 min  I personally provided care to this critically ill patient as documented above.  Critical care time does not include time spent on separately billed procedures.  None of my critical care time was concurrent with other critical care providers.

## 2023-03-29 NOTE — PLAN OF CARE
Goal Outcome Evaluation:  - VSS  - Potassium level 3.1. Started replacement with potassium 40meq this morning  - Levophed still off with map maintained above 70  - 1 BM over night   - Resting comfortably in bed

## 2023-03-29 NOTE — PROGRESS NOTES
Cardiology Progress Note      Reason for visit:    · Code AMI/inferior    IDENTIFICATION: 72-year-old gentleman who resides in [place], [place]    Active Hospital Problems    Diagnosis  POA   • **ST elevation myocardial infarction involving right coronary artery (HCC) [I21.11]  Yes     Priority: High   • Coronary artery disease involving native coronary artery of native heart [I25.10]  Yes     Priority: High     · Cardiac cath for STEMI (3/25/2023): Severe two-vessel disease RCA and diagonal of LAD.  PCI of RCA.  Unsuccessful manual thrombectomy of 100% occluded RPDA.  Unsuccessful angioplasty to right posterior lateral branch.  Medical therapy recommended for diagonal branch.     • CKD (chronic kidney disease) stage 5, GFR less than 15 ml/min (HCC) [N18.5]  Yes     Priority: High   • PAOLO (acute kidney injury) (HCC) [N17.9]  Yes     Priority: High   • Hyperlipidemia LDL goal <70 [E78.5]  Yes     Priority: Medium   • Decompensated hepatic cirrhosis (HCC) [K72.90, K74.60]  Yes     Priority: Medium   • Coagulopathy (HCC) [D68.9]  Yes     Priority: Medium   • Liver cirrhosis secondary to HAYES (HCC) [K75.81, K74.60]  Yes     Priority: Medium   • Hyperbilirubinemia [E80.6]  Yes     Priority: Medium              No events noted overnight.  Patient is maintaining normal sinus rhythm and denies any chest pain or shortness of breath.  He is breathing easy on room air.  He is on dual antiplatelet therapy with aspirin and Plavix.  Platelets are low at 46 yesterday and 44 today.  Creatinine has continued to worsen and is over 5.  Nephrology and gastroenterology are following.  Right radial access site from cath performed 4 days ago is soft with no hematoma or bleeding but bruising noted.       Vital Sign Min/Max for last 24 hours  Temp  Min: 97.7 °F (36.5 °C)  Max: 98.3 °F (36.8 °C)   BP  Min: 99/62  Max: 122/75   Pulse  Min: 79  Max: 101   Resp  Min: 15  Max: 18   SpO2  Min: 83 %  Max: 99 %   Flow (L/min)  Min: 2  Max: 2       Intake/Output Summary (Last 24 hours) at 3/29/2023 0730  Last data filed at 3/29/2023 0600  Gross per 24 hour   Intake 993.41 ml   Output 1515 ml   Net -521.59 ml           Physical Exam  Constitutional:       General: He is awake.   Cardiovascular:      Rate and Rhythm: Normal rate and regular rhythm.      Heart sounds: Murmur heard.   Pulmonary:      Effort: Pulmonary effort is normal.      Breath sounds: Normal breath sounds.   Abdominal:      General: There is distension.   Skin:     General: Skin is warm and dry.   Neurological:      Mental Status: He is alert and oriented to person, place, and time.   Psychiatric:         Behavior: Behavior is cooperative.         Tele: Normal sinus rhythm     Results Review (reviewed the patient's recent labs in the electronic medical record):      EKG (3/25/2023): Sinus tachycardia with first-degree AV block, anterior infarct and inferior injury pattern     CXR (3/26/2023): Small right pleural effusion and patchy interstitial changes with mild cardiomegaly compatible with mild pulmonary edema     ECHO (3/25/2023): LVEF 65%.  Calcification aortic valve with minimal stenosis.  Moderate pulmonary hypertension with RVSP 49 mmHg.  Moderate dilation aortic root and aneurysmal dilation of ascending aorta.  Right pleural effusion  Results from last 7 days   Lab Units 03/29/23  0200 03/28/23  1337 03/28/23  0300 03/27/23  0406 03/26/23  0054 03/25/23  1317 03/25/23  1112 03/25/23  1103   SODIUM mmol/L 138  --  137 134* 136 135*  --  133*   POTASSIUM mmol/L 3.2* 3.5 3.1* 3.3* 4.1 4.1  --  3.8   CHLORIDE mmol/L 94*  --  90* 87* 89* 91*  --  89*   BUN mg/dL 71*  --  77* 63* 50* 48*  --  43*   CREATININE mg/dL 5.14*  --  4.92* 4.70* 4.14* 3.94*   < > 4.07*   MAGNESIUM mg/dL 1.7  --   --  2.2 1.8  --   --  1.9    < > = values in this interval not displayed.     Results from last 7 days   Lab Units 03/25/23  1317 03/25/23  1103   HSTROP T ng/L 792* 739*     Results from last 7 days   Lab  Units 03/29/23  0328 03/28/23  0300 03/27/23  0406   WBC 10*3/mm3 8.27 5.66 14.01*   HEMOGLOBIN g/dL 8.2* 8.0* 7.7*   HEMATOCRIT % 24.7* 23.7* 22.7*   PLATELETS 10*3/mm3 44* 46* 101*       Lab Results   Component Value Date    HGBA1C 5.60 03/25/2023       Lab Results   Component Value Date    CHOL 94 03/25/2023              STEMI involving right coronary artery  • Status post HARDEEP to proximal RCA with residual thrombotic occlusions in the distal vessels not able to be adequately revascularized  • Continue aspirin 81+ clopidogrel 75 mg daily  • No beta-blocker due to hypotension     Hypotension  • Midodrine 15 mg 3 times daily   • Levophed drip weaned 3/27/2023  • Current /67     Hyperlipidemia  • No statin due to decompensated liver cirrhosis     Stage V chronic kidney disease  • Rejected for transplant at  currently being evaluated at   • Creatinine continues to worsen and currently 5.14  • Anemic with H&H of 8.2 and 24.7.  On supplemental p.o. iron  • Renal ultrasound shows normal-appearing kidneys with no obstructive uropathy.  Incidentally small left renal cyst.  • Likely hepatorenal syndrome  • Nephrology following and thinks patient likely will require renal replacement therapy soon     End-stage liver disease/decompensated liver cirrhosis/thrombocytopenia  • Rejected for transplant at  and currently being evaluated at   • Ascites present.  Bedside ultrasound showed fluid in all quadrants.  • ALT 21 and AST 68  • Paracentesis performed 3/26/2023  • Thrombocytopenic, platelets decreased 44 was 46 yesterday  • Management per GI                   · Continue dual antiplatelet therapy.  Continue to watch platelets.  Complex case with end-stage liver disease and coagulopathy  · Continue midodrine for hypotension  · No beta-blocker due to hypotension  · No statin due to end-stage liver disease/liver cirrhosis  · Please call cardiology with any further questions.  We will see as needed.    Electronically  signed by QUOC Montalvo, 03/28/23, 7:50 AM EDT.

## 2023-03-29 NOTE — CASE MANAGEMENT/SOCIAL WORK
Continued Stay Note  [facility]     Patient Name: Leoncio Hopson  MRN: [id number]  Today's Date: 3/29/2023    Admit Date: 3/25/2023    Plan: Home with Home Health Services   Discharge Plan     Row Name 03/29/23 1142       Plan    Plan Home with Home Health Services    Patient/Family in Agreement with Plan yes    Plan Comments Per MDR, norepinephrine successfully weaned off.  Mr. Hopson is currently requiring 2L O2 via Nasal cannula.  PT has recommended home health services.  I have confirmed with Elisabeth at [facility] that she will review the referral.  CM will cont to follow the evolving plan of care and assist with discharge needs as recommendations become available.    Final Discharge Disposition Code 06 - home with home health care               Discharge Codes    No documentation.               Expected Discharge Date and Time     Expected Discharge Date Expected Discharge Time    April 1, 2023             Zenobia Olsen RN

## 2023-03-30 ENCOUNTER — APPOINTMENT (OUTPATIENT)
Dept: GENERAL RADIOLOGY | Facility: HOSPITAL | Age: 73
DRG: 246 | End: 2023-03-30
Payer: MEDICARE

## 2023-03-30 LAB
ALBUMIN SERPL-MCNC: 2.6 G/DL (ref 3.5–5.2)
ALBUMIN/GLOB SERPL: 0.9 G/DL
ALP SERPL-CCNC: 67 U/L (ref 39–117)
ALT SERPL W P-5'-P-CCNC: 19 U/L (ref 1–41)
ANION GAP SERPL CALCULATED.3IONS-SCNC: 14 MMOL/L (ref 5–15)
AST SERPL-CCNC: 55 U/L (ref 1–40)
BACTERIA SPEC AEROBE CULT: NORMAL
BACTERIA SPEC AEROBE CULT: NORMAL
BILIRUB SERPL-MCNC: 3.3 MG/DL (ref 0–1.2)
BUN SERPL-MCNC: 69 MG/DL (ref 8–23)
BUN/CREAT SERPL: 14.2 (ref 7–25)
CALCIUM SPEC-SCNC: 8.3 MG/DL (ref 8.6–10.5)
CHLORIDE SERPL-SCNC: 95 MMOL/L (ref 98–107)
CO2 SERPL-SCNC: 27 MMOL/L (ref 22–29)
CREAT SERPL-MCNC: 4.85 MG/DL (ref 0.76–1.27)
DEPRECATED RDW RBC AUTO: 63.5 FL (ref 37–54)
EGFRCR SERPLBLD CKD-EPI 2021: 11.9 ML/MIN/1.73
ERYTHROCYTE [DISTWIDTH] IN BLOOD BY AUTOMATED COUNT: 17.2 % (ref 12.3–15.4)
GLOBULIN UR ELPH-MCNC: 2.8 GM/DL
GLUCOSE BLDC GLUCOMTR-MCNC: 136 MG/DL (ref 70–130)
GLUCOSE BLDC GLUCOMTR-MCNC: 213 MG/DL (ref 70–130)
GLUCOSE BLDC GLUCOMTR-MCNC: 293 MG/DL (ref 70–130)
GLUCOSE BLDC GLUCOMTR-MCNC: 311 MG/DL (ref 70–130)
GLUCOSE SERPL-MCNC: 124 MG/DL (ref 65–99)
HCT VFR BLD AUTO: 25.9 % (ref 37.5–51)
HGB BLD-MCNC: 8.8 G/DL (ref 13–17.7)
MAGNESIUM SERPL-MCNC: 1.9 MG/DL (ref 1.6–2.4)
MCH RBC QN AUTO: 34.6 PG (ref 26.6–33)
MCHC RBC AUTO-ENTMCNC: 34 G/DL (ref 31.5–35.7)
MCV RBC AUTO: 102 FL (ref 79–97)
PLATELET # BLD AUTO: 52 10*3/MM3 (ref 140–450)
PMV BLD AUTO: 0 FL (ref 6–12)
POTASSIUM SERPL-SCNC: 3.7 MMOL/L (ref 3.5–5.2)
PROT SERPL-MCNC: 5.4 G/DL (ref 6–8.5)
RBC # BLD AUTO: 2.54 10*6/MM3 (ref 4.14–5.8)
SODIUM SERPL-SCNC: 136 MMOL/L (ref 136–145)
WBC NRBC COR # BLD: 13.05 10*3/MM3 (ref 3.4–10.8)

## 2023-03-30 PROCEDURE — 25010000002 PIPERACILLIN SOD-TAZOBACTAM PER 1 G: Performed by: INTERNAL MEDICINE

## 2023-03-30 PROCEDURE — 97530 THERAPEUTIC ACTIVITIES: CPT

## 2023-03-30 PROCEDURE — 99233 SBSQ HOSP IP/OBS HIGH 50: CPT | Performed by: NURSE PRACTITIONER

## 2023-03-30 PROCEDURE — 85027 COMPLETE CBC AUTOMATED: CPT | Performed by: INTERNAL MEDICINE

## 2023-03-30 PROCEDURE — 97165 OT EVAL LOW COMPLEX 30 MIN: CPT

## 2023-03-30 PROCEDURE — 25010000002 OCTREOTIDE PER 25 MCG: Performed by: NURSE PRACTITIONER

## 2023-03-30 PROCEDURE — 71045 X-RAY EXAM CHEST 1 VIEW: CPT

## 2023-03-30 PROCEDURE — 63710000001 INSULIN LISPRO (HUMAN) PER 5 UNITS: Performed by: INTERNAL MEDICINE

## 2023-03-30 PROCEDURE — 82962 GLUCOSE BLOOD TEST: CPT

## 2023-03-30 PROCEDURE — 83735 ASSAY OF MAGNESIUM: CPT | Performed by: INTERNAL MEDICINE

## 2023-03-30 PROCEDURE — 99232 SBSQ HOSP IP/OBS MODERATE 35: CPT | Performed by: INTERNAL MEDICINE

## 2023-03-30 PROCEDURE — 97535 SELF CARE MNGMENT TRAINING: CPT

## 2023-03-30 PROCEDURE — 80053 COMPREHEN METABOLIC PANEL: CPT | Performed by: INTERNAL MEDICINE

## 2023-03-30 PROCEDURE — 25010000002 OCTREOTIDE PER 25 MCG: Performed by: INTERNAL MEDICINE

## 2023-03-30 RX ORDER — FUROSEMIDE 40 MG/1
40 TABLET ORAL DAILY
Status: DISCONTINUED | OUTPATIENT
Start: 2023-03-30 | End: 2023-04-02

## 2023-03-30 RX ADMIN — TAZOBACTAM SODIUM AND PIPERACILLIN SODIUM 3.38 G: 375; 3 INJECTION, SOLUTION INTRAVENOUS at 08:08

## 2023-03-30 RX ADMIN — Medication 400 MCG: at 08:10

## 2023-03-30 RX ADMIN — INSULIN LISPRO 4 UNITS: 100 INJECTION, SOLUTION INTRAVENOUS; SUBCUTANEOUS at 12:15

## 2023-03-30 RX ADMIN — URSODIOL 300 MG: 300 CAPSULE ORAL at 08:09

## 2023-03-30 RX ADMIN — TAZOBACTAM SODIUM AND PIPERACILLIN SODIUM 3.38 G: 375; 3 INJECTION, SOLUTION INTRAVENOUS at 20:52

## 2023-03-30 RX ADMIN — Medication 10 ML: at 20:52

## 2023-03-30 RX ADMIN — RIFAXIMIN 550 MG: 550 TABLET ORAL at 08:09

## 2023-03-30 RX ADMIN — ASPIRIN 81 MG CHEWABLE TABLET 81 MG: 81 TABLET CHEWABLE at 08:09

## 2023-03-30 RX ADMIN — INSULIN LISPRO 3 UNITS: 100 INJECTION, SOLUTION INTRAVENOUS; SUBCUTANEOUS at 20:53

## 2023-03-30 RX ADMIN — OCTREOTIDE ACETATE 50 MCG/HR: 500 INJECTION, SOLUTION INTRAVENOUS; SUBCUTANEOUS at 08:09

## 2023-03-30 RX ADMIN — Medication 10 ML: at 08:08

## 2023-03-30 RX ADMIN — PANTOPRAZOLE SODIUM 40 MG: 40 INJECTION, POWDER, LYOPHILIZED, FOR SOLUTION INTRAVENOUS at 05:54

## 2023-03-30 RX ADMIN — FERROUS SULFATE TAB 325 MG (65 MG ELEMENTAL FE) 325 MG: 325 (65 FE) TAB at 08:09

## 2023-03-30 RX ADMIN — MIDODRINE HYDROCHLORIDE 15 MG: 5 TABLET ORAL at 12:16

## 2023-03-30 RX ADMIN — MIDODRINE HYDROCHLORIDE 15 MG: 5 TABLET ORAL at 16:30

## 2023-03-30 RX ADMIN — CLOPIDOGREL BISULFATE 75 MG: 75 TABLET ORAL at 08:09

## 2023-03-30 RX ADMIN — OCTREOTIDE ACETATE 50 MCG/HR: 500 INJECTION, SOLUTION INTRAVENOUS; SUBCUTANEOUS at 18:28

## 2023-03-30 RX ADMIN — INSULIN LISPRO 5 UNITS: 100 INJECTION, SOLUTION INTRAVENOUS; SUBCUTANEOUS at 16:42

## 2023-03-30 RX ADMIN — LACTULOSE 20 G: 20 SOLUTION ORAL at 08:07

## 2023-03-30 RX ADMIN — RIFAXIMIN 550 MG: 550 TABLET ORAL at 20:52

## 2023-03-30 RX ADMIN — FLUTICASONE PROPIONATE 2 SPRAY: 50 SPRAY, METERED NASAL at 08:09

## 2023-03-30 RX ADMIN — FUROSEMIDE 40 MG: 40 TABLET ORAL at 14:16

## 2023-03-30 RX ADMIN — URSODIOL 300 MG: 300 CAPSULE ORAL at 20:52

## 2023-03-30 RX ADMIN — MIDODRINE HYDROCHLORIDE 15 MG: 5 TABLET ORAL at 08:08

## 2023-03-30 NOTE — PLAN OF CARE
Problem: Adult Inpatient Plan of Care  Goal: Readiness for Transition of Care  Outcome: Ongoing, Progressing   Goal Outcome Evaluation:   Patient VSS overnight, complains of SOB occasionally with movement in the bed. 1 BM overnight. Disoriented to time. -125q2 hrs. Octreotide currently infusing.

## 2023-03-30 NOTE — PROGRESS NOTES
Clinical Nutrition     Reason for Visit: MDR, Follow-up protocol      Patient Name: Leoncio Hopson  YOB: 1950  MRN: [id number]  Date of Encounter: 03/30/23 19:15 EDT  Admission date: 3/25/2023      Nutrition Assessment       Problem List:    ST elevation myocardial infarction involving right coronary artery (HCC)    PAOLO (acute kidney injury) (HCC)    Liver cirrhosis secondary to HAYES (HCC)    Hyperbilirubinemia    Coagulopathy (HCC)    Decompensated hepatic cirrhosis (HCC)    CKD (chronic kidney disease) stage 5, GFR less than 15 ml/min (HCC)    Hyperlipidemia LDL goal <70    Coronary artery disease involving native coronary artery of native heart     s/p RCA stent 3-25-23     s/p Paracentesis 3-26-23    PMH:   He  has a past medical history of Anemia, Coagulopathy (HCC) (2/11/2023), Decompensated hepatic cirrhosis (HCC) (2/11/2023), Diabetes mellitus (HCC), Encephalopathy, hepatic (2/11/2023), Hypertension, Liver cirrhosis secondary to HAYES (HCC), Liver lesion, Other ascites (2/11/2023), and Thrombocytopenia (HCC) (2/11/2023).    PSH:  He  has a past surgical history that includes Ankle surgery; Eye surgery; Cardiac catheterization (N/A, 3/25/2023); Cardiac catheterization (3/25/2023); and Cardiac catheterization (N/A, 3/25/2023).      Applicable Nutrition Concerns:   Skin: gluteal area- red/ blanchable  GI: ascites, last bm 3-30      Reported/Observed/Food/Nutrition Related History:     Per RN: pt had 500ml UOP last night, off levophed, swallowing ok    Pt walked in hallway with PT today    Pt resting in bed at present, very [place], reports poor appetite, is eating magic cups    Labs    Labs Reviewed: Yes     Results from last 7 days   Lab Units 03/30/23  0206 03/29/23  1619 03/29/23  0200 03/28/23  1337 03/28/23  0300 03/27/23  0406 03/26/23  0054   GLUCOSE mg/dL 124*  --  121*  --  151* 55* 182*   BUN mg/dL 69*  --  71*  --  77* 63* 50*   CREATININE mg/dL 4.85*  --  5.14*  --   4.92* 4.70* 4.14*   SODIUM mmol/L 136  --  138  --  137 134* 136   CHLORIDE mmol/L 95*  --  94*  --  90* 87* 89*   POTASSIUM mmol/L 3.7 3.4* 3.2*   < > 3.1* 3.3* 4.1   PHOSPHORUS mg/dL  --   --   --   --  3.8 4.0 3.8   MAGNESIUM mg/dL 1.9  --  1.7  --   --  2.2 1.8   ALT (SGPT) U/L 19  --  21  --  25 28 23    < > = values in this interval not displayed.       Results from last 7 days   Lab Units 03/30/23  0206 03/29/23  0200 03/28/23  0300 03/26/23  0054 03/25/23  1310 03/25/23  1103   ALBUMIN g/dL 2.6* 2.7* 2.7*   < >  --  3.2*   IONIZED CALCIUM mmol/L  --   --   --   --  1.21  --    CHOLESTEROL mg/dL  --   --   --   --   --  94    < > = values in this interval not displayed.       Results from last 7 days   Lab Units 03/30/23  1637 03/30/23  1135 03/30/23  0705 03/29/23  2128 03/29/23  1613 03/29/23  1145   GLUCOSE mg/dL 311* 293* 136* 126 179* 215*     Lab Results   Lab Value Date/Time    HGBA1C 5.60 03/25/2023 1103    HGBA1C 6.10 (H) 02/10/2023 0301    HGBA1C 5.9 (H) 04/22/2022 0912                 Medications    Medications Reviewed: Yes  Pertinent: abx, iron, folate, insulin, lactulose, rifaximin, midodrine, protonix, ursodiol  Infusion: octreotide        Intake/Ouptut 24 hrs (0701 - 0700)   I&O's Reviewed: Yes     Intake & Output (last day)       03/30 0701 03/31 0700    P.O. 240    I.V. (mL/kg) 109.4 (1.5)    IV Piggyback 50    Total Intake(mL/kg) 399.4 (5.5)    Urine (mL/kg/hr) 753 (0.9)    Stool 0    Total Output 753    Net -353.6         Stool Unmeasured Occurrence 3 x            Anthropometrics       Height: 68in  Weight: 157lb bedscale on admit  BMI: 23.9  BMI classification: Normal: 18.5-24.9kg/m2    Last 15 Recorded Weights  View Complete Flowsheet  Weight Weight (kg) Weight (lbs) Weight Method   3/28/2023 73.1 kg 161 lb 2.5 oz Bed scale   3/28/2023 74.5 kg 164 lb 3.9 oz Bed scale   3/28/2023 71.2 kg 156 lb 15.5 oz Bed scale   3/25/2023 71.4 kg 157 lb 6.5 oz -   3/25/2023 71.4 kg 157 lb 6.5 oz Bed  scale   3/25/2023 72.576 kg 160 lb -   2/14/2023 72.984 kg 160 lb 14.4 oz Bed scale   2/13/2023 72.984 kg 160 lb 14.4 oz Bed scale   2/12/2023 71.714 kg 158 lb 1.6 oz Bed scale   2/11/2023 72.576 kg 160 lb Bed scale   2/10/2023 71.215 kg 157 lb -   2/10/2023 71.532 kg 157 lb 11.2 oz Bed scale   2/9/2023 72.122 kg 159 lb Bed scale   2/9/2023 72.576 kg 160 lb          Current Nutrition Prescription     PO: Diet: Cardiac Diets, Diabetic Diets; Healthy Heart (2-3 Na+); Consistent Carbohydrate; Texture: Regular Texture (IDDSI 7); Fluid Consistency: Thin (IDDSI 0)     Oral Nutrition Supplement: Magic Cups 3x/day    Intake: 30% of 5 meals      Nutrition Diagnosis   Date: 3-28-23 Updated: 3-30  Problem Inadequate oral intake   Etiology Per Clinical Status   Signs/Symptoms Po intake 30%   Status: gradually improving    Goal:   General: Nutrition support treatment     PO: Increase intake      Nutrition Intervention      Follow treatment progress, Interview for preferences, Menu provided, Menu adjusted, Encourage intake      Monitoring/Evaluation:   Per protocol, I&O, PO intake, Supplement intake, Pertinent labs, Weight, Skin status, GI status, Symptoms, Hemodynamic stability      Daisy Hassan RD  Time Spent: 30min

## 2023-03-30 NOTE — THERAPY EVALUATION
Patient Name: Leoncio Hopson  : 1950    MRN: [id number]                              Today's Date: 3/30/2023       Admit Date: 3/25/2023    Visit Dx:     ICD-10-CM ICD-9-CM   1. Hyperammonemia (HCC)  E72.20 270.6     Patient Active Problem List   Diagnosis   • PAOLO (acute kidney injury) (HCC)   • Liver cirrhosis secondary to HAYES (HCC)   • Hyperbilirubinemia   • Anemia, chronic disease   • Hyperammonemia (HCC)   • Coagulopathy (HCC)   • Thrombocytopenia (HCC)   • Decompensated hepatic cirrhosis (HCC)   • ST elevation myocardial infarction involving right coronary artery (HCC)   • CKD (chronic kidney disease) stage 5, GFR less than 15 ml/min (HCC)   • Hyperlipidemia LDL goal <70   • Coronary artery disease involving native coronary artery of native heart     Past Medical History:   Diagnosis Date   • Anemia    • Coagulopathy (HCC) 2023   • Decompensated hepatic cirrhosis (HCC) 2023   • Diabetes mellitus (HCC)    • Encephalopathy, hepatic 2023   • Hypertension    • Liver cirrhosis secondary to HAYES (HCC)    • Liver lesion    • Other ascites 2023   • Thrombocytopenia (HCC) 2023     Past Surgical History:   Procedure Laterality Date   • ANKLE SURGERY     • CARDIAC CATHETERIZATION N/A 3/25/2023    Procedure: Left Heart Cath;  Surgeon: Nithin Nieto IV, MD;  Location:  [facility] CATH INVASIVE LOCATION;  Service: Cardiovascular;  Laterality: N/A;   • CARDIAC CATHETERIZATION  3/25/2023    Procedure: Percutaneous Manual Thrombectomy;  Surgeon: Nithin Nieto IV, MD;  Location:  [facility] CATH INVASIVE LOCATION;  Service: Cardiovascular;;   • CARDIAC CATHETERIZATION N/A 3/25/2023    Procedure: Stent HARDEEP coronary;  Surgeon: Nithin Nieto IV, MD;  Location:  [facility] CATH INVASIVE LOCATION;  Service: Cardiovascular;  Laterality: N/A;   • EYE SURGERY        General Information     Row Name 23 0920          OT Time and Intention    Document Type evaluation  -CS      Mode of Treatment occupational therapy  -CS     Row Name 03/30/23 0920          General Information    Patient Profile Reviewed yes  -CS     Prior Level of Function independent:;all household mobility;ADL's  -CS     Existing Precautions/Restrictions fall;cardiac  -CS     Barriers to Rehab medically complex;hearing deficit  -CS     Row Name 03/30/23 0920          Living Environment    People in Home spouse  -CS     Row Name 03/30/23 0920          Home Main Entrance    Number of Stairs, Main Entrance one  -CS     Row Name 03/30/23 0920          Cognition    Orientation Status (Cognition) oriented x 3  -CS     Row Name 03/30/23 0920          Safety Issues, Functional Mobility    Impairments Affecting Function (Mobility) balance;endurance/activity tolerance  -CS           User Key  (r) = Recorded By, (t) = Taken By, (c) = Cosigned By    Initials Name Provider Type    CS Britta Wu, OT Occupational Therapist                 Mobility/ADL's     Row Name 03/30/23 0920          Bed Mobility    Bed Mobility supine-sit  -CS     Supine-Sit [place] (Bed Mobility) minimum assist (75% patient effort);verbal cues  -CS     Assistive Device (Bed Mobility) bed rails;head of bed elevated  -CS     Row Name 03/30/23 0920          Transfers    Transfers sit-stand transfer;stand-sit transfer;toilet transfer;bed-chair transfer  -CS     Row Name 03/30/23 0920          Bed-Chair Transfer    Bed-Chair [place] (Transfers) minimum assist (75% patient effort);verbal cues  -CS     Assistive Device (Bed-Chair Transfers) walker, front-wheeled  -CS     Row Name 03/30/23 0920          Sit-Stand Transfer    Sit-Stand [place] (Transfers) contact guard;verbal cues  -CS     Assistive Device (Sit-Stand Transfers) walker, front-wheeled  -CS     Row Name 03/30/23 0920          Stand-Sit Transfer    Stand-Sit [place] (Transfers) contact guard;verbal cues  -CS     Assistive Device (Stand-Sit Transfers) walker, front-wheeled  -CS     Row  Name 03/30/23 0920          Toilet Transfer    Type (Toilet Transfer) stand pivot/stand step  -     [place] Level (Toilet Transfer) verbal cues;minimum assist (75% patient effort)  -     Assistive Device (Toilet Transfer) commode, bedside without drop arms;walker, front-wheeled  -     Row Name 03/30/23 0920          Activities of Daily Living    BADL Assessment/Intervention lower body dressing;toileting  -     Row Name 03/30/23 0920          Lower Body Dressing Assessment/Training    [place] Level (Lower Body Dressing) don;socks;dependent (less than 25% patient effort)  -CS     Position (Lower Body Dressing) supine  -CS     Row Name 03/30/23 0920          Toileting Assessment/Training    [place] Level (Toileting) adjust/manage clothing;perform perineal hygiene;maximum assist (25% patient effort)  -CS     Position (Toileting) supported sitting;supported standing  -           User Key  (r) = Recorded By, (t) = Taken By, (c) = Cosigned By    Initials Name Provider Type     Britta Wu OT Occupational Therapist               Obj/Interventions     Row Name 03/30/23 0922          Sensory Assessment (Somatosensory)    Sensory Assessment (Somatosensory) UE sensation intact  -     Row Name 03/30/23 0922          Range of Motion Comprehensive    General Range of Motion bilateral upper extremity ROM WFL  -     Row Name 03/30/23 0922          Strength Comprehensive (MMT)    Comment, General Manual Muscle Testing (MMT) Assessment BUE grossly observed 4-/5  -CS     Row Name 03/30/23 0922          Balance    Balance Assessment sitting static balance;sitting dynamic balance;standing static balance;standing dynamic balance  -     Static Sitting Balance standby assist  -     Dynamic Sitting Balance standby assist  -     Position, Sitting Balance sitting edge of bed  -     Static Standing Balance contact guard  -     Dynamic Standing Balance minimal assist  -CS     Position/Device Used,  Standing Balance walker, rolling  -CS     Balance Interventions sitting;standing;static;dynamic;dynamic reaching;occupation based/functional task;weight shifting activity  -CS           User Key  (r) = Recorded By, (t) = Taken By, (c) = Cosigned By    Initials Name Provider Type    CS Britta Wu, OT Occupational Therapist               Goals/Plan     Row Name 03/30/23 0924          Transfer Goal 1 (OT)    Activity/Assistive Device (Transfer Goal 1, OT) sit-to-stand/stand-to-sit;toilet  -CS     [place] Level/Cues Needed (Transfer Goal 1, OT) standby assist  -CS     Time Frame (Transfer Goal 1, OT) long term goal (LTG);10 days  -CS     Progress/Outcome (Transfer Goal 1, OT) goal ongoing  -CS     Row Name 03/30/23 0924          Dressing Goal 1 (OT)    Activity/Device (Dressing Goal 1, OT) lower body dressing  -CS     [place]/Cues Needed (Dressing Goal 1, OT) moderate assist (50-74% patient effort)  -CS     Time Frame (Dressing Goal 1, OT) long term goal (LTG);10 days  -CS     Strategies/Barriers (Dressing Goal 1, OT) don/doff socks w/ AAD  -CS     Progress/Outcome (Dressing Goal 1, OT) goal ongoing  -CS     Row Name 03/30/23 0924          Toileting Goal 1 (OT)    Activity/Device (Toileting Goal 1, OT) adjust/manage clothing;perform perineal hygiene  -CS     [place] Level/Cues Needed (Toileting Goal 1, OT) supervision required  -CS     Time Frame (Toileting Goal 1, OT) long term goal (LTG);10 days  -CS     Progress/Outcome (Toileting Goal 1, OT) goal ongoing  -CS     Row Name 03/30/23 0924          Therapy Assessment/Plan (OT)    Planned Therapy Interventions (OT) activity tolerance training;adaptive equipment training;BADL retraining;occupation/activity based interventions;ROM/therapeutic exercise;functional balance retraining;strengthening exercise;transfer/mobility retraining;patient/caregiver education/training  -CS           User Key  (r) = Recorded By, (t) = Taken By, (c) = Cosigned By     Initials Name Provider Type    CS Britta Wu OT Occupational Therapist               Clinical Impression     Row Name 03/30/23 0922          Pain Assessment    Pretreatment Pain Rating 0/10 - no pain  -CS     Posttreatment Pain Rating 0/10 - no pain  -CS     Row Name 03/30/23 0922          Plan of Care Review    Plan of Care Reviewed With patient;spouse  -CS     Outcome Evaluation OT eval complete. Pt presents w/ c/o abd discomfort, balance deficits, and endurance deficits limiting functional independence from baseline. Pt would benefit from cont skilled IPOT POC to facilitate return to PLOF. Recommend pt DC home w/ assist and HH OT/PT services.  -CS     Row Name 03/30/23 0922          Therapy Assessment/Plan (OT)    Patient/Family Therapy Goal Statement (OT) Return to PLOF  -CS     Rehab Potential (OT) good, to achieve stated therapy goals  -CS     Criteria for Skilled Therapeutic Interventions Met (OT) yes;skilled treatment is necessary  -CS     Therapy Frequency (OT) daily  -CS     Row Name 03/30/23 0922          Therapy Plan Review/Discharge Plan (OT)    Anticipated Discharge Disposition (OT) home with assist;home with home health  -CS     Row Name 03/30/23 0922          Vital Signs    Pre Systolic BP Rehab --  VSS  -CS     O2 Delivery Pre Treatment room air  -CS     O2 Delivery Intra Treatment room air  -CS     O2 Delivery Post Treatment room air  -CS     Pre Patient Position Supine  -CS     Intra Patient Position Standing  -CS     Post Patient Position Sitting  -CS     Row Name 03/30/23 0922          Positioning and Restraints    Pre-Treatment Position in bed  -CS     Post Treatment Position chair  -CS     In Chair notified nsg;reclined;call light within reach;encouraged to call for assist;exit alarm on;with family/caregiver;[place] cushion;legs elevated  -CS           User Key  (r) = Recorded By, (t) = Taken By, (c) = Cosigned By    Initials Name Provider Type    CS Britta Wu OT Occupational  Therapist               Outcome Measures     Row Name 03/30/23 0924          How much help from another is currently needed...    Putting on and taking off regular lower body clothing? 2  -CS     Bathing (including washing, rinsing, and drying) 2  -CS     Toileting (which includes using toilet bed pan or urinal) 2  -CS     Putting on and taking off regular upper body clothing 2  -CS     Taking care of personal grooming (such as brushing teeth) 3  -CS     Eating meals 4  -CS     AM-PAC 6 Clicks Score (OT) 15  -CS     Row Name 03/30/23 0924          Functional Assessment    Outcome Measure Options AM-PAC 6 Clicks Daily Activity (OT)  -CS           User Key  (r) = Recorded By, (t) = Taken By, (c) = Cosigned By    Initials Name Provider Type    CS Britta Wu OT Occupational Therapist                Occupational Therapy Education     Title: PT OT SLP Therapies (In Progress)     Topic: Occupational Therapy (In Progress)     Point: ADL training (In Progress)     Description:   Instruct learner(s) on proper safety adaptation and remediation techniques during self care or transfers.   Instruct in proper use of assistive devices.              Learning Progress Summary           Patient Acceptance, E, NR by CS at 3/30/2023 0925                   Point: Home exercise program (Not Started)     Description:   Instruct learner(s) on appropriate technique for monitoring, assisting and/or progressing therapeutic exercises/activities.              Learner Progress:  Not documented in this visit.          Point: Precautions (In Progress)     Description:   Instruct learner(s) on prescribed precautions during self-care and functional transfers.              Learning Progress Summary           Patient Acceptance, E, NR by CS at 3/30/2023 0925                   Point: Body mechanics (In Progress)     Description:   Instruct learner(s) on proper positioning and spine alignment during self-care, functional mobility activities and/or  exercises.              Learning Progress Summary           Patient Acceptance, E, NR by  at 3/30/2023 0925                               User Key     Initials Effective Dates Name Provider Type Discipline     09/02/21 -  Britta Wu OT Occupational Therapist OT              OT Recommendation and Plan  Planned Therapy Interventions (OT): activity tolerance training, adaptive equipment training, BADL retraining, occupation/activity based interventions, ROM/therapeutic exercise, functional balance retraining, strengthening exercise, transfer/mobility retraining, patient/caregiver education/training  Therapy Frequency (OT): daily  Plan of Care Review  Plan of Care Reviewed With: patient, spouse  Outcome Evaluation: OT eval complete. Pt presents w/ c/o abd discomfort, balance deficits, and endurance deficits limiting functional independence from baseline. Pt would benefit from cont skilled IPOT POC to facilitate return to PLOF. Recommend pt DC home w/ assist and  OT/PT services.     Time Calculation:    Time Calculation- OT     Row Name 03/30/23 0925             Time Calculation- OT    OT Start Time 0830  -CS      OT Received On 03/30/23  -CS      OT Goal Re-Cert Due Date 04/09/23  -CS         Timed Charges    00923 - OT Self Care/Mgmt Minutes 8  -CS         Untimed Charges    OT Eval/Re-eval Minutes 46  -CS         Total Minutes    Timed Charges Total Minutes 8  -CS      Untimed Charges Total Minutes 46  -CS       Total Minutes 54  -CS            User Key  (r) = Recorded By, (t) = Taken By, (c) = Cosigned By    Initials Name Provider Type     Britta Wu OT Occupational Therapist              Therapy Charges for Today     Code Description Service Date Service Provider Modifiers Qty    68398638810 HC OT SELF CARE/MGMT/TRAIN EA 15 MIN 3/30/2023 Britta Wu OT GO 1    12301558796 HC OT EVAL LOW COMPLEXITY 4 3/30/2023 Britta Wu OT GO 1               Britta Wu OT  3/30/2023

## 2023-03-30 NOTE — PROGRESS NOTES
"   LOS: 5 days    Patient Care Team:  Antonio Castro MD as PCP - General  Antonio Castro MD as PCP - Family Medicine    Chief Complaint: Shortness of breath     72-year-old male with past medical history of [place], liver cirrhosis, prerenal acute kidney failure was last seen a month ago with a creatinine of 5.0 when he was transferred to  for further management for liver transplant.  Dialysis was not started at that time.    Prior to the above baseline creatinine 0.8-1.2.  On previous admission creatinine was 4.7, before discharge. Labs showed a creatinine 3.94, BUN 48, sodium 135, potassium 4.1, CO2 6.0, calcium 9.6, EGFR 15 mL/min, troponin high 53, hemoglobin 10.5, platelets 111 K, lactate high 17.6, phosphorus 3.9 magnesium 1.9, bilirubin 4.6 other liver enzymes were normal.  Subjective : F/U PAOLO ON CKD.    Interval History:   Critically ill in ICU.  Mild decreasing renal function.    Review of Systems:   Hard of hearing, abdominal distention, mild shortness of breath, generalized weakness. + COUGH.  All other negative    Objective     Vital Sign Min/Max for last 24 hours  Temp  Min: 97.2 °F (36.2 °C)  Max: 98.4 °F (36.9 °C)   BP  Min: 83/53  Max: 139/85   Pulse  Min: 72  Max: 101   Resp  Min: 16  Max: 20   SpO2  Min: 86 %  Max: 100 %   No data recorded   Weight  Min: 72 kg (158 lb 11.7 oz)  Max: 72 kg (158 lb 11.7 oz)     Flowsheet Rows    Flowsheet Row First Filed Value   Admission Height 172.7 cm (68\") Documented at 03/25/2023 1055   Admission Weight 72.6 kg (160 lb) Documented at 03/25/2023 1055          I/O this shift:  In: 399.4 [P.O.:240; I.V.:109.4; IV Piggyback:50]  Out: 210 [Urine:210]  I/O last 3 completed shifts:  In: 1860.2 [P.O.:1348; I.V.:436; IV Piggyback:76.2]  Out: 2120 [Urine:2120]    Physical Exam:  General appearance: Sick looking  male mild distress laying in bed.    HEENT: Hard of hearing, oral mucosa moist, neck is supple  Lungs: Few rhonchi's and rails heard, equal chest " movement, no crepitation.  Heart: Normal S1, S2, no gallop, murmur, RRR.  Abdomen: Abdomen distended, positive thrill soft, nontender, positive bowel sounds.  Extremities: Positive lower extremity edema, no cyanosis, no joint swelling.  Neuro: Alert, oriented x4, no focal deficit.  Psych: Mood and affect are normal and appropriate.  Skin: Skin is warm and dry.  : No suprapubic fullness or tenderness. TORRE.    WBC WBC   Date Value Ref Range Status   03/30/2023 13.05 (H) 3.40 - 10.80 10*3/mm3 Final   03/29/2023 8.27 3.40 - 10.80 10*3/mm3 Final   03/28/2023 5.66 3.40 - 10.80 10*3/mm3 Final      HGB Hemoglobin   Date Value Ref Range Status   03/30/2023 8.8 (L) 13.0 - 17.7 g/dL Final   03/29/2023 8.2 (L) 13.0 - 17.7 g/dL Final   03/28/2023 8.0 (L) 13.0 - 17.7 g/dL Final      HCT Hematocrit   Date Value Ref Range Status   03/30/2023 25.9 (L) 37.5 - 51.0 % Final   03/29/2023 24.7 (L) 37.5 - 51.0 % Final   03/28/2023 23.7 (L) 37.5 - 51.0 % Final      Platlets No results found for: LABPLAT   MCV MCV   Date Value Ref Range Status   03/30/2023 102.0 (H) 79.0 - 97.0 fL Final   03/29/2023 102.1 (H) 79.0 - 97.0 fL Final   03/28/2023 100.4 (H) 79.0 - 97.0 fL Final          Sodium Sodium   Date Value Ref Range Status   03/30/2023 136 136 - 145 mmol/L Final   03/29/2023 138 136 - 145 mmol/L Final   03/28/2023 137 136 - 145 mmol/L Final      Potassium Potassium   Date Value Ref Range Status   03/30/2023 3.7 3.5 - 5.2 mmol/L Final   03/29/2023 3.4 (L) 3.5 - 5.2 mmol/L Final   03/29/2023 3.2 (L) 3.5 - 5.2 mmol/L Final   03/28/2023 3.5 3.5 - 5.2 mmol/L Final   03/28/2023 3.1 (L) 3.5 - 5.2 mmol/L Final      Chloride Chloride   Date Value Ref Range Status   03/30/2023 95 (L) 98 - 107 mmol/L Final   03/29/2023 94 (L) 98 - 107 mmol/L Final   03/28/2023 90 (L) 98 - 107 mmol/L Final      CO2 CO2   Date Value Ref Range Status   03/30/2023 27.0 22.0 - 29.0 mmol/L Final   03/29/2023 28.0 22.0 - 29.0 mmol/L Final   03/28/2023 29.0 22.0 - 29.0  mmol/L Final      BUN BUN   Date Value Ref Range Status   03/30/2023 69 (H) 8 - 23 mg/dL Final   03/29/2023 71 (H) 8 - 23 mg/dL Final   03/28/2023 77 (H) 8 - 23 mg/dL Final      Creatinine Creatinine   Date Value Ref Range Status   03/30/2023 4.85 (H) 0.76 - 1.27 mg/dL Final   03/29/2023 5.14 (H) 0.76 - 1.27 mg/dL Final   03/28/2023 4.92 (H) 0.76 - 1.27 mg/dL Final      Calcium Calcium   Date Value Ref Range Status   03/30/2023 8.3 (L) 8.6 - 10.5 mg/dL Final   03/29/2023 8.2 (L) 8.6 - 10.5 mg/dL Final   03/28/2023 8.2 (L) 8.6 - 10.5 mg/dL Final      PO4 No results found for: CAPO4   Albumin Albumin   Date Value Ref Range Status   03/30/2023 2.6 (L) 3.5 - 5.2 g/dL Final   03/29/2023 2.7 (L) 3.5 - 5.2 g/dL Final   03/28/2023 2.7 (L) 3.5 - 5.2 g/dL Final      Magnesium Magnesium   Date Value Ref Range Status   03/30/2023 1.9 1.6 - 2.4 mg/dL Final   03/29/2023 1.7 1.6 - 2.4 mg/dL Final      Uric Acid No results found for: URICACID        Results Review:     I reviewed the patient's new clinical results.    aspirin, 81 mg, Oral, Daily  clopidogrel, 75 mg, Oral, Daily  ferrous sulfate, 325 mg, Oral, Daily With Breakfast  fluticasone, 2 spray, Each Nare, Daily  folic acid, 400 mcg, Oral, Daily  insulin lispro, 0-7 Units, Subcutaneous, 4x Daily With Meals & Nightly  lactulose, 20 g, Oral, Daily  midodrine, 15 mg, Oral, TID AC  pantoprazole, 40 mg, Intravenous, Q AM  pharmacy consult - MTM, , Does not apply, Daily  piperacillin-tazobactam, 3.375 g, Intravenous, Q12H  riFAXIMin, 550 mg, Oral, Q12H  sodium chloride, 10 mL, Intravenous, Q12H  ursodiol, 300 mg, Oral, BID      norepinephrine, 0.02-0.3 mcg/kg/min, Last Rate: Stopped (03/28/23 0325)  octreotide (SandoSTATIN) infusion, 50 mcg/hr, Last Rate: 50 mcg/hr (03/30/23 0809)        Medication Review: Reviewed    Assessment & Plan       ST elevation myocardial infarction involving right coronary artery (HCC)    PAOLO (acute kidney injury) (HCC)    Liver cirrhosis secondary to  HAYES (HCC)    Hyperbilirubinemia    Coagulopathy (HCC)    Decompensated hepatic cirrhosis (HCC)    CKD (chronic kidney disease) stage 5, GFR less than 15 ml/min (HCC)    Hyperlipidemia LDL goal <70    Coronary artery disease involving native coronary artery of native heart    1.  Acute kidney injury: Patient was last seen a month ago with creatinine 4.5- 5.0 range.  Prior to that baseline was 0.8-1.2, likely diagnosis was hepatorenal versus ATN.  At that time patient was transferred to  for further management.  Patient has been admitted and underwent a cardiac catheterization received 80 mL of IV contrast.   2.  S/p right RCA stenting for the clot 3/25/2023 today  3.  HAYES: Decompensated liver cirrhosis.  Ammonia level 106  4.  Hypotension: In the setting of liver disease.  5.  Anemia  6.  Thrombocytopenia   7.  Thoracentesis: 3 weeks back  8.  Paracentesis: On 3/17/2023 at [facility]. 9. HYPOKALEMIA.     Recommendations:  So far patient has not demonstrated meaningful renal recovery since last admission in Feb and developed acute kidney disease with persistent cr elevation. Niurka any uremic symptoms. Therefore no indication of dialysis. However he is at risk for needing dialysis in near future. He is undergoing txp evaluation at  therefore dialysis is in consideration. Stable for transfer out of ICU  - D/C bicarb supplementation   - Will need albumin supp if undergoing paracentesis >3 liter/day  - Continue lasix 40 mg daily   - Will need close outpatient f/u in renal clinic        Sergei العلي MD  03/30/23  12:50 EDT

## 2023-03-30 NOTE — PLAN OF CARE
Goal Outcome Evaluation:  Plan of Care Reviewed With: patient, spouse           Outcome Evaluation: OT eval complete. Pt presents w/ c/o abd discomfort, balance deficits, and endurance deficits limiting functional independence from baseline. Pt would benefit from cont skilled IPOT POC to facilitate return to PLOF. Recommend pt DC home w/ assist and HH OT/PT services.

## 2023-03-30 NOTE — PLAN OF CARE
Goal Outcome Evaluation:  Plan of Care Reviewed With: (P) patient, spouse        Progress: (P) improving  Outcome Evaluation: (P) Pt ambulated 240' with RW and CGA showing limitations of decreased endurance and balance deficits. Rec skilled PT to increase ind with mobility and d/c to home with assist and HH PT/OT services. Rec trialing SPC next session rather than RW.

## 2023-03-30 NOTE — PLAN OF CARE
Goal Outcome Evaluation:           Progress: no change       Transferred out of 2A ICU today. Pt denies complaints at this time. VSS on RA, NSR on monitor. Gallegos in place

## 2023-03-30 NOTE — PROGRESS NOTES
"GI Daily Progress Note  Subjective:    Chief Complaint: Follow-up decompensated cirrhosis    Patient up in bed eating breakfast.  Wife states that he has been eating a little more.  He denies abdominal pain or nausea.  No confusion or signs of GI bleeding.  Continues to have 2-3 bowel movements daily with lactulose.  Wife reports that he has been up out of the bed and in the chair and even walking in the hallway with physical therapy.    Objective:    /72   Pulse 96   Temp 97.9 °F (36.6 °C) (Axillary)   Resp 18   Ht 172.7 cm (67.99\")   Wt 72 kg (158 lb 11.7 oz)   SpO2 93%   BMI 24.14 kg/m²     Physical Exam  Constitutional:       Appearance: Normal appearance. He is ill-appearing.   HENT:      Head: Normocephalic and atraumatic.   Eyes:      General: Scleral icterus present.   Pulmonary:      Effort: Pulmonary effort is normal.   Abdominal:      General: There is distension.      Comments: Tympany to percussion in the central abdomen.  Mild to moderate ascites.    Neurological:      Mental Status: He is alert and oriented to person, place, and time.   Psychiatric:         Mood and Affect: Mood normal.         Thought Content: Thought content normal.         Lab  Lab Results   Component Value Date    WBC 13.05 (H) 03/30/2023    HGB 8.8 (L) 03/30/2023    HGB 8.2 (L) 03/29/2023    HGB 8.0 (L) 03/28/2023    .0 (H) 03/30/2023    PLT 52 (L) 03/30/2023    INR 2.05 (H) 03/29/2023    INR 3.09 (H) 03/27/2023    INR 2.71 (H) 03/26/2023    INR 2.58 (H) 03/25/2023    INR 1.6 (H) 03/15/2023       Lab Results   Component Value Date    GLUCOSE 124 (H) 03/30/2023    BUN 69 (H) 03/30/2023    CREATININE 4.85 (H) 03/30/2023    EGFRIFNONA 59 (L) 04/22/2022    EGFRIFAFRI >60 04/22/2022    BCR 14.2 03/30/2023     03/30/2023    K 3.7 03/30/2023    CO2 27.0 03/30/2023    CALCIUM 8.3 (L) 03/30/2023    PROTENTOTREF 5.8 (L) 05/24/2015    ALBUMIN 2.6 (L) 03/30/2023    ALKPHOS 67 03/30/2023    BILITOT 3.3 (H) 03/30/2023 "    BILIDIR 1.91 (H) 03/15/2023    ALT 19 03/30/2023    AST 55 (H) 03/30/2023       Assessment:    1.  HAYES cirrhosis. MELD-Na = 32.  2.  Ascites. S/p 4.5 L paracentesis on 3/26/2023.  No evidence for SBP.  3.  Hepatic hydrothorax.  4.  Chronic kidney disease.  Treated for hepatorenal syndrome last month at .  As urine sodium is currently not low, favor PAOLO from ATN in possible background of type II HRS.  Urine output slightly improved overnight.  5.  STEMI, status post PCI/HARDEEP RCA on 3/25/23.Unsuccessful balloon angioplasty of 100% occluded right posterior lateral branch, and unsuccessful manual thrombectomy of 100% occluded RPDA. Preserved EF at 65%.      Plan:  >> Continue Xifaxan.  Titrate lactulose to effectiveness of having 2-3 stools daily and no clinical signs of hepatic encephalopathy  >> Continue to encourage p.o. intake  >> Continue to encourage mobility  >> Nephrology following and monitoring for need of renal replacement therapy  >> Follow-up with liver transplant at [facility]-appointment currently 4/10/2023, this may need to be rescheduled given current events.  [facility] [phone]    Dimitri Méndez, APRN  03/30/23  09:44 EDT

## 2023-03-30 NOTE — THERAPY TREATMENT NOTE
Patient Name: Leoncio Hopson  : 1950    MRN: [id number]                              Today's Date: 3/30/2023       Admit Date: 3/25/2023    Visit Dx:     ICD-10-CM ICD-9-CM   1. Hyperammonemia (HCC)  E72.20 270.6     Patient Active Problem List   Diagnosis   • PAOLO (acute kidney injury) (HCC)   • Liver cirrhosis secondary to HAYES (HCC)   • Hyperbilirubinemia   • Anemia, chronic disease   • Hyperammonemia (HCC)   • Coagulopathy (HCC)   • Thrombocytopenia (HCC)   • Decompensated hepatic cirrhosis (HCC)   • ST elevation myocardial infarction involving right coronary artery (HCC)   • CKD (chronic kidney disease) stage 5, GFR less than 15 ml/min (HCC)   • Hyperlipidemia LDL goal <70   • Coronary artery disease involving native coronary artery of native heart     Past Medical History:   Diagnosis Date   • Anemia    • Coagulopathy (HCC) 2023   • Decompensated hepatic cirrhosis (HCC) 2023   • Diabetes mellitus (HCC)    • Encephalopathy, hepatic 2023   • Hypertension    • Liver cirrhosis secondary to HAYES (HCC)    • Liver lesion    • Other ascites 2023   • Thrombocytopenia (HCC) 2023     Past Surgical History:   Procedure Laterality Date   • ANKLE SURGERY     • CARDIAC CATHETERIZATION N/A 3/25/2023    Procedure: Left Heart Cath;  Surgeon: Nithin Nieto IV, MD;  Location:  [facility] CATH INVASIVE LOCATION;  Service: Cardiovascular;  Laterality: N/A;   • CARDIAC CATHETERIZATION  3/25/2023    Procedure: Percutaneous Manual Thrombectomy;  Surgeon: Nithin Nieto IV, MD;  Location:  [facility] CATH INVASIVE LOCATION;  Service: Cardiovascular;;   • CARDIAC CATHETERIZATION N/A 3/25/2023    Procedure: Stent HARDEEP coronary;  Surgeon: Nithin Nieto IV, MD;  Location:  [facility] CATH INVASIVE LOCATION;  Service: Cardiovascular;  Laterality: N/A;   • EYE SURGERY        General Information     Row Name 23 1127          Physical Therapy Time and Intention    Document Type  therapy note (daily note)  -AY (r) HT (t) AY (c)     Mode of Treatment physical therapy  -AY (r) HT (t) AY (c)     Row Name 03/30/23 1127          General Information    Patient Profile Reviewed yes  -AY (r) HT (t) AY (c)     Existing Precautions/Restrictions fall;cardiac  -AY (r) HT (t) AY (c)     Barriers to Rehab medically complex;hearing deficit  -AY (r) HT (t) AY (c)     Row Name 03/30/23 1127          Cognition    Orientation Status (Cognition) oriented x 3  -AY (r) HT (t) AY (c)     Row Name 03/30/23 1127          Safety Issues, Functional Mobility    Safety Issues Affecting Function (Mobility) insight into deficits/self-awareness;safety precaution awareness;safety precautions follow-through/compliance  -AY (r) HT (t) AY (c)     Impairments Affecting Function (Mobility) balance;endurance/activity tolerance  -AY (r) HT (t) AY (c)           User Key  (r) = Recorded By, (t) = Taken By, (c) = Cosigned By    Initials Name Provider Type    Kelli Good, PT Physical Therapist    HT Barbara Oconnell, PT Student PT Student               Mobility     Row Name 03/30/23 1127          Bed Mobility    Comment, (Bed Mobility) Pt [facility] upon arrival.  -AY (r) HT (t) AY (c)     Row Name 03/30/23 1127          Sit-Stand Transfer    Sit-Stand [place] (Transfers) contact guard;verbal cues  -AY (r) HT (t) AY (c)     Assistive Device (Sit-Stand Transfers) walker, front-wheeled  -AY (r) HT (t) AY (c)     Comment, (Sit-Stand Transfer) 3x STS, 2x from chair and 1x from toilet requiring vc for hand placement and max A with mir care.  -AY (r) HT (t) AY (c)     Row Name 03/30/23 1127          Gait/Stairs (Locomotion)    [place] Level (Gait) 1 person to manage equipment;verbal cues;contact guard  -AY (r) HT (t) AY (c)     Assistive Device (Gait) walker, front-wheeled  -AY (r) HT (t) AY (c)     Distance in Feet (Gait) 240+10+10  -AY (r) HT (t) AY (c)     Deviations/Abnormal Patterns (Gait) bilateral deviations;base of support,  narrow;selena decreased;stride length decreased  -AY (r) HT (t) AY (c)     Bilateral Gait Deviations heel strike decreased;forward flexed posture  -AY (r) HT (t) AY (c)     Comment, (Gait/Stairs) Pt ambulated around hallway then in/out of bathroom. Pt demonstrated step through gait pattern showing decreased selena, decreased heel strike B, and fwd trunk requiring cues for walker management, upright posture, and decreased use of UEs on RW. Gait limited by decreased endurance and balance deficits.  -AY (r) HT (t) AY (c)           User Key  (r) = Recorded By, (t) = Taken By, (c) = Cosigned By    Initials Name Provider Type    Kelli Good, PT Physical Therapist    Barbara Aguirre, PT Student PT Student               Obj/Interventions     Row Name 03/30/23 1132          Balance    Balance Assessment sitting static balance;sitting dynamic balance;standing static balance;standing dynamic balance  -AY (r) HT (t) AY (c)     Static Sitting Balance independent  -AY (r) HT (t) AY (c)     Dynamic Sitting Balance standby assist  -AY (r) HT (t) AY (c)     Position, Sitting Balance sitting in chair  -AY (r) HT (t) AY (c)     Static Standing Balance standby assist  -AY (r) HT (t) AY (c)     Dynamic Standing Balance contact guard  -AY (r) HT (t) AY (c)     Position/Device Used, Standing Balance walker, rolling  -AY (r) HT (t) AY (c)     Balance Interventions standing;dynamic;step overs  -AY (r) HT (t) AY (c)     Comment, Balance Pt ambulated while performing marches showing slight decrease in selena requiring cues for walker management.  -AY (r) HT (t) AY (c)           User Key  (r) = Recorded By, (t) = Taken By, (c) = Cosigned By    Initials Name Provider Type    Kelli Good, PT Physical Therapist    Barbara Aguirre, PT Student PT Student               Goals/Plan    No documentation.                Clinical Impression     Row Name 03/30/23 1133          Pain    Pretreatment Pain Rating 0/10 - no pain  -AY (r) HT  (t) AY (c)     Posttreatment Pain Rating 0/10 - no pain  -AY (r) HT (t) AY (c)     Additional Documentation Pain Scale: Numbers Pre/Post-Treatment (Group)  -AY (r) HT (t) AY (c)     Row Name 03/30/23 1133          Plan of Care Review    Plan of Care Reviewed With patient;spouse  -AY (r) HT (t) AY (c)     Progress improving  -AY (r) HT (t) AY (c)     Outcome Evaluation Pt ambulated 240' with RW and CGA showing limitations of decreased endurance and balance deficits. Rec skilled PT to increase ind with mobility and d/c to home with assist and HH PT/OT services. Rec trialing SPC next session rather than RW.  -AY (r) HT (t) AY (c)     Row Name 03/30/23 1133          Therapy Assessment/Plan (PT)    Criteria for Skilled Interventions Met (PT) yes;meets criteria;skilled treatment is necessary  -AY (r) HT (t) AY (c)     Row Name 03/30/23 1133          Vital Signs    Pre Systolic BP Rehab 114  -AY (r) HT (t) AY (c)     Pre Treatment Diastolic BP 86  -AY (r) HT (t) AY (c)     Post Systolic BP Rehab 127  -AY (r) HT (t) AY (c)     Post Treatment Diastolic BP 78  -AY (r) HT (t) AY (c)     Pretreatment Heart Rate (beats/min) 87  -AY (r) HT (t) AY (c)     Posttreatment Heart Rate (beats/min) 87  -AY (r) HT (t) AY (c)     Pre SpO2 (%) 96  -AY (r) HT (t) AY (c)     O2 Delivery Pre Treatment room air  -AY (r) HT (t) AY (c)     O2 Delivery Intra Treatment room air  -AY (r) HT (t) AY (c)     Post SpO2 (%) 97  -AY (r) HT (t) AY (c)     O2 Delivery Post Treatment room air  -AY (r) HT (t) AY (c)     Pre Patient Position Sitting  -AY (r) HT (t) AY (c)     Intra Patient Position Standing  -AY (r) HT (t) AY (c)     Post Patient Position Sitting  -AY (r) HT (t) AY (c)     Row Name 03/30/23 1133          Positioning and Restraints    Pre-Treatment Position sitting in chair/recliner  -AY (r) HT (t) AY (c)     Post Treatment Position chair  -AY (r) HT (t) AY (c)     In Chair notified nsg;reclined;sitting;call light within reach;encouraged to  call for assist;exit alarm on;with family/caregiver;LUE elevated;RUE elevated;legs elevated;waffle cushion  -AY (r) HT (t) AY (c)           User Key  (r) = Recorded By, (t) = Taken By, (c) = Cosigned By    Initials Name Provider Type    Kelli Good, PT Physical Therapist    HT Barbara Oconnell, PT Student PT Student               Outcome Measures     Row Name 03/30/23 1137          How much help from another person do you currently need...    Turning from your back to your side while in flat bed without using bedrails? 3  -AY (r) HT (t) AY (c)     Moving from lying on back to sitting on the side of a flat bed without bedrails? 3  -AY (r) HT (t) AY (c)     Moving to and from a bed to a chair (including a wheelchair)? 3  -AY (r) HT (t) AY (c)     Standing up from a chair using your arms (e.g., wheelchair, bedside chair)? 3  -AY (r) HT (t) AY (c)     Climbing 3-5 steps with a railing? 3  -AY (r) HT (t) AY (c)     To walk in hospital room? 3  -AY (r) HT (t) AY (c)     AM-PAC 6 Clicks Score (PT) 18  -AY (r) HT (t)     Highest level of mobility 6 --> Walked 10 steps or more  -AY (r) HT (t)     Row Name 03/30/23 1137 03/30/23 0924       Functional Assessment    Outcome Measure Options AM-PAC 6 Clicks Basic Mobility (PT)  -AY (r) HT (t) AY (c) AM-PAC 6 Clicks Daily Activity (OT)  -CS          User Key  (r) = Recorded By, (t) = Taken By, (c) = Cosigned By    Initials Name Provider Type    Britta Calderón OT Occupational Therapist    Kelli Good, PT Physical Therapist    Barbara Aguirre, PT Student PT Student                             Physical Therapy Education     Title: PT OT SLP Therapies (In Progress)     Topic: Physical Therapy (In Progress)     Point: Mobility training (Done)     Learning Progress Summary           Patient Acceptance, E, VU by  at 3/30/2023 1138    Acceptance, E, VU,NR by  at 3/29/2023 0954                   Point: Home exercise program (Not Started)     Learner Progress:  Not  documented in this visit.          Point: Body mechanics (Done)     Learning Progress Summary           Patient Acceptance, E, VU by  at 3/30/2023 1138    Acceptance, E, VU,NR by  at 3/29/2023 0940                   Point: Precautions (Done)     Learning Progress Summary           Patient Acceptance, E, VU by  at 3/30/2023 1138    Acceptance, E, VU,NR by  at 3/29/2023 0940                               User Key     Initials Effective Dates Name Provider Type [facility] 01/12/23 -  Barbara Oconnell, PT Student PT Student PT              PT Recommendation and Plan  Planned Therapy Interventions (PT): balance training, bed mobility training, gait training, home exercise program, patient/family education, neuromuscular re-education, stair training, strengthening, transfer training  Plan of Care Reviewed With: patient, spouse  Progress: improving  Outcome Evaluation: Pt ambulated 240' with RW and CGA showing limitations of decreased endurance and balance deficits. Rec skilled PT to increase ind with mobility and d/c to home with assist and HH PT/OT services. Rec trialing SPC next session rather than RW.     Time Calculation:    PT Charges     Row Name 03/30/23 1138             Time Calculation    Start Time 1035  -AY (r) HT (t) AY (c)      PT Received On 03/30/23  -AY (r) HT (t) AY (c)         Timed Charges    58369 - PT Therapeutic Activity Minutes 38  -AY (r) HT (t) AY (c)         Total Minutes    Timed Charges Total Minutes 38  -AY (r) HT (t)       Total Minutes 38  -AY (r) HT (t)            User Key  (r) = Recorded By, (t) = Taken By, (c) = Cosigned By    Initials Name Provider Type    Kelli Good, PT Physical Therapist     Barbara Oconnell, PT Student PT Student              Therapy Charges for Today     Code Description Service Date Service Provider Modifiers Qty    89946925966 HC PT THERAPEUTIC ACT EA 15 MIN 3/29/2023 Barbara Oconnell, PT Student GP 1    19650665714 HC PT EVAL MOD COMPLEXITY 3  3/29/2023 Barbara Oconnell, PT Student GP 1    86858464376 HC PT THERAPEUTIC ACT EA 15 MIN 3/30/2023 Barbara Oconnell, PT Student GP 3          PT G-Codes  Outcome Measure Options: AM-PAC 6 Clicks Basic Mobility (PT)  AM-PAC 6 Clicks Score (PT): 18  AM-PAC 6 Clicks Score (OT): 15  PT Discharge Summary  Anticipated Discharge Disposition (PT): home with home health, home with assist    Barbara Oconnell, PT Student  3/30/2023

## 2023-03-30 NOTE — PROGRESS NOTES
"Critical Care Note     LOS: 5 days   Patient Care Team:  Antonio Castro MD as PCP - General  Antonio Castro MD as PCP - Family Medicine    Chief Complaint/Reason for visit:     ST elevation myocardial infarction involving right coronary artery (HCC)    PAOLO (acute kidney injury) (HCC)    Liver cirrhosis secondary to HAYES (HCC)    Hyperbilirubinemia    Coagulopathy (HCC)    Decompensated hepatic cirrhosis (HCC)    CKD (chronic kidney disease) stage 5, GFR less than 15 ml/min (HCC)    Hyperlipidemia LDL goal <70    Coronary artery disease involving native coronary artery of native heart    Subjective     Interval History:     He remains off norepinephrine.  He is on octreotide drip.  Subcutaneous shots were causing him pain so he was placed back on the drip.  He is complaining of some pain on the left side of his tongue.  He does feel hungry.  He did walk with therapy today.    History taken from: patient,nursing,chart    Review of Systems:    All systems were reviewed and negative except as noted in subjective.    Medical history, surgical history, social history, family history reviewed    Objective     Intake/Output:    Intake/Output Summary (Last 24 hours) at 3/30/2023 1255  Last data filed at 3/30/2023 1200  Gross per 24 hour   Intake 1670.56 ml   Output 1045 ml   Net 625.56 ml       Nutrition:  Diet: Cardiac Diets, Diabetic Diets; Healthy Heart (2-3 Na+); Consistent Carbohydrate; Texture: Regular Texture (IDDSI 7); Fluid Consistency: Thin (IDDSI 0)    Infusions:  norepinephrine, 0.02-0.3 mcg/kg/min, Last Rate: Stopped (03/28/23 0325)  octreotide (SandoSTATIN) infusion, 50 mcg/hr, Last Rate: 50 mcg/hr (03/30/23 0809)        Mechanical Ventilator Settings:                                                Telemetry: Sinus tachycardia, sinus rhythm             Vital Signs  Blood pressure 102/65, pulse 73, temperature 98.4 °F (36.9 °C), temperature source Oral, resp. rate 16, height 172.7 cm (67.99\"), weight 72 kg " (158 lb 11.7 oz), SpO2 94 %.    Physical Exam:  General Appearance:   upright in bed in no distress.   Head:   Atraumatic   Eyes:          Jaundice   Ears:   External ear normal, hard of hearing, hearing aids in place   Throat:  Oral mucosa moist, small shallow ulcer on the left lateral aspect of his tongue   Neck:  Trachea midline, no palpable thyroid, no crepitus   Back:    Spine is straight   Lungs:    Symmetric chest excursion.  Breath sounds are bilateral, diminished at the bases, right greater than left, clear anteriorly    Heart:   Regular rhythm, S1, S2 auscultated   Abdomen:    Persistent ascites with fluid wave, nontender   Rectal:   Deferred   Extremities:  Bilateral lower extremity edema   Pulses:  Palpable radial pulses   Skin:  Warm and dry   Lymph nodes:  No cervical adenopathy   Neurologic:  He is awake and oriented to name and hospital      Results Review:     I reviewed the patient's new clinical results.   Results from last 7 days   Lab Units 03/30/23  0206 03/29/23  1619 03/29/23  0200 03/28/23  1337 03/28/23  0300   SODIUM mmol/L 136  --  138  --  137   POTASSIUM mmol/L 3.7 3.4* 3.2*   < > 3.1*   CHLORIDE mmol/L 95*  --  94*  --  90*   CO2 mmol/L 27.0  --  28.0  --  29.0   BUN mg/dL 69*  --  71*  --  77*   CREATININE mg/dL 4.85*  --  5.14*  --  4.92*   CALCIUM mg/dL 8.3*  --  8.2*  --  8.2*   BILIRUBIN mg/dL 3.3*  --  2.9*  --  2.7*   ALK PHOS U/L 67  --  63  --  60   ALT (SGPT) U/L 19  --  21  --  25   AST (SGOT) U/L 55*  --  68*  --  109*   GLUCOSE mg/dL 124*  --  121*  --  151*    < > = values in this interval not displayed.     Results from last 7 days   Lab Units 03/30/23  0206 03/29/23  0328 03/28/23  0300   WBC 10*3/mm3 13.05* 8.27 5.66   HEMOGLOBIN g/dL 8.8* 8.2* 8.0*   HEMATOCRIT % 25.9* 24.7* 23.7*   PLATELETS 10*3/mm3 52* 44* 46*     Results from last 7 days   Lab Units 03/27/23  0322   PH, ARTERIAL pH units 7.516*   PO2 ART mm Hg 72.3*   PCO2, ARTERIAL mm Hg 38.3   HCO3 ART mmol/L  31.0*     Lab Results   Component Value Date    BLOODCX No growth at 4 days 03/25/2023     Lab Results   Component Value Date    URINECX No growth 03/25/2023       I reviewed the patient's new imaging including images and reports.    IMPRESSION:     There is a right internal jugular central venous catheter with the catheter tip near the atrial caval junction.     There is a small right pleural effusion and patchy interstitial changes with mild cardiomegaly which is compatible with mild pulmonary edema.     No focal consolidation is otherwise seen.     Electronically signed by:  Lalo Elizondo D.O.    3/25/2023 11:46 PM Mountain Time     Findings:  CT SCAN OF THE CHEST WITHOUT CONTRAST: There is a moderate right pleural effusion, decreased from 2/9/2023. No left effusion or pericardial effusion is seen. There is ectasia of the ascending aorta is approximately 4.4 cm, minimally if at all increased   from the prior study. There is dense coronary artery calcification. No mediastinal adenopathy is seen. Dense peripheral groundglass changes of the left lower lobe are very similar to appearance of Covid 19 pneumonia from the initial months the scan.   There are much milder multifocal interstitial changes the left upper lobe. Small elongated inferior right upper lobe nodule or nodular scar is stable. Airways appear normally patent. Bony structures appear to be intact.     IMPRESSION:  1. Moderate right pleural effusion decreased from 2/9/2023.     2. Left lower lobe pneumonia and much milder left upper lobe pneumonia, which may represent Covid 19 or other atypical pneumonia.     3. Stable small inferior right upper lobe pulmonary nodule.     4. Ectatic ascending aorta to 4.4 cm, stable only minimally increased.           CT SCAN OF THE ABDOMEN AND PELVIS WITHOUT CONTRAST: Separate scans of the abdomen and pelvis are performed, as the initial scan of the abdomen was performed prior to pelvic ct scan being ordered. The  studies are dictated together however.     There is extensive ascites, and dense diffuse edema of subcutaneous tissues and particularly dense edema throughout the small bowel mesentery. There is a small cirrhotic appearing liver with no obvious lesion. Spleen is not enlarged. Pancreas and adrenal   glands appear unremarkable. Concentrated contrast in the renal collecting systems resembles renal calculi. There is a small left lower pole renal cyst measuring near water density. No evidence of obstructive uropathy is seen. There is no evidence of   free air or adenopathy. There is marked distention of stomach with fluid and air, and relatively decompressed duodenum but no evidence of obstructive lesion.     Regarding lower abdomen pelvis, dense small bowel mesenteric edema and extensive ascites are again noted. No abnormally dilated bowel loops, evidence of pneumatosis or significant bowel wall edema is appreciated. Bladder is normally distended and normal   in appearance. No mass or adenopathy is seen. Review of the bony structures shows grade 1 anterolisthesis of L5 on S1 with bilateral pars defects. There is a somewhat transitional appearance of S1 with a rudimentary S1-S2 disc space.     Impression:     1. Extensive ascites and extensive dense anasarca throughout the small bowel mesentery and remaining soft tissues.     2. Small cirrhotic liver.     3. Fluid and air distended stomach, to the level of the duodenum. No obvious obstructing lesion or inflammation, possibly gastric ileus.     4. Incidentally noted grade 1 anterolisthesis of L5 on S1, with bilateral pars defects.     Electronically Signed: Blayne Chung    3/25/2023 8:56 PM EDT    Workstation ID: [id number]    Interpretation Summary echocardiogram March 25, 2023       •  Left ventricular systolic function is normal. Estimated left ventricular EF = 65%  •  The aortic valve is mildly calcified.  There is minimal aortic stenosis present.  •  Moderate pulmonary  hypertension is present. Estimated right ventricular systolic pressure from tricuspid regurgitation is moderately elevated (49 mmHg).  •  Moderate dilation of the aortic root is present. Aneurysmal dilation of the ascending aorta is present.  •  There is a right pleural effusion      Conclusion  Left heart cath, March 25, 2023     •  Severe 2-vessel CAD (RCA, diagonal branch of the LAD)  •  Successful PCI of the proximal RCA with placement of a Xience 4 x 23 mm drug-eluting stent  •  Unsuccessful balloon angioplasty of 100% occluded right posterior lateral branch  •  Unsuccessful manual thrombectomy of 100% occluded RPDA  •  Normal LV filling pressure  •  Medical therapy recommended for diagonal branch stenosis.  •  DAPT (aspirin 81 mg + clopidogrel 75 mg daily) for 1 month due to high bleed risk.       All medications reviewed.   aspirin, 81 mg, Oral, Daily  clopidogrel, 75 mg, Oral, Daily  ferrous sulfate, 325 mg, Oral, Daily With Breakfast  fluticasone, 2 spray, Each Nare, Daily  folic acid, 400 mcg, Oral, Daily  insulin lispro, 0-7 Units, Subcutaneous, 4x Daily With Meals & Nightly  lactulose, 20 g, Oral, Daily  midodrine, 15 mg, Oral, TID AC  pantoprazole, 40 mg, Intravenous, Q AM  pharmacy consult - MTM, , Does not apply, Daily  piperacillin-tazobactam, 3.375 g, Intravenous, Q12H  riFAXIMin, 550 mg, Oral, Q12H  sodium chloride, 10 mL, Intravenous, Q12H  ursodiol, 300 mg, Oral, BID          Assessment & Plan       ST elevation myocardial infarction involving right coronary artery (HCC)    PAOLO (acute kidney injury) (HCC)    Liver cirrhosis secondary to HAYES (HCC)    Hyperbilirubinemia    Coagulopathy (HCC)    Decompensated hepatic cirrhosis (HCC)    CKD (chronic kidney disease) stage 5, GFR less than 15 ml/min (HCC)    Hyperlipidemia LDL goal <70    Coronary artery disease involving native coronary artery of native heart     72 y.o.male with relevant PMH of Stage V CKD not on HD, decompensated HAYES cirrhosis  with ascites (rejected for Liver-Kidney Txp at , being evaluated at  - not on transplant list), varices and recurrent hepatic hydrothorax (undergone 4 thoracentesis), prior Paracentesis x 1, diabetes, HTN, anemia, thrombocytopenia admitted 3/25/2023 from OSH w/ STEMI.  He had presented there for increased SOA and was hoping to get thoracentesis or paracentesis.     Emergent LHC showed severe 2-vessel CAD (RCA & LAD).  Had PCI proximal RCA w/ HARDEEP and Unsuccessful balloon of 100% RPL / Unsuccessful thrombectomy 100% occluded RPDA.  He received a loading dose of Plavix in the Cath Lab.  Maintenance dose was started March 27.  Echo revealed an EF of 65%.     In addition to above, patient noted to have profound lactic acidosis.  This seem out of proportion to his clinical appearance appearance - I.e. does not appear that he is in overwhelming shock with poor perfusion.  Has had poor po intake recently.  Suspect there is strong component of Type B LA .  March 27 lactic acid was down to 6 compared to 20 on admission.  Today serum bicarbonate is 27.    He also underwent a large-volume paracentesis March 26 with 4.5 L removed.  Fluid did not appear infected.  Bilirubin today is 3.3 compared to 9 earlier in the week.  Albumin is low at 2.6.  Creatinine is 4.85, improved compared to yesterday when it was 5.12.  Urine output is adequate, 1160 mL in 24 hours.  Platelet count remains 52, improved from yesterday but worse compared to 100  2 days ago.  He has no evidence of acute GI loss currently.    Today he remains off norepinephrine with adequate blood pressure.  He is tolerating a p.o. diet and ambulating with therapy.  He has persistent ascites and pleural fluid secondary to hepatohydrothorax.  Typically thoracentesis is unhelpful because with his large amount of ascites the effusion reaccumulates in 24 hours.  Oxygenation is adequate.    PLAN:    For his coronary artery disease and drug-eluting stent, Plavix and aspirin  restarted, but no statin because of his liver disease.  Electrolytes have been adequately replaced.  No arrhythmias identified.    Continue Zosyn, day number 6 out of 7 for mildly elevated procalcitonin, possible infection.  Blood and urine cultures negative.    Rifaximin, lactulose for hepatic encephalopathy and elevated ammonia  Continue octreotide,   Monitor liver function test, coagulation studies, H&H    Monitor BUN, creatinine, urine output  Chest x-ray tomorrow  Encourage p.o. intake  Ambulate with therapy  Continue midodrine for orthostatic hypotension    Telemetry      VTE Prophylaxis:SCDS    Stress Ulcer Prophylaxis:protonix      Yen Rodrigues MD  03/30/23  12:55 EDT      Time: 25min.

## 2023-03-31 ENCOUNTER — APPOINTMENT (OUTPATIENT)
Dept: CT IMAGING | Facility: HOSPITAL | Age: 73
DRG: 246 | End: 2023-03-31
Payer: MEDICARE

## 2023-03-31 LAB
ALBUMIN SERPL-MCNC: 2.5 G/DL (ref 3.5–5.2)
ALBUMIN/GLOB SERPL: 1 G/DL
ALP SERPL-CCNC: 75 U/L (ref 39–117)
ALT SERPL W P-5'-P-CCNC: 17 U/L (ref 1–41)
ANION GAP SERPL CALCULATED.3IONS-SCNC: 16 MMOL/L (ref 5–15)
APPEARANCE FLD: ABNORMAL
AST SERPL-CCNC: 43 U/L (ref 1–40)
BILIRUB SERPL-MCNC: 3.7 MG/DL (ref 0–1.2)
BUN SERPL-MCNC: 59 MG/DL (ref 8–23)
BUN/CREAT SERPL: 12.4 (ref 7–25)
CALCIUM SPEC-SCNC: 8.1 MG/DL (ref 8.6–10.5)
CHLORIDE SERPL-SCNC: 92 MMOL/L (ref 98–107)
CO2 SERPL-SCNC: 27 MMOL/L (ref 22–29)
COLOR FLD: YELLOW
CREAT SERPL-MCNC: 4.75 MG/DL (ref 0.76–1.27)
DEPRECATED RDW RBC AUTO: 59.7 FL (ref 37–54)
EGFRCR SERPLBLD CKD-EPI 2021: 12.2 ML/MIN/1.73
ERYTHROCYTE [DISTWIDTH] IN BLOOD BY AUTOMATED COUNT: 16.6 % (ref 12.3–15.4)
GLOBULIN UR ELPH-MCNC: 2.4 GM/DL
GLUCOSE BLDC GLUCOMTR-MCNC: 120 MG/DL (ref 70–130)
GLUCOSE BLDC GLUCOMTR-MCNC: 248 MG/DL (ref 70–130)
GLUCOSE BLDC GLUCOMTR-MCNC: 301 MG/DL (ref 70–130)
GLUCOSE BLDC GLUCOMTR-MCNC: 318 MG/DL (ref 70–130)
GLUCOSE SERPL-MCNC: 93 MG/DL (ref 65–99)
HCT VFR BLD AUTO: 23.6 % (ref 37.5–51)
HGB BLD-MCNC: 8.2 G/DL (ref 13–17.7)
INR PPP: 1.91 (ref 0.84–1.13)
MACROPHAGE FLUID: 37 %
MCH RBC QN AUTO: 34.2 PG (ref 26.6–33)
MCHC RBC AUTO-ENTMCNC: 34.7 G/DL (ref 31.5–35.7)
MCV RBC AUTO: 98.3 FL (ref 79–97)
MESOTHL CELL NFR FLD MANUAL: 7 %
MONOCYTES NFR FLD: 2 %
NEUTROPHILS NFR FLD MANUAL: 54 %
PLATELET # BLD AUTO: 46 10*3/MM3 (ref 140–450)
POTASSIUM SERPL-SCNC: 3.5 MMOL/L (ref 3.5–5.2)
PROT SERPL-MCNC: 4.9 G/DL (ref 6–8.5)
PROTHROMBIN TIME: 21.9 SECONDS (ref 11.4–14.4)
RBC # BLD AUTO: 2.4 10*6/MM3 (ref 4.14–5.8)
RBC # FLD AUTO: 8000 /MM3
SODIUM SERPL-SCNC: 135 MMOL/L (ref 136–145)
WBC # FLD AUTO: 99 /MM3
WBC NRBC COR # BLD: 10.98 10*3/MM3 (ref 3.4–10.8)

## 2023-03-31 PROCEDURE — 0W9G3ZZ DRAINAGE OF PERITONEAL CAVITY, PERCUTANEOUS APPROACH: ICD-10-PCS | Performed by: RADIOLOGY

## 2023-03-31 PROCEDURE — 87015 SPECIMEN INFECT AGNT CONCNTJ: CPT | Performed by: INTERNAL MEDICINE

## 2023-03-31 PROCEDURE — 80053 COMPREHEN METABOLIC PANEL: CPT | Performed by: INTERNAL MEDICINE

## 2023-03-31 PROCEDURE — 25010000002 ALBUMIN HUMAN 25% PER 50 ML: Performed by: INTERNAL MEDICINE

## 2023-03-31 PROCEDURE — 85610 PROTHROMBIN TIME: CPT | Performed by: INTERNAL MEDICINE

## 2023-03-31 PROCEDURE — 49083 ABD PARACENTESIS W/IMAGING: CPT | Performed by: RADIOLOGY

## 2023-03-31 PROCEDURE — 82962 GLUCOSE BLOOD TEST: CPT

## 2023-03-31 PROCEDURE — 85027 COMPLETE CBC AUTOMATED: CPT | Performed by: INTERNAL MEDICINE

## 2023-03-31 PROCEDURE — P9047 ALBUMIN (HUMAN), 25%, 50ML: HCPCS | Performed by: INTERNAL MEDICINE

## 2023-03-31 PROCEDURE — 0 LIDOCAINE 1 % SOLUTION: Performed by: RADIOLOGY

## 2023-03-31 PROCEDURE — 87205 SMEAR GRAM STAIN: CPT | Performed by: INTERNAL MEDICINE

## 2023-03-31 PROCEDURE — 75989 ABSCESS DRAINAGE UNDER X-RAY: CPT

## 2023-03-31 PROCEDURE — 89051 BODY FLUID CELL COUNT: CPT | Performed by: INTERNAL MEDICINE

## 2023-03-31 PROCEDURE — 25010000002 PIPERACILLIN SOD-TAZOBACTAM PER 1 G: Performed by: INTERNAL MEDICINE

## 2023-03-31 PROCEDURE — 87070 CULTURE OTHR SPECIMN AEROBIC: CPT | Performed by: INTERNAL MEDICINE

## 2023-03-31 PROCEDURE — 63710000001 INSULIN LISPRO (HUMAN) PER 5 UNITS: Performed by: INTERNAL MEDICINE

## 2023-03-31 PROCEDURE — 25010000002 OCTREOTIDE PER 25 MCG: Performed by: INTERNAL MEDICINE

## 2023-03-31 PROCEDURE — 99232 SBSQ HOSP IP/OBS MODERATE 35: CPT | Performed by: NURSE PRACTITIONER

## 2023-03-31 PROCEDURE — 99233 SBSQ HOSP IP/OBS HIGH 50: CPT | Performed by: INTERNAL MEDICINE

## 2023-03-31 PROCEDURE — C1729 CATH, DRAINAGE: HCPCS

## 2023-03-31 RX ORDER — ALBUMIN (HUMAN) 12.5 G/50ML
50 SOLUTION INTRAVENOUS ONCE
Status: COMPLETED | OUTPATIENT
Start: 2023-03-31 | End: 2023-03-31

## 2023-03-31 RX ORDER — LIDOCAINE HYDROCHLORIDE 10 MG/ML
10 INJECTION, SOLUTION INFILTRATION; PERINEURAL ONCE
Status: COMPLETED | OUTPATIENT
Start: 2023-03-31 | End: 2023-03-31

## 2023-03-31 RX ADMIN — URSODIOL 300 MG: 300 CAPSULE ORAL at 22:09

## 2023-03-31 RX ADMIN — LIDOCAINE HYDROCHLORIDE 10 ML: 10 INJECTION, SOLUTION INFILTRATION; PERINEURAL at 15:38

## 2023-03-31 RX ADMIN — ASPIRIN 81 MG CHEWABLE TABLET 81 MG: 81 TABLET CHEWABLE at 08:16

## 2023-03-31 RX ADMIN — RIFAXIMIN 550 MG: 550 TABLET ORAL at 22:03

## 2023-03-31 RX ADMIN — ALBUMIN (HUMAN) 50 G: 0.25 INJECTION, SOLUTION INTRAVENOUS at 17:29

## 2023-03-31 RX ADMIN — FERROUS SULFATE TAB 325 MG (65 MG ELEMENTAL FE) 325 MG: 325 (65 FE) TAB at 08:16

## 2023-03-31 RX ADMIN — FUROSEMIDE 40 MG: 40 TABLET ORAL at 08:16

## 2023-03-31 RX ADMIN — INSULIN LISPRO 5 UNITS: 100 INJECTION, SOLUTION INTRAVENOUS; SUBCUTANEOUS at 17:23

## 2023-03-31 RX ADMIN — MIDODRINE HYDROCHLORIDE 15 MG: 5 TABLET ORAL at 11:44

## 2023-03-31 RX ADMIN — INSULIN LISPRO 3 UNITS: 100 INJECTION, SOLUTION INTRAVENOUS; SUBCUTANEOUS at 22:03

## 2023-03-31 RX ADMIN — Medication 400 MCG: at 08:17

## 2023-03-31 RX ADMIN — INSULIN LISPRO 5 UNITS: 100 INJECTION, SOLUTION INTRAVENOUS; SUBCUTANEOUS at 11:50

## 2023-03-31 RX ADMIN — URSODIOL 300 MG: 300 CAPSULE ORAL at 08:16

## 2023-03-31 RX ADMIN — TAZOBACTAM SODIUM AND PIPERACILLIN SODIUM 3.38 G: 375; 3 INJECTION, SOLUTION INTRAVENOUS at 08:10

## 2023-03-31 RX ADMIN — TAZOBACTAM SODIUM AND PIPERACILLIN SODIUM 3.38 G: 375; 3 INJECTION, SOLUTION INTRAVENOUS at 22:03

## 2023-03-31 RX ADMIN — CLOPIDOGREL BISULFATE 75 MG: 75 TABLET ORAL at 08:16

## 2023-03-31 RX ADMIN — PANTOPRAZOLE SODIUM 40 MG: 40 INJECTION, POWDER, LYOPHILIZED, FOR SOLUTION INTRAVENOUS at 04:56

## 2023-03-31 RX ADMIN — LACTULOSE 20 G: 20 SOLUTION ORAL at 08:13

## 2023-03-31 RX ADMIN — RIFAXIMIN 550 MG: 550 TABLET ORAL at 08:16

## 2023-03-31 RX ADMIN — FLUTICASONE PROPIONATE 2 SPRAY: 50 SPRAY, METERED NASAL at 08:17

## 2023-03-31 RX ADMIN — MIDODRINE HYDROCHLORIDE 15 MG: 5 TABLET ORAL at 08:15

## 2023-03-31 RX ADMIN — Medication 10 ML: at 22:04

## 2023-03-31 RX ADMIN — OCTREOTIDE ACETATE 50 MCG/HR: 500 INJECTION, SOLUTION INTRAVENOUS; SUBCUTANEOUS at 04:56

## 2023-03-31 RX ADMIN — Medication 10 ML: at 08:17

## 2023-03-31 RX ADMIN — MIDODRINE HYDROCHLORIDE 15 MG: 5 TABLET ORAL at 17:24

## 2023-03-31 NOTE — PROGRESS NOTES
[facility] Medicine Services  PROGRESS NOTE    Patient Name: Leoncio Hopson  : 1950  MRN: [id number]    Date of Admission: 3/25/2023  Primary Care Physician: Antonio Castro MD    Subjective   Subjective     CC:  HAYES, STEMI    HPI:  Patient sitting in bedside chair, asking about paracentesis today as his abdomen is quite swollen, he denies pain. Mild SOA. Daughter at bedside asking about discharge home.    ROS:  Gen- No fevers, chills  CV- No chest pain, palpitations  Resp- No cough, dyspnea  GI- No N/V/D, + abd pain/distention    Objective   Objective     Vital Signs:   Temp:  [97.7 °F (36.5 °C)-98.5 °F (36.9 °C)] 97.8 °F (36.6 °C)  Heart Rate:  [73-98] 90  Resp:  [16-22] 18  BP: (104-133)/(56-78) 114/63  Flow (L/min):  [2] 2     Physical Exam:  Constitutional: No acute distress, awake, alert  HENT: NCAT, mucous membranes moist  Respiratory: Clear to auscultation bilaterally, respiratory effort normal   Cardiovascular: RRR, no murmurs, rubs, or gallops  Gastrointestinal: distended with + fluid wave/ascites, firm  Musculoskeletal: 1+ bilateral ankle edema  Psychiatric: Appropriate affect, cooperative  Neurologic: Oriented x 3, speech clear, hard of hearing  Skin: No rashes      Results Reviewed:  LAB RESULTS:      Lab 23  0607 23  0206 23  0328 23  0300 23  0406 23  0054 23  2350 23  2021 23  1707 23  1620 23  1310 23  1103   WBC 10.98* 13.05* 8.27 5.66 14.01* 5.24  --   --   --   --  12.89* 13.39*   HEMOGLOBIN 8.2* 8.8* 8.2* 8.0* 7.7* 7.5*  --   --   --   --  10.5* 9.8*   HEMATOCRIT 23.6* 25.9* 24.7* 23.7* 22.7* 23.4*  --   --   --   --  31.9* 30.2*   PLATELETS 46* 52* 44* 46* 101* 107*  --   --   --   --  111* 134*   NEUTROS ABS  --   --  5.69  --  11.09* 4.15  --   --   --   --   --  9.77*   IMMATURE GRANS (ABS)  --   --  0.09*  --  0.16* 0.27*  --   --   --   --   --  0.28*   LYMPHS ABS  --   --   0.63*  --  0.98 0.49*  --   --   --   --   --  0.68*   MONOS ABS  --   --  0.41  --  1.18* 0.21  --   --   --   --   --  0.71   EOS ABS  --   --  1.44*  --  0.58* 0.11  --   --   --   --   --  1.85*   MCV 98.3* 102.0* 102.1* 100.4* 100.9* 105.4*  --   --   --   --  105.6* 105.6*   PROCALCITONIN  --   --   --   --   --  0.95*  --   --   --   --   --  0.82*   LACTATE  --   --   --   --  6.0*  --  16.7* 18.8*  --  20.5* 17.6*  --    PROTIME 21.9*  --  23.1*  --  32.1* 28.9*  --   --  27.8*  --   --   --    APTT  --   --   --   --   --   --   --   --  124.4*  --   --   --          Lab 03/31/23  0607 03/30/23  0206 03/29/23  1619 03/29/23  0200 03/28/23  1337 03/28/23  0300 03/27/23  0406 03/26/23  0054 03/25/23  1317 03/25/23  1310 03/25/23  1112 03/25/23  1103   SODIUM 135* 136  --  138  --  137 134* 136   < >  --   --  133*   POTASSIUM 3.5 3.7 3.4* 3.2* 3.5 3.1* 3.3* 4.1   < >  --   --  3.8   CHLORIDE 92* 95*  --  94*  --  90* 87* 89*   < >  --   --  89*   CO2 27.0 27.0  --  28.0  --  29.0 27.0 9.0*   < >  --   --  6.0*   ANION GAP 16.0* 14.0  --  16.0*  --  18.0* 20.0* 38.0*   < >  --   --  38.0*   BUN 59* 69*  --  71*  --  77* 63* 50*   < >  --   --  43*   CREATININE 4.75* 4.85*  --  5.14*  --  4.92* 4.70* 4.14*   < >  --    < > 4.07*   EGFR 12.2* 11.9*  --  11.1*  --  11.7* 12.3* 14.4*   < >  --   --  14.7*   GLUCOSE 93 124*  --  121*  --  151* 55* 182*   < >  --   --  168*   CALCIUM 8.1* 8.3*  --  8.2*  --  8.2* 8.4* 8.2*   < >  --   --  9.5   IONIZED CALCIUM  --   --   --   --   --   --   --   --   --  1.21  --   --    MAGNESIUM  --  1.9  --  1.7  --   --  2.2 1.8  --   --   --  1.9   PHOSPHORUS  --   --   --   --   --  3.8 4.0 3.8  --   --   --  3.9   HEMOGLOBIN A1C  --   --   --   --   --   --   --   --   --   --   --  5.60    < > = values in this interval not displayed.         Lab 03/31/23  0607 03/30/23  0206 03/29/23  0200 03/28/23  0300 03/27/23  0406   TOTAL PROTEIN 4.9* 5.4* 5.1* 5.3* 5.5*   ALBUMIN 2.5*  2.6* 2.7* 2.7* 2.9*   GLOBULIN 2.4 2.8 2.4 2.6 2.6   ALT (SGPT) 17 19 21 25 28   AST (SGOT) 43* 55* 68* 109* 157*   BILIRUBIN 3.7* 3.3* 2.9* 2.7* 2.4*   ALK PHOS 75 67 63 60 62         Lab 03/31/23  0607 03/29/23  0328 03/27/23  0406 03/26/23  0054 03/25/23  1707 03/25/23  1317 03/25/23  1103   HSTROP T  --   --   --   --   --  792* 739*   PROTIME 21.9* 23.1* 32.1* 28.9* 27.8*  --   --    INR 1.91* 2.05* 3.09* 2.71* 2.58*  --   --          Lab 03/25/23  1103   CHOLESTEROL 94         Lab 03/29/23  1619   ABO TYPING O   RH TYPING Positive   ANTIBODY SCREEN Negative         Lab 03/27/23  0322 03/26/23  0509 03/25/23  1456   PH, ARTERIAL 7.516* 7.394 7.152*   PCO2, ARTERIAL 38.3 24.2* 15.5*   PO2 ART 72.3* 84.6 128.0*   FIO2 28 21 21   HCO3 ART 31.0* 14.8* 5.4*   BASE EXCESS ART 7.5* -9.0* <-20.0*   CARBOXYHEMOGLOBIN 1.3 1.3 0.9     Brief Urine Lab Results  (Last result in the past 365 days)      Color   Clarity   Blood   Leuk Est   Nitrite   Protein   CREAT   Urine HCG        03/25/23 2327 Dark Yellow   Turbid   Large (3+)   Moderate (2+)   Negative   >=300 mg/dL (3+)                 Microbiology Results Abnormal     Procedure Component Value - Date/Time    Blood Culture - Blood, Arm, Left [[id number]]  (Normal) Collected: 03/25/23 2021    Lab Status: Final result Specimen: Blood from Arm, Left Updated: 03/30/23 2045     Blood Culture No growth at 5 days    Blood Culture - Blood, Hand, Left [[id number]]  (Normal) Collected: 03/25/23 1622    Lab Status: Final result Specimen: Blood from Hand, Left Updated: 03/30/23 1700     Blood Culture No growth at 5 days    Narrative:      Aerobic bottle only      Urine Culture - Urine, Indwelling Urethral Catheter [[id number]]  (Normal) Collected: 03/25/23 2327    Lab Status: Final result Specimen: Urine from Indwelling Urethral Catheter Updated: 03/27/23 1014     Urine Culture No growth    Eosinophil Smear - Urine, Urine, Clean Catch [[id number]]  (Normal) Collected: 03/25/23 7968     Lab Status: Final result Specimen: Urine, Clean Catch Updated: 03/26/23 0224     Eosinophil Smear 0 % EOS/100 Cells     Narrative:      No eosinophil seen          XR Chest 1 View    Result Date: 3/30/2023  XR CHEST 1 VW Date of Exam: 3/30/2023 3:40 AM EDT Indication: follow up pleural effusions. Comparison: 3/26/2023 Findings: Right-sided central venous catheter projects unchanged. Aeration is worsened from comparison, including both increased bilateral airspace disease, most notable on the right as well as with increased small left and moderate right pleural effusions, with the appearance of underlying pulmonary edema. No pneumothorax. Unchanged heart and mediastinal contours.     Impression: Impression: Right-sided central venous catheter projects unchanged. Aeration is worsened from comparison, including both increased bilateral airspace disease, most notable on the right as well as with increased small left and moderate right pleural effusions, with the appearance of underlying pulmonary edema. No pneumothorax. Unchanged heart and mediastinal contours. Electronically Signed: Rylan Gaspar  3/30/2023 5:04 AM EDT  Workstation ID: [id number]      Results for orders placed during the hospital encounter of 03/25/23    Adult Transthoracic Echo Complete W/ Cont if Necessary Per Protocol    Interpretation Summary  •  Left ventricular systolic function is normal. Estimated left ventricular EF = 65%  •  The aortic valve is mildly calcified.  There is minimal aortic stenosis present.  •  Moderate pulmonary hypertension is present. Estimated right ventricular systolic pressure from tricuspid regurgitation is moderately elevated (49 mmHg).  •  Moderate dilation of the aortic root is present. Aneurysmal dilation of the ascending aorta is present.  •  There is a right pleural effusion.      Current medications:  Scheduled Meds:aspirin, 81 mg, Oral, Daily  clopidogrel, 75 mg, Oral, Daily  ferrous sulfate, 325 mg, Oral, Daily  With Breakfast  fluticasone, 2 spray, Each Nare, Daily  folic acid, 400 mcg, Oral, Daily  furosemide, 40 mg, Oral, Daily  insulin lispro, 0-7 Units, Subcutaneous, 4x Daily With Meals & Nightly  lactulose, 20 g, Oral, Daily  midodrine, 15 mg, Oral, TID AC  pantoprazole, 40 mg, Intravenous, Q AM  pharmacy consult - MTM, , Does not apply, Daily  piperacillin-tazobactam, 3.375 g, Intravenous, Q12H  riFAXIMin, 550 mg, Oral, Q12H  sodium chloride, 10 mL, Intravenous, Q12H  ursodiol, 300 mg, Oral, BID      Continuous Infusions:   PRN Meds:.•  Calcium Replacement - Follow Nurse / BPA Driven Protocol  •  dextrose  •  dextrose  •  glucagon (human recombinant)  •  Lidocaine HCl gel  •  Magnesium Low Dose Replacement - Follow Nurse / BPA Driven Protocol  •  Phosphorus Replacement - Follow Nurse / BPA Driven Protocol  •  Potassium Replacement - Follow Nurse / BPA Driven Protocol  •  sodium chloride  •  sodium chloride    Assessment & Plan   Assessment & Plan     Active Hospital Problems    Diagnosis  POA   • **ST elevation myocardial infarction involving right coronary artery (HCC) [I21.11]  Yes   • Hyperlipidemia LDL goal <70 [E78.5]  Yes   • Coronary artery disease involving native coronary artery of native heart [I25.10]  Yes   • CKD (chronic kidney disease) stage 5, GFR less than 15 ml/min (HCC) [N18.5]  Yes   • Decompensated hepatic cirrhosis (HCC) [K72.90, K74.60]  Yes   • Coagulopathy (HCC) [D68.9]  Yes   • Liver cirrhosis secondary to HAYES (HCC) [K75.81, K74.60]  Yes   • Hyperbilirubinemia [E80.6]  Yes   • PAOLO (acute kidney injury) (HCC) [N17.9]  Yes      Resolved Hospital Problems    Diagnosis Date Resolved POA   • Diabetes mellitus (HCC) [E11.9] 03/25/2023 Unknown        Brief Hospital Course to date:  Leoncio Hopson is a 72 y.o. male with PMHx significant for decompensated HAYES cirrhosis, CKD IV, DM, HTN who presented from OSH w/STEMI. He underwent emergent LHC which showed 2v CAD, he received HARDEEP to RCA and  unsuccessful thrombectomy to 100% occluded RPDA. Echocardiogram with EF 65%.    STEMI s/p HARDEEP to RCA on 3/25  - on DAPT, cardiology following  - EF 65%  - no statin due to liver failure    Decompensated HAYES Cirrhosis w/ascites  Thrombocytopenia, Coagulopathy  Hepatic Encephalopathy  Hepatic Hydrothorax  Hx of Varices  - MELD-Na 33  - had paracentesis 3/26 with 4.5L removed, needs repeat paracentesis prior to discharge  - continue rifaxamin, lactulose, lasix  - octreotide gtt discontinued today, does not seem to be getting any benefit from this  - discussed extensively with daughter at bedside, we discussed palliative options and palliative peritoneal drain should patient wish to ever go that route. Currently not a candidate for transplant due to recent STEMI and need for DAPT. They do plan to try to f/u at [facility], follow with hepatology at  and has already been deemed not transplant candidate there.  - Midodrine for hypotension, will need albumin if paracentesis repeated  - GI following, appreciate input    PAOLO on CKD IV  - likely related to HRS vs. ATN  - Nephrology following, appreciate input  - high risk for need for HD, family aware    Possible Sepsis  Lactic Acidosis  - treated with empiric Zosyn in the ICU to complete 7 days  - blood and urine cultures remain negative  - mildly elevated procal      Expected Discharge Location and Transportation: Home w/family  Expected Discharge   Expected Discharge Date and Time     Expected Discharge Date Expected Discharge Time    Apr 6, 2023            DVT prophylaxis:  Mechanical DVT prophylaxis orders are present.     AM-PAC 6 Clicks Score (PT): 18 (03/30/23 1137)    CODE STATUS:   Code Status and Medical Interventions:   Ordered at: 03/27/23 1524     Code Status (Patient has no pulse and is not breathing):    CPR (Attempt to Resuscitate)     Medical Interventions (Patient has pulse or is breathing):    Full Support     Release to patient:    Routine Release        Do Saucedo,   03/31/23

## 2023-03-31 NOTE — PLAN OF CARE
Goal Outcome Evaluation:         A&Ox4. VSS. [place]. Cont fluids stopped. Multiple loose BM's. OOB to chair. Gallegos pulled @ 1840. Paracentesis done, pt tolerated well, 5L off, and abd softer post procedure. Daughter at bedside. Pt eating well. K+ 3.5 and not replaced per provider d/t Cr levels. Palliative and spiritual care consulted. Safety maintained.

## 2023-03-31 NOTE — PROGRESS NOTES
"   LOS: 6 days    Patient Care Team:  Antonio Castro MD as PCP - General  Antonio Castro MD as PCP - Family Medicine    Chief Complaint: Shortness of breath     72-year-old male with past medical history of [place], liver cirrhosis, prerenal acute kidney failure was last seen a month ago with a creatinine of 5.0 when he was transferred to  for further management for liver transplant.  Dialysis was not started at that time.    Prior to the above baseline creatinine 0.8-1.2.  On previous admission creatinine was 4.7, before discharge. Labs showed a creatinine 3.94, BUN 48, sodium 135, potassium 4.1, CO2 6.0, calcium 9.6, EGFR 15 mL/min, troponin high 53, hemoglobin 10.5, platelets 111 K, lactate high 17.6, phosphorus 3.9 magnesium 1.9, bilirubin 4.6 other liver enzymes were normal.  Subjective : F/U PAOLO ON CKD.    Interval History:   Transferred out of ICU. Resting in bed comfortably. Niurka any specific issue. Plan for possible paracentesis today. 5 fluid removed.      Review of Systems:   Hard of hearing, abdominal distention, mild shortness of breath, generalized weakness. + COUGH.  All other negative    Objective     Vital Sign Min/Max for last 24 hours  Temp  Min: 97.8 °F (36.6 °C)  Max: 98.5 °F (36.9 °C)   BP  Min: 104/56  Max: 117/68   Pulse  Min: 73  Max: 98   Resp  Min: 17  Max: 22   SpO2  Min: 90 %  Max: 100 %   Flow (L/min)  Min: 2  Max: 2   Weight  Min: 74.8 kg (164 lb 12.8 oz)  Max: 74.8 kg (164 lb 12.8 oz)     Flowsheet Rows    Flowsheet Row First Filed Value   Admission Height 172.7 cm (68\") Documented at 03/25/2023 1055   Admission Weight 72.6 kg (160 lb) Documented at 03/25/2023 1055          I/O this shift:  In: 573.6 [P.O.:300; I.V.:173.6; IV Piggyback:100]  Out: 275 [Urine:275]  I/O last 3 completed shifts:  In: 805.4 [P.O.:540; I.V.:215.4; IV Piggyback:50]  Out: 1628 [Urine:1628]    Physical Exam:  General appearance: Sick looking  male mild distress laying in bed.    HEENT: Hard of " hearing, oral mucosa moist, neck is supple  Lungs: Few rhonchi's and rails heard, equal chest movement, no crepitation.  Heart: Normal S1, S2, no gallop, murmur, RRR.  Abdomen: Abdomen distended, positive thrill soft, nontender, positive bowel sounds.  Extremities: Positive lower extremity edema, no cyanosis, no joint swelling.  Neuro: Alert, oriented x4, no focal deficit.  Psych: Mood and affect are normal and appropriate.  Skin: Skin is warm and dry.  : No suprapubic fullness or tenderness. TORRE.    WBC WBC   Date Value Ref Range Status   03/31/2023 10.98 (H) 3.40 - 10.80 10*3/mm3 Final   03/30/2023 13.05 (H) 3.40 - 10.80 10*3/mm3 Final   03/29/2023 8.27 3.40 - 10.80 10*3/mm3 Final      HGB Hemoglobin   Date Value Ref Range Status   03/31/2023 8.2 (L) 13.0 - 17.7 g/dL Final   03/30/2023 8.8 (L) 13.0 - 17.7 g/dL Final   03/29/2023 8.2 (L) 13.0 - 17.7 g/dL Final      HCT Hematocrit   Date Value Ref Range Status   03/31/2023 23.6 (L) 37.5 - 51.0 % Final   03/30/2023 25.9 (L) 37.5 - 51.0 % Final   03/29/2023 24.7 (L) 37.5 - 51.0 % Final      Platlets No results found for: LABPLAT   MCV MCV   Date Value Ref Range Status   03/31/2023 98.3 (H) 79.0 - 97.0 fL Final   03/30/2023 102.0 (H) 79.0 - 97.0 fL Final   03/29/2023 102.1 (H) 79.0 - 97.0 fL Final          Sodium Sodium   Date Value Ref Range Status   03/31/2023 135 (L) 136 - 145 mmol/L Final   03/30/2023 136 136 - 145 mmol/L Final   03/29/2023 138 136 - 145 mmol/L Final      Potassium Potassium   Date Value Ref Range Status   03/31/2023 3.5 3.5 - 5.2 mmol/L Final   03/30/2023 3.7 3.5 - 5.2 mmol/L Final   03/29/2023 3.4 (L) 3.5 - 5.2 mmol/L Final   03/29/2023 3.2 (L) 3.5 - 5.2 mmol/L Final      Chloride Chloride   Date Value Ref Range Status   03/31/2023 92 (L) 98 - 107 mmol/L Final   03/30/2023 95 (L) 98 - 107 mmol/L Final   03/29/2023 94 (L) 98 - 107 mmol/L Final      CO2 CO2   Date Value Ref Range Status   03/31/2023 27.0 22.0 - 29.0 mmol/L Final   03/30/2023  27.0 22.0 - 29.0 mmol/L Final   03/29/2023 28.0 22.0 - 29.0 mmol/L Final      BUN BUN   Date Value Ref Range Status   03/31/2023 59 (H) 8 - 23 mg/dL Final   03/30/2023 69 (H) 8 - 23 mg/dL Final   03/29/2023 71 (H) 8 - 23 mg/dL Final      Creatinine Creatinine   Date Value Ref Range Status   03/31/2023 4.75 (H) 0.76 - 1.27 mg/dL Final   03/30/2023 4.85 (H) 0.76 - 1.27 mg/dL Final   03/29/2023 5.14 (H) 0.76 - 1.27 mg/dL Final      Calcium Calcium   Date Value Ref Range Status   03/31/2023 8.1 (L) 8.6 - 10.5 mg/dL Final   03/30/2023 8.3 (L) 8.6 - 10.5 mg/dL Final   03/29/2023 8.2 (L) 8.6 - 10.5 mg/dL Final      PO4 No results found for: CAPO4   Albumin Albumin   Date Value Ref Range Status   03/31/2023 2.5 (L) 3.5 - 5.2 g/dL Final   03/30/2023 2.6 (L) 3.5 - 5.2 g/dL Final   03/29/2023 2.7 (L) 3.5 - 5.2 g/dL Final      Magnesium Magnesium   Date Value Ref Range Status   03/30/2023 1.9 1.6 - 2.4 mg/dL Final   03/29/2023 1.7 1.6 - 2.4 mg/dL Final      Uric Acid No results found for: URICACID        Results Review:     I reviewed the patient's new clinical results.    aspirin, 81 mg, Oral, Daily  clopidogrel, 75 mg, Oral, Daily  ferrous sulfate, 325 mg, Oral, Daily With Breakfast  fluticasone, 2 spray, Each Nare, Daily  folic acid, 400 mcg, Oral, Daily  furosemide, 40 mg, Oral, Daily  insulin lispro, 0-7 Units, Subcutaneous, 4x Daily With Meals & Nightly  lactulose, 20 g, Oral, Daily  midodrine, 15 mg, Oral, TID AC  pantoprazole, 40 mg, Intravenous, Q AM  pharmacy consult - MTM, , Does not apply, Daily  piperacillin-tazobactam, 3.375 g, Intravenous, Q12H  riFAXIMin, 550 mg, Oral, Q12H  sodium chloride, 10 mL, Intravenous, Q12H  ursodiol, 300 mg, Oral, BID           Medication Review: Reviewed    Assessment & Plan       ST elevation myocardial infarction involving right coronary artery (HCC)    PAOLO (acute kidney injury) (HCC)    Liver cirrhosis secondary to HAYES (HCC)    Hyperbilirubinemia    Coagulopathy (HCC)     Decompensated hepatic cirrhosis (HCC)    CKD (chronic kidney disease) stage 5, GFR less than 15 ml/min (HCC)    Hyperlipidemia LDL goal <70    Coronary artery disease involving native coronary artery of native heart    1.  Acute kidney injury: Patient was last seen a month ago with creatinine 4.5- 5.0 range.  Prior to that baseline was 0.8-1.2, likely diagnosis was hepatorenal versus ATN.  At that time patient was transferred to  for further management.  Patient has been admitted and underwent a cardiac catheterization received 80 mL of IV contrast.   2.  S/p right RCA stenting for the clot 3/25/2023 today  3.  HAYES: Decompensated liver cirrhosis.  Ammonia level 106  4.  Hypotension: In the setting of liver disease.  5.  Anemia  6.  Thrombocytopenia   7.  Thoracentesis: 3 weeks back  8.  Paracentesis: On 3/17/2023 at [facility]. 9. HYPOKALEMIA.     Recommendations:  So far patient has not demonstrated meaningful renal recovery since last admission in Feb and developed acute kidney disease with persistent cr elevation. Niurka any uremic symptoms. Therefore no indication of dialysis. However he is at risk for needing dialysis in near future. He is undergoing txp evaluation at  therefore dialysis is in consideration. Stable for transfer out of ICU  - Will need albumin supp if undergoing paracentesis >3 liter/day  - Continue lasix 40 mg daily   - Will need close outpatient f/u in renal clinic. If renal function stable by tomorrow can be discharge with close followup.   - Hold midodrine if sbp>130         Sergei العلي MD  03/31/23  18:26 EDT

## 2023-03-31 NOTE — DISCHARGE PLACEMENT REQUEST
"LEONOR Joyce RN  Case Management  [phone]    Patient Room # S464      Scot Hopson (72 y.o. Male)     Date of Birth   1950    Social Security Number       Address   [address] [place] [address]    Home Phone   [phone]    MRN   [id number]       [facility]    Marital Status                               Admission Date   3/25/23    Admission Type   Urgent    Admitting Provider   Do Saucedo DO    Attending Provider   Do Saucedo DO    Department, Room/Bed   [address], S464/1       Discharge Date       Discharge Disposition       Discharge Destination                               Attending Provider: Do Saucedo DO    Allergies: Contrast Dye (Echo Or Unknown Ct/mr), Gadobutrol    Isolation: None   Infection: None   Code Status: CPR    Ht: 172.7 cm (67.99\")   Wt: 74.8 kg (164 lb 12.8 oz)    Admission Cmt: None   Principal Problem: ST elevation myocardial infarction involving right coronary artery (HCC) [I21.11]                 Active Insurance as of 3/25/2023     Primary Coverage     Payor Plan Insurance Group Employer/Plan Group    MEDICARE MEDICARE A & B      Payor Plan Address Payor Plan Phone Number Payor Plan Fax Number Effective Dates    [address] [phone]  6/1/2013 - None Entered    [facility] [address]       Subscriber Name Subscriber Birth Date Member ID       SCOT HOPSON 1950 [id number]           Secondary Coverage     Payor Plan Insurance Group Employer/Plan Group    [facility] SU[employer] [facility] HEALTH CARE OPTIONS [id number]     Payor Plan Address Payor Plan Phone Number Payor Plan Fax Number Effective Dates    [facility] [phone]  1/1/2018 - None Entered    [address]       [facility] [address]       Subscriber Name Subscriber Birth Date Member ID       SCOT HOPSON 1950 [id number]                 Emergency Contacts      (Rel.) Home Phone Work Phone Mobile Phone " "   Nica Murguia (Daughter) -- -- [phone]    Capri Hopson (Spouse) [phone] -- [phone]    LATANYA HOPSON (Son) [phone] -- [phone]            Insurance Information                MEDICARE/MEDICARE A & B Phone: [phone]    Subscriber: Leoncio Hopson Subscriber#: [id number]    Group#: -- Precert#: --        [facility] HEALTH CARE OPTIONS Phone: [phone]    Subscriber: AgnesLeoncio berg Nba Subscriber#: [id number]    Group#: [id number] Precert#: --           [address] [address]  Dept. Phone:  [phone]  Dept. Fax:  [phone] Date Ordered: Mar 31, 2023         Patient:  Leoncio Hopson MRN:  [id number]   [address] [place] [id number] :  1950  SSN:    Phone: [phone] Sex:  M     Weight: 74.8 kg (164 lb 12.8 oz)         Ht Readings from Last 1 Encounters:   23 172.7 cm (67.99\")         Dougie               (Order ID: [id number])    Diagnosis:  Hyperammonemia (HCC) (E72.20 [ICD-10-CM] 270.6 [ICD-9-CM])  ST elevation myocardial infarction involving right coronary artery (HCC) (I21.11 [ICD-10-CM] 410.31 [ICD-9-CM])  CKD (chronic kidney disease) stage 5, GFR less than 15 ml/min (HCC) (N18.5 [ICD-10-CM] 585.5 [ICD-9-CM])  Liver cirrhosis secondary to HAYES (HCC) (K75.81,K74.60 [ICD-10-CM] 571.8,571.5 [ICD-9-CM])  Decompensated hepatic cirrhosis (HCC) (K72.90,K74.60 [ICD-10-CM] 571.5,572.8 [ICD-9-CM])   Quantity:  1     Equipment:  Walker Folding with Wheels  Length of Need (99 Months = Lifetime): 99 Months = Lifetime        Authorizing Provider's Phone: [phone]  Verbal Order Mode: Telephone with readback   Authorizing Provider: Do Saucedo DO  Authorizing Provider's NPI: [id number]     Order Entered By: Shobha Osborn RN 3/31/2023  2:26 PM     Electronically signed by: Do Saucedo DO 3/31/2023  2:38 PM       "

## 2023-03-31 NOTE — PLAN OF CARE
Goal Outcome Evaluation:               Aox4. VSS. 2L NC while asleep. No complaints of pain. Gallegos in place. IV Abx given. Octreotide infusing.

## 2023-03-31 NOTE — CASE MANAGEMENT/SOCIAL WORK
Continued Stay Note   [facility]     Patient Name: Leoncio Hopson  MRN: [id number]  Today's Date: 3/31/2023    Admit Date: 3/25/2023    Plan: update   Discharge Plan     Row Name 03/31/23 1437       Plan    Plan update    Patient/Family in Agreement with Plan yes    Plan Comments Spoke with patient and daughter at bedside regarding discharge plan who confirm that they do want HH at discharge and would like to have a [facility] as well.  Daughter asking if she could speak with Palliative Care or Hospice about what kind of services are available as her father worsens, wants to have information to make decisions from.  Palliative Care consult placed.  They would like to use [facility] for the DME.  No other needs verbalized.  Called liaison for [facility] to advise of potential discharge this weekend, orders placed.  Spoke with [facility] [phone] who advise [facility] can deliver a RW to room today, request orders be faxed to [phone].  CM following.  Patient plan is to discharge home with [facility] via car with family to transport.    Final Discharge Disposition Code 06 - home with home health care               Discharge Codes    No documentation.               Expected Discharge Date and Time     Expected Discharge Date Expected Discharge Time    Apr 6, 2023             Shobha Osborn RN

## 2023-03-31 NOTE — PROGRESS NOTES
"GI Daily Progress Note  Subjective:    Chief Complaint: Follow-up decompensated cirrhosis    Patient been transferred out of ICU to floor bed.  He is up in chair, alert and oriented.  His daughter is at bedside.  He has complaints of abdominal distention, needing a paracentesis.  He has no confusions or signs of GI bleeding.    His daughter is asking what needs to be done to get him out of the hospital and what that will look like going home.  She reports that [facility] transplant has advised them that he is not a transplant candidate at this time given his recent STEMI and need to be on Plavix.  She would like to establish care with hepatology locally to follow.  He is established with  hepatology already.    Continues on octreotide drip with little benefit noted; creatinine 4.75.  He was unable to obtain octreotide as an outpatient due to insurance coverage.    Objective:    /63 (BP Location: Left arm, Patient Position: Lying)   Pulse 80   Temp 97.8 °F (36.6 °C) (Oral)   Resp 18   Ht 172.7 cm (67.99\")   Wt 74.8 kg (164 lb 12.8 oz)   SpO2 95%   BMI 25.06 kg/m²     Physical Exam  Pulmonary:      Effort: Pulmonary effort is normal.      Breath sounds: Normal breath sounds.   Abdominal:      General: There is distension.      Comments: Apparent ascites   Skin:     General: Skin is warm and dry.   Neurological:      Mental Status: He is alert and oriented to person, place, and time.         Lab  Lab Results   Component Value Date    WBC 10.98 (H) 03/31/2023    HGB 8.2 (L) 03/31/2023    HGB 8.8 (L) 03/30/2023    HGB 8.2 (L) 03/29/2023    MCV 98.3 (H) 03/31/2023    PLT 46 (C) 03/31/2023    INR 1.91 (H) 03/31/2023    INR 2.05 (H) 03/29/2023    INR 3.09 (H) 03/27/2023    INR 2.71 (H) 03/26/2023    INR 2.58 (H) 03/25/2023       Lab Results   Component Value Date    GLUCOSE 93 03/31/2023    BUN 59 (H) 03/31/2023    CREATININE 4.75 (H) 03/31/2023    EGFRIFNONA 59 (L) 04/22/2022    EGFRIFAFRI >60 " 04/22/2022    BCR 12.4 03/31/2023     (L) 03/31/2023    K 3.5 03/31/2023    CO2 27.0 03/31/2023    CALCIUM 8.1 (L) 03/31/2023    PROTENTOTREF 5.8 (L) 05/24/2015    ALBUMIN 2.5 (L) 03/31/2023    ALKPHOS 75 03/31/2023    BILITOT 3.7 (H) 03/31/2023    BILIDIR 1.91 (H) 03/15/2023    ALT 17 03/31/2023    AST 43 (H) 03/31/2023       Assessment:  1.  HAYES cirrhosis. MELD-Na = 33  2.  Ascites  3.  Hepatic hydrothorax.  4.  Chronic kidney disease.   5.  STEMI, status post PCI/HARDEEP RCA on 3/25/23    Plan:  >> Discontinue octreotide drip; providing no benefit  >> Paracentesis today  >> Recommend continued hepatology follow-up as outpatient at     [facility]  03/31/23  11:31 EDT

## 2023-03-31 NOTE — NURSING NOTE
Image guided paracentesis performed by MD GALLARDO. 5000 mls of  REDISH fluid removed from peritoneum. Patient tolerated well. Report called to RN.

## 2023-04-01 LAB
ALBUMIN SERPL-MCNC: 2.2 G/DL (ref 3.5–5.2)
ALBUMIN/GLOB SERPL: 1 G/DL
ALP SERPL-CCNC: 64 U/L (ref 39–117)
ALT SERPL W P-5'-P-CCNC: 13 U/L (ref 1–41)
ANION GAP SERPL CALCULATED.3IONS-SCNC: 16 MMOL/L (ref 5–15)
AST SERPL-CCNC: 32 U/L (ref 1–40)
BILIRUB SERPL-MCNC: 3.6 MG/DL (ref 0–1.2)
BUN SERPL-MCNC: 54 MG/DL (ref 8–23)
BUN/CREAT SERPL: 12.5 (ref 7–25)
CALCIUM SPEC-SCNC: 7.5 MG/DL (ref 8.6–10.5)
CHLORIDE SERPL-SCNC: 92 MMOL/L (ref 98–107)
CO2 SERPL-SCNC: 24 MMOL/L (ref 22–29)
CREAT SERPL-MCNC: 4.31 MG/DL (ref 0.76–1.27)
DEPRECATED RDW RBC AUTO: 59.9 FL (ref 37–54)
EGFRCR SERPLBLD CKD-EPI 2021: 13.7 ML/MIN/1.73
ERYTHROCYTE [DISTWIDTH] IN BLOOD BY AUTOMATED COUNT: 16.2 % (ref 12.3–15.4)
GLOBULIN UR ELPH-MCNC: 2.1 GM/DL
GLUCOSE BLDC GLUCOMTR-MCNC: 160 MG/DL (ref 70–130)
GLUCOSE BLDC GLUCOMTR-MCNC: 221 MG/DL (ref 70–130)
GLUCOSE BLDC GLUCOMTR-MCNC: 260 MG/DL (ref 70–130)
GLUCOSE BLDC GLUCOMTR-MCNC: 427 MG/DL (ref 70–130)
GLUCOSE BLDC GLUCOMTR-MCNC: 443 MG/DL (ref 70–130)
GLUCOSE SERPL-MCNC: 128 MG/DL (ref 65–99)
HCT VFR BLD AUTO: 22.6 % (ref 37.5–51)
HGB BLD-MCNC: 7.6 G/DL (ref 13–17.7)
MCH RBC QN AUTO: 33.8 PG (ref 26.6–33)
MCHC RBC AUTO-ENTMCNC: 33.6 G/DL (ref 31.5–35.7)
MCV RBC AUTO: 100.4 FL (ref 79–97)
PLATELET # BLD AUTO: 36 10*3/MM3 (ref 140–450)
PMV BLD AUTO: 11.5 FL (ref 6–12)
POTASSIUM SERPL-SCNC: 3.1 MMOL/L (ref 3.5–5.2)
PROT SERPL-MCNC: 4.3 G/DL (ref 6–8.5)
RBC # BLD AUTO: 2.25 10*6/MM3 (ref 4.14–5.8)
SODIUM SERPL-SCNC: 132 MMOL/L (ref 136–145)
WBC NRBC COR # BLD: 6.97 10*3/MM3 (ref 3.4–10.8)

## 2023-04-01 PROCEDURE — 63710000001 INSULIN LISPRO (HUMAN) PER 5 UNITS: Performed by: INTERNAL MEDICINE

## 2023-04-01 PROCEDURE — 85027 COMPLETE CBC AUTOMATED: CPT | Performed by: INTERNAL MEDICINE

## 2023-04-01 PROCEDURE — 63710000001 INSULIN DETEMIR PER 5 UNITS: Performed by: INTERNAL MEDICINE

## 2023-04-01 PROCEDURE — 80053 COMPREHEN METABOLIC PANEL: CPT | Performed by: INTERNAL MEDICINE

## 2023-04-01 PROCEDURE — 25010000002 PIPERACILLIN SOD-TAZOBACTAM PER 1 G: Performed by: INTERNAL MEDICINE

## 2023-04-01 PROCEDURE — 82962 GLUCOSE BLOOD TEST: CPT

## 2023-04-01 PROCEDURE — 99232 SBSQ HOSP IP/OBS MODERATE 35: CPT | Performed by: INTERNAL MEDICINE

## 2023-04-01 PROCEDURE — 99232 SBSQ HOSP IP/OBS MODERATE 35: CPT | Performed by: NURSE PRACTITIONER

## 2023-04-01 RX ORDER — DIPHENHYDRAMINE HYDROCHLORIDE AND LIDOCAINE HYDROCHLORIDE AND ALUMINUM HYDROXIDE AND MAGNESIUM HYDRO
5 KIT EVERY 6 HOURS PRN
Status: DISCONTINUED | OUTPATIENT
Start: 2023-04-01 | End: 2023-04-04 | Stop reason: HOSPADM

## 2023-04-01 RX ORDER — DIPHENHYDRAMINE HYDROCHLORIDE AND LIDOCAINE HYDROCHLORIDE AND ALUMINUM HYDROXIDE AND MAGNESIUM HYDRO
5 KIT
Status: DISCONTINUED | OUTPATIENT
Start: 2023-04-01 | End: 2023-04-04 | Stop reason: HOSPADM

## 2023-04-01 RX ORDER — INSULIN LISPRO 100 [IU]/ML
0-9 INJECTION, SOLUTION INTRAVENOUS; SUBCUTANEOUS
Status: DISCONTINUED | OUTPATIENT
Start: 2023-04-01 | End: 2023-04-03

## 2023-04-01 RX ORDER — POTASSIUM CHLORIDE 750 MG/1
20 CAPSULE, EXTENDED RELEASE ORAL ONCE
Status: COMPLETED | OUTPATIENT
Start: 2023-04-01 | End: 2023-04-01

## 2023-04-01 RX ADMIN — CLOPIDOGREL BISULFATE 75 MG: 75 TABLET ORAL at 08:40

## 2023-04-01 RX ADMIN — FERROUS SULFATE TAB 325 MG (65 MG ELEMENTAL FE) 325 MG: 325 (65 FE) TAB at 08:40

## 2023-04-01 RX ADMIN — FLUTICASONE PROPIONATE 2 SPRAY: 50 SPRAY, METERED NASAL at 08:41

## 2023-04-01 RX ADMIN — URSODIOL 300 MG: 300 CAPSULE ORAL at 20:42

## 2023-04-01 RX ADMIN — DIPHENHYDRAMINE HYDROCHLORIDE AND LIDOCAINE HYDROCHLORIDE AND ALUMINUM HYDROXIDE AND MAGNESIUM HYDRO 5 ML: KIT at 17:15

## 2023-04-01 RX ADMIN — DIPHENHYDRAMINE HYDROCHLORIDE AND LIDOCAINE HYDROCHLORIDE AND ALUMINUM HYDROXIDE AND MAGNESIUM HYDRO 5 ML: KIT at 20:42

## 2023-04-01 RX ADMIN — ASPIRIN 81 MG CHEWABLE TABLET 81 MG: 81 TABLET CHEWABLE at 08:40

## 2023-04-01 RX ADMIN — MIDODRINE HYDROCHLORIDE 15 MG: 5 TABLET ORAL at 12:21

## 2023-04-01 RX ADMIN — Medication 10 ML: at 20:46

## 2023-04-01 RX ADMIN — MIDODRINE HYDROCHLORIDE 15 MG: 5 TABLET ORAL at 17:14

## 2023-04-01 RX ADMIN — FUROSEMIDE 40 MG: 40 TABLET ORAL at 08:40

## 2023-04-01 RX ADMIN — INSULIN LISPRO 9 UNITS: 100 INJECTION, SOLUTION INTRAVENOUS; SUBCUTANEOUS at 12:32

## 2023-04-01 RX ADMIN — CAMPHOR AND MENTHOL: 5; 5 LOTION TOPICAL at 17:16

## 2023-04-01 RX ADMIN — Medication 10 ML: at 08:41

## 2023-04-01 RX ADMIN — POTASSIUM CHLORIDE 20 MEQ: 10 CAPSULE, COATED, EXTENDED RELEASE ORAL at 12:21

## 2023-04-01 RX ADMIN — INSULIN LISPRO 4 UNITS: 100 INJECTION, SOLUTION INTRAVENOUS; SUBCUTANEOUS at 17:15

## 2023-04-01 RX ADMIN — LACTULOSE 20 G: 20 SOLUTION ORAL at 08:40

## 2023-04-01 RX ADMIN — MIDODRINE HYDROCHLORIDE 15 MG: 5 TABLET ORAL at 08:39

## 2023-04-01 RX ADMIN — PANTOPRAZOLE SODIUM 40 MG: 40 INJECTION, POWDER, LYOPHILIZED, FOR SOLUTION INTRAVENOUS at 08:47

## 2023-04-01 RX ADMIN — TAZOBACTAM SODIUM AND PIPERACILLIN SODIUM 3.38 G: 375; 3 INJECTION, SOLUTION INTRAVENOUS at 08:46

## 2023-04-01 RX ADMIN — Medication 400 MCG: at 08:40

## 2023-04-01 RX ADMIN — INSULIN DETEMIR 5 UNITS: 100 INJECTION, SOLUTION SUBCUTANEOUS at 20:42

## 2023-04-01 RX ADMIN — URSODIOL 300 MG: 300 CAPSULE ORAL at 08:40

## 2023-04-01 RX ADMIN — RIFAXIMIN 550 MG: 550 TABLET ORAL at 08:39

## 2023-04-01 RX ADMIN — RIFAXIMIN 550 MG: 550 TABLET ORAL at 20:42

## 2023-04-01 RX ADMIN — CAMPHOR AND MENTHOL: 5; 5 LOTION TOPICAL at 20:46

## 2023-04-01 NOTE — CONSULTS
Hospice consult received for information. Chart reviewed. BS visit made w/ pt’s spouse. Pt. was asleep. Spouse did ask that writer call their daughter, Nica. Call placed to Nica to educate on hospice services/POC/philosophy. Hospice related questions/concerns answered/addressed. Nica states that she is not certain that they would not want to return to the hospital & have additional work up @ [facility] should the pt. be able to discontinue his Plavix after his MI. Writer did educate on Palliative care services as well. Nica states that she would like to discuss their options w/ her mother & siblings w/ a plan to have hospice hospital liaison f/u on Monday. Nica states that she would like for the pt.’s referral to be sent to hospice intake. 24 hr. number was provided for hospice-[place] [facility] & hospice @ [facility]. Hospice written information was also left w/ the pt.’s spouse. If further assistance is needed, please call [phone].

## 2023-04-01 NOTE — PROGRESS NOTES
[facility] Medicine Services  PROGRESS NOTE    Patient Name: Leoncio Hopson  : 1950  MRN: [id number]    Date of Admission: 3/25/2023  Primary Care Physician: Antonio Castro MD    Subjective   Subjective     CC:  HAYES, STEMI    HPI:  Patient resting in bed. Son at bedside. S/p para yesterday with improvement, complains about his belly filling back up already    ROS:  Gen- No fevers, chills  CV- No chest pain, palpitations  Resp- No cough, dyspnea  GI- No N/V/D, + abd pain/distention    Objective   Objective     Vital Signs:   Temp:  [97.8 °F (36.6 °C)-98.4 °F (36.9 °C)] 98.2 °F (36.8 °C)  Heart Rate:  [] 93  Resp:  [17-18] 18  BP: ()/(56-68) 97/56     Physical Exam:  Constitutional: No acute distress, awake, alert  HENT: NCAT, mucous membranes moist  Respiratory: Clear to auscultation bilaterally, respiratory effort normal   Cardiovascular: RRR, no murmurs, rubs, or gallops  Gastrointestinal: distention improved s/p paracentesis  Musculoskeletal: 1+ bilateral ankle edema  Psychiatric: Appropriate affect, cooperative  Neurologic: Oriented x 3, speech clear, hard of hearing  Skin: No rashes      Results Reviewed:  LAB RESULTS:      Lab 23  0739 23  0607 23  0206 23  0328 23  0300 23  0406 23  0054 23  2350 23  2021 23  1707 23  1620 23  1310 23  1103   WBC 6.97 10.98* 13.05* 8.27 5.66 14.01* 5.24  --   --   --   --  12.89* 13.39*   HEMOGLOBIN 7.6* 8.2* 8.8* 8.2* 8.0* 7.7* 7.5*  --   --   --   --  10.5* 9.8*   HEMATOCRIT 22.6* 23.6* 25.9* 24.7* 23.7* 22.7* 23.4*  --   --   --   --  31.9* 30.2*   PLATELETS 36* 46* 52* 44* 46* 101* 107*  --   --   --   --  111* 134*   NEUTROS ABS  --   --   --  5.69  --  11.09* 4.15  --   --   --   --   --  9.77*   IMMATURE GRANS (ABS)  --   --   --  0.09*  --  0.16* 0.27*  --   --   --   --   --  0.28*   LYMPHS ABS  --   --   --  0.63*  --  0.98 0.49*  --    --   --   --   --  0.68*   MONOS ABS  --   --   --  0.41  --  1.18* 0.21  --   --   --   --   --  0.71   EOS ABS  --   --   --  1.44*  --  0.58* 0.11  --   --   --   --   --  1.85*   .4* 98.3* 102.0* 102.1* 100.4* 100.9* 105.4*  --   --   --   --  105.6* 105.6*   PROCALCITONIN  --   --   --   --   --   --  0.95*  --   --   --   --   --  0.82*   LACTATE  --   --   --   --   --  6.0*  --  16.7* 18.8*  --  20.5* 17.6*  --    PROTIME  --  21.9*  --  23.1*  --  32.1* 28.9*  --   --  27.8*  --   --   --    APTT  --   --   --   --   --   --   --   --   --  124.4*  --   --   --          Lab 04/01/23  0739 03/31/23  0607 03/30/23  0206 03/29/23  1619 03/29/23  0200 03/28/23  1337 03/28/23  0300 03/27/23  0406 03/26/23  0054 03/25/23  1317 03/25/23  1310 03/25/23  1112 03/25/23  1103   SODIUM 132* 135* 136  --  138  --  137 134* 136   < >  --   --  133*   POTASSIUM 3.1* 3.5 3.7 3.4* 3.2*   < > 3.1* 3.3* 4.1   < >  --   --  3.8   CHLORIDE 92* 92* 95*  --  94*  --  90* 87* 89*   < >  --   --  89*   CO2 24.0 27.0 27.0  --  28.0  --  29.0 27.0 9.0*   < >  --   --  6.0*   ANION GAP 16.0* 16.0* 14.0  --  16.0*  --  18.0* 20.0* 38.0*   < >  --   --  38.0*   BUN 54* 59* 69*  --  71*  --  77* 63* 50*   < >  --   --  43*   CREATININE 4.31* 4.75* 4.85*  --  5.14*  --  4.92* 4.70* 4.14*   < >  --    < > 4.07*   EGFR 13.7* 12.2* 11.9*  --  11.1*  --  11.7* 12.3* 14.4*   < >  --   --  14.7*   GLUCOSE 128* 93 124*  --  121*  --  151* 55* 182*   < >  --   --  168*   CALCIUM 7.5* 8.1* 8.3*  --  8.2*  --  8.2* 8.4* 8.2*   < >  --   --  9.5   IONIZED CALCIUM  --   --   --   --   --   --   --   --   --   --  1.21  --   --    MAGNESIUM  --   --  1.9  --  1.7  --   --  2.2 1.8  --   --   --  1.9   PHOSPHORUS  --   --   --   --   --   --  3.8 4.0 3.8  --   --   --  3.9   HEMOGLOBIN A1C  --   --   --   --   --   --   --   --   --   --   --   --  5.60    < > = values in this interval not displayed.         Lab 04/01/23  0739 03/31/23  0607  03/30/23  0206 03/29/23  0200 03/28/23  0300   TOTAL PROTEIN 4.3* 4.9* 5.4* 5.1* 5.3*   ALBUMIN 2.2* 2.5* 2.6* 2.7* 2.7*   GLOBULIN 2.1 2.4 2.8 2.4 2.6   ALT (SGPT) 13 17 19 21 25   AST (SGOT) 32 43* 55* 68* 109*   BILIRUBIN 3.6* 3.7* 3.3* 2.9* 2.7*   ALK PHOS 64 75 67 63 60         Lab 03/31/23  0607 03/29/23  0328 03/27/23  0406 03/26/23  0054 03/25/23  1707 03/25/23  1317 03/25/23  1103   HSTROP T  --   --   --   --   --  792* 739*   PROTIME 21.9* 23.1* 32.1* 28.9* 27.8*  --   --    INR 1.91* 2.05* 3.09* 2.71* 2.58*  --   --          Lab 03/25/23  1103   CHOLESTEROL 94         Lab 03/29/23  1619   ABO TYPING O   RH TYPING Positive   ANTIBODY SCREEN Negative         Lab 03/27/23  0322 03/26/23  0509 03/25/23  1456   PH, ARTERIAL 7.516* 7.394 7.152*   PCO2, ARTERIAL 38.3 24.2* 15.5*   PO2 ART 72.3* 84.6 128.0*   FIO2 28 21 21   HCO3 ART 31.0* 14.8* 5.4*   BASE EXCESS ART 7.5* -9.0* <-20.0*   CARBOXYHEMOGLOBIN 1.3 1.3 0.9     Brief Urine Lab Results  (Last result in the past 365 days)      Color   Clarity   Blood   Leuk Est   Nitrite   Protein   CREAT   Urine HCG        03/25/23 2327 Dark Yellow   Turbid   Large (3+)   Moderate (2+)   Negative   >=300 mg/dL (3+)                 Microbiology Results Abnormal     Procedure Component Value - Date/Time    Body Fluid Culture - Body Fluid, Peritoneum [[id number]] Collected: 03/31/23 1538    Lab Status: Preliminary result Specimen: Body Fluid from Peritoneum Updated: 04/01/23 1045     Body Fluid Culture No growth     Gram Stain Few (2+) WBCs seen      No organisms seen    Blood Culture - Blood, Arm, Left [[id number]]  (Normal) Collected: 03/25/23 2021    Lab Status: Final result Specimen: Blood from Arm, Left Updated: 03/30/23 2045     Blood Culture No growth at 5 days    Blood Culture - Blood, Hand, Left [[id number]]  (Normal) Collected: 03/25/23 1622    Lab Status: Final result Specimen: Blood from Hand, Left Updated: 03/30/23 1700     Blood Culture No growth at 5 days     Narrative:      Aerobic bottle only      Urine Culture - Urine, Indwelling Urethral Catheter [[id number]]  (Normal) Collected: 03/25/23 2327    Lab Status: Final result Specimen: Urine from Indwelling Urethral Catheter Updated: 03/27/23 1014     Urine Culture No growth    Eosinophil Smear - Urine, Urine, Clean Catch [[id number]]  (Normal) Collected: 03/25/23 2241    Lab Status: Final result Specimen: Urine, Clean Catch Updated: 03/26/23 0224     Eosinophil Smear 0 % EOS/100 Cells     Narrative:      No eosinophil seen          CT Guided Paracentesis    Result Date: 3/31/2023  Clinical indication: Recurrent/refractory ascites : Dr. Partha Ribera Procedure: CT-guided paracentesis. Complication: None apparent. Contrast usage: None Estimated blood loss: Negligible Conscious sedation: None Technique: Formal written consent for the procedure was obtained from the patient/family after explaining risks to include bleeding, infection, injury to bowel/adjacent organs, need for additional surgery/procedure.  The patient's right lower quadrant was prepped and draped in the usual, sterile fashion.  Local anesthesia with 1% lidocaine infiltration was achieved.  Utilizing CT guidance, an 8.5 Kyrgyz drainage catheter was placed into a large pocket of ascites within the right lower quadrant without difficulty.  Approximately 5000 mL of danette-colored ascitic fluid was removed without difficulty.  Specimen was sent for culture/labs, per the referring service.  At the end of the procedure, all catheters were removed and a sterile dressing was applied to the puncture site.  The patient tolerated the procedure well without apparent complication. Impression: Successful CT-guided paracentesis, with 5000 mL of danette-colored ascitic fluid removed without difficulty.  Specimen was sent for culture/labs, per the referring service.      Results for orders placed during the hospital encounter of 03/25/23    Adult Transthoracic Echo Complete  W/ Cont if Necessary Per Protocol    Interpretation Summary  •  Left ventricular systolic function is normal. Estimated left ventricular EF = 65%  •  The aortic valve is mildly calcified.  There is minimal aortic stenosis present.  •  Moderate pulmonary hypertension is present. Estimated right ventricular systolic pressure from tricuspid regurgitation is moderately elevated (49 mmHg).  •  Moderate dilation of the aortic root is present. Aneurysmal dilation of the ascending aorta is present.  •  There is a right pleural effusion.      Current medications:  Scheduled Meds:aspirin, 81 mg, Oral, Daily  clopidogrel, 75 mg, Oral, Daily  ferrous sulfate, 325 mg, Oral, Daily With Breakfast  fluticasone, 2 spray, Each Nare, Daily  folic acid, 400 mcg, Oral, Daily  furosemide, 40 mg, Oral, Daily  insulin lispro, 0-7 Units, Subcutaneous, 4x Daily With Meals & Nightly  lactulose, 20 g, Oral, Daily  midodrine, 15 mg, Oral, TID AC  pantoprazole, 40 mg, Intravenous, Q AM  pharmacy consult - MTM, , Does not apply, Daily  piperacillin-tazobactam, 3.375 g, Intravenous, Q12H  potassium chloride, 20 mEq, Oral, Once  riFAXIMin, 550 mg, Oral, Q12H  sodium chloride, 10 mL, Intravenous, Q12H  ursodiol, 300 mg, Oral, BID      Continuous Infusions:   PRN Meds:.•  Calcium Replacement - Follow Nurse / BPA Driven Protocol  •  dextrose  •  dextrose  •  glucagon (human recombinant)  •  Lidocaine HCl gel  •  Magnesium Low Dose Replacement - Follow Nurse / BPA Driven Protocol  •  Phosphorus Replacement - Follow Nurse / BPA Driven Protocol  •  Potassium Replacement - Follow Nurse / BPA Driven Protocol  •  sodium chloride  •  sodium chloride    Assessment & Plan   Assessment & Plan     Active Hospital Problems    Diagnosis  POA   • **ST elevation myocardial infarction involving right coronary artery [I21.11]  Yes   • Hyperlipidemia LDL goal <70 [E78.5]  Yes   • Coronary artery disease involving native coronary artery of native heart [I25.10]  Yes   •  CKD (chronic kidney disease) stage 5, GFR less than 15 ml/min [N18.5]  Yes   • Decompensated hepatic cirrhosis [K72.90, K74.60]  Yes   • Coagulopathy [D68.9]  Yes   • Liver cirrhosis secondary to HAYES [K75.81, K74.60]  Yes   • Hyperbilirubinemia [E80.6]  Yes   • PAOLO (acute kidney injury) [N17.9]  Yes      Resolved Hospital Problems    Diagnosis Date Resolved POA   • Diabetes mellitus [E11.9] 03/25/2023 Unknown        Brief Hospital Course to date:  Leoncio Hopson is a 72 y.o. male with PMHx significant for decompensated HAYES cirrhosis, CKD IV, DM, HTN who presented from OSH w/STEMI. He underwent emergent LHC which showed 2v CAD, he received HARDEEP to RCA and unsuccessful thrombectomy to 100% occluded RPDA. Echocardiogram with EF 65%.    STEMI s/p HARDEEP to RCA on 3/25  - on DAPT, cardiology following  - EF 65%  - no statin due to liver failure    Decompensated HAYES Cirrhosis w/ascites  Thrombocytopenia, Coagulopathy  Hepatic Encephalopathy  Hepatic Hydrothorax  Hx of Varices  - MELD-Na 33  - had paracentesis 3/26 with 4.5L removed and repeat 3/31 w/5L removed, no evidence of SBP  - continue rifaxamin, lactulose, lasix  - octreotide gtt discontinued t3/31, does not seem to be getting any benefit from this  - discussed extensively with daughter at bedside yesterday, we discussed palliative options and palliative peritoneal drain should patient wish to ever go that route. Currently not a candidate for transplant due to recent STEMI and need for DAPT. They do plan to try to f/u at [place], currently they follow with hepatology at  and has already been deemed not transplant candidate there. Desired palliative medicine consult which has been placed.  - Midodrine for hypotension, hold if SBP >130  - GI following, appreciate input    PAOLO on CKD IV  - likely related to HRS vs. ATN  - Nephrology following, appreciate input, creatinine remains stable   - high risk for need for HD, family aware    Possible Sepsis  Lactic  Acidosis  - treated with empiric Zosyn in the ICU to complete 7 days  - blood and urine cultures remain negative  - mildly elevated procal    Hypokalemia:  - replace with one time dose given poor renal function    Expected Discharge Location and Transportation: Home w/family  Expected Discharge   Expected Discharge Date and Time     Expected Discharge Date Expected Discharge Time    Apr 6, 2023            DVT prophylaxis:  Mechanical DVT prophylaxis orders are present.     AM-PAC 6 Clicks Score (PT): 17 (04/01/23 0800)    CODE STATUS:   Code Status and Medical Interventions:   Ordered at: 03/27/23 1524     Code Status (Patient has no pulse and is not breathing):    CPR (Attempt to Resuscitate)     Medical Interventions (Patient has pulse or is breathing):    Full Support     Release to patient:    Routine Release       Do Saucedo DO  04/01/23

## 2023-04-01 NOTE — PROGRESS NOTES
"GI Daily Progress Note  Subjective:    Chief Complaint: Follow-up decompensated liver cirrhosis.    Patient resting in bed no acute distress.  Doing well following paracentesis yesterday.  Appetite remains poor, though somewhat better.  Has not been out of bed today and reports he is feeling weak.    Objective:    /68   Pulse 81   Temp 97.7 °F (36.5 °C) (Oral)   Resp 17   Ht 172.7 cm (67.99\")   Wt 70.5 kg (155 lb 8 oz)   SpO2 94%   BMI 23.65 kg/m²     Physical Exam  Vitals and nursing note reviewed.   Constitutional:       General: He is not in acute distress.     Appearance: Normal appearance. He is normal weight. He is not ill-appearing or toxic-appearing.   Cardiovascular:      Rate and Rhythm: Normal rate and regular rhythm.      Pulses: Normal pulses.      Heart sounds: Normal heart sounds.   Pulmonary:      Effort: Pulmonary effort is normal. No respiratory distress.      Breath sounds: Normal breath sounds.   Abdominal:      General: Abdomen is flat. Bowel sounds are normal. There is no distension.      Palpations: Abdomen is soft. There is no mass.      Tenderness: There is no abdominal tenderness. There is no guarding or rebound.      Hernia: No hernia is present.   Skin:     Capillary Refill: Capillary refill takes less than 2 seconds.   Neurological:      General: No focal deficit present.      Mental Status: He is alert and oriented to person, place, and time.   Psychiatric:         Mood and Affect: Mood normal.         Behavior: Behavior normal.         Thought Content: Thought content normal.         Judgment: Judgment normal.         Lab  I have personally reviewed most recent cardiac tracings, lab results and radiology images and interpretations and agree with findings.    Lab Results   Component Value Date    WBC 6.97 04/01/2023    HGB 7.6 (L) 04/01/2023    HGB 8.2 (L) 03/31/2023    HGB 8.8 (L) 03/30/2023    .4 (H) 04/01/2023    PLT 36 (C) 04/01/2023    INR 1.91 (H) 03/31/2023    " INR 2.05 (H) 03/29/2023    INR 3.09 (H) 03/27/2023    INR 2.71 (H) 03/26/2023    INR 2.58 (H) 03/25/2023       Lab Results   Component Value Date    GLUCOSE 128 (H) 04/01/2023    BUN 54 (H) 04/01/2023    CREATININE 4.31 (H) 04/01/2023    EGFRIFNONA 59 (L) 04/22/2022    EGFRIFAFRI >60 04/22/2022    BCR 12.5 04/01/2023     (L) 04/01/2023    K 3.1 (L) 04/01/2023    CO2 24.0 04/01/2023    CALCIUM 7.5 (L) 04/01/2023    PROTENTOTREF 5.8 (L) 05/24/2015    ALBUMIN 2.2 (L) 04/01/2023    ALKPHOS 64 04/01/2023    BILITOT 3.6 (H) 04/01/2023    BILIDIR 1.91 (H) 03/15/2023    ALT 13 04/01/2023    AST 32 04/01/2023       Assessment:      ST elevation myocardial infarction involving right coronary artery    PAOLO (acute kidney injury)    Liver cirrhosis secondary to HAYES    Hyperbilirubinemia    Coagulopathy    Decompensated hepatic cirrhosis    CKD (chronic kidney disease) stage 5, GFR less than 15 ml/min    Hyperlipidemia LDL goal <70    Coronary artery disease involving native coronary artery of native heart    1.  HAYES cirrhosis. MELD-Na = 33  2.  Ascites  3.  Hepatic hydrothorax.  4.  Chronic kidney disease.   5.  STEMI, status post PCI/HARDEEP RCA on 3/25/23    Plan:  Patient doing well today; still with poor appetite and overall weakness.  Renal function remains impaired.  >>> Fluid reviewed from paracentesis; no SBP.  >>> Continue to encourage nutrition   -high-protein / low sodium (less than 2g/day) diet   -Frequent high-protein snack   -High-protein bedtime snack  >>> Abdomen is taut and distended today   -We will look with ultrasound in a.m. to evaluate pocket for further paracentesis.   -We will need set up for periodic outpatient paracentesis.  >>> Will need close outpatient follow-up with GI/hepatology.  >>> Noted that patient and family have been discussing hospice and palliative care services.  Given significance of liver disease, renal disease, and recent STEMI, this is appropriate given ongoing decline in  functional status.    Christian Campos, APRN  04/01/23  18:25 EDT

## 2023-04-01 NOTE — CONSULTS
"Palliative Care Initial Consult   Attending Physician: Do Saucedo DO  Referring Provider: Dr. Saucedo    Reason for Referral:  assistance with clarification of goals of care    Code Status:   Code Status and Medical Interventions:   Ordered at: 03/27/23 1524     Code Status (Patient has no pulse and is not breathing):    CPR (Attempt to Resuscitate)     Medical Interventions (Patient has pulse or is breathing):    Full Support     Release to patient:    Routine Release      Advanced Directives: Advance Directive Status: Patient does not have advance directive   Family/Support: wife at bedside   Goals of Care: TBD.    HPI:   73yo female with PMH significant for CKD-stage IV, decompensated HAYES Cirrhosis ([facility] declined as transplant candidate) with recurrent ascites s/p paracentesis x1, varices and recurrent hepatic hydrothorax s/p thoracentesis x4, T2DM, HTN, anemia, thrombocytopenia who initially presented to OSH ED r/t SOA and was found to have inferior ST elevation MI and was transferred to [facility] for further evaluation and higher level care and was taken directly to cath lab now s/p PCI with stenting to proximal RCA, however, pt was also noted to have embolized occlusive thrombi in both the RPDA and posterior lateral branch which did not resolve with angioplasty or thrombectomy and stents were unable to be placed. Hospital course has been further complicated by recurrent ascites (3/26- 4.5L and 3/31-5L), PAOLO on CKD, elevated lactic acidosis on IV abx, and hypokalemia s/p replacement. Given patients current condition and overall poor prognosis, both palliative care and hospice were consulted to assist with GOC and ACP in the context of complex medical decision making. Patient is currently listed as a full code.     ROS: denies pain, nausea, anxiety, SOA, CP, HA. C/C oral pain r/t sore on tongue and \"itch\" on back/shoulder. Appetite is \"ok\". Reports intermittent incontinence of bowel- \"frustrating\" thinks he has " "gas then has BM.       Past Medical History:   Diagnosis Date   • Anemia    • Coagulopathy 2/11/2023   • Decompensated hepatic cirrhosis 2/11/2023   • Diabetes mellitus    • Encephalopathy, hepatic 2/11/2023   • Hypertension    • Liver cirrhosis secondary to HAYES    • Liver lesion    • Other ascites 2/11/2023   • Thrombocytopenia 2/11/2023     Past Surgical History:   Procedure Laterality Date   • ANKLE SURGERY     • CARDIAC CATHETERIZATION N/A 3/25/2023    Procedure: Left Heart Cath;  Surgeon: Nithin Nieto IV, MD;  Location:  [facility];  Service: Cardiovascular;  Laterality: N/A;   • CARDIAC CATHETERIZATION  3/25/2023    Procedure: Percutaneous Manual Thrombectomy;  Surgeon: Nithin Nieto IV, MD;  Location:  [facility];  Service: Cardiovascular;;   • CARDIAC CATHETERIZATION N/A 3/25/2023    Procedure: Stent HARDEEP coronary;  Surgeon: Nithin Nieto IV, MD;  Location:  [facility];  Service: Cardiovascular;  Laterality: N/A;   • EYE SURGERY       Social History     Socioeconomic History   • Marital status:    Tobacco Use   • Smoking status: Never   • Smokeless tobacco: Never   Vaping Use   • Vaping Use: Never used   Substance and Sexual Activity   • Alcohol use: Not Currently   • Drug use: Never     Family History   Problem Relation Age of Onset   • Rheum arthritis Mother    • Diabetes Mother    • COPD Mother    • Thyroid disease Mother    • Heart disease Father    • COPD Father    • Cancer Father    • Diabetes Father        Allergies   Allergen Reactions   • Contrast Dye (Echo Or Unknown Ct/Mr) Hives     Single hive in substernal/epigastric abdominal area.  Pt. states this rxn has occurred \"the last couple of times I had MRI contrast.   • Gadobutrol Unknown (See Comments)       Current medication reviewed for route, type, dose and frequency and are current per MAR at time of dictation.    Palliative Performance Scale " "Score: 60%    BP 97/56 (BP Location: Right arm, Patient Position: Lying)   Pulse 93   Temp 98.2 °F (36.8 °C) (Oral)   Resp 18   Ht 172.7 cm (67.99\")   Wt 70.5 kg (155 lb 8 oz)   SpO2 90%   BMI 23.65 kg/m²     Intake/Output Summary (Last 24 hours) at 4/1/2023 1204  Last data filed at 4/1/2023 0304  Gross per 24 hour   Intake 300 ml   Output 225 ml   Net 75 ml       Physical Exam:    General Appearance:    Alert, cooperative, NAD, smiling   HEENT:    NC/AT, EOMI, anicteric, MMM, face relaxed, [place]   Neck:   supple, trachea midline, no JVD   Lungs:     CTA bilat, diminished in bases; respirations regular, even and unlabored; RR20 on exam on O2 per NC    Heart:    RRR-92 on exam, normal S1 and S2, no M/R/G   Abdomen:     Normal bowel sounds, soft, non-tender, +distended/ascites, LBM 4/1   G/U:   Deferred   MSK/Extremities:   Wasting, trace BLE edema   Pulses:   Pulses palpable and equal bilaterally   Skin:   Warm, dry   Neurologic:   A/Ox3, cooperative, VICTOR, no tremors   Psych:   Calm, appropriate         Labs:   Results from last 7 days   Lab Units 04/01/23  0739   WBC 10*3/mm3 6.97   HEMOGLOBIN g/dL 7.6*   HEMATOCRIT % 22.6*   PLATELETS 10*3/mm3 36*     Results from last 7 days   Lab Units 04/01/23  0739   SODIUM mmol/L 132*   POTASSIUM mmol/L 3.1*   CHLORIDE mmol/L 92*   CO2 mmol/L 24.0   BUN mg/dL 54*   CREATININE mg/dL 4.31*   GLUCOSE mg/dL 128*   CALCIUM mg/dL 7.5*     Results from last 7 days   Lab Units 04/01/23  0739   SODIUM mmol/L 132*   POTASSIUM mmol/L 3.1*   CHLORIDE mmol/L 92*   CO2 mmol/L 24.0   BUN mg/dL 54*   CREATININE mg/dL 4.31*   CALCIUM mg/dL 7.5*   BILIRUBIN mg/dL 3.6*   ALK PHOS U/L 64   ALT (SGPT) U/L 13   AST (SGOT) U/L 32   GLUCOSE mg/dL 128*     Imaging Results (Last 72 Hours)     Procedure Component Value Units Date/Time    CT Guided Paracentesis [[id number]] Resulted: 03/31/23 1622     Updated: 03/31/23 1625    Narrative:      Clinical indication: Recurrent/refractory " ascites    : Dr. Partha Ribera    Procedure: CT-guided paracentesis.    Complication: None apparent.    Contrast usage: None    Estimated blood loss: Negligible    Conscious sedation: None    Technique: Formal written consent for the procedure was obtained from the   patient/family after explaining risks to include bleeding, infection,   injury to bowel/adjacent organs, need for additional surgery/procedure.    The patient's right lower quadrant was prepped and draped in the usual,   sterile fashion.  Local anesthesia with 1% lidocaine infiltration was   achieved.  Utilizing CT guidance, an 8.5 [place] drainage catheter was   placed into a large pocket of ascites within the right lower quadrant   without difficulty.  Approximately 5000 mL of danette-colored ascitic fluid   was removed without difficulty.  Specimen was sent for culture/labs, per   the referring service.  At the end of the procedure, all catheters were   removed and a sterile dressing was applied to the puncture site.  The   patient tolerated the procedure well without apparent complication.    Impression: Successful CT-guided paracentesis, with 5000 mL of   danette-colored ascitic fluid removed without difficulty.  Specimen was sent   for culture/labs, per the referring service.    XR Chest 1 View [[id number]] Collected: 03/30/23 0503     Updated: 03/30/23 0507    Narrative:      XR CHEST 1 VW    Date of Exam: 3/30/2023 3:40 AM EDT    Indication: follow up pleural effusions.    Comparison: 3/26/2023    Findings:  Right-sided central venous catheter projects unchanged. Aeration is worsened from comparison, including both increased bilateral airspace disease, most notable on the right as well as with increased small left and moderate right pleural effusions, with   the appearance of underlying pulmonary edema. No pneumothorax. Unchanged heart and mediastinal contours.      Impression:      Impression:  Right-sided central venous catheter projects  unchanged. Aeration is worsened from comparison, including both increased bilateral airspace disease, most notable on the right as well as with increased small left and moderate right pleural effusions, with   the appearance of underlying pulmonary edema. No pneumothorax. Unchanged heart and mediastinal contours.    Electronically Signed: Rylan Gaspar    3/30/2023 5:04 AM EDT    Workstation ID: [id number]              Diagnostics: Reviewed    A:   Patient Active Problem List   Diagnosis   • PAOLO (acute kidney injury)   • Liver cirrhosis secondary to HAYES   • Hyperbilirubinemia   • Anemia, chronic disease   • Hyperammonemia   • Coagulopathy   • Thrombocytopenia   • Decompensated hepatic cirrhosis   • ST elevation myocardial infarction involving right coronary artery   • CKD (chronic kidney disease) stage 5, GFR less than 15 ml/min   • Hyperlipidemia LDL goal <70   • Coronary artery disease involving native coronary artery of native heart     71yo male with MI c/b CKD and decompensated HAYES Cirrhosis    S/S:   1. Debility  -PT/OT following    2. Dyspnea  -planning for paracentesis [id number] per wifes report  -denies at present    3. Oral lesion/mucositis  -magic mouth wash AC & HS and PRN    4.Itching  -sarna lotion TID and PRN    5. GOC  -Now DNR/DNI- Full treat  -hospice also consulted and following    P:  Introduce palliative care. Had long conversation with patient regarding his current condition and PMH leading up to this hospitalization. Initially planning for evaluation at  for transplant, however recent cardiac events have put this on hold. Patient verbalizes frustration with making plans to get better, then ongoing complications derail these plans. Patient has good understanding of his condition. Discussed if anyone had talked to him about his code status this admission and he reports they have not. After explaining code status, specifics of CPR and intubation patient reports that he is ready for all of this to  "be over. Initially patient reported that he wanted CPR \"only for a little bit\" but no intubation. Provided further education on CPR and he then stated that he doesn't want any of that. Discussed that DNR/DNI does not mean no treatment. Discussed he can have all of the interventions that he has already had in the past and this admission. Discussed that in the event those interventions fail and his hear stops or his lungs fail then at that point we would allow him to die naturally and keep him comfortable. Patient and wife are appropriately tearful during this conversation. Discussed that it is a difficult conversation to have and difficult decisions to make. Patient is appreciative of the conversation and desire to honor his wishes. Provided emotional support to patient and wife throughout visit. Discussed with wife that it can be hard to hear our loved one verbalized there wishes, but that ultimately it is important for him to make them if he can, that way in the event of future hospitalizations her and her daughter do not have to wonder what he would want and only honor his wishes in the event he were not able to verbalize them. Wife states \"this is why we are trying to get him home\" as she thinks he will be more comfortable there and be surrounded by family. Medication and POC adjustments as above. Thank you for this consult and allowing us to participate in patient's plan of care. Palliative Care Team will continue to follow patient.     Time spent:75 minutes spent reviewing medical and medication records, assessing and examining patient, discussing with family, answering questions, providing some guidance about a plan and documentation of care, and coordinating care with other healthcare members, with > 50% time spent face to face.     Marquita Bates, APRN  4/1/2023      "

## 2023-04-01 NOTE — DISCHARGE PLACEMENT REQUEST
"Scot Hopson (72 y.o. Male)   Referring: Marquita Bates  PCP: Dr. Antonio Castro  Hospice Dx: [place] Cirrhosis  Screened Covid negative on 3/25/23  BMI:23.65kg    Date of Birth   1950    Social Security Number       Address   [address] [place] [address]    Home Phone   [phone]    MRN   [id number]       [facility]   [facility]    Marital Status                               Admission Date   3/25/23    Admission Type   Urgent    Admitting Provider   Do Saucedo DO    Attending Provider   Do Saucedo DO    Department, Room/Bed   [address], S464/1       Discharge Date       Discharge Disposition       Discharge Destination                               Attending Provider: Do Saucedo DO    Allergies: Contrast Dye (Echo Or Unknown Ct/mr), Gadobutrol    Isolation: None   Infection: None   Code Status: CPR    Ht: 172.7 cm (67.99\")   Wt: 70.5 kg (155 lb 8 oz)    Admission Cmt: None   Principal Problem: ST elevation myocardial infarction involving right coronary artery [I21.11]                 Active Insurance as of 3/25/2023     Primary Coverage     Payor Plan Insurance Group Employer/Plan Group    MEDICARE MEDICARE A & B      Payor Plan Address Payor Plan Phone Number Payor Plan Fax Number Effective Dates    [address] [phone]  6/1/2013 - None Entered    [facility] [id number]       Subscriber Name Subscriber Birth Date Member ID       SCOT HOPSON 1950 [id number]           Secondary Coverage     Payor Plan Insurance Group Employer/Plan Group    [facility] HEALTH CARE OPTIONS [id number]     Payor Plan Address Payor Plan Phone Number Payor Plan Fax Number Effective Dates    [facility] [phone]  1/1/2018 - None Entered    [address]       [facility] [address]       Subscriber Name Subscriber Birth Date Member ID       SCOT HOPSON 1950 [id number]                 Emergency Contacts      " (Rel.) Home Phone Work Phone Mobile Phone    Nica Murguia (Daughter) -- -- [phone]    Capri Hopson (Spouse) [phone] -- [phone]    LATANYA HOPSON (Son) [phone] -- [phone]            Emergency Contact Information     Name Relation Home Work Mobile    Nica Murguia Daughter   [phone]    Capri Hopson Spouse [phone]    LATANYA HOPSON Son [phone]          Insurance Information                MEDICARE/MEDICARE A & B Phone: [phone]    Subscriber: Leoncio Hopson Subscriber#: [id number]    Group#: -- Precert#: --        [facility] HEALTH CARE OPTIONS Phone: [phone]    Subscriber: Leoncio Hopson Subscriber#: [id number]    Group#: [id number] Precert#: --          Problem List           Codes Noted - Resolved       Hospital    Hyperlipidemia LDL goal <70 ICD-10-CM: E78.5  ICD-9-CM: 272.4 3/26/2023 - Present    Coronary artery disease involving native coronary artery of native heart ICD-10-CM: I25.10  ICD-9-CM: 414.01 3/26/2023 - Present    * (Principal) ST elevation myocardial infarction involving right coronary artery ICD-10-CM: I21.11  ICD-9-CM: 410.31 3/25/2023 - Present    CKD (chronic kidney disease) stage 5, GFR less than 15 ml/min ICD-10-CM: N18.5  ICD-9-CM: 585.5 3/25/2023 - Present    Coagulopathy ICD-10-CM: D68.9  ICD-9-CM: 286.9 2/11/2023 - Present    Decompensated hepatic cirrhosis ICD-10-CM: K72.90, K74.60  ICD-9-CM: 571.5, 572.8 2/11/2023 - Present    PAOLO (acute kidney injury) ICD-10-CM: N17.9  ICD-9-CM: 584.9 2/9/2023 - Present    Liver cirrhosis secondary to HAYES ICD-10-CM: K75.81, K74.60  ICD-9-CM: 571.8, 571.5 2/9/2023 - Present    Hyperbilirubinemia ICD-10-CM: E80.6  ICD-9-CM: 782.4 2/9/2023 - Present       Non-Hospital    Thrombocytopenia ICD-10-CM: D69.6  ICD-9-CM: 287.5 2/11/2023 - Present    Anemia, chronic disease ICD-10-CM: D63.8  ICD-9-CM: 285.29 2/9/2023 - Present    Hyperammonemia ICD-10-CM:  "E72.20  ICD-9-CM: 270.6 2/9/2023 - Present        History & Physical      Zach Cheung MD at 03/25/23 1205          INTENSIVIST / PULMONARY INITIAL VISIT (CONSULT / H&P) NOTE     Hospital:  LOS: 0 days   Mr. Leoncio Hopson, 72 y.o. male is followed for:   No chief complaint on file.      ST elevation myocardial infarction involving right coronary artery (HCC)    PAOLO (acute kidney injury) (HCC)    Liver cirrhosis secondary to HAYES (HCC)    Hyperbilirubinemia    Coagulopathy (HCC)    Decompensated hepatic cirrhosis (HCC)    CKD (chronic kidney disease) stage 5, GFR less than 15 ml/min (HCC)         History of Present Illness   Mr. Hopson is a 71 yo male with PMH Stage V CKD not on HD, decompensated HAYES cirrhosis with ascites (rejected for Liver-Kidney Txp at , being evaluated at  - not on transplant list), varices and recurrent hepatic hydrothorax (undergone 4 thoracentesis), prior Paracentesis x 1, diabetes, HTN, anemia, thrombocytopenia who presents to the ICU as a transfer from the Cath Lab s/p C with PCI with Dr. Nieto. According to reports patient initially presented to [facility] today with shortness of breath and hopes to obtain a thoracentesis and paracentesis. While there he noted he had been having tachycardia prompting EKG. This demonstrated ST elevation so he was loaded with Ticagrelor and transferred to [facility] for evaluation/management.     On arrival patient was taken to cath lab where a right radial approach LHC was performed. He was found to have a thrombotic lesion in the proximal RCA which was treated with HARDEEP. There was also embolized occlusive thrombi in both the RPDA and the posterior lateral branch which did not resolve with angioplasty or thrombectomy. \"Stents were not placed in these vessels due to distal nature and unclear anatomy distal to the occlusions\". Following procedure he was loaded with ASA and Plavix and transferred to ICU for management.     Of note " patient was recently admitted to  for HRS and hepatic encephalopathy. During that admission he had AoC Kidney failure with creatinine as high as 5. He was unresponsive to albumin challenge and was started on midodrine/octreotide for HRS. He was transferred to [facility] for transplant evaluation. At that time transplant team did not identify him as a candidate for acute liver txp eval and advised he continued treatment of HRS. As his renal function improved his discharge was planned. Unfortunately his octreotide was denied by insurance. He ultimately got weaned from Octreotide. At time of discharge his renal function was almost back to his baseline.    While hospitalized, it's noted he had acute on chronic anemia with hematochezia, although patient does not recall any GIB. No indication for inpatient EGD/colonoscopy. He received 1 unit PRBC. Hospitalization as also complicated by reaccumulation of hepatic hydrothorax which was causing worsening SOA. This is typically drained as an outpatient by IR but was ultimately drained by Pulmonary while inpatient.      I spent 15 minutes of time on this.  SRI Tompkins, AGACNP-BC, APRN    Electronically signed by QUOC Johnson, 03/25/23, 12:58 PM EDT.        Past Medical History:   Diagnosis Date   • Anemia    • Coagulopathy (HCC) 2/11/2023   • Decompensated hepatic cirrhosis (HCC) 2/11/2023   • Diabetes mellitus (HCC)    • Encephalopathy, hepatic 2/11/2023   • Hypertension    • Liver cirrhosis secondary to HAYES (HCC)    • Liver lesion    • Other ascites 2/11/2023   • Thrombocytopenia (HCC) 2/11/2023     Past Surgical History:   Procedure Laterality Date   • ANKLE SURGERY     • EYE SURGERY       Family History   Problem Relation Age of Onset   • Rheum arthritis Mother    • Diabetes Mother    • COPD Mother    • Thyroid disease Mother    • Heart disease Father    • COPD Father    • Cancer Father    • Diabetes Father      Social History     Socioeconomic History   •  "Marital status:    Tobacco Use   • Smoking status: Never   • Smokeless tobacco: Never   Vaping Use   • Vaping Use: Never used   Substance and Sexual Activity   • Alcohol use: Not Currently   • Drug use: Never     Allergies   Allergen Reactions   • Contrast Dye (Echo Or Unknown Ct/Mr) Hives     Single hive in substernal/epigastric abdominal area.  Pt. states this rxn has occurred \"the last couple of times I had MRI contrast.   • Gadobutrol Unknown (See Comments)     No current facility-administered medications on file prior to encounter.     Current Outpatient Medications on File Prior to Encounter   Medication Sig Dispense Refill   • Fergon 240 (27 Fe) MG tablet Take 1 tablet by mouth Daily.     • fluticasone (FLONASE) 50 MCG/ACT nasal spray 2 sprays by Each Nare route Daily.     • folic acid (FOLVITE) 400 MCG tablet Take 400 mcg by mouth Daily.     • lactulose (CHRONULAC) 10 GM/15ML solution Take 45 mL by mouth 2 (Two) Times a Day.     • midodrine (PROAMATINE) 5 MG tablet Take 3 tablets by mouth 3 (Three) Times a Day Before Meals.     • octreotide (sandoSTATIN) 100 MCG/ML injection Infuse 2 mL into a venous catheter 3 (Three) Times a Day.     • pantoprazole (PROTONIX) 40 MG injection Infuse 10 mL into a venous catheter Every Morning. Indications: Gastrointestinal (GI) Bleed     • riFAXIMin (XIFAXAN) 550 MG tablet Take 1 tablet by mouth Every 12 (Twelve) Hours for 730 doses. Indications: Impaired Brain Function due to Liver Disease 60 tablet 11   • sodium bicarbonate 650 MG tablet Take 2 tablets by mouth 3 (Three) Times a Day.     • ursodiol (ACTIGALL) 300 MG capsule Take 300 mg by mouth 2 (Two) Times a Day.         ROS:    Per HPI, all other systems were reviewed and were negative         OBJECTIVE     Vital Sign Min/Max for last 24 hours  No data recorded   BP  Min: 106/55  Max: 136/84   Pulse  Min: 83  Max: 120   Resp  Min: 16  Max: 22   SpO2  Min: 99 %  Max: 100 %   No data recorded     Telemetry:         "      General Appearance:  No acute distress  Eyes:  No scleral icterus or pallor, pupils normal  Ears, Nose, Mouth, Throat:  Atraumatic, oropharynx clear  Neck:  Trachea midline, thyroid normal  Respiratory:  Clear to auscultation bilaterally, normal effort  Cardiovascular:  Regular rate and rhythm, no murmurs, no peripheral edema  Gastrointestinal: tense, distended, +fluid wave  Skin:  Normal temperature, no rash  Psychiatric:  No agitation  Neuro:  No new focal neurologic deficits observed    SpO2: 100 % SpO2  Min: 99 %  Max: 100 %   Device:      Flow Rate:   No data recorded     Mechanical Ventilator Settings:                                         Intake/Ouptut 24 hrs (7:00AM - 6:59 AM)  Intake & Output (last 3 days)     None            Lines, Drains & Airways     Active LDAs     Name Placement date Placement time Site Days    Peripheral IV 02/11/23 0930 Left Antecubital 02/11/23  0930  Antecubital  42                Hematology:  Results from last 7 days   Lab Units 03/25/23  1310 03/25/23  1103   WBC 10*3/mm3 12.89* 13.39*   HEMOGLOBIN g/dL 10.5* 9.8*   HEMATOCRIT % 31.9* 30.2*   PLATELETS 10*3/mm3 111* 134*     Electrolytes, Magnesium and Phosphorus:  Results from last 7 days   Lab Units 03/25/23  1317 03/25/23  1103   SODIUM mmol/L 135* 133*   POTASSIUM mmol/L 4.1 3.8   CO2 mmol/L 6.0* 6.0*   MAGNESIUM mg/dL  --  1.9   PHOSPHORUS mg/dL  --  3.9     Renal:  Results from last 7 days   Lab Units 03/25/23  1317 03/25/23  1112 03/25/23  1103   CREATININE mg/dL 3.94* 4.50* 4.07*   BUN mg/dL 48*  --  43*     Estimated Creatinine Clearance: 16.6 mL/min (A) (by C-G formula based on SCr of 3.94 mg/dL (H)).  Estimated Creatinine Clearance: 16.6 mL/min (A) (by C-G formula based on SCr of 3.94 mg/dL (H)).  Hepatic:  Results from last 7 days   Lab Units 03/25/23  1103   ALK PHOS U/L 90   BILIRUBIN mg/dL 3.3*   ALT (SGPT) U/L 27   AST (SGOT) U/L 65*     Arterial Blood Gases:        Results from last 7 days   Lab Units  03/25/23  1103   HEMOGLOBIN A1C % 5.60       Lab Results   Component Value Date    LACTATE 17.6 (C) 03/25/2023       Cardiac Catheterization/Vascular Study    Result Date: 3/25/2023  Narrative: •  Severe 2-vessel CAD (RCA, diagonal branch of the LAD) •  Successful PCI of the proximal RCA with placement of a Xience 4 x 23 mm drug-eluting stent •  Unsuccessful balloon angioplasty of 100% occluded right posterior lateral branch •  Unsuccessful manual thrombectomy of 100% occluded RPDA •  Normal LV filling pressure •  Medical therapy recommended for diagonal branch stenosis. •  DAPT (aspirin 81 mg + clopidogrel 75 mg daily) for 1 month due to high bleed risk.       Relevant imaging studies and labs from 03/25/23 were reviewed    Medications (drips):  sodium bicarbonate drip (greater than 75 mEq/bag)        ferrous sulfate, 325 mg, Oral, Daily With Breakfast  fluticasone, 2 spray, Each Nare, Daily  folic acid, 400 mcg, Oral, Daily  lactulose, 30 g, Oral, BID  midodrine, 15 mg, Oral, TID AC  octreotide, 200 mcg, Intravenous, TID  [START ON 3/26/2023] pantoprazole, 40 mg, Intravenous, Q AM  pantoprazole, 40 mg, Intravenous, Q AM  [START ON 3/26/2023] pharmacy consult - MTM, , Does not apply, Daily  piperacillin-tazobactam, 3.375 g, Intravenous, Once  piperacillin-tazobactam, 3.375 g, Intravenous, Q12H  riFAXIMin, 550 mg, Oral, Q12H  sodium bicarbonate, 1,300 mg, Oral, TID  sodium chloride, 10 mL, Intravenous, Q12H  thiamine (B-1) IV, 500 mg, Intravenous, Q8H  ursodiol, 300 mg, Oral, BID        •  sodium chloride  •  sodium chloride     Assessment & Plan   IMPRESSION / PLAN     Inpatient Problem List:  72 y.o.male:  Active Hospital Problems    Diagnosis    • **ST elevation myocardial infarction involving right coronary artery (HCC)    • CKD (chronic kidney disease) stage 5, GFR less than 15 ml/min (HCC)    • Decompensated hepatic cirrhosis (HCC)    • Coagulopathy (HCC)    • Liver cirrhosis secondary to HAYES (HCC)    •  Hyperbilirubinemia    • PAOLO (acute kidney injury) (HCC)         Impression:  72 y.o.male with relevant PMH of Stage V CKD not on HD, decompensated HAYES cirrhosis with ascites (rejected for Liver-Kidney Txp at , being evaluated at  - not on transplant list), varices and recurrent hepatic hydrothorax (undergone 4 thoracentesis), prior Paracentesis x 1, diabetes, HTN, anemia, thrombocytopenia admitted 3/25/2023 from OSH w/ STEMI.  He had presented there for increased SOA and was hoping to get thoracentesis or paracentesis.    Emergent LHC showed severe 2-vessel CAD (RCA & LAD).  Had PCI proximal RCA w/ HARDEEP and Unsuccessful balloon of 100% RPL / Unsuccessful thrombectomy 100% occluded RPDA.    In addition to above, patient noted to have profound lactic acidosis.  This seem out of proportion to current vitals and appearance - I.e. does not appear that he is in overwhelming shock with poor perfusion.  Has had poor po intake recently.  Suspect there is strong component of Type B LA.    Plan:    STEMI  LHC as above, plan per Cardiology    Severe Lactic Acidosis  R/o Severe Sepsis  Pan culture, empiric Pip-tazo, Give 1 liter Bicarb gtt, start high dose thiamine.  Serial Lactic acid. If this doesn't improve rapidly will need CT abdomen / pelvis.    Acute Hypoxemic Respiratory Failure  Hepatic Hydrothorax s/p Thora x 4  Stat CXR.  If recurrent effusion could consider thora in a few days (just loaded with DAPT).    ESLD w/ Cirrhosis  Ascites  Stage V CKD w/ PAOLO  Has tense ascites.  Was just loaded w/ DAPT.  Will ask GI to follow.      Cr was 2.19 on 2/23. Expect this could worsen after contrast.  1 liter IV bicarb as above, place abdul, check UA / UCx / Urine studies / renal ultrasound.  Will ask Nephrology to follow.  As above - rejected by  for Liver-Kidney Txp, being evaluated at  but not listed.      SCDs/PPI    Nutrition  Dietary Orders (From admission, onward)     Start     Ordered    03/25/23 1211  Diet: Cardiac  Diets, Diabetic Diets; Healthy Heart (2-3 Na+); Consistent Carbohydrate; Texture: Regular Texture (IDDSI 7); Fluid Consistency: Thin (IDDSI 0)  Diet Effective Now        References:    Diet Order Crosswalk   Question Answer Comment   Diets: Cardiac Diets    Diets: Diabetic Diets    Cardiac Diet: Healthy Heart (2-3 Na+)    Diabetic Diet: Consistent Carbohydrate    Texture: Regular Texture (IDDSI 7)    Fluid Consistency: Thin (IDDSI 0)        03/25/23 1211                Critical Care time spent in direct patient care: 45 minutes (excluding procedure time, if applicable) including high complexity decision making to assess, manipulate, and support vital organ system failure in this individual who has impairment of one or more vital organ systems such that there is a high probability of imminent or life threatening deterioration in the patient’s condition.       Zach Cheung MD  Intensive Care Medicine  03/25/23 14:37 EDT         Electronically signed by Zach Cheung MD at 03/25/23 1505         Current Facility-Administered Medications   Medication Dose Route Frequency Provider Last Rate Last Admin   • aspirin chewable tablet 81 mg  81 mg Oral Daily Yen Rodrigues MD   81 mg at 04/01/23 0840   • Calcium Replacement - Follow Nurse / BPA Driven Protocol   Does not apply PRN Yen Rodrigues MD       • clopidogrel (PLAVIX) tablet 75 mg  75 mg Oral Daily Yen Rodrigues MD   75 mg at 04/01/23 0840   • dextrose (D50W) (25 g/50 mL) IV injection 25 g  25 g Intravenous Q15 Min PRN Yen Rodrigues MD   25 g at 03/27/23 0641   • dextrose (GLUTOSE) oral gel 15 g  15 g Oral Q15 Min PRN Yen Rodrigues MD       • ferrous sulfate tablet 325 mg  325 mg Oral Daily With Breakfast Yen Rodrigues MD   325 mg at 04/01/23 0840   • fluticasone (FLONASE) 50 MCG/ACT nasal spray 2 spray  2 spray Each Nare Daily Yen Rodrigues MD   2 spray at 04/01/23 0841   • folic acid  (FOLVITE) tablet 400 mcg  400 mcg Oral Daily Yen Rodrigues MD   400 mcg at 04/01/23 0840   • furosemide (LASIX) tablet 40 mg  40 mg Oral Daily Yen Rodrigues MD   40 mg at 04/01/23 0840   • glucagon (GLUCAGEN) injection 1 mg  1 mg Intramuscular Q15 Min PRN Yen Rodrigues MD       • Insulin Lispro (humaLOG) injection 0-7 Units  0-7 Units Subcutaneous 4x Daily With Meals & Nightly Yen Rodrigues MD   3 Units at 03/31/23 2203   • lactulose (CHRONULAC) 10 GM/15ML solution 20 g  20 g Oral Daily Yen Rodrigues MD   20 g at 04/01/23 0840   • Lidocaine HCl gel (XYLOCAINE) urethral/mucosal syringe   Topical PRN Yen Rodrigues MD   Given at 03/25/23 1645   • Magnesium Low Dose Replacement - Follow Nurse / BPA Driven Protocol   Does not apply PRN Yen Rodrigues MD       • midodrine (PROAMATINE) tablet 15 mg  15 mg Oral TID AC Yen Rodrigues MD   15 mg at 04/01/23 0839   • pantoprazole (PROTONIX) injection 40 mg  40 mg Intravenous Q AM Yen Rodrigues MD   40 mg at 04/01/23 0847   • Pharmacy Consult - MTM   Does not apply Daily Yen Rodrigues MD       • Phosphorus Replacement - Follow Nurse / BPA Driven Protocol   Does not apply PRN Yen Rodrigues MD       • piperacillin-tazobactam (ZOSYN) 3.375 g in iso-osmotic dextrose 50 ml (premix)  3.375 g Intravenous Q12H Yen Rodrigues MD   3.375 g at 04/01/23 0846   • potassium chloride (MICRO-K) CR capsule 20 mEq  20 mEq Oral Once Do Saucedo, DO       • Potassium Replacement - Follow Nurse / BPA Driven Protocol   Does not apply PRN Yen Rodrigues MD       • riFAXIMin (XIFAXAN) tablet 550 mg  550 mg Oral Q12H Yen Rodrigues MD   550 mg at 04/01/23 0839   • sodium chloride 0.9 % flush 10 mL  10 mL Intravenous Q12H Yen Rodrigues MD   10 mL at 04/01/23 0841   • sodium chloride 0.9 % flush 10 mL  10 mL Intravenous PRN Yen Rodrigues MD       • sodium  chloride 0.9 % infusion 40 mL  40 mL Intravenous PRN Yen Rodrigues MD       • ursodiol (ACTIGALL) capsule 300 mg  300 mg Oral BID Yen Rodrigues MD   300 mg at 04/01/23 0840       Lab Results (last 24 hours)     Procedure Component Value Units Date/Time    Body Fluid Culture - Body Fluid, Peritoneum [[id number]] Collected: 03/31/23 1538    Specimen: Body Fluid from Peritoneum Updated: 04/01/23 1045     Body Fluid Culture No growth     Gram Stain Few (2+) WBCs seen      No organisms seen    Comprehensive Metabolic Panel [[id number]]  (Abnormal) Collected: 04/01/23 0739    Specimen: Blood Updated: 04/01/23 0817     Glucose 128 mg/dL      BUN 54 mg/dL      Creatinine 4.31 mg/dL      Sodium 132 mmol/L      Potassium 3.1 mmol/L      Chloride 92 mmol/L      CO2 24.0 mmol/L      Calcium 7.5 mg/dL      Total Protein 4.3 g/dL      Albumin 2.2 g/dL      ALT (SGPT) 13 U/L      AST (SGOT) 32 U/L      Alkaline Phosphatase 64 U/L      Total Bilirubin 3.6 mg/dL      Globulin 2.1 gm/dL      Comment: Calculated Result        A/G Ratio 1.0 g/dL      BUN/Creatinine Ratio 12.5     Anion Gap 16.0 mmol/L      eGFR 13.7 mL/min/1.73      Comment: <15 Indicative of kidney failure       Narrative:      GFR Normal >60  Chronic Kidney Disease <60  Kidney Failure <15    The GFR formula is only valid for adults with stable renal function between ages 18 and 70.    CBC (No Diff) [[id number]]  (Abnormal) Collected: 04/01/23 0739    Specimen: Blood Updated: 04/01/23 0803     WBC 6.97 10*3/mm3      RBC 2.25 10*6/mm3      Hemoglobin 7.6 g/dL      Hematocrit 22.6 %      .4 fL      MCH 33.8 pg      MCHC 33.6 g/dL      RDW 16.2 %      RDW-SD 59.9 fl      MPV 11.5 fL      Platelets 36 10*3/mm3     POC Glucose Once [[id number]]  (Abnormal) Collected: 04/01/23 0710    Specimen: Blood Updated: 04/01/23 0712     Glucose 160 mg/dL      Comment: Meter: [id number] : [id number] Katt Carvajal       POC Glucose Once [[id number]]   (Abnormal) Collected: 03/31/23 2044    Specimen: Blood Updated: 03/31/23 2046     Glucose 248 mg/dL      Comment: Meter: [id number] : [id number] Manolo Romero       Body Fluid Cell Count With Differential - Body Fluid, Peritoneum [[id number]]  (Abnormal) Collected: 03/31/23 1538    Specimen: Body Fluid from Peritoneum Updated: 03/31/23 1718    Narrative:      The following orders were created for panel order Body Fluid Cell Count With Differential - Body Fluid, Peritoneum.  Procedure                               Abnormality         Status                     ---------                               -----------         ------                     Body fluid cell count - ...[[id number]]  Abnormal            Final result               Body fluid differential ...[[id number]]                      Final result                 Please view results for these tests on the individual orders.    Body fluid differential - Body Fluid, Peritoneum [[id number]] Collected: 03/31/23 1538    Specimen: Body Fluid from Peritoneum Updated: 03/31/23 1718     Neutrophils, Fluid 54 %      Monocytes, Fluid 2 %      Mesothelial Cells, Fluid 7 %      Macrophage, Fluid 37 %     Narrative:      If the reference range(s) are not listed, then the reference range(s) have not been defined, so unavailable for the body fluid result.    Body fluid cell count - Body Fluid, Peritoneum [[id number]]  (Abnormal) Collected: 03/31/23 1538    Specimen: Body Fluid from Peritoneum Updated: 03/31/23 1643     Color, Fluid Yellow     Appearance, Fluid Slightly Hazy     WBC, Fluid 99 /mm3      RBC, Fluid 8,000 /mm3     Narrative:      If the reference range(s) are not listed, then the reference range(s) have not been defined, so unavailable for the body fluid result.    POC Glucose Once [[id number]]  (Abnormal) Collected: 03/31/23 1614    Specimen: Blood Updated: 03/31/23 1616     Glucose 318 mg/dL      Comment: Meter: [id number] : [id number] Katt Carvajal               Physician Progress Notes (last 72 hours)      Do Saucedo DO at 23 1058              [facility] Medicine Services  PROGRESS NOTE    Patient Name: Leoncio Hopson  : 1950  MRN: [id number]    Date of Admission: 3/25/2023  Primary Care Physician: Antonio Castro MD    Subjective   Subjective     CC:  HAYES, STEMI    HPI:  Patient resting in bed. Son at bedside. S/p para yesterday with improvement, complains about his belly filling back up already    ROS:  Gen- No fevers, chills  CV- No chest pain, palpitations  Resp- No cough, dyspnea  GI- No N/V/D, + abd pain/distention    Objective   Objective     Vital Signs:   Temp:  [97.8 °F (36.6 °C)-98.4 °F (36.9 °C)] 98.2 °F (36.8 °C)  Heart Rate:  [] 93  Resp:  [17-18] 18  BP: ()/(56-68) 97/56     Physical Exam:  Constitutional: No acute distress, awake, alert  HENT: NCAT, mucous membranes moist  Respiratory: Clear to auscultation bilaterally, respiratory effort normal   Cardiovascular: RRR, no murmurs, rubs, or gallops  Gastrointestinal: distention improved s/p paracentesis  Musculoskeletal: 1+ bilateral ankle edema  Psychiatric: Appropriate affect, cooperative  Neurologic: Oriented x 3, speech clear, hard of hearing  Skin: No rashes      Results Reviewed:  LAB RESULTS:      Lab 23  0739 23  0607 23  0206 23  0328 23  0300 23  0406 23  0054 23  2350 23  2021 23  1707 23  1620 23  1310 23  1103   WBC 6.97 10.98* 13.05* 8.27 5.66 14.01* 5.24  --   --   --   --  12.89* 13.39*   HEMOGLOBIN 7.6* 8.2* 8.8* 8.2* 8.0* 7.7* 7.5*  --   --   --   --  10.5* 9.8*   HEMATOCRIT 22.6* 23.6* 25.9* 24.7* 23.7* 22.7* 23.4*  --   --   --   --  31.9* 30.2*   PLATELETS 36* 46* 52* 44* 46* 101* 107*  --   --   --   --  111* 134*   NEUTROS ABS  --   --   --  5.69  --  11.09* 4.15  --   --   --   --   --  9.77*   IMMATURE GRANS (ABS)  --   --   --  0.09*   --  0.16* 0.27*  --   --   --   --   --  0.28*   LYMPHS ABS  --   --   --  0.63*  --  0.98 0.49*  --   --   --   --   --  0.68*   MONOS ABS  --   --   --  0.41  --  1.18* 0.21  --   --   --   --   --  0.71   EOS ABS  --   --   --  1.44*  --  0.58* 0.11  --   --   --   --   --  1.85*   .4* 98.3* 102.0* 102.1* 100.4* 100.9* 105.4*  --   --   --   --  105.6* 105.6*   PROCALCITONIN  --   --   --   --   --   --  0.95*  --   --   --   --   --  0.82*   LACTATE  --   --   --   --   --  6.0*  --  16.7* 18.8*  --  20.5* 17.6*  --    PROTIME  --  21.9*  --  23.1*  --  32.1* 28.9*  --   --  27.8*  --   --   --    APTT  --   --   --   --   --   --   --   --   --  124.4*  --   --   --          Lab 04/01/23  0739 03/31/23  0607 03/30/23  0206 03/29/23  1619 03/29/23  0200 03/28/23  1337 03/28/23  0300 03/27/23  0406 03/26/23  0054 03/25/23  1317 03/25/23  1310 03/25/23  1112 03/25/23  1103   SODIUM 132* 135* 136  --  138  --  137 134* 136   < >  --   --  133*   POTASSIUM 3.1* 3.5 3.7 3.4* 3.2*   < > 3.1* 3.3* 4.1   < >  --   --  3.8   CHLORIDE 92* 92* 95*  --  94*  --  90* 87* 89*   < >  --   --  89*   CO2 24.0 27.0 27.0  --  28.0  --  29.0 27.0 9.0*   < >  --   --  6.0*   ANION GAP 16.0* 16.0* 14.0  --  16.0*  --  18.0* 20.0* 38.0*   < >  --   --  38.0*   BUN 54* 59* 69*  --  71*  --  77* 63* 50*   < >  --   --  43*   CREATININE 4.31* 4.75* 4.85*  --  5.14*  --  4.92* 4.70* 4.14*   < >  --    < > 4.07*   EGFR 13.7* 12.2* 11.9*  --  11.1*  --  11.7* 12.3* 14.4*   < >  --   --  14.7*   GLUCOSE 128* 93 124*  --  121*  --  151* 55* 182*   < >  --   --  168*   CALCIUM 7.5* 8.1* 8.3*  --  8.2*  --  8.2* 8.4* 8.2*   < >  --   --  9.5   IONIZED CALCIUM  --   --   --   --   --   --   --   --   --   --  1.21  --   --    MAGNESIUM  --   --  1.9  --  1.7  --   --  2.2 1.8  --   --   --  1.9   PHOSPHORUS  --   --   --   --   --   --  3.8 4.0 3.8  --   --   --  3.9   HEMOGLOBIN A1C  --   --   --   --   --   --   --   --   --   --   --    --  5.60    < > = values in this interval not displayed.         Lab 04/01/23  0739 03/31/23  0607 03/30/23  0206 03/29/23  0200 03/28/23  0300   TOTAL PROTEIN 4.3* 4.9* 5.4* 5.1* 5.3*   ALBUMIN 2.2* 2.5* 2.6* 2.7* 2.7*   GLOBULIN 2.1 2.4 2.8 2.4 2.6   ALT (SGPT) 13 17 19 21 25   AST (SGOT) 32 43* 55* 68* 109*   BILIRUBIN 3.6* 3.7* 3.3* 2.9* 2.7*   ALK PHOS 64 75 67 63 60         Lab 03/31/23  0607 03/29/23  0328 03/27/23  0406 03/26/23  0054 03/25/23  1707 03/25/23  1317 03/25/23  1103   HSTROP T  --   --   --   --   --  792* 739*   PROTIME 21.9* 23.1* 32.1* 28.9* 27.8*  --   --    INR 1.91* 2.05* 3.09* 2.71* 2.58*  --   --          Lab 03/25/23  1103   CHOLESTEROL 94         Lab 03/29/23  1619   ABO TYPING O   RH TYPING Positive   ANTIBODY SCREEN Negative         Lab 03/27/23  0322 03/26/23  0509 03/25/23  1456   PH, ARTERIAL 7.516* 7.394 7.152*   PCO2, ARTERIAL 38.3 24.2* 15.5*   PO2 ART 72.3* 84.6 128.0*   FIO2 28 21 21   HCO3 ART 31.0* 14.8* 5.4*   BASE EXCESS ART 7.5* -9.0* <-20.0*   CARBOXYHEMOGLOBIN 1.3 1.3 0.9     Brief Urine Lab Results  (Last result in the past 365 days)      Color   Clarity   Blood   Leuk Est   Nitrite   Protein   CREAT   Urine HCG        03/25/23 2327 Dark Yellow   Turbid   Large (3+)   Moderate (2+)   Negative   >=300 mg/dL (3+)                 Microbiology Results Abnormal     Procedure Component Value - Date/Time    Body Fluid Culture - Body Fluid, Peritoneum [[id number]] Collected: 03/31/23 1538    Lab Status: Preliminary result Specimen: Body Fluid from Peritoneum Updated: 04/01/23 1045     Body Fluid Culture No growth     Gram Stain Few (2+) WBCs seen      No organisms seen    Blood Culture - Blood, Arm, Left [[id number]]  (Normal) Collected: 03/25/23 2021    Lab Status: Final result Specimen: Blood from Arm, Left Updated: 03/30/23 2045     Blood Culture No growth at 5 days    Blood Culture - Blood, Hand, Left [[id number]]  (Normal) Collected: 03/25/23 1622    Lab Status: Final  result Specimen: Blood from Hand, Left Updated: 03/30/23 1700     Blood Culture No growth at 5 days    Narrative:      Aerobic bottle only      Urine Culture - Urine, Indwelling Urethral Catheter [[id number]]  (Normal) Collected: 03/25/23 2327    Lab Status: Final result Specimen: Urine from Indwelling Urethral Catheter Updated: 03/27/23 1014     Urine Culture No growth    Eosinophil Smear - Urine, Urine, Clean Catch [[id number]]  (Normal) Collected: 03/25/23 2241    Lab Status: Final result Specimen: Urine, Clean Catch Updated: 03/26/23 0224     Eosinophil Smear 0 % EOS/100 Cells     Narrative:      No eosinophil seen          CT Guided Paracentesis    Result Date: 3/31/2023  Clinical indication: Recurrent/refractory ascites : Dr. Partha Ribera Procedure: CT-guided paracentesis. Complication: None apparent. Contrast usage: None Estimated blood loss: Negligible Conscious sedation: None Technique: Formal written consent for the procedure was obtained from the patient/family after explaining risks to include bleeding, infection, injury to bowel/adjacent organs, need for additional surgery/procedure.  The patient's right lower quadrant was prepped and draped in the usual, sterile fashion.  Local anesthesia with 1% lidocaine infiltration was achieved.  Utilizing CT guidance, an 8.5 Slovak drainage catheter was placed into a large pocket of ascites within the right lower quadrant without difficulty.  Approximately 5000 mL of danette-colored ascitic fluid was removed without difficulty.  Specimen was sent for culture/labs, per the referring service.  At the end of the procedure, all catheters were removed and a sterile dressing was applied to the puncture site.  The patient tolerated the procedure well without apparent complication. Impression: Successful CT-guided paracentesis, with 5000 mL of danette-colored ascitic fluid removed without difficulty.  Specimen was sent for culture/labs, per the referring  service.      Results for orders placed during the hospital encounter of 03/25/23    Adult Transthoracic Echo Complete W/ Cont if Necessary Per Protocol    Interpretation Summary  •  Left ventricular systolic function is normal. Estimated left ventricular EF = 65%  •  The aortic valve is mildly calcified.  There is minimal aortic stenosis present.  •  Moderate pulmonary hypertension is present. Estimated right ventricular systolic pressure from tricuspid regurgitation is moderately elevated (49 mmHg).  •  Moderate dilation of the aortic root is present. Aneurysmal dilation of the ascending aorta is present.  •  There is a right pleural effusion.      Current medications:  Scheduled Meds:aspirin, 81 mg, Oral, Daily  clopidogrel, 75 mg, Oral, Daily  ferrous sulfate, 325 mg, Oral, Daily With Breakfast  fluticasone, 2 spray, Each Nare, Daily  folic acid, 400 mcg, Oral, Daily  furosemide, 40 mg, Oral, Daily  insulin lispro, 0-7 Units, Subcutaneous, 4x Daily With Meals & Nightly  lactulose, 20 g, Oral, Daily  midodrine, 15 mg, Oral, TID AC  pantoprazole, 40 mg, Intravenous, Q AM  pharmacy consult - MTM, , Does not apply, Daily  piperacillin-tazobactam, 3.375 g, Intravenous, Q12H  potassium chloride, 20 mEq, Oral, Once  riFAXIMin, 550 mg, Oral, Q12H  sodium chloride, 10 mL, Intravenous, Q12H  ursodiol, 300 mg, Oral, BID      Continuous Infusions:   PRN Meds:.•  Calcium Replacement - Follow Nurse / BPA Driven Protocol  •  dextrose  •  dextrose  •  glucagon (human recombinant)  •  Lidocaine HCl gel  •  Magnesium Low Dose Replacement - Follow Nurse / BPA Driven Protocol  •  Phosphorus Replacement - Follow Nurse / BPA Driven Protocol  •  Potassium Replacement - Follow Nurse / BPA Driven Protocol  •  sodium chloride  •  sodium chloride    Assessment & Plan   Assessment & Plan     Active Hospital Problems    Diagnosis  POA   • **ST elevation myocardial infarction involving right coronary artery [I21.11]  Yes   • Hyperlipidemia  LDL goal <70 [E78.5]  Yes   • Coronary artery disease involving native coronary artery of native heart [I25.10]  Yes   • CKD (chronic kidney disease) stage 5, GFR less than 15 ml/min [N18.5]  Yes   • Decompensated hepatic cirrhosis [K72.90, K74.60]  Yes   • Coagulopathy [D68.9]  Yes   • Liver cirrhosis secondary to HAYES [K75.81, K74.60]  Yes   • Hyperbilirubinemia [E80.6]  Yes   • PAOLO (acute kidney injury) [N17.9]  Yes      Resolved Hospital Problems    Diagnosis Date Resolved POA   • Diabetes mellitus [E11.9] 03/25/2023 Unknown        Brief Hospital Course to date:  Leoncio Hopson is a 72 y.o. male with PMHx significant for decompensated HAYES cirrhosis, CKD IV, DM, HTN who presented from OSH w/STEMI. He underwent emergent LHC which showed 2v CAD, he received HARDEEP to RCA and unsuccessful thrombectomy to 100% occluded RPDA. Echocardiogram with EF 65%.    STEMI s/p HARDEEP to RCA on 3/25  - on DAPT, cardiology following  - EF 65%  - no statin due to liver failure    Decompensated HAYES Cirrhosis w/ascites  Thrombocytopenia, Coagulopathy  Hepatic Encephalopathy  Hepatic Hydrothorax  Hx of Varices  - MELD-Na 33  - had paracentesis 3/26 with 4.5L removed and repeat 3/31 w/5L removed, no evidence of SBP  - continue rifaxamin, lactulose, lasix  - octreotide gtt discontinued t3/31, does not seem to be getting any benefit from this  - discussed extensively with daughter at bedside yesterday, we discussed palliative options and palliative peritoneal drain should patient wish to ever go that route. Currently not a candidate for transplant due to recent STEMI and need for DAPT. They do plan to try to f/u at [facility], currently they follow with hepatology at  and has already been deemed not transplant candidate there. Desired palliative medicine consult which has been placed.  - Midodrine for hypotension, hold if SBP >130  - GI following, appreciate input    PAOLO on CKD IV  - likely related to HRS vs. ATN  - Nephrology  following, appreciate input, creatinine remains stable   - high risk for need for HD, family aware    Possible Sepsis  Lactic Acidosis  - treated with empiric Zosyn in the ICU to complete 7 days  - blood and urine cultures remain negative  - mildly elevated procal    Hypokalemia:  - replace with one time dose given poor renal function    Expected Discharge Location and Transportation: Home w/family  Expected Discharge   Expected Discharge Date and Time     Expected Discharge Date Expected Discharge Time    Apr 6, 2023            DVT prophylaxis:  Mechanical DVT prophylaxis orders are present.     AM-PAC 6 Clicks Score (PT): 17 (04/01/23 0800)    CODE STATUS:   Code Status and Medical Interventions:   Ordered at: 03/27/23 1524     Code Status (Patient has no pulse and is not breathing):    CPR (Attempt to Resuscitate)     Medical Interventions (Patient has pulse or is breathing):    Full Support     Release to patient:    Routine Release       Do Saucedo DO  04/01/23        Electronically signed by Do Saucedo DO at 04/01/23 1100     Sergei العلي MD at 03/31/23 6095             LOS: 6 days    Patient Care Team:  Antonio Castro MD as PCP - General  Antonio Castro MD as PCP - Family Medicine    Chief Complaint: Shortness of breath     72-year-old male with past medical history of [place], liver cirrhosis, prerenal acute kidney failure was last seen a month ago with a creatinine of 5.0 when he was transferred to  for further management for liver transplant.  Dialysis was not started at that time.    Prior to the above baseline creatinine 0.8-1.2.  On previous admission creatinine was 4.7, before discharge. Labs showed a creatinine 3.94, BUN 48, sodium 135, potassium 4.1, CO2 6.0, calcium 9.6, EGFR 15 mL/min, troponin high 53, hemoglobin 10.5, platelets 111 K, lactate high 17.6, phosphorus 3.9 magnesium 1.9, bilirubin 4.6 other liver enzymes were normal.  Subjective : F/U PAOLO ON CKD.    Interval  "History:   Transferred out of ICU. Resting in bed comfortably. Niurka any specific issue. Plan for possible paracentesis today. 5 fluid removed.      Review of Systems:   Hard of hearing, abdominal distention, mild shortness of breath, generalized weakness. + COUGH.  All other negative    Objective     Vital Sign Min/Max for last 24 hours  Temp  Min: 97.8 °F (36.6 °C)  Max: 98.5 °F (36.9 °C)   BP  Min: 104/56  Max: 117/68   Pulse  Min: 73  Max: 98   Resp  Min: 17  Max: 22   SpO2  Min: 90 %  Max: 100 %   Flow (L/min)  Min: 2  Max: 2   Weight  Min: 74.8 kg (164 lb 12.8 oz)  Max: 74.8 kg (164 lb 12.8 oz)     Flowsheet Rows    Flowsheet Row First Filed Value   Admission Height 172.7 cm (68\") Documented at 03/25/2023 1055   Admission Weight 72.6 kg (160 lb) Documented at 03/25/2023 1055          I/O this shift:  In: 573.6 [P.O.:300; I.V.:173.6; IV Piggyback:100]  Out: 275 [Urine:275]  I/O last 3 completed shifts:  In: 805.4 [P.O.:540; I.V.:215.4; IV Piggyback:50]  Out: 1628 [Urine:1628]    Physical Exam:  General appearance: Sick looking  male mild distress laying in bed.    HEENT: Hard of hearing, oral mucosa moist, neck is supple  Lungs: Few rhonchi's and rails heard, equal chest movement, no crepitation.  Heart: Normal S1, S2, no gallop, murmur, RRR.  Abdomen: Abdomen distended, positive thrill soft, nontender, positive bowel sounds.  Extremities: Positive lower extremity edema, no cyanosis, no joint swelling.  Neuro: Alert, oriented x4, no focal deficit.  Psych: Mood and affect are normal and appropriate.  Skin: Skin is warm and dry.  : No suprapubic fullness or tenderness. TORRE.    WBC WBC   Date Value Ref Range Status   03/31/2023 10.98 (H) 3.40 - 10.80 10*3/mm3 Final   03/30/2023 13.05 (H) 3.40 - 10.80 10*3/mm3 Final   03/29/2023 8.27 3.40 - 10.80 10*3/mm3 Final      HGB Hemoglobin   Date Value Ref Range Status   03/31/2023 8.2 (L) 13.0 - 17.7 g/dL Final   03/30/2023 8.8 (L) 13.0 - 17.7 g/dL Final "   03/29/2023 8.2 (L) 13.0 - 17.7 g/dL Final      HCT Hematocrit   Date Value Ref Range Status   03/31/2023 23.6 (L) 37.5 - 51.0 % Final   03/30/2023 25.9 (L) 37.5 - 51.0 % Final   03/29/2023 24.7 (L) 37.5 - 51.0 % Final      Platlets No results found for: LABPLAT   MCV MCV   Date Value Ref Range Status   03/31/2023 98.3 (H) 79.0 - 97.0 fL Final   03/30/2023 102.0 (H) 79.0 - 97.0 fL Final   03/29/2023 102.1 (H) 79.0 - 97.0 fL Final          Sodium Sodium   Date Value Ref Range Status   03/31/2023 135 (L) 136 - 145 mmol/L Final   03/30/2023 136 136 - 145 mmol/L Final   03/29/2023 138 136 - 145 mmol/L Final      Potassium Potassium   Date Value Ref Range Status   03/31/2023 3.5 3.5 - 5.2 mmol/L Final   03/30/2023 3.7 3.5 - 5.2 mmol/L Final   03/29/2023 3.4 (L) 3.5 - 5.2 mmol/L Final   03/29/2023 3.2 (L) 3.5 - 5.2 mmol/L Final      Chloride Chloride   Date Value Ref Range Status   03/31/2023 92 (L) 98 - 107 mmol/L Final   03/30/2023 95 (L) 98 - 107 mmol/L Final   03/29/2023 94 (L) 98 - 107 mmol/L Final      CO2 CO2   Date Value Ref Range Status   03/31/2023 27.0 22.0 - 29.0 mmol/L Final   03/30/2023 27.0 22.0 - 29.0 mmol/L Final   03/29/2023 28.0 22.0 - 29.0 mmol/L Final      BUN BUN   Date Value Ref Range Status   03/31/2023 59 (H) 8 - 23 mg/dL Final   03/30/2023 69 (H) 8 - 23 mg/dL Final   03/29/2023 71 (H) 8 - 23 mg/dL Final      Creatinine Creatinine   Date Value Ref Range Status   03/31/2023 4.75 (H) 0.76 - 1.27 mg/dL Final   03/30/2023 4.85 (H) 0.76 - 1.27 mg/dL Final   03/29/2023 5.14 (H) 0.76 - 1.27 mg/dL Final      Calcium Calcium   Date Value Ref Range Status   03/31/2023 8.1 (L) 8.6 - 10.5 mg/dL Final   03/30/2023 8.3 (L) 8.6 - 10.5 mg/dL Final   03/29/2023 8.2 (L) 8.6 - 10.5 mg/dL Final      PO4 No results found for: CAPO4   Albumin Albumin   Date Value Ref Range Status   03/31/2023 2.5 (L) 3.5 - 5.2 g/dL Final   03/30/2023 2.6 (L) 3.5 - 5.2 g/dL Final   03/29/2023 2.7 (L) 3.5 - 5.2 g/dL Final      Magnesium  Magnesium   Date Value Ref Range Status   03/30/2023 1.9 1.6 - 2.4 mg/dL Final   03/29/2023 1.7 1.6 - 2.4 mg/dL Final      Uric Acid No results found for: URICACID        Results Review:     I reviewed the patient's new clinical results.    aspirin, 81 mg, Oral, Daily  clopidogrel, 75 mg, Oral, Daily  ferrous sulfate, 325 mg, Oral, Daily With Breakfast  fluticasone, 2 spray, Each Nare, Daily  folic acid, 400 mcg, Oral, Daily  furosemide, 40 mg, Oral, Daily  insulin lispro, 0-7 Units, Subcutaneous, 4x Daily With Meals & Nightly  lactulose, 20 g, Oral, Daily  midodrine, 15 mg, Oral, TID AC  pantoprazole, 40 mg, Intravenous, Q AM  pharmacy consult - [place], , Does not apply, Daily  piperacillin-tazobactam, 3.375 g, Intravenous, Q12H  riFAXIMin, 550 mg, Oral, Q12H  sodium chloride, 10 mL, Intravenous, Q12H  ursodiol, 300 mg, Oral, BID           Medication Review: Reviewed    Assessment & Plan       ST elevation myocardial infarction involving right coronary artery (HCC)    PAOLO (acute kidney injury) (HCC)    Liver cirrhosis secondary to HAYES (HCC)    Hyperbilirubinemia    Coagulopathy (HCC)    Decompensated hepatic cirrhosis (HCC)    CKD (chronic kidney disease) stage 5, GFR less than 15 ml/min (HCC)    Hyperlipidemia LDL goal <70    Coronary artery disease involving native coronary artery of native heart    1.  Acute kidney injury: Patient was last seen a month ago with creatinine 4.5- 5.0 range.  Prior to that baseline was 0.8-1.2, likely diagnosis was hepatorenal versus ATN.  At that time patient was transferred to  for further management.  Patient has been admitted and underwent a cardiac catheterization received 80 mL of IV contrast.   2.  S/p right RCA stenting for the clot 3/25/2023 today  3.  HAYES: Decompensated liver cirrhosis.  Ammonia level 106  4.  Hypotension: In the setting of liver disease.  5.  Anemia  6.  Thrombocytopenia   7.  Thoracentesis: 3 weeks back  8.  Paracentesis: On 3/17/2023 at [facility]. 9. HYPOKALEMIA.     Recommendations:  So far patient has not demonstrated meaningful renal recovery since last admission in Feb and developed acute kidney disease with persistent cr elevation. Niurka any uremic symptoms. Therefore no indication of dialysis. However he is at risk for needing dialysis in near future. He is undergoing txp evaluation at  therefore dialysis is in consideration. Stable for transfer out of ICU  - Will need albumin supp if undergoing paracentesis >3 liter/day  - Continue lasix 40 mg daily   - Will need close outpatient f/u in renal clinic. If renal function stable by tomorrow can be discharge with close followup.   - Hold midodrine if sbp>130         Sergei العلي MD  23  18:26 EDT       Electronically signed by Segrei العلي MD at 23 [phone]     Do Saucedo, DO at 23 1333              [facility] Medicine Services  PROGRESS NOTE    Patient Name: Leoncio Hopson  : 1950  MRN: [id number]    Date of Admission: 3/25/2023  Primary Care Physician: Antonio Castro MD    Subjective   Subjective     CC:  HAYES, STEMI    HPI:  Patient sitting in bedside chair, asking about paracentesis today as his abdomen is quite swollen, he denies pain. Mild SOA. Daughter at bedside asking about discharge home.    ROS:  Gen- No fevers, chills  CV- No chest pain, palpitations  Resp- No cough, dyspnea  GI- No N/V/D, + abd pain/distention    Objective   Objective     Vital Signs:   Temp:  [97.7 °F (36.5 °C)-98.5 °F (36.9 °C)] 97.8 °F (36.6 °C)  Heart Rate:  [73-98] 90  Resp:  [16-22] 18  BP: (104-133)/(56-78) 114/63  Flow (L/min):  [2] 2     Physical Exam:  Constitutional: No acute distress, awake, alert  HENT: NCAT, mucous membranes moist  Respiratory: Clear to auscultation bilaterally, respiratory effort normal   Cardiovascular: RRR, no murmurs, rubs, or gallops  Gastrointestinal: distended with + fluid wave/ascites, firm  Musculoskeletal: 1+  bilateral ankle edema  Psychiatric: Appropriate affect, cooperative  Neurologic: Oriented x 3, speech clear, hard of hearing  Skin: No rashes      Results Reviewed:  LAB RESULTS:      Lab 03/31/23  0607 03/30/23  0206 03/29/23  0328 03/28/23  0300 03/27/23  0406 03/26/23  0054 03/25/23  2350 03/25/23  2021 03/25/23  1707 03/25/23  1620 03/25/23  1310 03/25/23  1103   WBC 10.98* 13.05* 8.27 5.66 14.01* 5.24  --   --   --   --  12.89* 13.39*   HEMOGLOBIN 8.2* 8.8* 8.2* 8.0* 7.7* 7.5*  --   --   --   --  10.5* 9.8*   HEMATOCRIT 23.6* 25.9* 24.7* 23.7* 22.7* 23.4*  --   --   --   --  31.9* 30.2*   PLATELETS 46* 52* 44* 46* 101* 107*  --   --   --   --  111* 134*   NEUTROS ABS  --   --  5.69  --  11.09* 4.15  --   --   --   --   --  9.77*   IMMATURE GRANS (ABS)  --   --  0.09*  --  0.16* 0.27*  --   --   --   --   --  0.28*   LYMPHS ABS  --   --  0.63*  --  0.98 0.49*  --   --   --   --   --  0.68*   MONOS ABS  --   --  0.41  --  1.18* 0.21  --   --   --   --   --  0.71   EOS ABS  --   --  1.44*  --  0.58* 0.11  --   --   --   --   --  1.85*   MCV 98.3* 102.0* 102.1* 100.4* 100.9* 105.4*  --   --   --   --  105.6* 105.6*   PROCALCITONIN  --   --   --   --   --  0.95*  --   --   --   --   --  0.82*   LACTATE  --   --   --   --  6.0*  --  16.7* 18.8*  --  20.5* 17.6*  --    PROTIME 21.9*  --  23.1*  --  32.1* 28.9*  --   --  27.8*  --   --   --    APTT  --   --   --   --   --   --   --   --  124.4*  --   --   --          Lab 03/31/23  0607 03/30/23  0206 03/29/23  1619 03/29/23  0200 03/28/23  1337 03/28/23  0300 03/27/23  0406 03/26/23  0054 03/25/23  1317 03/25/23  1310 03/25/23  1112 03/25/23  1103   SODIUM 135* 136  --  138  --  137 134* 136   < >  --   --  133*   POTASSIUM 3.5 3.7 3.4* 3.2* 3.5 3.1* 3.3* 4.1   < >  --   --  3.8   CHLORIDE 92* 95*  --  94*  --  90* 87* 89*   < >  --   --  89*   CO2 27.0 27.0  --  28.0  --  29.0 27.0 9.0*   < >  --   --  6.0*   ANION GAP 16.0* 14.0  --  16.0*  --  18.0* 20.0* 38.0*   <  >  --   --  38.0*   BUN 59* 69*  --  71*  --  77* 63* 50*   < >  --   --  43*   CREATININE 4.75* 4.85*  --  5.14*  --  4.92* 4.70* 4.14*   < >  --    < > 4.07*   EGFR 12.2* 11.9*  --  11.1*  --  11.7* 12.3* 14.4*   < >  --   --  14.7*   GLUCOSE 93 124*  --  121*  --  151* 55* 182*   < >  --   --  168*   CALCIUM 8.1* 8.3*  --  8.2*  --  8.2* 8.4* 8.2*   < >  --   --  9.5   IONIZED CALCIUM  --   --   --   --   --   --   --   --   --  1.21  --   --    MAGNESIUM  --  1.9  --  1.7  --   --  2.2 1.8  --   --   --  1.9   PHOSPHORUS  --   --   --   --   --  3.8 4.0 3.8  --   --   --  3.9   HEMOGLOBIN A1C  --   --   --   --   --   --   --   --   --   --   --  5.60    < > = values in this interval not displayed.         Lab 03/31/23  0607 03/30/23  0206 03/29/23  0200 03/28/23  0300 03/27/23  0406   TOTAL PROTEIN 4.9* 5.4* 5.1* 5.3* 5.5*   ALBUMIN 2.5* 2.6* 2.7* 2.7* 2.9*   GLOBULIN 2.4 2.8 2.4 2.6 2.6   ALT (SGPT) 17 19 21 25 28   AST (SGOT) 43* 55* 68* 109* 157*   BILIRUBIN 3.7* 3.3* 2.9* 2.7* 2.4*   ALK PHOS 75 67 63 60 62         Lab 03/31/23  0607 03/29/23  0328 03/27/23  0406 03/26/23  0054 03/25/23  1707 03/25/23  1317 03/25/23  1103   HSTROP T  --   --   --   --   --  792* 739*   PROTIME 21.9* 23.1* 32.1* 28.9* 27.8*  --   --    INR 1.91* 2.05* 3.09* 2.71* 2.58*  --   --          Lab 03/25/23  1103   CHOLESTEROL 94         Lab 03/29/23  1619   ABO TYPING O   RH TYPING Positive   ANTIBODY SCREEN Negative         Lab 03/27/23  0322 03/26/23  0509 03/25/23  1456   PH, ARTERIAL 7.516* 7.394 7.152*   PCO2, ARTERIAL 38.3 24.2* 15.5*   PO2 ART 72.3* 84.6 128.0*   FIO2 28 21 21   HCO3 ART 31.0* 14.8* 5.4*   BASE EXCESS ART 7.5* -9.0* <-20.0*   CARBOXYHEMOGLOBIN 1.3 1.3 0.9     Brief Urine Lab Results  (Last result in the past 365 days)      Color   Clarity   Blood   Leuk Est   Nitrite   Protein   CREAT   Urine HCG        03/25/23 2746 Dark Yellow   Turbid   Large (3+)   Moderate (2+)   Negative   >=300 mg/dL (3+)                  Microbiology Results Abnormal     Procedure Component Value - Date/Time    Blood Culture - Blood, Arm, Left [[id number]]  (Normal) Collected: 03/25/23 2021    Lab Status: Final result Specimen: Blood from Arm, Left Updated: 03/30/23 2045     Blood Culture No growth at 5 days    Blood Culture - Blood, Hand, Left [[id number]]  (Normal) Collected: 03/25/23 1622    Lab Status: Final result Specimen: Blood from Hand, Left Updated: 03/30/23 1700     Blood Culture No growth at 5 days    Narrative:      Aerobic bottle only      Urine Culture - Urine, Indwelling Urethral Catheter [[id number]]  (Normal) Collected: 03/25/23 2327    Lab Status: Final result Specimen: Urine from Indwelling Urethral Catheter Updated: 03/27/23 1014     Urine Culture No growth    Eosinophil Smear - Urine, Urine, Clean Catch [[id number]]  (Normal) Collected: 03/25/23 2241    Lab Status: Final result Specimen: Urine, Clean Catch Updated: 03/26/23 0224     Eosinophil Smear 0 % EOS/100 Cells     Narrative:      No eosinophil seen          XR Chest 1 View    Result Date: 3/30/2023  XR CHEST 1 VW Date of Exam: 3/30/2023 3:40 AM EDT Indication: follow up pleural effusions. Comparison: 3/26/2023 Findings: Right-sided central venous catheter projects unchanged. Aeration is worsened from comparison, including both increased bilateral airspace disease, most notable on the right as well as with increased small left and moderate right pleural effusions, with the appearance of underlying pulmonary edema. No pneumothorax. Unchanged heart and mediastinal contours.     Impression: Impression: Right-sided central venous catheter projects unchanged. Aeration is worsened from comparison, including both increased bilateral airspace disease, most notable on the right as well as with increased small left and moderate right pleural effusions, with the appearance of underlying pulmonary edema. No pneumothorax. Unchanged heart and mediastinal contours. Electronically  Signed: Rylan Gaspar  3/30/2023 5:04 AM EDT  Workstation ID: [id number]      Results for orders placed during the hospital encounter of 03/25/23    Adult Transthoracic Echo Complete W/ Cont if Necessary Per Protocol    Interpretation Summary  •  Left ventricular systolic function is normal. Estimated left ventricular EF = 65%  •  The aortic valve is mildly calcified.  There is minimal aortic stenosis present.  •  Moderate pulmonary hypertension is present. Estimated right ventricular systolic pressure from tricuspid regurgitation is moderately elevated (49 mmHg).  •  Moderate dilation of the aortic root is present. Aneurysmal dilation of the ascending aorta is present.  •  There is a right pleural effusion.      Current medications:  Scheduled Meds:aspirin, 81 mg, Oral, Daily  clopidogrel, 75 mg, Oral, Daily  ferrous sulfate, 325 mg, Oral, Daily With Breakfast  fluticasone, 2 spray, Each Nare, Daily  folic acid, 400 mcg, Oral, Daily  furosemide, 40 mg, Oral, Daily  insulin lispro, 0-7 Units, Subcutaneous, 4x Daily With Meals & Nightly  lactulose, 20 g, Oral, Daily  midodrine, 15 mg, Oral, TID AC  pantoprazole, 40 mg, Intravenous, Q AM  pharmacy consult - MTM, , Does not apply, Daily  piperacillin-tazobactam, 3.375 g, Intravenous, Q12H  riFAXIMin, 550 mg, Oral, Q12H  sodium chloride, 10 mL, Intravenous, Q12H  ursodiol, 300 mg, Oral, BID      Continuous Infusions:   PRN Meds:.•  Calcium Replacement - Follow Nurse / BPA Driven Protocol  •  dextrose  •  dextrose  •  glucagon (human recombinant)  •  Lidocaine HCl gel  •  Magnesium Low Dose Replacement - Follow Nurse / BPA Driven Protocol  •  Phosphorus Replacement - Follow Nurse / BPA Driven Protocol  •  Potassium Replacement - Follow Nurse / BPA Driven Protocol  •  sodium chloride  •  sodium chloride    Assessment & Plan   Assessment & Plan     Active Hospital Problems    Diagnosis  POA   • **ST elevation myocardial infarction involving right coronary artery (HCC)  [I21.11]  Yes   • Hyperlipidemia LDL goal <70 [E78.5]  Yes   • Coronary artery disease involving native coronary artery of native heart [I25.10]  Yes   • CKD (chronic kidney disease) stage 5, GFR less than 15 ml/min (HCC) [N18.5]  Yes   • Decompensated hepatic cirrhosis (HCC) [K72.90, K74.60]  Yes   • Coagulopathy (HCC) [D68.9]  Yes   • Liver cirrhosis secondary to HAYES (HCC) [K75.81, K74.60]  Yes   • Hyperbilirubinemia [E80.6]  Yes   • PAOLO (acute kidney injury) (HCC) [N17.9]  Yes      Resolved Hospital Problems    Diagnosis Date Resolved POA   • Diabetes mellitus (HCC) [E11.9] 03/25/2023 Unknown        Brief Hospital Course to date:  Leoncio Hopson is a 72 y.o. male with PMHx significant for decompensated HAYES cirrhosis, CKD IV, DM, HTN who presented from OSH w/STEMI. He underwent emergent LHC which showed 2v CAD, he received HARDEEP to RCA and unsuccessful thrombectomy to 100% occluded RPDA. Echocardiogram with EF 65%.    STEMI s/p HARDEEP to RCA on 3/25  - on DAPT, cardiology following  - EF 65%  - no statin due to liver failure    Decompensated HAYES Cirrhosis w/ascites  Thrombocytopenia, Coagulopathy  Hepatic Encephalopathy  Hepatic Hydrothorax  Hx of Varices  - MELD-Na 33  - had paracentesis 3/26 with 4.5L removed, needs repeat paracentesis prior to discharge  - continue rifaxamin, lactulose, lasix  - octreotide gtt discontinued today, does not seem to be getting any benefit from this  - discussed extensively with daughter at bedside, we discussed palliative options and palliative peritoneal drain should patient wish to ever go that route. Currently not a candidate for transplant due to recent STEMI and need for DAPT. They do plan to try to f/u at [place], follow with hepatology at  and has already been deemed not transplant candidate there.  - Midodrine for hypotension, will need albumin if paracentesis repeated  - GI following, appreciate input    PAOLO on CKD IV  - likely related to HRS vs. ATN  - Nephrology  following, appreciate input  - high risk for need for HD, family aware    Possible Sepsis  Lactic Acidosis  - treated with empiric Zosyn in the ICU to complete 7 days  - blood and urine cultures remain negative  - mildly elevated procal      Expected Discharge Location and Transportation: Home w/family  Expected Discharge   Expected Discharge Date and Time     Expected Discharge Date Expected Discharge Time    Apr 6, 2023            DVT prophylaxis:  Mechanical DVT prophylaxis orders are present.     AM-PAC 6 Clicks Score (PT): 18 (03/30/23 1137)    CODE STATUS:   Code Status and Medical Interventions:   Ordered at: 03/27/23 1524     Code Status (Patient has no pulse and is not breathing):    CPR (Attempt to Resuscitate)     Medical Interventions (Patient has pulse or is breathing):    Full Support     Release to patient:    Routine Release       Do Saucedo DO  03/31/23        Electronically signed by Do Saucedo DO at 03/31/23 1353     Dimitri Méndez APRN at 03/31/23 1130     Attestation signed by Brunner, Mark I, MD at 03/31/23 2618    I have reviewed this documentation and agree.  Patient had 5 L paracentesis today, and fluid analysis shows no evidence for SBP.                  GI Daily Progress Note  Subjective:    Chief Complaint: Follow-up decompensated cirrhosis    Patient been transferred out of ICU to floor bed.  He is up in chair, alert and oriented.  His daughter is at bedside.  He has complaints of abdominal distention, needing a paracentesis.  He has no confusions or signs of GI bleeding.    His daughter is asking what needs to be done to get him out of the hospital and what that will look like going home.  She reports that [facility] has advised them that he is not a transplant candidate at this time given his recent STEMI and need to be on Plavix.  She would like to establish care with hepatology locally to follow.  He is established with [facility] hepatology  "already.    Continues on octreotide drip with little benefit noted; creatinine 4.75.  He was unable to obtain octreotide as an outpatient due to insurance coverage.    Objective:    /63 (BP Location: Left arm, Patient Position: Lying)   Pulse 80   Temp 97.8 °F (36.6 °C) (Oral)   Resp 18   Ht 172.7 cm (67.99\")   Wt 74.8 kg (164 lb 12.8 oz)   SpO2 95%   BMI 25.06 kg/m²     Physical Exam  Pulmonary:      Effort: Pulmonary effort is normal.      Breath sounds: Normal breath sounds.   Abdominal:      General: There is distension.      Comments: Apparent ascites   Skin:     General: Skin is warm and dry.   Neurological:      Mental Status: He is alert and oriented to person, place, and time.         Lab  Lab Results   Component Value Date    WBC 10.98 (H) 03/31/2023    HGB 8.2 (L) 03/31/2023    HGB 8.8 (L) 03/30/2023    HGB 8.2 (L) 03/29/2023    MCV 98.3 (H) 03/31/2023    PLT 46 (C) 03/31/2023    INR 1.91 (H) 03/31/2023    INR 2.05 (H) 03/29/2023    INR 3.09 (H) 03/27/2023    INR 2.71 (H) 03/26/2023    INR 2.58 (H) 03/25/2023       Lab Results   Component Value Date    GLUCOSE 93 03/31/2023    BUN 59 (H) 03/31/2023    CREATININE 4.75 (H) 03/31/2023    EGFRIFNONA 59 (L) 04/22/2022    EGFRIFAFRI >60 04/22/2022    BCR 12.4 03/31/2023     (L) 03/31/2023    K 3.5 03/31/2023    CO2 27.0 03/31/2023    CALCIUM 8.1 (L) 03/31/2023    PROTENTOTREF 5.8 (L) 05/24/2015    ALBUMIN 2.5 (L) 03/31/2023    ALKPHOS 75 03/31/2023    BILITOT 3.7 (H) 03/31/2023    BILIDIR 1.91 (H) 03/15/2023    ALT 17 03/31/2023    AST 43 (H) 03/31/2023       Assessment:  1.  HAYES cirrhosis. MELD-Na = 33  2.  Ascites  3.  Hepatic hydrothorax.  4.  Chronic kidney disease.   5.  STEMI, status post PCI/HARDEEP RCA on 3/25/23    Plan:  >> Discontinue octreotide drip; providing no benefit  >> Paracentesis today  >> Recommend continued hepatology follow-up as outpatient at     [facility]  03/31/23  11:31 EDT      Electronically signed by " Brunner, Mark I, MD at 03/31/23 1719     Yen Rodrigues MD at 03/30/23 1254          Critical Care Note     LOS: 5 days   Patient Care Team:  Antonio Castro MD as PCP - General  Antonio Castro MD as PCP - Family Medicine    Chief Complaint/Reason for visit:     ST elevation myocardial infarction involving right coronary artery (HCC)    PAOLO (acute kidney injury) (HCC)    Liver cirrhosis secondary to HAYES (HCC)    Hyperbilirubinemia    Coagulopathy (HCC)    Decompensated hepatic cirrhosis (HCC)    CKD (chronic kidney disease) stage 5, GFR less than 15 ml/min (HCC)    Hyperlipidemia LDL goal <70    Coronary artery disease involving native coronary artery of native heart    Subjective     Interval History:     He remains off norepinephrine.  He is on octreotide drip.  Subcutaneous shots were causing him pain so he was placed back on the drip.  He is complaining of some pain on the left side of his tongue.  He does feel hungry.  He did walk with therapy today.    History taken from: patient,nursing,chart    Review of Systems:    All systems were reviewed and negative except as noted in subjective.    Medical history, surgical history, social history, family history reviewed    Objective     Intake/Output:    Intake/Output Summary (Last 24 hours) at 3/30/2023 1255  Last data filed at 3/30/2023 1200  Gross per 24 hour   Intake 1670.56 ml   Output 1045 ml   Net 625.56 ml       Nutrition:  Diet: Cardiac Diets, Diabetic Diets; Healthy Heart (2-3 Na+); Consistent Carbohydrate; Texture: Regular Texture (IDDSI 7); Fluid Consistency: Thin (IDDSI 0)    Infusions:  norepinephrine, 0.02-0.3 mcg/kg/min, Last Rate: Stopped (03/28/23 0325)  octreotide (SandoSTATIN) infusion, 50 mcg/hr, Last Rate: 50 mcg/hr (03/30/23 0809)        Mechanical Ventilator Settings:                                                Telemetry: Sinus tachycardia, sinus rhythm             Vital Signs  Blood pressure 102/65, pulse 73, temperature 98.4  "°F (36.9 °C), temperature source Oral, resp. rate 16, height 172.7 cm (67.99\"), weight 72 kg (158 lb 11.7 oz), SpO2 94 %.    Physical Exam:  General Appearance:   upright in bed in no distress.   Head:   Atraumatic   Eyes:          Jaundice   Ears:   External ear normal, hard of hearing, hearing aids in place   Throat:  Oral mucosa moist, small shallow ulcer on the left lateral aspect of his tongue   Neck:  Trachea midline, no palpable thyroid, no crepitus   Back:    Spine is straight   Lungs:    Symmetric chest excursion.  Breath sounds are bilateral, diminished at the bases, right greater than left, clear anteriorly    Heart:   Regular rhythm, S1, S2 auscultated   Abdomen:    Persistent ascites with fluid wave, nontender   Rectal:   Deferred   Extremities:  Bilateral lower extremity edema   Pulses:  Palpable radial pulses   Skin:  Warm and dry   Lymph nodes:  No cervical adenopathy   Neurologic:  He is awake and oriented to name and hospital      Results Review:     I reviewed the patient's new clinical results.   Results from last 7 days   Lab Units 03/30/23  0206 03/29/23  1619 03/29/23  0200 03/28/23  1337 03/28/23  0300   SODIUM mmol/L 136  --  138  --  137   POTASSIUM mmol/L 3.7 3.4* 3.2*   < > 3.1*   CHLORIDE mmol/L 95*  --  94*  --  90*   CO2 mmol/L 27.0  --  28.0  --  29.0   BUN mg/dL 69*  --  71*  --  77*   CREATININE mg/dL 4.85*  --  5.14*  --  4.92*   CALCIUM mg/dL 8.3*  --  8.2*  --  8.2*   BILIRUBIN mg/dL 3.3*  --  2.9*  --  2.7*   ALK PHOS U/L 67  --  63  --  60   ALT (SGPT) U/L 19  --  21  --  25   AST (SGOT) U/L 55*  --  68*  --  109*   GLUCOSE mg/dL 124*  --  121*  --  151*    < > = values in this interval not displayed.     Results from last 7 days   Lab Units 03/30/23  0206 03/29/23  0328 03/28/23  0300   WBC 10*3/mm3 13.05* 8.27 5.66   HEMOGLOBIN g/dL 8.8* 8.2* 8.0*   HEMATOCRIT % 25.9* 24.7* 23.7*   PLATELETS 10*3/mm3 52* 44* 46*     Results from last 7 days   Lab Units 03/27/23  0322   PH, " ARTERIAL pH units 7.516*   PO2 ART mm Hg 72.3*   PCO2, ARTERIAL mm Hg 38.3   HCO3 ART mmol/L 31.0*     Lab Results   Component Value Date    BLOODCX No growth at 4 days 03/25/2023     Lab Results   Component Value Date    URINECX No growth 03/25/2023       I reviewed the patient's new imaging including images and reports.    IMPRESSION:     There is a right internal jugular central venous catheter with the catheter tip near the atrial caval junction.     There is a small right pleural effusion and patchy interstitial changes with mild cardiomegaly which is compatible with mild pulmonary edema.     No focal consolidation is otherwise seen.     Electronically signed by:  Lalo Elizondo D.O.    3/25/2023 11:46 PM Mountain Time     Findings:  CT SCAN OF THE CHEST WITHOUT CONTRAST: There is a moderate right pleural effusion, decreased from 2/9/2023. No left effusion or pericardial effusion is seen. There is ectasia of the ascending aorta is approximately 4.4 cm, minimally if at all increased   from the prior study. There is dense coronary artery calcification. No mediastinal adenopathy is seen. Dense peripheral groundglass changes of the left lower lobe are very similar to appearance of Covid 19 pneumonia from the initial months the scan.   There are much milder multifocal interstitial changes the left upper lobe. Small elongated inferior right upper lobe nodule or nodular scar is stable. Airways appear normally patent. Bony structures appear to be intact.     IMPRESSION:  1. Moderate right pleural effusion decreased from 2/9/2023.     2. Left lower lobe pneumonia and much milder left upper lobe pneumonia, which may represent Covid 19 or other atypical pneumonia.     3. Stable small inferior right upper lobe pulmonary nodule.     4. Ectatic ascending aorta to 4.4 cm, stable only minimally increased.           CT SCAN OF THE ABDOMEN AND PELVIS WITHOUT CONTRAST: Separate scans of the abdomen and pelvis are performed, as  the initial scan of the abdomen was performed prior to pelvic ct scan being ordered. The studies are dictated together however.     There is extensive ascites, and dense diffuse edema of subcutaneous tissues and particularly dense edema throughout the small bowel mesentery. There is a small cirrhotic appearing liver with no obvious lesion. Spleen is not enlarged. Pancreas and adrenal   glands appear unremarkable. Concentrated contrast in the renal collecting systems resembles renal calculi. There is a small left lower pole renal cyst measuring near water density. No evidence of obstructive uropathy is seen. There is no evidence of   free air or adenopathy. There is marked distention of stomach with fluid and air, and relatively decompressed duodenum but no evidence of obstructive lesion.     Regarding lower abdomen pelvis, dense small bowel mesenteric edema and extensive ascites are again noted. No abnormally dilated bowel loops, evidence of pneumatosis or significant bowel wall edema is appreciated. Bladder is normally distended and normal   in appearance. No mass or adenopathy is seen. Review of the bony structures shows grade 1 anterolisthesis of L5 on S1 with bilateral pars defects. There is a somewhat transitional appearance of S1 with a rudimentary S1-S2 disc space.     Impression:     1. Extensive ascites and extensive dense anasarca throughout the small bowel mesentery and remaining soft tissues.     2. Small cirrhotic liver.     3. Fluid and air distended stomach, to the level of the duodenum. No obvious obstructing lesion or inflammation, possibly gastric ileus.     4. Incidentally noted grade 1 anterolisthesis of L5 on S1, with bilateral pars defects.     Electronically Signed: Blayne Chung    3/25/2023 8:56 PM EDT    Workstation ID: [id number]    Interpretation Summary echocardiogram March 25, 2023       •  Left ventricular systolic function is normal. Estimated left ventricular EF = 65%  •  The aortic valve  is mildly calcified.  There is minimal aortic stenosis present.  •  Moderate pulmonary hypertension is present. Estimated right ventricular systolic pressure from tricuspid regurgitation is moderately elevated (49 mmHg).  •  Moderate dilation of the aortic root is present. Aneurysmal dilation of the ascending aorta is present.  •  There is a right pleural effusion      Conclusion  Left heart cath, March 25, 2023     •  Severe 2-vessel CAD (RCA, diagonal branch of the LAD)  •  Successful PCI of the proximal RCA with placement of a Xience 4 x 23 mm drug-eluting stent  •  Unsuccessful balloon angioplasty of 100% occluded right posterior lateral branch  •  Unsuccessful manual thrombectomy of 100% occluded RPDA  •  Normal LV filling pressure  •  Medical therapy recommended for diagonal branch stenosis.  •  DAPT (aspirin 81 mg + clopidogrel 75 mg daily) for 1 month due to high bleed risk.       All medications reviewed.   aspirin, 81 mg, Oral, Daily  clopidogrel, 75 mg, Oral, Daily  ferrous sulfate, 325 mg, Oral, Daily With Breakfast  fluticasone, 2 spray, Each Nare, Daily  folic acid, 400 mcg, Oral, Daily  insulin lispro, 0-7 Units, Subcutaneous, 4x Daily With Meals & Nightly  lactulose, 20 g, Oral, Daily  midodrine, 15 mg, Oral, TID AC  pantoprazole, 40 mg, Intravenous, Q AM  pharmacy consult - MTM, , Does not apply, Daily  piperacillin-tazobactam, 3.375 g, Intravenous, Q12H  riFAXIMin, 550 mg, Oral, Q12H  sodium chloride, 10 mL, Intravenous, Q12H  ursodiol, 300 mg, Oral, BID          Assessment & Plan       ST elevation myocardial infarction involving right coronary artery (HCC)    PAOLO (acute kidney injury) (HCC)    Liver cirrhosis secondary to HAYES (HCC)    Hyperbilirubinemia    Coagulopathy (HCC)    Decompensated hepatic cirrhosis (HCC)    CKD (chronic kidney disease) stage 5, GFR less than 15 ml/min (HCC)    Hyperlipidemia LDL goal <70    Coronary artery disease involving native coronary artery of native heart      72 y.o.male with relevant PMH of Stage V CKD not on HD, decompensated HAYES cirrhosis with ascites (rejected for Liver-Kidney Txp at , being evaluated at  - not on transplant list), varices and recurrent hepatic hydrothorax (undergone 4 thoracentesis), prior Paracentesis x 1, diabetes, HTN, anemia, thrombocytopenia admitted 3/25/2023 from OSH w/ STEMI.  He had presented there for increased SOA and was hoping to get thoracentesis or paracentesis.     Emergent LHC showed severe 2-vessel CAD (RCA & LAD).  Had PCI proximal RCA w/ HARDEEP and Unsuccessful balloon of 100% RPL / Unsuccessful thrombectomy 100% occluded RPDA.  He received a loading dose of Plavix in the Cath Lab.  Maintenance dose was started March 27.  Echo revealed an EF of 65%.     In addition to above, patient noted to have profound lactic acidosis.  This seem out of proportion to his clinical appearance appearance - I.e. does not appear that he is in overwhelming shock with poor perfusion.  Has had poor po intake recently.  Suspect there is strong component of Type B LA .  March 27 lactic acid was down to 6 compared to 20 on admission.  Today serum bicarbonate is 27.    He also underwent a large-volume paracentesis March 26 with 4.5 L removed.  Fluid did not appear infected.  Bilirubin today is 3.3 compared to 9 earlier in the week.  Albumin is low at 2.6.  Creatinine is 4.85, improved compared to yesterday when it was 5.12.  Urine output is adequate, 1160 mL in 24 hours.  Platelet count remains 52, improved from yesterday but worse compared to 100  2 days ago.  He has no evidence of acute GI loss currently.    Today he remains off norepinephrine with adequate blood pressure.  He is tolerating a p.o. diet and ambulating with therapy.  He has persistent ascites and pleural fluid secondary to hepatohydrothorax.  Typically thoracentesis is unhelpful because with his large amount of ascites the effusion reaccumulates in 24 hours.  Oxygenation is  adequate.    PLAN:    For his coronary artery disease and drug-eluting stent, Plavix and aspirin restarted, but no statin because of his liver disease.  Electrolytes have been adequately replaced.  No arrhythmias identified.    Continue Zosyn, day number 6 out of 7 for mildly elevated procalcitonin, possible infection.  Blood and urine cultures negative.    Rifaximin, lactulose for hepatic encephalopathy and elevated ammonia  Continue octreotide,   Monitor liver function test, coagulation studies, H&H    Monitor BUN, creatinine, urine output  Chest x-ray tomorrow  Encourage p.o. intake  Ambulate with therapy  Continue midodrine for orthostatic hypotension    Telemetry      VTE Prophylaxis:SCDS    Stress Ulcer Prophylaxis:protonix      Yen Rodrigues MD  03/30/23  12:55 EDT      Time: 25min.     Electronically signed by Yen Rodrigues MD at 03/30/23 1313     Sergei العلي MD at 03/30/23 1250             LOS: 5 days    Patient Care Team:  Antonio Castro MD as PCP - General  Antonio Castro MD as PCP - Family Medicine    Chief Complaint: Shortness of breath     72-year-old male with past medical history of [place], liver cirrhosis, prerenal acute kidney failure was last seen a month ago with a creatinine of 5.0 when he was transferred to  for further management for liver transplant.  Dialysis was not started at that time.    Prior to the above baseline creatinine 0.8-1.2.  On previous admission creatinine was 4.7, before discharge. Labs showed a creatinine 3.94, BUN 48, sodium 135, potassium 4.1, CO2 6.0, calcium 9.6, EGFR 15 mL/min, troponin high 53, hemoglobin 10.5, platelets 111 K, lactate high 17.6, phosphorus 3.9 magnesium 1.9, bilirubin 4.6 other liver enzymes were normal.  Subjective : F/U PAOLO ON CKD.    Interval History:   Critically ill in ICU.  Mild decreasing renal function.    Review of Systems:   Hard of hearing, abdominal distention, mild shortness of breath, generalized weakness. +  "COUGH.  All other negative    Objective     Vital Sign Min/Max for last 24 hours  Temp  Min: 97.2 °F (36.2 °C)  Max: 98.4 °F (36.9 °C)   BP  Min: 83/53  Max: 139/85   Pulse  Min: 72  Max: 101   Resp  Min: 16  Max: 20   SpO2  Min: 86 %  Max: 100 %   No data recorded   Weight  Min: 72 kg (158 lb 11.7 oz)  Max: 72 kg (158 lb 11.7 oz)     Flowsheet Rows    Flowsheet Row First Filed Value   Admission Height 172.7 cm (68\") Documented at 03/25/2023 1055   Admission Weight 72.6 kg (160 lb) Documented at 03/25/2023 1055          I/O this shift:  In: 399.4 [P.O.:240; I.V.:109.4; IV Piggyback:50]  Out: 210 [Urine:210]  I/O last 3 completed shifts:  In: 1860.2 [P.O.:1348; I.V.:436; IV Piggyback:76.2]  Out: 2120 [Urine:2120]    Physical Exam:  General appearance: Sick looking  male mild distress laying in bed.    HEENT: Hard of hearing, oral mucosa moist, neck is supple  Lungs: Few rhonchi's and rails heard, equal chest movement, no crepitation.  Heart: Normal S1, S2, no gallop, murmur, RRR.  Abdomen: Abdomen distended, positive thrill soft, nontender, positive bowel sounds.  Extremities: Positive lower extremity edema, no cyanosis, no joint swelling.  Neuro: Alert, oriented x4, no focal deficit.  Psych: Mood and affect are normal and appropriate.  Skin: Skin is warm and dry.  : No suprapubic fullness or tenderness. TORRE.    WBC WBC   Date Value Ref Range Status   03/30/2023 13.05 (H) 3.40 - 10.80 10*3/mm3 Final   03/29/2023 8.27 3.40 - 10.80 10*3/mm3 Final   03/28/2023 5.66 3.40 - 10.80 10*3/mm3 Final      HGB Hemoglobin   Date Value Ref Range Status   03/30/2023 8.8 (L) 13.0 - 17.7 g/dL Final   03/29/2023 8.2 (L) 13.0 - 17.7 g/dL Final   03/28/2023 8.0 (L) 13.0 - 17.7 g/dL Final      HCT Hematocrit   Date Value Ref Range Status   03/30/2023 25.9 (L) 37.5 - 51.0 % Final   03/29/2023 24.7 (L) 37.5 - 51.0 % Final   03/28/2023 23.7 (L) 37.5 - 51.0 % Final      Platlets No results found for: LABPLAT   MCV MCV   Date " Value Ref Range Status   03/30/2023 102.0 (H) 79.0 - 97.0 fL Final   03/29/2023 102.1 (H) 79.0 - 97.0 fL Final   03/28/2023 100.4 (H) 79.0 - 97.0 fL Final          Sodium Sodium   Date Value Ref Range Status   03/30/2023 136 136 - 145 mmol/L Final   03/29/2023 138 136 - 145 mmol/L Final   03/28/2023 137 136 - 145 mmol/L Final      Potassium Potassium   Date Value Ref Range Status   03/30/2023 3.7 3.5 - 5.2 mmol/L Final   03/29/2023 3.4 (L) 3.5 - 5.2 mmol/L Final   03/29/2023 3.2 (L) 3.5 - 5.2 mmol/L Final   03/28/2023 3.5 3.5 - 5.2 mmol/L Final   03/28/2023 3.1 (L) 3.5 - 5.2 mmol/L Final      Chloride Chloride   Date Value Ref Range Status   03/30/2023 95 (L) 98 - 107 mmol/L Final   03/29/2023 94 (L) 98 - 107 mmol/L Final   03/28/2023 90 (L) 98 - 107 mmol/L Final      CO2 CO2   Date Value Ref Range Status   03/30/2023 27.0 22.0 - 29.0 mmol/L Final   03/29/2023 28.0 22.0 - 29.0 mmol/L Final   03/28/2023 29.0 22.0 - 29.0 mmol/L Final      BUN BUN   Date Value Ref Range Status   03/30/2023 69 (H) 8 - 23 mg/dL Final   03/29/2023 71 (H) 8 - 23 mg/dL Final   03/28/2023 77 (H) 8 - 23 mg/dL Final      Creatinine Creatinine   Date Value Ref Range Status   03/30/2023 4.85 (H) 0.76 - 1.27 mg/dL Final   03/29/2023 5.14 (H) 0.76 - 1.27 mg/dL Final   03/28/2023 4.92 (H) 0.76 - 1.27 mg/dL Final      Calcium Calcium   Date Value Ref Range Status   03/30/2023 8.3 (L) 8.6 - 10.5 mg/dL Final   03/29/2023 8.2 (L) 8.6 - 10.5 mg/dL Final   03/28/2023 8.2 (L) 8.6 - 10.5 mg/dL Final      PO4 No results found for: CAPO4   Albumin Albumin   Date Value Ref Range Status   03/30/2023 2.6 (L) 3.5 - 5.2 g/dL Final   03/29/2023 2.7 (L) 3.5 - 5.2 g/dL Final   03/28/2023 2.7 (L) 3.5 - 5.2 g/dL Final      Magnesium Magnesium   Date Value Ref Range Status   03/30/2023 1.9 1.6 - 2.4 mg/dL Final   03/29/2023 1.7 1.6 - 2.4 mg/dL Final      Uric Acid No results found for: URICACID        Results Review:     I reviewed the patient's new clinical  results.    aspirin, 81 mg, Oral, Daily  clopidogrel, 75 mg, Oral, Daily  ferrous sulfate, 325 mg, Oral, Daily With Breakfast  fluticasone, 2 spray, Each Nare, Daily  folic acid, 400 mcg, Oral, Daily  insulin lispro, 0-7 Units, Subcutaneous, 4x Daily With Meals & Nightly  lactulose, 20 g, Oral, Daily  midodrine, 15 mg, Oral, TID AC  pantoprazole, 40 mg, Intravenous, Q AM  pharmacy consult - [place], , Does not apply, Daily  piperacillin-tazobactam, 3.375 g, Intravenous, Q12H  riFAXIMin, 550 mg, Oral, Q12H  sodium chloride, 10 mL, Intravenous, Q12H  ursodiol, 300 mg, Oral, BID      norepinephrine, 0.02-0.3 mcg/kg/min, Last Rate: Stopped (03/28/23 0325)  octreotide (SandoSTATIN) infusion, 50 mcg/hr, Last Rate: 50 mcg/hr (03/30/23 0809)        Medication Review: Reviewed    Assessment & Plan       ST elevation myocardial infarction involving right coronary artery (HCC)    PAOLO (acute kidney injury) (HCC)    Liver cirrhosis secondary to HAYES (HCC)    Hyperbilirubinemia    Coagulopathy (HCC)    Decompensated hepatic cirrhosis (HCC)    CKD (chronic kidney disease) stage 5, GFR less than 15 ml/min (HCC)    Hyperlipidemia LDL goal <70    Coronary artery disease involving native coronary artery of native heart    1.  Acute kidney injury: Patient was last seen a month ago with creatinine 4.5- 5.0 range.  Prior to that baseline was 0.8-1.2, likely diagnosis was hepatorenal versus ATN.  At that time patient was transferred to  for further management.  Patient has been admitted and underwent a cardiac catheterization received 80 mL of IV contrast.   2.  S/p right RCA stenting for the clot 3/25/2023 today  3.  HAYES: Decompensated liver cirrhosis.  Ammonia level 106  4.  Hypotension: In the setting of liver disease.  5.  Anemia  6.  Thrombocytopenia   7.  Thoracentesis: 3 weeks back  8.  Paracentesis: On 3/17/2023 at [facility]. 9. HYPOKALEMIA.     Recommendations:  So far patient has not demonstrated meaningful renal  "recovery since last admission in Feb and developed acute kidney disease with persistent cr elevation. Niurka any uremic symptoms. Therefore no indication of dialysis. However he is at risk for needing dialysis in near future. He is undergoing txp evaluation at  therefore dialysis is in consideration. Stable for transfer out of ICU  - D/C bicarb supplementation   - Will need albumin supp if undergoing paracentesis >3 liter/day  - Continue lasix 40 mg daily   - Will need close outpatient f/u in renal clinic        Sergei العلي MD  03/30/23  12:50 EDT       Electronically signed by Sergei العلي MD at 03/30/23 1254     Dimitri Méndez APRN at 03/30/23 0987     Attestation signed by Brunner, Mark I, MD at 03/30/23 1251    I have reviewed this documentation and agree.                  GI Daily Progress Note  Subjective:    Chief Complaint: Follow-up decompensated cirrhosis    Patient up in bed eating breakfast.  Wife states that he has been eating a little more.  He denies abdominal pain or nausea.  No confusion or signs of GI bleeding.  Continues to have 2-3 bowel movements daily with lactulose.  Wife reports that he has been up out of the bed and in the chair and even walking in the hallway with physical therapy.    Objective:    /72   Pulse 96   Temp 97.9 °F (36.6 °C) (Axillary)   Resp 18   Ht 172.7 cm (67.99\")   Wt 72 kg (158 lb 11.7 oz)   SpO2 93%   BMI 24.14 kg/m²     Physical Exam  Constitutional:       Appearance: Normal appearance. He is ill-appearing.   HENT:      Head: Normocephalic and atraumatic.   Eyes:      General: Scleral icterus present.   Pulmonary:      Effort: Pulmonary effort is normal.   Abdominal:      General: There is distension.      Comments: Tympany to percussion in the central abdomen.  Mild to moderate ascites.    Neurological:      Mental Status: He is alert and oriented to person, place, and time.   Psychiatric:         Mood and Affect: Mood normal.         Thought " Content: Thought content normal.         Lab  Lab Results   Component Value Date    WBC 13.05 (H) 03/30/2023    HGB 8.8 (L) 03/30/2023    HGB 8.2 (L) 03/29/2023    HGB 8.0 (L) 03/28/2023    .0 (H) 03/30/2023    PLT 52 (L) 03/30/2023    INR 2.05 (H) 03/29/2023    INR 3.09 (H) 03/27/2023    INR 2.71 (H) 03/26/2023    INR 2.58 (H) 03/25/2023    INR 1.6 (H) 03/15/2023       Lab Results   Component Value Date    GLUCOSE 124 (H) 03/30/2023    BUN 69 (H) 03/30/2023    CREATININE 4.85 (H) 03/30/2023    EGFRIFNONA 59 (L) 04/22/2022    EGFRIFAFRI >60 04/22/2022    BCR 14.2 03/30/2023     03/30/2023    K 3.7 03/30/2023    CO2 27.0 03/30/2023    CALCIUM 8.3 (L) 03/30/2023    PROTENTOTREF 5.8 (L) 05/24/2015    ALBUMIN 2.6 (L) 03/30/2023    ALKPHOS 67 03/30/2023    BILITOT 3.3 (H) 03/30/2023    BILIDIR 1.91 (H) 03/15/2023    ALT 19 03/30/2023    AST 55 (H) 03/30/2023       Assessment:    1.  HAYES cirrhosis. MELD-Na = 32.  2.  Ascites. S/p 4.5 L paracentesis on 3/26/2023.  No evidence for SBP.  3.  Hepatic hydrothorax.  4.  Chronic kidney disease.  Treated for hepatorenal syndrome last month at .  As urine sodium is currently not low, favor PAOLO from ATN in possible background of type II HRS.  Urine output slightly improved overnight.  5.  STEMI, status post PCI/HARDEEP RCA on 3/25/23.Unsuccessful balloon angioplasty of 100% occluded right posterior lateral branch, and unsuccessful manual thrombectomy of 100% occluded RPDA. Preserved EF at 65%.      Plan:  >> Continue Xifaxan.  Titrate lactulose to effectiveness of having 2-3 stools daily and no clinical signs of hepatic encephalopathy  >> Continue to encourage p.o. intake  >> Continue to encourage mobility  >> Nephrology following and monitoring for need of renal replacement therapy  >> Follow-up with liver transplant at [facility]-appointment currently 4/10/2023, this may need to be rescheduled given current events.  [facility],  [place]-transplant coordinator [phone]    Dimitri Méndez, QUOC  03/30/23  09:44 EDT      Electronically signed by Brunner, Mark I, MD at 03/30/23 1255     Yen Rodrigues MD at 03/29/23 1306          Critical Care Note     LOS: 4 days   Patient Care Team:  Antonio Castro MD as PCP - General  Antonio Castro MD as PCP - Family Medicine    Chief Complaint/Reason for visit:     ST elevation myocardial infarction involving right coronary artery (HCC)    PAOLO (acute kidney injury) (HCC)    Liver cirrhosis secondary to HAYES (HCC)    Hyperbilirubinemia    Coagulopathy (HCC)    Decompensated hepatic cirrhosis (HCC)    CKD (chronic kidney disease) stage 5, GFR less than 15 ml/min (HCC)    Hyperlipidemia LDL goal <70    Coronary artery disease involving native coronary artery of native heart    Subjective     Interval History:     Remains off norepinephrine.  Potassium replaced this morning.  Room air saturation 96%.  Urine output yesterday 1.5 L.  Octreotide day 5.  Decrease number of stools to 3 yesterday.  He denies chest pain or nausea.  Appetite remains poor.  He is in sinus tachycardia.    History taken from: patient,nursing,chart    Review of Systems:    All systems were reviewed and negative except as noted in subjective.    Medical history, surgical history, social history, family history reviewed    Objective     Intake/Output:    Intake/Output Summary (Last 24 hours) at 3/29/2023 1306  Last data filed at 3/29/2023 1200  Gross per 24 hour   Intake 1081.48 ml   Output 1730 ml   Net -648.52 ml       Nutrition:  Diet: Cardiac Diets, Diabetic Diets; Healthy Heart (2-3 Na+); Consistent Carbohydrate; Texture: Regular Texture (IDDSI 7); Fluid Consistency: Thin (IDDSI 0)    Infusions:  norepinephrine, 0.02-0.3 mcg/kg/min, Last Rate: Stopped (03/28/23 0325)  octreotide (SandoSTATIN) infusion, 50 mcg/hr, Last Rate: 50 mcg/hr (03/29/23 1237)        Mechanical Ventilator Settings:                               "                  Telemetry: Sinus tachycardia, sinus rhythm             Vital Signs  Blood pressure 120/70, pulse 84, temperature 97.7 °F (36.5 °C), temperature source Oral, resp. rate 17, height 172.7 cm (67.99\"), weight 72.7 kg (160 lb 4.4 oz), SpO2 96 %.    Physical Exam:  General Appearance:   Semiupright in bed in no distress.   Head:   Atraumatic   Eyes:          Jaundice   Ears:   External ear normal, hard of hearing, hearing aids in place   Throat:  Oral mucosa moist   Neck:  Trachea midline, no palpable thyroid, no crepitus   Back:    Spine is straight   Lungs:    Symmetric chest excursion.  Breath sounds are bilateral, diminished at the bases, right greater than left, clear anteriorly    Heart:   Regular rhythm, S1, S2 auscultated   Abdomen:    Persistent ascites with fluid wave, nontender   Rectal:   Deferred   Extremities:  Bilateral lower extremity edema   Pulses:  Palpable radial pulses   Skin:  Warm and dry   Lymph nodes:  No cervical adenopathy   Neurologic:  He is awake and oriented to name and hospital      Results Review:     I reviewed the patient's new clinical results.   Results from last 7 days   Lab Units 03/29/23  0200 03/28/23  1337 03/28/23  0300 03/27/23  0406   SODIUM mmol/L 138  --  137 134*   POTASSIUM mmol/L 3.2* 3.5 3.1* 3.3*   CHLORIDE mmol/L 94*  --  90* 87*   CO2 mmol/L 28.0  --  29.0 27.0   BUN mg/dL 71*  --  77* 63*   CREATININE mg/dL 5.14*  --  4.92* 4.70*   CALCIUM mg/dL 8.2*  --  8.2* 8.4*   BILIRUBIN mg/dL 2.9*  --  2.7* 2.4*   ALK PHOS U/L 63  --  60 62   ALT (SGPT) U/L 21  --  25 28   AST (SGOT) U/L 68*  --  109* 157*   GLUCOSE mg/dL 121*  --  151* 55*     Results from last 7 days   Lab Units 03/29/23  0328 03/28/23  0300 03/27/23  0406   WBC 10*3/mm3 8.27 5.66 14.01*   HEMOGLOBIN g/dL 8.2* 8.0* 7.7*   HEMATOCRIT % 24.7* 23.7* 22.7*   PLATELETS 10*3/mm3 44* 46* 101*     Results from last 7 days   Lab Units 03/27/23  0322   PH, ARTERIAL pH units 7.516*   PO2 ART mm Hg " 72.3*   PCO2, ARTERIAL mm Hg 38.3   HCO3 ART mmol/L 31.0*     Lab Results   Component Value Date    BLOODCX No growth at 3 days 03/25/2023     Lab Results   Component Value Date    URINECX No growth 03/25/2023       I reviewed the patient's new imaging including images and reports.    IMPRESSION:     There is a right internal jugular central venous catheter with the catheter tip near the atrial caval junction.     There is a small right pleural effusion and patchy interstitial changes with mild cardiomegaly which is compatible with mild pulmonary edema.     No focal consolidation is otherwise seen.     Electronically signed by:  Lalo Elizondo D.O.    3/25/2023 11:46 PM Mountain Time     Findings:  CT SCAN OF THE CHEST WITHOUT CONTRAST: There is a moderate right pleural effusion, decreased from 2/9/2023. No left effusion or pericardial effusion is seen. There is ectasia of the ascending aorta is approximately 4.4 cm, minimally if at all increased   from the prior study. There is dense coronary artery calcification. No mediastinal adenopathy is seen. Dense peripheral groundglass changes of the left lower lobe are very similar to appearance of Covid 19 pneumonia from the initial months the scan.   There are much milder multifocal interstitial changes the left upper lobe. Small elongated inferior right upper lobe nodule or nodular scar is stable. Airways appear normally patent. Bony structures appear to be intact.     IMPRESSION:  1. Moderate right pleural effusion decreased from 2/9/2023.     2. Left lower lobe pneumonia and much milder left upper lobe pneumonia, which may represent Covid 19 or other atypical pneumonia.     3. Stable small inferior right upper lobe pulmonary nodule.     4. Ectatic ascending aorta to 4.4 cm, stable only minimally increased.           CT SCAN OF THE ABDOMEN AND PELVIS WITHOUT CONTRAST: Separate scans of the abdomen and pelvis are performed, as the initial scan of the abdomen was  performed prior to pelvic ct scan being ordered. The studies are dictated together however.     There is extensive ascites, and dense diffuse edema of subcutaneous tissues and particularly dense edema throughout the small bowel mesentery. There is a small cirrhotic appearing liver with no obvious lesion. Spleen is not enlarged. Pancreas and adrenal   glands appear unremarkable. Concentrated contrast in the renal collecting systems resembles renal calculi. There is a small left lower pole renal cyst measuring near water density. No evidence of obstructive uropathy is seen. There is no evidence of   free air or adenopathy. There is marked distention of stomach with fluid and air, and relatively decompressed duodenum but no evidence of obstructive lesion.     Regarding lower abdomen pelvis, dense small bowel mesenteric edema and extensive ascites are again noted. No abnormally dilated bowel loops, evidence of pneumatosis or significant bowel wall edema is appreciated. Bladder is normally distended and normal   in appearance. No mass or adenopathy is seen. Review of the bony structures shows grade 1 anterolisthesis of L5 on S1 with bilateral pars defects. There is a somewhat transitional appearance of S1 with a rudimentary S1-S2 disc space.     Impression:     1. Extensive ascites and extensive dense anasarca throughout the small bowel mesentery and remaining soft tissues.     2. Small cirrhotic liver.     3. Fluid and air distended stomach, to the level of the duodenum. No obvious obstructing lesion or inflammation, possibly gastric ileus.     4. Incidentally noted grade 1 anterolisthesis of L5 on S1, with bilateral pars defects.     Electronically Signed: Blayne Chung    3/25/2023 8:56 PM EDT    Workstation ID: [id number]    Interpretation Summary echocardiogram March 25, 2023       •  Left ventricular systolic function is normal. Estimated left ventricular EF = 65%  •  The aortic valve is mildly calcified.  There is  minimal aortic stenosis present.  •  Moderate pulmonary hypertension is present. Estimated right ventricular systolic pressure from tricuspid regurgitation is moderately elevated (49 mmHg).  •  Moderate dilation of the aortic root is present. Aneurysmal dilation of the ascending aorta is present.  •  There is a right pleural effusion      Conclusion  Left heart cath, March 25, 2023     •  Severe 2-vessel CAD (RCA, diagonal branch of the LAD)  •  Successful PCI of the proximal RCA with placement of a Xience 4 x 23 mm drug-eluting stent  •  Unsuccessful balloon angioplasty of 100% occluded right posterior lateral branch  •  Unsuccessful manual thrombectomy of 100% occluded RPDA  •  Normal LV filling pressure  •  Medical therapy recommended for diagonal branch stenosis.  •  DAPT (aspirin 81 mg + clopidogrel 75 mg daily) for 1 month due to high bleed risk.       All medications reviewed.   aspirin, 81 mg, Oral, Daily  clopidogrel, 75 mg, Oral, Daily  ferrous sulfate, 325 mg, Oral, Daily With Breakfast  fluticasone, 2 spray, Each Nare, Daily  folic acid, 400 mcg, Oral, Daily  insulin lispro, 0-7 Units, Subcutaneous, 4x Daily With Meals & Nightly  lactulose, 20 g, Oral, Daily  midodrine, 15 mg, Oral, TID AC  pantoprazole, 40 mg, Intravenous, Q AM  pharmacy consult - [facility], , Does not apply, Daily  piperacillin-tazobactam, 3.375 g, Intravenous, Q12H  riFAXIMin, 550 mg, Oral, Q12H  sodium bicarbonate, 650 mg, Oral, TID  sodium chloride, 10 mL, Intravenous, Q12H  ursodiol, 300 mg, Oral, BID          Assessment & Plan       ST elevation myocardial infarction involving right coronary artery (HCC)    PAOLO (acute kidney injury) (HCC)    Liver cirrhosis secondary to HAYES (HCC)    Hyperbilirubinemia    Coagulopathy (HCC)    Decompensated hepatic cirrhosis (HCC)    CKD (chronic kidney disease) stage 5, GFR less than 15 ml/min (HCC)    Hyperlipidemia LDL goal <70    Coronary artery disease involving native coronary artery of native  heart     72 y.o.male with relevant PMH of Stage V CKD not on HD, decompensated HAYES cirrhosis with ascites (rejected for Liver-Kidney Txp at , being evaluated at  - not on transplant list), varices and recurrent hepatic hydrothorax (undergone 4 thoracentesis), prior Paracentesis x 1, diabetes, HTN, anemia, thrombocytopenia admitted 3/25/2023 from OSH w/ STEMI.  He had presented there for increased SOA and was hoping to get thoracentesis or paracentesis.     Emergent LHC showed severe 2-vessel CAD (RCA & LAD).  Had PCI proximal RCA w/ HARDEEP and Unsuccessful balloon of 100% RPL / Unsuccessful thrombectomy 100% occluded RPDA.  He received a loading dose of Plavix in the Cath Lab.  Maintenance dose was started March 27.  Echo revealed an EF of 65%.     In addition to above, patient noted to have profound lactic acidosis.  This seem out of proportion to his clinical appearance appearance - I.e. does not appear that he is in overwhelming shock with poor perfusion.  Has had poor po intake recently.  Suspect there is strong component of Type B LA .  March 27 lactic acid was down to 6 compared to 20 on admission.  Today serum bicarbonate is 28.    He also underwent a large-volume paracentesis March 26 with 4.5 L removed.  Fluid did not appear infected.  Bilirubin today is 2.9 compared to 9 earlier in the week.  Albumin is low at 2.7.  Unfortunately his creatinine continues to climb and today is 5.12.  Urine output remains adequate.  He is maintaining an adequate blood pressure off norepinephrine for 2 days.  Hemoglobin has also remained stable, 8.2.  Platelet count has dropped from 100 to 44.  Pro time INR is mildly elevated at 2.05.  He does not have evidence of active bleeding currently.    I personally feel we should not consider hemodialysis in this individual given his heart disease, severe liver disease and denial for liver transplant.  I think the patient and the family are having a difficult time with this  end-stage disease.  Creatinine today is 5.12.  On February 19 his creatinine was 3.  Last November his creatinine was 1.14.  In January 2022 his creatinine was 0.98.  Clearly in the last 4 months his renal function has dramatically declined.  CT scan of the abdomen in February 2023 revealed a right renal cyst but no hydronephrosis.  Unfortunately, I do not clinically feel the family or the patient have reached a point where they would consider limiting care    PLAN:    For his coronary artery disease and drug-eluting stent, Plavix and aspirin restarted, but no statin because of his liver disease.    Potassium was 3.1 and being replaced.  Magnesium also slightly decreased and being replaced with low-dose renal dosing.    Continue Zosyn for mildly elevated procalcitonin, possible infection, will complete an empiric 7-day course    Rifaximin, lactulose for hepatic encephalopathy and elevated ammonia  Continue octreotide, for now but consider changing to subcutaneous as this is day 5  Monitor liver function test, coagulation studies, H&H      If renal function continues to deteriorate will have little choice but to initiate dialysis, or transition to palliative  Nephrology gave him a dose of furosemide yesterday and he did have improved urine output  Repeat chest x-ray to reevaluate hepatohydrothorax  If pleural fluid has reaccumulated there is little benefit in repeated thoracentesis as his ascites is severe enough that effusion will rapidly reaccumulate    Continue midodrine for orthostatic hypotension      Encourage p.o. intake      VTE Prophylaxis:SCDS    Stress Ulcer Prophylaxis:protonix    Patient remains critically ill with end-stage liver disease, worsening renal failure, hypotension requiring vasopressors, encephalopathy secondary to liver disease and the need of ongoing ICU monitoring and care    Yen Rodrigues MD  03/29/23  13:06 EDT      Time: Critical care 35 min  I personally provided care to this  "critically ill patient as documented above.  Critical care time does not include time spent on separately billed procedures.  None of my critical care time was concurrent with other critical care providers.     Electronically signed by Yen Rodrigues MD at 03/29/23 1324     Sergei العلي MD at 03/29/23 1303             LOS: 4 days    Patient Care Team:  Antonio Castro MD as PCP - General  Antonio Castro MD as PCP - Family Medicine    Chief Complaint: Shortness of breath     72-year-old male with past medical history of [place], liver cirrhosis, prerenal acute kidney failure was last seen a month ago with a creatinine of 5.0 when he was transferred to  for further management for liver transplant.  Dialysis was not started at that time.    Prior to the above baseline creatinine 0.8-1.2.  On previous admission creatinine was 4.7, before discharge. Labs showed a creatinine 3.94, BUN 48, sodium 135, potassium 4.1, CO2 6.0, calcium 9.6, EGFR 15 mL/min, troponin high 53, hemoglobin 10.5, platelets 111 K, lactate high 17.6, phosphorus 3.9 magnesium 1.9, bilirubin 4.6 other liver enzymes were normal.  Subjective : F/U PAOLO ON CKD.    Interval History:   Critically ill in ICU.  Mild decreasing renal function.  Ammonia level is high.    Review of Systems:   Hard of hearing, abdominal distention, mild shortness of breath, generalized weakness. + COUGH.  All other negative    Objective     Vital Sign Min/Max for last 24 hours  Temp  Min: 97.7 °F (36.5 °C)  Max: 98.3 °F (36.8 °C)   BP  Min: 99/62  Max: 138/79   Pulse  Min: 79  Max: 118   Resp  Min: 15  Max: 18   SpO2  Min: 83 %  Max: 99 %   Flow (L/min)  Min: 2  Max: 2   Weight  Min: 72.7 kg (160 lb 4.4 oz)  Max: 72.7 kg (160 lb 4.4 oz)     Flowsheet Rows    Flowsheet Row First Filed Value   Admission Height 172.7 cm (68\") Documented at 03/25/2023 1055   Admission Weight 72.6 kg (160 lb) Documented at 03/25/2023 1055          I/O this shift:  In: 408.9 [P.O.:240; " I.V.:142.7; IV Piggyback:26.2]  Out: 325 [Urine:325]  I/O last 3 completed shifts:  In: 1493.9 [P.O.:418; I.V.:607.5; IV Piggyback:468.4]  Out: 1905 [Urine:1905]    Physical Exam:  General appearance: Sick looking  male mild distress laying in bed.    HEENT: Hard of hearing, oral mucosa moist, neck is supple  Lungs: Few rhonchi's and rails heard, equal chest movement, no crepitation.  Heart: Normal S1, S2, no gallop, murmur, RRR.  Abdomen: Abdomen distended, positive thrill soft, nontender, positive bowel sounds.  Extremities: Positive lower extremity edema, no cyanosis, no joint swelling.  Neuro: Alert, oriented x4, no focal deficit.  Psych: Mood and affect are normal and appropriate.  Skin: Skin is warm and dry.  : No suprapubic fullness or tenderness. TORRE.    WBC WBC   Date Value Ref Range Status   03/29/2023 8.27 3.40 - 10.80 10*3/mm3 Final   03/28/2023 5.66 3.40 - 10.80 10*3/mm3 Final   03/27/2023 14.01 (H) 3.40 - 10.80 10*3/mm3 Final      HGB Hemoglobin   Date Value Ref Range Status   03/29/2023 8.2 (L) 13.0 - 17.7 g/dL Final   03/28/2023 8.0 (L) 13.0 - 17.7 g/dL Final   03/27/2023 7.7 (L) 13.0 - 17.7 g/dL Final      HCT Hematocrit   Date Value Ref Range Status   03/29/2023 24.7 (L) 37.5 - 51.0 % Final   03/28/2023 23.7 (L) 37.5 - 51.0 % Final   03/27/2023 22.7 (L) 37.5 - 51.0 % Final      Platlets No results found for: LABPLAT   MCV MCV   Date Value Ref Range Status   03/29/2023 102.1 (H) 79.0 - 97.0 fL Final   03/28/2023 100.4 (H) 79.0 - 97.0 fL Final   03/27/2023 100.9 (H) 79.0 - 97.0 fL Final          Sodium Sodium   Date Value Ref Range Status   03/29/2023 138 136 - 145 mmol/L Final   03/28/2023 137 136 - 145 mmol/L Final   03/27/2023 134 (L) 136 - 145 mmol/L Final      Potassium Potassium   Date Value Ref Range Status   03/29/2023 3.2 (L) 3.5 - 5.2 mmol/L Final   03/28/2023 3.5 3.5 - 5.2 mmol/L Final   03/28/2023 3.1 (L) 3.5 - 5.2 mmol/L Final   03/27/2023 3.3 (L) 3.5 - 5.2 mmol/L Final       Chloride Chloride   Date Value Ref Range Status   03/29/2023 94 (L) 98 - 107 mmol/L Final   03/28/2023 90 (L) 98 - 107 mmol/L Final   03/27/2023 87 (L) 98 - 107 mmol/L Final      CO2 CO2   Date Value Ref Range Status   03/29/2023 28.0 22.0 - 29.0 mmol/L Final   03/28/2023 29.0 22.0 - 29.0 mmol/L Final   03/27/2023 27.0 22.0 - 29.0 mmol/L Final      BUN BUN   Date Value Ref Range Status   03/29/2023 71 (H) 8 - 23 mg/dL Final   03/28/2023 77 (H) 8 - 23 mg/dL Final   03/27/2023 63 (H) 8 - 23 mg/dL Final      Creatinine Creatinine   Date Value Ref Range Status   03/29/2023 5.14 (H) 0.76 - 1.27 mg/dL Final   03/28/2023 4.92 (H) 0.76 - 1.27 mg/dL Final   03/27/2023 4.70 (H) 0.76 - 1.27 mg/dL Final      Calcium Calcium   Date Value Ref Range Status   03/29/2023 8.2 (L) 8.6 - 10.5 mg/dL Final   03/28/2023 8.2 (L) 8.6 - 10.5 mg/dL Final   03/27/2023 8.4 (L) 8.6 - 10.5 mg/dL Final      PO4 No results found for: CAPO4   Albumin Albumin   Date Value Ref Range Status   03/29/2023 2.7 (L) 3.5 - 5.2 g/dL Final   03/28/2023 2.7 (L) 3.5 - 5.2 g/dL Final   03/27/2023 2.9 (L) 3.5 - 5.2 g/dL Final      Magnesium Magnesium   Date Value Ref Range Status   03/29/2023 1.7 1.6 - 2.4 mg/dL Final   03/27/2023 2.2 1.6 - 2.4 mg/dL Final      Uric Acid No results found for: URICACID        Results Review:     I reviewed the patient's new clinical results.    aspirin, 81 mg, Oral, Daily  clopidogrel, 75 mg, Oral, Daily  ferrous sulfate, 325 mg, Oral, Daily With Breakfast  fluticasone, 2 spray, Each Nare, Daily  folic acid, 400 mcg, Oral, Daily  insulin lispro, 0-7 Units, Subcutaneous, 4x Daily With Meals & Nightly  lactulose, 20 g, Oral, Daily  midodrine, 15 mg, Oral, TID AC  pantoprazole, 40 mg, Intravenous, Q AM  pharmacy consult - MTM, , Does not apply, Daily  piperacillin-tazobactam, 3.375 g, Intravenous, Q12H  riFAXIMin, 550 mg, Oral, Q12H  sodium bicarbonate, 650 mg, Oral, TID  sodium chloride, 10 mL, Intravenous, Q12H  ursodiol, 300 mg,  Oral, BID      norepinephrine, 0.02-0.3 mcg/kg/min, Last Rate: Stopped (03/28/23 0325)  octreotide (SandoSTATIN) infusion, 50 mcg/hr, Last Rate: 50 mcg/hr (03/29/23 1237)        Medication Review: Reviewed    Assessment & Plan       ST elevation myocardial infarction involving right coronary artery (HCC)    PAOLO (acute kidney injury) (HCC)    Liver cirrhosis secondary to HAYES (HCC)    Hyperbilirubinemia    Coagulopathy (HCC)    Decompensated hepatic cirrhosis (HCC)    CKD (chronic kidney disease) stage 5, GFR less than 15 ml/min (HCC)    Hyperlipidemia LDL goal <70    Coronary artery disease involving native coronary artery of native heart    1.  Acute kidney injury: Patient was last seen a month ago with creatinine 4.5- 5.0 range.  Prior to that baseline was 0.8-1.2, likely diagnosis was hepatorenal versus ATN.  At that time patient was transferred to  for further management.  Patient has been admitted and underwent a cardiac catheterization received 80 mL of IV contrast.   2.  S/p right RCA stenting for the clot 3/25/2023 today  3.  HAYES: Decompensated liver cirrhosis.  Ammonia level 106  4.  Hypotension: In the setting of liver disease.  5.  Anemia  6.  Thrombocytopenia   7.  Thoracentesis: 3 weeks back  8.  Paracentesis: On 3/17/2023 at [facility]. 9. HYPOKALEMIA.     Recommendations:  So far patient has not demonstrated meaningful renal recovery since last admission in Feb and developed acute kidney disease with persistent cr elevation. Niurka any uremic symptoms. Therefore no indication of dialysis. However he is at risk for needing dialysis in near future. He is undergoing txp evaluation at  therefore dialysis is in equation.   - D/C bicarb supplementation   - Will need albumin supp if undergoing paracentesis >3 liter/day  - Hold off on additional diuretics today        Sergei العلي MD  03/29/23  13:03 EDT       Electronically signed by Sergei العلي MD at 03/29/23 5316       Consult Notes (last  72 hours)  Notes from 03/29/23 1148 through 04/01/23 1148   No notes of this type exist for this encounter.

## 2023-04-01 NOTE — PROGRESS NOTES
"   LOS: 7 days    Patient Care Team:  Antonio Castro MD as PCP - General  Antonio Castro MD as PCP - Family Medicine    Chief Complaint: Shortness of breath     72-year-old male with past medical history of [place], liver cirrhosis, prerenal acute kidney failure was last seen a month ago with a creatinine of 5.0 when he was transferred to  for further management for liver transplant.  Dialysis was not started at that time.    Prior to the above baseline creatinine 0.8-1.2.  On previous admission creatinine was 4.7, before discharge. Labs showed a creatinine 3.94, BUN 48, sodium 135, potassium 4.1, CO2 6.0, calcium 9.6, EGFR 15 mL/min, troponin high 53, hemoglobin 10.5, platelets 111 K, lactate high 17.6, phosphorus 3.9 magnesium 1.9, bilirubin 4.6 other liver enzymes were normal.  Subjective : F/U PAOLO ON CKD.    Interval History:   Transferred out of ICU. Stable renal function. Paracentesis yesterday 5 liter fluid removed.      Review of Systems:   Hard of hearing, abdominal distention, mild shortness of breath, generalized weakness. + COUGH.  All other negative    Objective     Vital Sign Min/Max for last 24 hours  Temp  Min: 97.8 °F (36.6 °C)  Max: 98.4 °F (36.9 °C)   BP  Min: 97/56  Max: 117/61   Pulse  Min: 69  Max: 108   Resp  Min: 17  Max: 18   SpO2  Min: 90 %  Max: 100 %   No data recorded   Weight  Min: 70.5 kg (155 lb 8 oz)  Max: 70.5 kg (155 lb 8 oz)     Flowsheet Rows    Flowsheet Row First Filed Value   Admission Height 172.7 cm (68\") Documented at 03/25/2023 1055   Admission Weight 72.6 kg (160 lb) Documented at 03/25/2023 1055          I/O this shift:  In: 480 [P.O.:480]  Out: -   I/O last 3 completed shifts:  In: 823.6 [P.O.:550; I.V.:173.6; IV Piggyback:100]  Out: 850 [Urine:850]    Physical Exam:  General appearance: Sick looking  male mild distress laying in bed.    HEENT: Hard of hearing, oral mucosa moist, neck is supple  Lungs: Few rhonchi's and rails heard, equal chest movement, " no crepitation.  Heart: Normal S1, S2, no gallop, murmur, RRR.  Abdomen: Abdomen distended, positive thrill soft, nontender, positive bowel sounds.  Extremities: Positive lower extremity edema, no cyanosis, no joint swelling.  Neuro: Alert, oriented x4, no focal deficit.  Psych: Mood and affect are normal and appropriate.  Skin: Skin is warm and dry.  : No suprapubic fullness or tenderness. TORRE.    WBC WBC   Date Value Ref Range Status   04/01/2023 6.97 3.40 - 10.80 10*3/mm3 Final   03/31/2023 10.98 (H) 3.40 - 10.80 10*3/mm3 Final   03/30/2023 13.05 (H) 3.40 - 10.80 10*3/mm3 Final      HGB Hemoglobin   Date Value Ref Range Status   04/01/2023 7.6 (L) 13.0 - 17.7 g/dL Final   03/31/2023 8.2 (L) 13.0 - 17.7 g/dL Final   03/30/2023 8.8 (L) 13.0 - 17.7 g/dL Final      HCT Hematocrit   Date Value Ref Range Status   04/01/2023 22.6 (L) 37.5 - 51.0 % Final   03/31/2023 23.6 (L) 37.5 - 51.0 % Final   03/30/2023 25.9 (L) 37.5 - 51.0 % Final      Platlets No results found for: LABPLAT   MCV MCV   Date Value Ref Range Status   04/01/2023 100.4 (H) 79.0 - 97.0 fL Final   03/31/2023 98.3 (H) 79.0 - 97.0 fL Final   03/30/2023 102.0 (H) 79.0 - 97.0 fL Final          Sodium Sodium   Date Value Ref Range Status   04/01/2023 132 (L) 136 - 145 mmol/L Final   03/31/2023 135 (L) 136 - 145 mmol/L Final   03/30/2023 136 136 - 145 mmol/L Final      Potassium Potassium   Date Value Ref Range Status   04/01/2023 3.1 (L) 3.5 - 5.2 mmol/L Final   03/31/2023 3.5 3.5 - 5.2 mmol/L Final   03/30/2023 3.7 3.5 - 5.2 mmol/L Final   03/29/2023 3.4 (L) 3.5 - 5.2 mmol/L Final      Chloride Chloride   Date Value Ref Range Status   04/01/2023 92 (L) 98 - 107 mmol/L Final   03/31/2023 92 (L) 98 - 107 mmol/L Final   03/30/2023 95 (L) 98 - 107 mmol/L Final      CO2 CO2   Date Value Ref Range Status   04/01/2023 24.0 22.0 - 29.0 mmol/L Final   03/31/2023 27.0 22.0 - 29.0 mmol/L Final   03/30/2023 27.0 22.0 - 29.0 mmol/L Final      BUN BUN   Date Value Ref  Range Status   04/01/2023 54 (H) 8 - 23 mg/dL Final   03/31/2023 59 (H) 8 - 23 mg/dL Final   03/30/2023 69 (H) 8 - 23 mg/dL Final      Creatinine Creatinine   Date Value Ref Range Status   04/01/2023 4.31 (H) 0.76 - 1.27 mg/dL Final   03/31/2023 4.75 (H) 0.76 - 1.27 mg/dL Final   03/30/2023 4.85 (H) 0.76 - 1.27 mg/dL Final      Calcium Calcium   Date Value Ref Range Status   04/01/2023 7.5 (L) 8.6 - 10.5 mg/dL Final   03/31/2023 8.1 (L) 8.6 - 10.5 mg/dL Final   03/30/2023 8.3 (L) 8.6 - 10.5 mg/dL Final      PO4 No results found for: CAPO4   Albumin Albumin   Date Value Ref Range Status   04/01/2023 2.2 (L) 3.5 - 5.2 g/dL Final   03/31/2023 2.5 (L) 3.5 - 5.2 g/dL Final   03/30/2023 2.6 (L) 3.5 - 5.2 g/dL Final      Magnesium Magnesium   Date Value Ref Range Status   03/30/2023 1.9 1.6 - 2.4 mg/dL Final      Uric Acid No results found for: URICACID        Results Review:     I reviewed the patient's new clinical results.    aspirin, 81 mg, Oral, Daily  camphor-menthol, , Topical, Q8H  clopidogrel, 75 mg, Oral, Daily  ferrous sulfate, 325 mg, Oral, Daily With Breakfast  First Mouthwash (Magic Mouthwash), 5 mL, Swish & Spit, 4x Daily AC & at Bedtime  fluticasone, 2 spray, Each Nare, Daily  folic acid, 400 mcg, Oral, Daily  furosemide, 40 mg, Oral, Daily  insulin detemir, 5 Units, Subcutaneous, Nightly  insulin lispro, 0-9 Units, Subcutaneous, TID AC  lactulose, 20 g, Oral, Daily  midodrine, 15 mg, Oral, TID AC  pantoprazole, 40 mg, Intravenous, Q AM  pharmacy consult - MTM, , Does not apply, Daily  riFAXIMin, 550 mg, Oral, Q12H  sodium chloride, 10 mL, Intravenous, Q12H  ursodiol, 300 mg, Oral, BID           Medication Review: Reviewed    Assessment & Plan       ST elevation myocardial infarction involving right coronary artery    PAOLO (acute kidney injury)    Liver cirrhosis secondary to HAYES    Hyperbilirubinemia    Coagulopathy    Decompensated hepatic cirrhosis    CKD (chronic kidney disease) stage 5, GFR less than  "15 ml/min    Hyperlipidemia LDL goal <70    Coronary artery disease involving native coronary artery of native heart    1.  Acute kidney injury: Patient was last seen a month ago with creatinine 4.5- 5.0 range.  Prior to that baseline was 0.8-1.2, likely diagnosis was hepatorenal versus ATN.  At that time patient was transferred to  for further management.  Patient has been admitted and underwent a cardiac catheterization received 80 mL of IV contrast.   2.  S/p right RCA stenting for the clot 3/25/2023 today  3.  HAYES: Decompensated liver cirrhosis.    4.  Hypotension: In the setting of liver disease. Improved BP high now  5.  Anemia  6.  Thrombocytopenia   7.  Thoracentesis: 3 weeks back  8.  Paracentesis: On 3/17/2023 at [facility].   9 Hypokalemia\"      Recommendations:  So far patient has not demonstrated meaningful renal recovery since last admission in Feb and developed acute kidney disease with persistent cr elevation. Niurka any uremic symptoms. Therefore no indication of dialysis. However he is at risk for needing dialysis in near future. He is undergoing txp evaluation at  therefore dialysis is in consideration. Stable for transfer out of ICU  - Continue lasix 40 mg daily on discharge. Will add Spironolactone on f/u. Hyponatremia noted therefore will add MRA after repeat labs.    - Will need close outpatient f/u in renal clinic. If renal function stable by tomorrow can be discharge with close followup.   - Hold midodrine if sbp>130         Sergei العلي MD  04/01/23  14:53 EDT     "

## 2023-04-01 NOTE — PLAN OF CARE
Goal Outcome Evaluation:      Visit with pt and family. Both were pleasant and receptive to  support. 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 No spiritual care needs expressed.

## 2023-04-01 NOTE — PLAN OF CARE
Goal Outcome Evaluation:  Plan of Care Reviewed With: patient        Progress: no change  Outcome Evaluation: New palliaive consult for assistance with GOC and support per Dr. Saucedo.  No ACP on file.  NOK is patient's wife Capri Hopson, daughter Nica Murguia and son Troy Hopson.  Palliative care introduced, palliative brochure and blanket provided.  Pt. asleep but eventually roused to voice. Pt. did not demonstrate any visible signs of discomfort.  Pt. denied pain, nause and dyspnea.  Pt. did demonstrate increased wob at rest on 2LNC with pursed lip breathing.  Per Capri's report, pt was very dyspneic prior to paracentesis in which 5L of fluid was removed.  Capri stated that pt is to go home later this afternoon to their home in [facility].  Pt. is to have paracentesis every two weeks.  Capri would like more information regarding hospice services for when pt. declines further.  [facility] Molina to see pt this afternoon.   Hospice consult entered. Palliative care to continue to follow for support, POC and ongoing GOC.      Problem: Palliative Care  Goal: Enhanced Quality of Life  Intervention: Promote Advance Care Planning  Flowsheets (Taken 4/1/2023 1129)  Life Transition/Adjustment:   palliative care initiated   palliative care discussed

## 2023-04-02 LAB
ALBUMIN SERPL-MCNC: 2.4 G/DL (ref 3.5–5.2)
ALBUMIN/GLOB SERPL: 1 G/DL
ALP SERPL-CCNC: 72 U/L (ref 39–117)
ALT SERPL W P-5'-P-CCNC: 14 U/L (ref 1–41)
ANION GAP SERPL CALCULATED.3IONS-SCNC: 16 MMOL/L (ref 5–15)
APPEARANCE FLD: ABNORMAL
AST SERPL-CCNC: 30 U/L (ref 1–40)
BILIRUB SERPL-MCNC: 3.8 MG/DL (ref 0–1.2)
BUN SERPL-MCNC: 52 MG/DL (ref 8–23)
BUN/CREAT SERPL: 13.2 (ref 7–25)
CALCIUM SPEC-SCNC: 8 MG/DL (ref 8.6–10.5)
CHLORIDE SERPL-SCNC: 93 MMOL/L (ref 98–107)
CO2 SERPL-SCNC: 24 MMOL/L (ref 22–29)
COLOR FLD: YELLOW
CREAT SERPL-MCNC: 3.93 MG/DL (ref 0.76–1.27)
DEPRECATED RDW RBC AUTO: 57.6 FL (ref 37–54)
EGFRCR SERPLBLD CKD-EPI 2021: 15.3 ML/MIN/1.73
EOSINOPHIL NFR FLD MANUAL: 1 %
ERYTHROCYTE [DISTWIDTH] IN BLOOD BY AUTOMATED COUNT: 15.9 % (ref 12.3–15.4)
GLOBULIN UR ELPH-MCNC: 2.5 GM/DL
GLUCOSE BLDC GLUCOMTR-MCNC: 133 MG/DL (ref 70–130)
GLUCOSE BLDC GLUCOMTR-MCNC: 263 MG/DL (ref 70–130)
GLUCOSE BLDC GLUCOMTR-MCNC: 361 MG/DL (ref 70–130)
GLUCOSE BLDC GLUCOMTR-MCNC: 388 MG/DL (ref 70–130)
GLUCOSE SERPL-MCNC: 103 MG/DL (ref 65–99)
HCT VFR BLD AUTO: 24.7 % (ref 37.5–51)
HGB BLD-MCNC: 8.4 G/DL (ref 13–17.7)
LYMPHOCYTES NFR FLD MANUAL: 14 %
MACROPHAGE FLUID: 33 %
MCH RBC QN AUTO: 33.5 PG (ref 26.6–33)
MCHC RBC AUTO-ENTMCNC: 34 G/DL (ref 31.5–35.7)
MCV RBC AUTO: 98.4 FL (ref 79–97)
MESOTHL CELL NFR FLD MANUAL: 20 %
NEUTROPHILS NFR FLD MANUAL: 32 %
PLATELET # BLD AUTO: 46 10*3/MM3 (ref 140–450)
PMV BLD AUTO: 11.4 FL (ref 6–12)
POTASSIUM SERPL-SCNC: 3 MMOL/L (ref 3.5–5.2)
PROT SERPL-MCNC: 4.9 G/DL (ref 6–8.5)
RBC # BLD AUTO: 2.51 10*6/MM3 (ref 4.14–5.8)
RBC # FLD AUTO: 2000 /MM3
SODIUM SERPL-SCNC: 133 MMOL/L (ref 136–145)
WBC # FLD AUTO: 25 /MM3
WBC NRBC COR # BLD: 9.07 10*3/MM3 (ref 3.4–10.8)

## 2023-04-02 PROCEDURE — 63710000001 INSULIN LISPRO (HUMAN) PER 5 UNITS: Performed by: INTERNAL MEDICINE

## 2023-04-02 PROCEDURE — 25010000002 ALBUMIN HUMAN 25% PER 50 ML: Performed by: NURSE PRACTITIONER

## 2023-04-02 PROCEDURE — 89051 BODY FLUID CELL COUNT: CPT | Performed by: NURSE PRACTITIONER

## 2023-04-02 PROCEDURE — 85027 COMPLETE CBC AUTOMATED: CPT | Performed by: INTERNAL MEDICINE

## 2023-04-02 PROCEDURE — 99232 SBSQ HOSP IP/OBS MODERATE 35: CPT | Performed by: NURSE PRACTITIONER

## 2023-04-02 PROCEDURE — 87070 CULTURE OTHR SPECIMN AEROBIC: CPT | Performed by: NURSE PRACTITIONER

## 2023-04-02 PROCEDURE — 80053 COMPREHEN METABOLIC PANEL: CPT | Performed by: INTERNAL MEDICINE

## 2023-04-02 PROCEDURE — P9047 ALBUMIN (HUMAN), 25%, 50ML: HCPCS | Performed by: NURSE PRACTITIONER

## 2023-04-02 PROCEDURE — 82962 GLUCOSE BLOOD TEST: CPT

## 2023-04-02 PROCEDURE — 63710000001 INSULIN DETEMIR PER 5 UNITS: Performed by: INTERNAL MEDICINE

## 2023-04-02 PROCEDURE — 49083 ABD PARACENTESIS W/IMAGING: CPT | Performed by: NURSE PRACTITIONER

## 2023-04-02 PROCEDURE — 0W9G3ZZ DRAINAGE OF PERITONEAL CAVITY, PERCUTANEOUS APPROACH: ICD-10-PCS | Performed by: NURSE PRACTITIONER

## 2023-04-02 RX ORDER — FUROSEMIDE 20 MG/1
20 TABLET ORAL DAILY
Status: DISCONTINUED | OUTPATIENT
Start: 2023-04-03 | End: 2023-04-04 | Stop reason: HOSPADM

## 2023-04-02 RX ORDER — POTASSIUM CHLORIDE 750 MG/1
40 CAPSULE, EXTENDED RELEASE ORAL ONCE
Status: COMPLETED | OUTPATIENT
Start: 2023-04-02 | End: 2023-04-02

## 2023-04-02 RX ORDER — SPIRONOLACTONE 25 MG/1
25 TABLET ORAL DAILY
Status: DISCONTINUED | OUTPATIENT
Start: 2023-04-03 | End: 2023-04-04 | Stop reason: HOSPADM

## 2023-04-02 RX ORDER — ALBUMIN (HUMAN) 12.5 G/50ML
50 SOLUTION INTRAVENOUS ONCE
Status: COMPLETED | OUTPATIENT
Start: 2023-04-02 | End: 2023-04-02

## 2023-04-02 RX ADMIN — Medication 10 ML: at 21:28

## 2023-04-02 RX ADMIN — DIPHENHYDRAMINE HYDROCHLORIDE AND LIDOCAINE HYDROCHLORIDE AND ALUMINUM HYDROXIDE AND MAGNESIUM HYDRO 5 ML: KIT at 08:32

## 2023-04-02 RX ADMIN — CLOPIDOGREL BISULFATE 75 MG: 75 TABLET ORAL at 08:36

## 2023-04-02 RX ADMIN — MIDODRINE HYDROCHLORIDE 15 MG: 5 TABLET ORAL at 12:17

## 2023-04-02 RX ADMIN — Medication 10 ML: at 08:36

## 2023-04-02 RX ADMIN — DIPHENHYDRAMINE HYDROCHLORIDE AND LIDOCAINE HYDROCHLORIDE AND ALUMINUM HYDROXIDE AND MAGNESIUM HYDRO 5 ML: KIT at 21:56

## 2023-04-02 RX ADMIN — Medication 400 MCG: at 08:35

## 2023-04-02 RX ADMIN — INSULIN DETEMIR 5 UNITS: 100 INJECTION, SOLUTION SUBCUTANEOUS at 21:33

## 2023-04-02 RX ADMIN — POTASSIUM CHLORIDE 40 MEQ: 10 CAPSULE, COATED, EXTENDED RELEASE ORAL at 14:47

## 2023-04-02 RX ADMIN — PANTOPRAZOLE SODIUM 40 MG: 40 INJECTION, POWDER, LYOPHILIZED, FOR SOLUTION INTRAVENOUS at 05:41

## 2023-04-02 RX ADMIN — INSULIN LISPRO 8 UNITS: 100 INJECTION, SOLUTION INTRAVENOUS; SUBCUTANEOUS at 12:17

## 2023-04-02 RX ADMIN — RIFAXIMIN 550 MG: 550 TABLET ORAL at 08:36

## 2023-04-02 RX ADMIN — FERROUS SULFATE TAB 325 MG (65 MG ELEMENTAL FE) 325 MG: 325 (65 FE) TAB at 08:35

## 2023-04-02 RX ADMIN — MIDODRINE HYDROCHLORIDE 15 MG: 5 TABLET ORAL at 08:36

## 2023-04-02 RX ADMIN — DIPHENHYDRAMINE HYDROCHLORIDE AND LIDOCAINE HYDROCHLORIDE AND ALUMINUM HYDROXIDE AND MAGNESIUM HYDRO 5 ML: KIT at 17:38

## 2023-04-02 RX ADMIN — ALBUMIN (HUMAN) 50 G: 0.25 INJECTION, SOLUTION INTRAVENOUS at 14:48

## 2023-04-02 RX ADMIN — URSODIOL 300 MG: 300 CAPSULE ORAL at 21:33

## 2023-04-02 RX ADMIN — INSULIN LISPRO 6 UNITS: 100 INJECTION, SOLUTION INTRAVENOUS; SUBCUTANEOUS at 17:38

## 2023-04-02 RX ADMIN — LACTULOSE 20 G: 20 SOLUTION ORAL at 08:40

## 2023-04-02 RX ADMIN — CAMPHOR AND MENTHOL: 5; 5 LOTION TOPICAL at 21:33

## 2023-04-02 RX ADMIN — CAMPHOR AND MENTHOL: 5; 5 LOTION TOPICAL at 14:48

## 2023-04-02 RX ADMIN — FLUTICASONE PROPIONATE 2 SPRAY: 50 SPRAY, METERED NASAL at 08:37

## 2023-04-02 RX ADMIN — FUROSEMIDE 40 MG: 40 TABLET ORAL at 08:35

## 2023-04-02 RX ADMIN — RIFAXIMIN 550 MG: 550 TABLET ORAL at 21:33

## 2023-04-02 RX ADMIN — URSODIOL 300 MG: 300 CAPSULE ORAL at 08:35

## 2023-04-02 RX ADMIN — ASPIRIN 81 MG CHEWABLE TABLET 81 MG: 81 TABLET CHEWABLE at 08:34

## 2023-04-02 RX ADMIN — MIDODRINE HYDROCHLORIDE 15 MG: 5 TABLET ORAL at 17:38

## 2023-04-02 NOTE — PROGRESS NOTES
[facility] Medicine Services  PROGRESS NOTE    Patient Name: Leoncio Hopson  : 1950  MRN: [id number]    Date of Admission: 3/25/2023  Primary Care Physician: Antonio Castro MD    Subjective   Subjective     CC:  Follow up HAYES, STEMI    HPI:  Patient seen resting in bed in no apparent distress. Very False Pass. acute events overnight per nursing. He is currently undergoing bedside paracentesis per GI. No new complaints at this time.     ROS:  Gen- No fevers, chills  CV- No chest pain, palpitations  Resp- No cough, dyspnea  GI- No N/V/D, + abd distention     Objective   Objective     Vital Signs:   Temp:  [97.7 °F (36.5 °C)-97.8 °F (36.6 °C)] 97.8 °F (36.6 °C)  Heart Rate:  [] 77  Resp:  [17-18] 17  BP: ()/(54-75) 116/68     Physical Exam:  Constitutional: No acute distress, awake, alert, False Pass   HENT: NCAT, mucous membranes moist  Respiratory: Clear to auscultation bilaterally, respiratory effort normal on RA  Cardiovascular: RRR, no murmurs, cap refill brisk   Gastrointestinal: Positive bowel sounds, firm, abdominal distention   Musculoskeletal: 1+ BLE edema   Psychiatric: Appropriate affect, cooperative  Neurologic: Oriented x 3, moves all extremities, speech clear  Skin: warm, dry, no visible rash     Results Reviewed:  LAB RESULTS:      Lab 23  0716 23  0739 23  0607 23  0206 23  0328 23  0300 23  0406   WBC 9.07 6.97 10.98* 13.05* 8.27   < > 14.01*   HEMOGLOBIN 8.4* 7.6* 8.2* 8.8* 8.2*   < > 7.7*   HEMATOCRIT 24.7* 22.6* 23.6* 25.9* 24.7*   < > 22.7*   PLATELETS 46* 36* 46* 52* 44*   < > 101*   NEUTROS ABS  --   --   --   --  5.69  --  11.09*   IMMATURE GRANS (ABS)  --   --   --   --  0.09*  --  0.16*   LYMPHS ABS  --   --   --   --  0.63*  --  0.98   MONOS ABS  --   --   --   --  0.41  --  1.18*   EOS ABS  --   --   --   --  1.44*  --  0.58*   MCV 98.4* 100.4* 98.3* 102.0* 102.1*   < > 100.9*   LACTATE  --   --   --   --    --   --  6.0*   PROTIME  --   --  21.9*  --  23.1*  --  32.1*    < > = values in this interval not displayed.         Lab 04/02/23  0716 04/01/23  0739 03/31/23  0607 03/30/23  0206 03/29/23  1619 03/29/23  0200 03/28/23  1337 03/28/23  0300 03/27/23  0406   SODIUM 133* 132* 135* 136  --  138  --  137 134*   POTASSIUM 3.0* 3.1* 3.5 3.7 3.4* 3.2*   < > 3.1* 3.3*   CHLORIDE 93* 92* 92* 95*  --  94*  --  90* 87*   CO2 24.0 24.0 27.0 27.0  --  28.0  --  29.0 27.0   ANION GAP 16.0* 16.0* 16.0* 14.0  --  16.0*  --  18.0* 20.0*   BUN 52* 54* 59* 69*  --  71*  --  77* 63*   CREATININE 3.93* 4.31* 4.75* 4.85*  --  5.14*  --  4.92* 4.70*   EGFR 15.3* 13.7* 12.2* 11.9*  --  11.1*  --  11.7* 12.3*   GLUCOSE 103* 128* 93 124*  --  121*  --  151* 55*   CALCIUM 8.0* 7.5* 8.1* 8.3*  --  8.2*  --  8.2* 8.4*   MAGNESIUM  --   --   --  1.9  --  1.7  --   --  2.2   PHOSPHORUS  --   --   --   --   --   --   --  3.8 4.0    < > = values in this interval not displayed.         Lab 04/02/23  0716 04/01/23  0739 03/31/23  0607 03/30/23  0206 03/29/23  0200   TOTAL PROTEIN 4.9* 4.3* 4.9* 5.4* 5.1*   ALBUMIN 2.4* 2.2* 2.5* 2.6* 2.7*   GLOBULIN 2.5 2.1 2.4 2.8 2.4   ALT (SGPT) 14 13 17 19 21   AST (SGOT) 30 32 43* 55* 68*   BILIRUBIN 3.8* 3.6* 3.7* 3.3* 2.9*   ALK PHOS 72 64 75 67 63         Lab 03/31/23  0607 03/29/23  0328 03/27/23  0406   PROTIME 21.9* 23.1* 32.1*   INR 1.91* 2.05* 3.09*             Lab 03/29/23  1619   ABO TYPING O   RH TYPING Positive   ANTIBODY SCREEN Negative         Lab 03/27/23  0322   PH, ARTERIAL 7.516*   PCO2, ARTERIAL 38.3   PO2 ART 72.3*   FIO2 28   HCO3 ART 31.0*   BASE EXCESS ART 7.5*   CARBOXYHEMOGLOBIN 1.3     Brief Urine Lab Results  (Last result in the past 365 days)      Color   Clarity   Blood   Leuk Est   Nitrite   Protein   CREAT   Urine HCG        03/25/23 2327 Dark Yellow   Turbid   Large (3+)   Moderate (2+)   Negative   >=300 mg/dL (3+)                 Microbiology Results Abnormal     Procedure  Component Value - Date/Time    Body Fluid Culture - Body Fluid, Peritoneum [[id number]] Collected: 03/31/23 1538    Lab Status: Preliminary result Specimen: Body Fluid from Peritoneum Updated: 04/01/23 1045     Body Fluid Culture No growth     Gram Stain Few (2+) WBCs seen      No organisms seen    Blood Culture - Blood, Arm, Left [[id number]]  (Normal) Collected: 03/25/23 2021    Lab Status: Final result Specimen: Blood from Arm, Left Updated: 03/30/23 2045     Blood Culture No growth at 5 days    Blood Culture - Blood, Hand, Left [[id number]]  (Normal) Collected: 03/25/23 1622    Lab Status: Final result Specimen: Blood from Hand, Left Updated: 03/30/23 1700     Blood Culture No growth at 5 days    Narrative:      Aerobic bottle only      Urine Culture - Urine, Indwelling Urethral Catheter [[id number]]  (Normal) Collected: 03/25/23 2327    Lab Status: Final result Specimen: Urine from Indwelling Urethral Catheter Updated: 03/27/23 1014     Urine Culture No growth    Eosinophil Smear - Urine, Urine, Clean Catch [[id number]]  (Normal) Collected: 03/25/23 2241    Lab Status: Final result Specimen: Urine, Clean Catch Updated: 03/26/23 0224     Eosinophil Smear 0 % EOS/100 Cells     Narrative:      No eosinophil seen          CT Guided Paracentesis    Result Date: 3/31/2023  Clinical indication: Recurrent/refractory ascites : Dr. Partha Ribera Procedure: CT-guided paracentesis. Complication: None apparent. Contrast usage: None Estimated blood loss: Negligible Conscious sedation: None Technique: Formal written consent for the procedure was obtained from the patient/family after explaining risks to include bleeding, infection, injury to bowel/adjacent organs, need for additional surgery/procedure.  The patient's right lower quadrant was prepped and draped in the usual, sterile fashion.  Local anesthesia with 1% lidocaine infiltration was achieved.  Utilizing CT guidance, an 8.5 [place] drainage catheter was placed into  a large pocket of ascites within the right lower quadrant without difficulty.  Approximately 5000 mL of danette-colored ascitic fluid was removed without difficulty.  Specimen was sent for culture/labs, per the referring service.  At the end of the procedure, all catheters were removed and a sterile dressing was applied to the puncture site.  The patient tolerated the procedure well without apparent complication. Impression: Successful CT-guided paracentesis, with 5000 mL of danette-colored ascitic fluid removed without difficulty.  Specimen was sent for culture/labs, per the referring service.      Results for orders placed during the hospital encounter of 03/25/23    Adult Transthoracic Echo Complete W/ Cont if Necessary Per Protocol    Interpretation Summary  •  Left ventricular systolic function is normal. Estimated left ventricular EF = 65%  •  The aortic valve is mildly calcified.  There is minimal aortic stenosis present.  •  Moderate pulmonary hypertension is present. Estimated right ventricular systolic pressure from tricuspid regurgitation is moderately elevated (49 mmHg).  •  Moderate dilation of the aortic root is present. Aneurysmal dilation of the ascending aorta is present.  •  There is a right pleural effusion.      Current medications:  Scheduled Meds:aspirin, 81 mg, Oral, Daily  camphor-menthol, , Topical, Q8H  clopidogrel, 75 mg, Oral, Daily  ferrous sulfate, 325 mg, Oral, Daily With Breakfast  First Mouthwash (Magic Mouthwash), 5 mL, Swish & Spit, 4x Daily AC & at Bedtime  fluticasone, 2 spray, Each Nare, Daily  folic acid, 400 mcg, Oral, Daily  furosemide, 40 mg, Oral, Daily  insulin detemir, 5 Units, Subcutaneous, Nightly  insulin lispro, 0-9 Units, Subcutaneous, TID AC  lactulose, 20 g, Oral, Daily  midodrine, 15 mg, Oral, TID AC  pantoprazole, 40 mg, Intravenous, Q AM  pharmacy consult - MTM, , Does not apply, Daily  riFAXIMin, 550 mg, Oral, Q12H  sodium chloride, 10 mL, Intravenous,  Q12H  ursodiol, 300 mg, Oral, BID      Continuous Infusions:   PRN Meds:.•  Calcium Replacement - Follow Nurse / BPA Driven Protocol  •  camphor-menthol  •  dextrose  •  dextrose  •  First Mouthwash (Magic Mouthwash)  •  glucagon (human recombinant)  •  Lidocaine HCl gel  •  Magnesium Low Dose Replacement - Follow Nurse / BPA Driven Protocol  •  palliative care oral rinse  •  Phosphorus Replacement - Follow Nurse / BPA Driven Protocol  •  sodium chloride  •  sodium chloride    Assessment & Plan   Assessment & Plan     Active Hospital Problems    Diagnosis  POA   • **ST elevation myocardial infarction involving right coronary artery [I21.11]  Yes   • Hyperlipidemia LDL goal <70 [E78.5]  Yes   • Coronary artery disease involving native coronary artery of native heart [I25.10]  Yes   • CKD (chronic kidney disease) stage 5, GFR less than 15 ml/min [N18.5]  Yes   • Decompensated hepatic cirrhosis [K72.90, K74.60]  Yes   • Coagulopathy [D68.9]  Yes   • Liver cirrhosis secondary to HAYES [K75.81, K74.60]  Yes   • Hyperbilirubinemia [E80.6]  Yes   • PAOLO (acute kidney injury) [N17.9]  Yes      Resolved Hospital Problems    Diagnosis Date Resolved POA   • Diabetes mellitus [E11.9] 03/25/2023 Unknown        Brief Hospital Course to date:  Leoncio Hopson is a 72 y.o. male with PMHx significant for decompensated HAYES cirrhosis, CKD IV, DM, HTN who presented from OSH w/STEMI. He underwent emergent LHC which showed 2v CAD, he received HARDEEP to RCA and unsuccessful thrombectomy to 100% occluded RPDA. Echocardiogram with EF 65%.GI was consulted due to decompensated HAYES liver cirrhosis. He underwent paracentesis on 3/26 with 4.5L removed and repeat 3/31 with 5L removed. Palliative and hospice were consulted per patient/family request, but patient is not currently interested in hospice care as he would like to follow up with the transplant team at . He underwent additional bedside paracentesis on 4/2 per GI d/t abdominal  distention.     This patient's problems and plans were partially entered by my partner and updated as appropriate by me 04/02/23.    STEMI s/p HARDEEP to RCA on 3/25  - on DAPT, cardiology following   - EF 65%  - no statin due to liver failure     Decompensated HAYES Cirrhosis w/ascites  Thrombocytopenia, Coagulopathy  Hepatic Encephalopathy  Hepatic Hydrothorax  Hx of Varices  - MELD-Na 33  - had paracentesis 3/26 with 4.5L removed and repeat 3/31 w/5L removed, no evidence of SBP  - continue rifaxamin, lactulose  - octreotide gtt discontinued t3/31, does not seem to be getting any benefit from this  - Partner discussed extensively with daughter at bedside, discussed palliative options and palliative peritoneal drain should patient wish to ever go that route. Currently not a candidate for transplant due to recent STEMI and need for DAPT. They do plan to try to f/u at [facility], currently they follow with hepatology at  and has already been deemed not transplant candidate there. Desired palliative medicine consult which has been placed.  - Midodrine for hypotension, hold if SBP >130  - GI following, appreciate input  -Started on Aldactone 25 mg daily, Lasix reduced to 20 mg daily   -S/p repeat paracentesis 4/2  -GI to consider Tenckhoff catheter if decision is made to pursue hospice  -AM labs     PAOLO on CKD IV  - likely related to HRS vs. ATN  - Nephrology following, appreciate input, creatinine remains stable   - high risk for need for HD, family aware     Possible Sepsis  Lactic Acidosis  - treated with empiric Zosyn in the ICU to complete 7 days  - blood and urine cultures remain negative  - mildly elevated procal     Hypokalemia:  -Potassium 3.0 this AM   -KCL 40 meq x1  -BMP in AM     GOC/POC  -Palliative/hospice following   -Plan for hospice to meet with family 4/3      Expected Discharge Location and Transportation: Home vs hospice   Expected Discharge        Expected Discharge Date and Time      Expected  Discharge Date Expected Discharge Time     Apr 3, 2023           DVT prophylaxis:  Mechanical DVT prophylaxis orders are present.     AM-PAC 6 Clicks Score (PT): 17 (04/01/23 0800)    CODE STATUS:   Code Status and Medical Interventions:   Ordered at: 04/01/23 1306     Medical Intervention Limits:    NO intubation (DNI)     Level Of Support Discussed With:    Patient     Code Status (Patient has no pulse and is not breathing):    No CPR (Do Not Attempt to Resuscitate)     Medical Interventions (Patient has pulse or is breathing):    Limited Support     Comments:    Full treatment     Release to patient:    Routine Release       Feroz Tamez, APRN  04/02/23

## 2023-04-02 NOTE — PROGRESS NOTES
"   LOS: 8 days    Patient Care Team:  Antonio Castro MD as PCP - General  Antonio Castro MD as PCP - Family Medicine    Chief Complaint: Shortness of breath     72-year-old male with past medical history of [place], liver cirrhosis, prerenal acute kidney failure was last seen a month ago with a creatinine of 5.0 when he was transferred to  for further management for liver transplant.  Dialysis was not started at that time.    Prior to the above baseline creatinine 0.8-1.2.  On previous admission creatinine was 4.7, before discharge. Labs showed a creatinine 3.94, BUN 48, sodium 135, potassium 4.1, CO2 6.0, calcium 9.6, EGFR 15 mL/min, troponin high 53, hemoglobin 10.5, platelets 111 K, lactate high 17.6, phosphorus 3.9 magnesium 1.9, bilirubin 4.6 other liver enzymes were normal.  Subjective :   F/U PAOLO ON CKD.    Interval History:   Transferred out of ICU. Stable renal function. Paracentesis yesterday 5 liter fluid removed.      Review of Systems:   Hard of hearing, abdominal distention, mild shortness of breath, generalized weakness. + COUGH.  All other negative    Objective     Vital Sign Min/Max for last 24 hours  Temp  Min: 97.8 °F (36.6 °C)  Max: 97.8 °F (36.6 °C)   BP  Min: 112/75  Max: 128/77   Pulse  Min: 73  Max: 91   Resp  Min: 17  Max: 17   SpO2  Min: 92 %  Max: 96 %   No data recorded   Weight  Min: 70.9 kg (156 lb 6.4 oz)  Max: 70.9 kg (156 lb 6.4 oz)     Flowsheet Rows    Flowsheet Row First Filed Value   Admission Height 172.7 cm (68\") Documented at 03/25/2023 1055   Admission Weight 72.6 kg (160 lb) Documented at 03/25/2023 1055          I/O this shift:  In: -   Out: 5000 [Other:5000]  I/O last 3 completed shifts:  In: 970 [P.O.:970]  Out: 50 [Urine:50]    Physical Exam:  General appearance: Sick looking  male mild distress laying in bed.    HEENT: Hard of hearing, oral mucosa moist, neck is supple  Lungs: Few rhonchi's and rails heard, equal chest movement, no crepitation.  Heart: " Normal S1, S2, no gallop, murmur, RRR.  Abdomen: Abdomen distended, positive thrill soft, nontender, positive bowel sounds.  Extremities: Positive lower extremity edema, no cyanosis, no joint swelling.  Neuro: Alert, oriented x4, no focal deficit.  Psych: Mood and affect are normal and appropriate.  Skin: Skin is warm and dry.  : No suprapubic fullness or tenderness. TORRE.    WBC WBC   Date Value Ref Range Status   04/02/2023 9.07 3.40 - 10.80 10*3/mm3 Final   04/01/2023 6.97 3.40 - 10.80 10*3/mm3 Final   03/31/2023 10.98 (H) 3.40 - 10.80 10*3/mm3 Final      HGB Hemoglobin   Date Value Ref Range Status   04/02/2023 8.4 (L) 13.0 - 17.7 g/dL Final   04/01/2023 7.6 (L) 13.0 - 17.7 g/dL Final   03/31/2023 8.2 (L) 13.0 - 17.7 g/dL Final      HCT Hematocrit   Date Value Ref Range Status   04/02/2023 24.7 (L) 37.5 - 51.0 % Final   04/01/2023 22.6 (L) 37.5 - 51.0 % Final   03/31/2023 23.6 (L) 37.5 - 51.0 % Final      Platlets No results found for: LABPLAT   MCV MCV   Date Value Ref Range Status   04/02/2023 98.4 (H) 79.0 - 97.0 fL Final   04/01/2023 100.4 (H) 79.0 - 97.0 fL Final   03/31/2023 98.3 (H) 79.0 - 97.0 fL Final          Sodium Sodium   Date Value Ref Range Status   04/02/2023 133 (L) 136 - 145 mmol/L Final   04/01/2023 132 (L) 136 - 145 mmol/L Final   03/31/2023 135 (L) 136 - 145 mmol/L Final      Potassium Potassium   Date Value Ref Range Status   04/02/2023 3.0 (L) 3.5 - 5.2 mmol/L Final   04/01/2023 3.1 (L) 3.5 - 5.2 mmol/L Final   03/31/2023 3.5 3.5 - 5.2 mmol/L Final      Chloride Chloride   Date Value Ref Range Status   04/02/2023 93 (L) 98 - 107 mmol/L Final   04/01/2023 92 (L) 98 - 107 mmol/L Final   03/31/2023 92 (L) 98 - 107 mmol/L Final      CO2 CO2   Date Value Ref Range Status   04/02/2023 24.0 22.0 - 29.0 mmol/L Final   04/01/2023 24.0 22.0 - 29.0 mmol/L Final   03/31/2023 27.0 22.0 - 29.0 mmol/L Final      BUN BUN   Date Value Ref Range Status   04/02/2023 52 (H) 8 - 23 mg/dL Final   04/01/2023  54 (H) 8 - 23 mg/dL Final   03/31/2023 59 (H) 8 - 23 mg/dL Final      Creatinine Creatinine   Date Value Ref Range Status   04/02/2023 3.93 (H) 0.76 - 1.27 mg/dL Final   04/01/2023 4.31 (H) 0.76 - 1.27 mg/dL Final   03/31/2023 4.75 (H) 0.76 - 1.27 mg/dL Final      Calcium Calcium   Date Value Ref Range Status   04/02/2023 8.0 (L) 8.6 - 10.5 mg/dL Final   04/01/2023 7.5 (L) 8.6 - 10.5 mg/dL Final   03/31/2023 8.1 (L) 8.6 - 10.5 mg/dL Final      PO4 No results found for: CAPO4   Albumin Albumin   Date Value Ref Range Status   04/02/2023 2.4 (L) 3.5 - 5.2 g/dL Final   04/01/2023 2.2 (L) 3.5 - 5.2 g/dL Final   03/31/2023 2.5 (L) 3.5 - 5.2 g/dL Final      Magnesium No results found for: MG   Uric Acid No results found for: URICACID        Results Review:     I reviewed the patient's new clinical results.    aspirin, 81 mg, Oral, Daily  camphor-menthol, , Topical, Q8H  clopidogrel, 75 mg, Oral, Daily  ferrous sulfate, 325 mg, Oral, Daily With Breakfast  First Mouthwash (Magic Mouthwash), 5 mL, Swish & Spit, 4x Daily AC & at Bedtime  fluticasone, 2 spray, Each Nare, Daily  folic acid, 400 mcg, Oral, Daily  furosemide, 40 mg, Oral, Daily  insulin detemir, 5 Units, Subcutaneous, Nightly  insulin lispro, 0-9 Units, Subcutaneous, TID AC  lactulose, 20 g, Oral, Daily  midodrine, 15 mg, Oral, TID AC  pantoprazole, 40 mg, Intravenous, Q AM  pharmacy consult - [place], , Does not apply, Daily  riFAXIMin, 550 mg, Oral, Q12H  sodium chloride, 10 mL, Intravenous, Q12H  ursodiol, 300 mg, Oral, BID           Medication Review: Reviewed    Assessment & Plan       ST elevation myocardial infarction involving right coronary artery    PAOLO (acute kidney injury)    Liver cirrhosis secondary to HAYES    Hyperbilirubinemia    Coagulopathy    Decompensated hepatic cirrhosis    CKD (chronic kidney disease) stage 5, GFR less than 15 ml/min    Hyperlipidemia LDL goal <70    Coronary artery disease involving native coronary artery of native heart    1.   "Acute kidney injury: Patient was last seen a month ago with creatinine 4.5- 5.0 range.  Prior to that baseline was 0.8-1.2, likely diagnosis was hepatorenal versus ATN.  At that time patient was transferred to  for further management.  Patient has been admitted and underwent a cardiac catheterization received 80 mL of IV contrast.   2.  S/p right RCA stenting for the clot 3/25/2023 today  3.  HAYES: Decompensated liver cirrhosis.    4.  Hypotension: In the setting of liver disease. Improved BP high now  5.  Anemia  6.  Thrombocytopenia   7.  Thoracentesis: 3 weeks back  8.  Paracentesis: On 3/17/2023 at [facility].   9 Hypokalemia\"      Recommendations:  So far patient has not demonstrated meaningful renal recovery since last admission in Feb and developed acute kidney disease with persistent cr elevation. Niurka any uremic symptoms. Therefore no indication of dialysis. However he is at risk for needing dialysis in near future. He is undergoing txp evaluation at  therefore dialysis is in consideration  . Will add spironolactone 25 mg daily. Reduce lasix to 20 mg daily  - Will need close outpatient f/u in renal clinic. If renal function stable by tomorrow can be discharge with close followup.   - Hold midodrine if sbp>130         Sergei العلي MD  04/02/23  16:15 EDT     "

## 2023-04-02 NOTE — PROCEDURES
Patient with significant abdominal distention noted on yesterday's physical exam.  Given this, recommended yesterday that bedside ultrasound be completed to assess for large volume ascites.  On physical exam today, patient found to be tympanic to percussion over the midline with areas of dullness over the lateralmost aspects of the abdomen consistent with abdominal ascites; also found to be fluid wave positive.  Utilizing point-of-care ultrasound, all quadrants of abdomen were inspected: Large volume of ascites identified in right lower quadrant, worse than left lower quadrant.  Based upon this, recommend bedside paracentesis with ultrasound guidance today.  Risk and benefits of procedure were discussed with patient and family would like to proceed.    After question above, informed consent was obtained.  Point-of-care ultrasound utilized to identify safe window for paracentesis which was then marked in the right lower quadrant with a sterile pen.  Afterwards, the patient was prepped and draped in the standard sterile fashion.  Local anesthesia was achieved with 5 mL of 1% Xylocaine that was inserted in a wheal-like fashion and extended to the level of the peritoneum where a flash of clear yellow ascites fluid was noted in barrel of syringe.  After adequate anesthesia was obtained, a small skin nick was made in the right lateral abdomen with a scalpel and the paracentesis trocar was introduced in the peritoneum without difficulty.  60 mL of clear yellow ascites fluid was obtained and sent to lab for cell count, culture, and differential.  The trocar was then connected to drainage tubing and passed off of sterile field to Omni drug to drain via gravity.  Albumin infusing during procedure.  See nursing I&O's for volume.  Patient tolerated the procedure well with no immediate complications.  EBL 0.    >>> We will send fluid for cell count, culture, differential  >>> Nursing to pull paracentesis trocar in approximately  1/2-hour; apply pressure for 3 to 5 minutes after removal and then apply bandage.   -If leaking ascites, may apply ostomy appliance.  >>> IV albumin 50 g to be given.

## 2023-04-03 LAB
ALBUMIN SERPL-MCNC: 3.1 G/DL (ref 3.5–5.2)
ALBUMIN/GLOB SERPL: 1.3 G/DL
ALP SERPL-CCNC: 95 U/L (ref 39–117)
ALT SERPL W P-5'-P-CCNC: 13 U/L (ref 1–41)
ANION GAP SERPL CALCULATED.3IONS-SCNC: 16 MMOL/L (ref 5–15)
AST SERPL-CCNC: 27 U/L (ref 1–40)
BACTERIA FLD CULT: NORMAL
BILIRUB SERPL-MCNC: 4.2 MG/DL (ref 0–1.2)
BUN SERPL-MCNC: 49 MG/DL (ref 8–23)
BUN/CREAT SERPL: 13.8 (ref 7–25)
CALCIUM SPEC-SCNC: 8.3 MG/DL (ref 8.6–10.5)
CHLORIDE SERPL-SCNC: 93 MMOL/L (ref 98–107)
CO2 SERPL-SCNC: 25 MMOL/L (ref 22–29)
CREAT SERPL-MCNC: 3.55 MG/DL (ref 0.76–1.27)
DEPRECATED RDW RBC AUTO: 58.7 FL (ref 37–54)
EGFRCR SERPLBLD CKD-EPI 2021: 17.3 ML/MIN/1.73
ERYTHROCYTE [DISTWIDTH] IN BLOOD BY AUTOMATED COUNT: 15.9 % (ref 12.3–15.4)
GLOBULIN UR ELPH-MCNC: 2.4 GM/DL
GLUCOSE BLDC GLUCOMTR-MCNC: 165 MG/DL (ref 70–130)
GLUCOSE BLDC GLUCOMTR-MCNC: 391 MG/DL (ref 70–130)
GLUCOSE BLDC GLUCOMTR-MCNC: 393 MG/DL (ref 70–130)
GLUCOSE BLDC GLUCOMTR-MCNC: 472 MG/DL (ref 70–130)
GLUCOSE BLDC GLUCOMTR-MCNC: 502 MG/DL (ref 70–130)
GLUCOSE SERPL-MCNC: 136 MG/DL (ref 65–99)
GRAM STN SPEC: NORMAL
GRAM STN SPEC: NORMAL
HCT VFR BLD AUTO: 27.4 % (ref 37.5–51)
HGB BLD-MCNC: 9.3 G/DL (ref 13–17.7)
MCH RBC QN AUTO: 34.1 PG (ref 26.6–33)
MCHC RBC AUTO-ENTMCNC: 33.9 G/DL (ref 31.5–35.7)
MCV RBC AUTO: 100.4 FL (ref 79–97)
PLATELET # BLD AUTO: 50 10*3/MM3 (ref 140–450)
PMV BLD AUTO: 10.6 FL (ref 6–12)
POTASSIUM SERPL-SCNC: 2.9 MMOL/L (ref 3.5–5.2)
PROT SERPL-MCNC: 5.5 G/DL (ref 6–8.5)
RBC # BLD AUTO: 2.73 10*6/MM3 (ref 4.14–5.8)
SODIUM SERPL-SCNC: 134 MMOL/L (ref 136–145)
WBC NRBC COR # BLD: 8.88 10*3/MM3 (ref 3.4–10.8)

## 2023-04-03 PROCEDURE — 99232 SBSQ HOSP IP/OBS MODERATE 35: CPT | Performed by: NURSE PRACTITIONER

## 2023-04-03 PROCEDURE — 82962 GLUCOSE BLOOD TEST: CPT

## 2023-04-03 PROCEDURE — 63710000001 INSULIN LISPRO (HUMAN) PER 5 UNITS: Performed by: INTERNAL MEDICINE

## 2023-04-03 PROCEDURE — 80053 COMPREHEN METABOLIC PANEL: CPT | Performed by: INTERNAL MEDICINE

## 2023-04-03 PROCEDURE — 97530 THERAPEUTIC ACTIVITIES: CPT

## 2023-04-03 PROCEDURE — 99232 SBSQ HOSP IP/OBS MODERATE 35: CPT | Performed by: PHYSICIAN ASSISTANT

## 2023-04-03 PROCEDURE — 85027 COMPLETE CBC AUTOMATED: CPT | Performed by: INTERNAL MEDICINE

## 2023-04-03 PROCEDURE — 63710000001 INSULIN DETEMIR PER 5 UNITS: Performed by: INTERNAL MEDICINE

## 2023-04-03 PROCEDURE — 63710000001 INSULIN LISPRO (HUMAN) PER 5 UNITS: Performed by: PHYSICIAN ASSISTANT

## 2023-04-03 RX ORDER — ASPIRIN 81 MG/1
81 TABLET, CHEWABLE ORAL DAILY
Qty: 90 TABLET | Refills: 1 | Status: SHIPPED | OUTPATIENT
Start: 2023-04-04 | End: 2023-04-04 | Stop reason: SDUPTHER

## 2023-04-03 RX ORDER — OXYMETAZOLINE HYDROCHLORIDE 0.05 G/100ML
2 SPRAY NASAL 2 TIMES DAILY
Status: DISCONTINUED | OUTPATIENT
Start: 2023-04-03 | End: 2023-04-04 | Stop reason: HOSPADM

## 2023-04-03 RX ORDER — POTASSIUM CHLORIDE 750 MG/1
40 CAPSULE, EXTENDED RELEASE ORAL EVERY 4 HOURS
Status: COMPLETED | OUTPATIENT
Start: 2023-04-03 | End: 2023-04-03

## 2023-04-03 RX ORDER — INSULIN LISPRO 100 [IU]/ML
0-9 INJECTION, SOLUTION INTRAVENOUS; SUBCUTANEOUS
Status: DISCONTINUED | OUTPATIENT
Start: 2023-04-04 | End: 2023-04-03

## 2023-04-03 RX ORDER — CLOPIDOGREL BISULFATE 75 MG/1
75 TABLET ORAL DAILY
Qty: 90 TABLET | Refills: 1 | Status: SHIPPED | OUTPATIENT
Start: 2023-04-04 | End: 2023-04-04 | Stop reason: SDUPTHER

## 2023-04-03 RX ORDER — POTASSIUM CHLORIDE 750 MG/1
40 CAPSULE, EXTENDED RELEASE ORAL 2 TIMES DAILY
Status: DISCONTINUED | OUTPATIENT
Start: 2023-04-03 | End: 2023-04-03

## 2023-04-03 RX ORDER — INSULIN LISPRO 100 [IU]/ML
0-9 INJECTION, SOLUTION INTRAVENOUS; SUBCUTANEOUS
Status: DISCONTINUED | OUTPATIENT
Start: 2023-04-03 | End: 2023-04-04 | Stop reason: HOSPADM

## 2023-04-03 RX ADMIN — INSULIN LISPRO 2 UNITS: 100 INJECTION, SOLUTION INTRAVENOUS; SUBCUTANEOUS at 09:00

## 2023-04-03 RX ADMIN — MIDODRINE HYDROCHLORIDE 15 MG: 5 TABLET ORAL at 09:01

## 2023-04-03 RX ADMIN — URSODIOL 300 MG: 300 CAPSULE ORAL at 08:59

## 2023-04-03 RX ADMIN — FERROUS SULFATE TAB 325 MG (65 MG ELEMENTAL FE) 325 MG: 325 (65 FE) TAB at 09:02

## 2023-04-03 RX ADMIN — POTASSIUM CHLORIDE 40 MEQ: 10 CAPSULE, COATED, EXTENDED RELEASE ORAL at 17:22

## 2023-04-03 RX ADMIN — FLUTICASONE PROPIONATE 2 SPRAY: 50 SPRAY, METERED NASAL at 08:59

## 2023-04-03 RX ADMIN — OXYMETAZOLINE HCL 2 SPRAY: 0.05 SPRAY NASAL at 21:25

## 2023-04-03 RX ADMIN — INSULIN DETEMIR 5 UNITS: 100 INJECTION, SOLUTION SUBCUTANEOUS at 21:23

## 2023-04-03 RX ADMIN — URSODIOL 300 MG: 300 CAPSULE ORAL at 21:23

## 2023-04-03 RX ADMIN — INSULIN LISPRO 8 UNITS: 100 INJECTION, SOLUTION INTRAVENOUS; SUBCUTANEOUS at 17:22

## 2023-04-03 RX ADMIN — Medication 10 ML: at 09:04

## 2023-04-03 RX ADMIN — DIPHENHYDRAMINE HYDROCHLORIDE AND LIDOCAINE HYDROCHLORIDE AND ALUMINUM HYDROXIDE AND MAGNESIUM HYDRO 5 ML: KIT at 17:25

## 2023-04-03 RX ADMIN — INSULIN LISPRO 8 UNITS: 100 INJECTION, SOLUTION INTRAVENOUS; SUBCUTANEOUS at 11:55

## 2023-04-03 RX ADMIN — CLOPIDOGREL BISULFATE 75 MG: 75 TABLET ORAL at 09:02

## 2023-04-03 RX ADMIN — DIPHENHYDRAMINE HYDROCHLORIDE AND LIDOCAINE HYDROCHLORIDE AND ALUMINUM HYDROXIDE AND MAGNESIUM HYDRO 5 ML: KIT at 08:57

## 2023-04-03 RX ADMIN — SPIRONOLACTONE 25 MG: 25 TABLET ORAL at 09:02

## 2023-04-03 RX ADMIN — RIFAXIMIN 550 MG: 550 TABLET ORAL at 21:23

## 2023-04-03 RX ADMIN — RIFAXIMIN 550 MG: 550 TABLET ORAL at 09:02

## 2023-04-03 RX ADMIN — Medication 400 MCG: at 09:02

## 2023-04-03 RX ADMIN — CAMPHOR AND MENTHOL: 5; 5 LOTION TOPICAL at 14:02

## 2023-04-03 RX ADMIN — DIPHENHYDRAMINE HYDROCHLORIDE AND LIDOCAINE HYDROCHLORIDE AND ALUMINUM HYDROXIDE AND MAGNESIUM HYDRO 5 ML: KIT at 11:55

## 2023-04-03 RX ADMIN — Medication 10 ML: at 21:26

## 2023-04-03 RX ADMIN — CAMPHOR AND MENTHOL: 5; 5 LOTION TOPICAL at 05:09

## 2023-04-03 RX ADMIN — PANTOPRAZOLE SODIUM 40 MG: 40 INJECTION, POWDER, LYOPHILIZED, FOR SOLUTION INTRAVENOUS at 05:10

## 2023-04-03 RX ADMIN — MIDODRINE HYDROCHLORIDE 15 MG: 5 TABLET ORAL at 11:55

## 2023-04-03 RX ADMIN — ASPIRIN 81 MG CHEWABLE TABLET 81 MG: 81 TABLET CHEWABLE at 09:01

## 2023-04-03 RX ADMIN — MIDODRINE HYDROCHLORIDE 15 MG: 5 TABLET ORAL at 17:22

## 2023-04-03 RX ADMIN — DIPHENHYDRAMINE HYDROCHLORIDE AND LIDOCAINE HYDROCHLORIDE AND ALUMINUM HYDROXIDE AND MAGNESIUM HYDRO 5 ML: KIT at 21:26

## 2023-04-03 RX ADMIN — OXYMETAZOLINE HCL 2 SPRAY: 0.05 SPRAY NASAL at 14:02

## 2023-04-03 RX ADMIN — FUROSEMIDE 20 MG: 20 TABLET ORAL at 09:02

## 2023-04-03 RX ADMIN — LACTULOSE 20 G: 20 SOLUTION ORAL at 09:00

## 2023-04-03 RX ADMIN — INSULIN LISPRO 9 UNITS: 100 INJECTION, SOLUTION INTRAVENOUS; SUBCUTANEOUS at 21:24

## 2023-04-03 RX ADMIN — CAMPHOR AND MENTHOL: 5; 5 LOTION TOPICAL at 21:27

## 2023-04-03 RX ADMIN — POTASSIUM CHLORIDE 40 MEQ: 10 CAPSULE, COATED, EXTENDED RELEASE ORAL at 14:01

## 2023-04-03 NOTE — PLAN OF CARE
Goal Outcome Evaluation:         Multiple incontinent loose BM. Denies pain. RA. NSR w/ rates 70s-90s. Abdomen firm but nontender per pt. Pt c/o itching, Sarna lotion administered.

## 2023-04-03 NOTE — PLAN OF CARE
Goal Outcome Evaluation:  Plan of Care Reviewed With: patient, family        Progress: no change  Outcome Evaluation: Pt. denied pain, nausea and dyspnea.  He did have a nosebleed and family asked for afrin which is what pt uses at home for nosebleeds.  Dr. Parks entered order for afrin.  Pt. got up to Bedside chair with assistance.  Pt. to get peritoneal drain tomorrow at 1100 then home with hospice services.  Family to transport. Palliative care to follow until discharge.

## 2023-04-03 NOTE — PLAN OF CARE
Goal Outcome Evaluation:  Plan of Care Reviewed With: patient, family        Progress: no change  Outcome Evaluation: Pt limited this date by fatigue but agreeable to therapy. Pt able to complete functional mobility with CGA - [place] without LOB. Increased time required for family education regarding mobility techniques and proper use of assistive devices for mobility at home. Will continue to follow IPOC.

## 2023-04-03 NOTE — THERAPY TREATMENT NOTE
Patient Name: Leoncio Hopson  : 1950    MRN: [id number]                              Today's Date: 4/3/2023       Admit Date: 3/25/2023    Visit Dx:     ICD-10-CM ICD-9-CM   1. Hyperammonemia  E72.20 270.6   2. ST elevation myocardial infarction involving right coronary artery  I21.11 410.31   3. CKD (chronic kidney disease) stage 5, GFR less than 15 ml/min  N18.5 585.5   4. Liver cirrhosis secondary to HAYES  K75.81 571.8    K74.60 571.5   5. Decompensated hepatic cirrhosis  K72.90 571.5    K74.60 572.8     Patient Active Problem List   Diagnosis   • PAOLO (acute kidney injury)   • Liver cirrhosis secondary to HAYES   • Hyperbilirubinemia   • Anemia, chronic disease   • Hyperammonemia   • Coagulopathy   • Thrombocytopenia   • Decompensated hepatic cirrhosis   • ST elevation myocardial infarction involving right coronary artery   • CKD (chronic kidney disease) stage 5, GFR less than 15 ml/min   • Hyperlipidemia LDL goal <70   • Coronary artery disease involving native coronary artery of native heart     Past Medical History:   Diagnosis Date   • Anemia    • Coagulopathy 2023   • Decompensated hepatic cirrhosis 2023   • Diabetes mellitus    • Encephalopathy, hepatic 2023   • Hypertension    • Liver cirrhosis secondary to HAYES    • Liver lesion    • Other ascites 2023   • Thrombocytopenia 2023     Past Surgical History:   Procedure Laterality Date   • ANKLE SURGERY     • CARDIAC CATHETERIZATION N/A 3/25/2023    Procedure: Left Heart Cath;  Surgeon: Nithin Nieto IV, MD;  Location:  [facility] CATH INVASIVE LOCATION;  Service: Cardiovascular;  Laterality: N/A;   • CARDIAC CATHETERIZATION  3/25/2023    Procedure: Percutaneous Manual Thrombectomy;  Surgeon: Nithin Nieto IV, MD;  Location: [facility] CATH INVASIVE LOCATION;  Service: Cardiovascular;;   • CARDIAC CATHETERIZATION N/A 3/25/2023    Procedure: Stent HARDEEP coronary;  Surgeon: Nithin Nieto IV, MD;   Location: [facility] INVASIVE [facility];  Service: Cardiovascular;  Laterality: N/A;   • EYE SURGERY        General Information     Row Name 04/03/23 1145          Physical Therapy Time and Intention    Document Type therapy note (daily note)  -ES     Mode of Treatment physical therapy  -ES     Row Name 04/03/23 1145          General Information    Patient Profile Reviewed yes  -ES     Existing Precautions/Restrictions fall;cardiac  -ES     Barriers to Rehab medically complex;hearing deficit  -ES     Row Name 04/03/23 1145          Cognition    Orientation Status (Cognition) oriented x 3  -ES     Row Name 04/03/23 1145          Safety Issues, Functional Mobility    Safety Issues Affecting Function (Mobility) insight into deficits/self-awareness;safety precaution awareness;safety precautions follow-through/compliance  -ES     Impairments Affecting Function (Mobility) balance;endurance/activity tolerance  -ES     Comment, Safety Issues/Impairments (Mobility) up x1 assist  -ES           User Key  (r) = Recorded By, (t) = Taken By, (c) = Cosigned By    Initials Name Provider Type    ES Patsy Toney, PT Physical Therapist               Mobility     Row Name 04/03/23 1146          Bed Mobility    Supine-Sit [place] (Bed Mobility) contact guard  -ES     Assistive Device (Bed Mobility) bed rails;head of bed elevated  -ES     Row Name 04/03/23 1146          Bed-Chair Transfer    Bed-Chair [place] (Transfers) minimum assist (75% patient effort);verbal cues  -ES     Assistive Device (Bed-Chair Transfers) other (see comments)  BUE support  -ES     Row Name 04/03/23 1146          Sit-Stand Transfer    Sit-Stand [place] (Transfers) contact guard;verbal cues  -ES     Assistive Device (Sit-Stand Transfers) other (see comments)  BUE support  -ES     Row Name 04/03/23 1146          Gait/Stairs (Locomotion)    Comment, (Gait/Stairs) Pt declined ambulation this date due to nosebleed and increased fatigue.  -ES            User Key  (r) = Recorded By, (t) = Taken By, (c) = Cosigned By    Initials Name Provider Type    ES Patsy Toney PT Physical Therapist               Obj/Interventions     Row Name 04/03/23 1147          Balance    Balance Assessment sitting static balance;sitting dynamic balance;sit to stand dynamic balance;standing static balance;standing dynamic balance  -ES     Static Sitting Balance independent  -ES     Dynamic Sitting Balance standby assist  -ES     Position, Sitting Balance unsupported;sitting edge of bed  -ES     Static Standing Balance standby assist  -ES     Dynamic Standing Balance minimal assist  -ES     Position/Device Used, Standing Balance supported;other (see comments)  BUE support  -ES     Balance Interventions sitting;standing;sit to stand;static;dynamic;supported  -ES     Comment, Balance Pt with forward flexed posture during t/f. No LOB  -ES           User Key  (r) = Recorded By, (t) = Taken By, (c) = Cosigned By    Initials Name Provider Type    ES Patsy Toney, YASMIN Physical Therapist               Goals/Plan    No documentation.                Clinical Impression     Row Name 04/03/23 1148          Pain    Pretreatment Pain Rating 0/10 - no pain  -ES     Posttreatment Pain Rating 0/10 - no pain  -ES     Pre/Posttreatment Pain Comment tolerated  -ES     Pain Intervention(s) Repositioned;Ambulation/increased activity  -ES     Row Name 04/03/23 1148          Plan of Care Review    Plan of Care Reviewed With patient;family  -ES     Progress no change  -ES     Outcome Evaluation Pt limited this date by fatigue but agreeable to therapy. Pt able to complete functional mobility with CGA - [place] without LOB. Increased time required for family education regarding mobility techniques and proper use of assistive devices for mobility at home. Will continue to follow IPOC.  -ES     Row Name 04/03/23 1140          Therapy Assessment/Plan (PT)    Rehab Potential (PT) fair, will monitor progress  closely  -ES     Criteria for Skilled Interventions Met (PT) yes;meets criteria;skilled treatment is necessary  -ES     Therapy Frequency (PT) daily  -ES     Row Name 04/03/23 1148          Vital Signs    Pre Systolic BP Rehab --  VSS  -ES     O2 Delivery Pre Treatment room air  -ES     O2 Delivery Intra Treatment room air  -ES     O2 Delivery Post Treatment room air  -ES     Pre Patient Position Supine  -ES     Intra Patient Position Standing  -ES     Post Patient Position Sitting  -ES     Row Name 04/03/23 1148          Positioning and Restraints    Pre-Treatment Position in bed  -ES     Post Treatment Position chair  -ES     In Chair notified nsg;reclined;sitting;call light within reach;encouraged to call for assist;exit alarm on;waffle cushion;legs elevated;with family/caregiver  -ES           User Key  (r) = Recorded By, (t) = Taken By, (c) = Cosigned By    Initials Name Provider Type    Patsy Saleem, PT Physical Therapist               Outcome Measures     Row Name 04/03/23 1150 04/03/23 0800       How much help from another person do you currently need...    Turning from your back to your side while in flat bed without using bedrails? 3  -ES 3  -AM    Moving from lying on back to sitting on the side of a flat bed without bedrails? 3  -ES 3  -AM    Moving to and from a bed to a chair (including a wheelchair)? 3  -ES 3  -AM    Standing up from a chair using your arms (e.g., wheelchair, bedside chair)? 3  -ES 3  -AM    Climbing 3-5 steps with a railing? 2  -ES 2  -AM    To walk in hospital room? 2  -ES 3  -AM    AM-PAC 6 Clicks Score (PT) 16  -ES 17  -AM    Highest level of mobility 5 --> Static standing  -ES 5 --> Static standing  -AM    Row Name 04/03/23 1150          Functional Assessment    Outcome Measure Options AM-PAC 6 Clicks Basic Mobility (PT)  -ES           User Key  (r) = Recorded By, (t) = Taken By, (c) = Cosigned By    Initials Name Provider Type    Rin Sanders RN Registered  Nurse    Patsy Saleem, PT Physical Therapist                             Physical Therapy Education     Title: PT OT SLP Therapies (In Progress)     Topic: Physical Therapy (In Progress)     Point: Mobility training (Done)     Learning Progress Summary           Patient Acceptance, E, VU by  at 3/30/2023 1138    Acceptance, E, VU,NR by HT at 3/29/2023 0940                   Point: Home exercise program (Not Started)     Learner Progress:  Not documented in this visit.          Point: Body mechanics (Done)     Learning Progress Summary           Patient Acceptance, E, VU by  at 3/30/2023 1138    Acceptance, E, VU,NR by HT at 3/29/2023 0940                   Point: Precautions (Done)     Learning Progress Summary           Patient Acceptance, E, VU by HT at 3/30/2023 1138    Acceptance, E, VU,NR by  at 3/29/2023 0940                               User Key     Initials Effective Dates Name Provider Type Discipline     01/12/23 -  Barbara Oconnell, PT Student PT Student PT              PT Recommendation and Plan     Plan of Care Reviewed With: patient, family  Progress: no change  Outcome Evaluation: Pt limited this date by fatigue but agreeable to therapy. Pt able to complete functional mobility with CGA - [place] without LOB. Increased time required for family education regarding mobility techniques and proper use of assistive devices for mobility at home. Will continue to follow IPOC.     Time Calculation:    PT Charges     Row Name 04/03/23 1150             Time Calculation    Start Time 1117  -ES      PT Received On 04/03/23  -ES      PT Goal Re-Cert Due Date 04/08/23  -ES         Time Calculation- PT    Total Timed Code Minutes- PT 26 minute(s)  -ES         Timed Charges    21462 - PT Therapeutic Activity Minutes 26  -ES         Total Minutes    Timed Charges Total Minutes 26  -ES       Total Minutes 26  -ES            User Key  (r) = Recorded By, (t) = Taken By, (c) = Cosigned By    Initials Name  Provider Type     Patsy Toney, PT Physical Therapist              Therapy Charges for Today     Code Description Service Date Service Provider Modifiers Qty    22491942763 HC PT THERAPEUTIC ACT EA 15 MIN 4/3/2023 Patsy Toney, PT GP 2          PT G-Codes  Outcome Measure Options: AM-PAC 6 Clicks Basic Mobility (PT)  AM-PAC 6 Clicks Score (PT): 16  AM-PAC 6 Clicks Score (OT): 15  PT Discharge Summary  Anticipated Discharge Disposition (PT): home with assist    Patsy Toney PT  4/3/2023

## 2023-04-03 NOTE — PROGRESS NOTES
"   LOS: 9 days    Patient Care Team:  Antonio Castro MD as PCP - General  Antonio Castro MD as PCP - Family Medicine    Chief Complaint: Shortness of breath     72-year-old male with past medical history of [place], liver cirrhosis, prerenal acute kidney failure was last seen a month ago with a creatinine of 5.0 when he was transferred to  for further management for liver transplant.  Dialysis was not started at that time.    Prior to the above baseline creatinine 0.8-1.2.  On previous admission creatinine was 4.7, before discharge. Labs showed a creatinine 3.94, BUN 48, sodium 135, potassium 4.1, CO2 6.0, calcium 9.6, EGFR 15 mL/min, troponin high 53, hemoglobin 10.5, platelets 111 K, lactate high 17.6, phosphorus 3.9 magnesium 1.9, bilirubin 4.6 other liver enzymes were normal.  Subjective :   F/U PAOLO ON CKD.    Interval History:   Stable renal function. Paracentesis yesterday. Hypokalemia noted. Started on spironolactone      Review of Systems:   Hard of hearing, abdominal distention, mild shortness of breath, generalized weakness. .  All other negative    Objective     Vital Sign Min/Max for last 24 hours  Temp  Min: 97.6 °F (36.4 °C)  Max: 98.3 °F (36.8 °C)   BP  Min: 95/70  Max: 123/66   Pulse  Min: 73  Max: 105   Resp  Min: 16  Max: 18   SpO2  Min: 90 %  Max: 100 %   No data recorded   Weight  Min: 65.9 kg (145 lb 4.8 oz)  Max: 65.9 kg (145 lb 4.8 oz)     Flowsheet Rows    Flowsheet Row First Filed Value   Admission Height 172.7 cm (68\") Documented at 03/25/2023 1055   Admission Weight 72.6 kg (160 lb) Documented at 03/25/2023 1055          No intake/output data recorded.  I/O last 3 completed shifts:  In: -   Out: 5250 [Urine:250; Other:5000]    Physical Exam:  General appearance: Sick looking  male mild distress laying in bed.    HEENT: Hard of hearing, oral mucosa moist, neck is supple  Lungs: Few rhonchi's and rails heard, equal chest movement, no crepitation.  Heart: Normal S1, S2, no " gallop, murmur, RRR.  Abdomen: Abdomen distended, positive thrill soft, nontender, positive bowel sounds.  Extremities: Positive lower extremity edema, no cyanosis, no joint swelling.  Neuro: Alert, oriented x4, no focal deficit.  Psych: Mood and affect are normal and appropriate.  Skin: Skin is warm and dry.  : No suprapubic fullness or tenderness.    WBC WBC   Date Value Ref Range Status   04/03/2023 8.88 3.40 - 10.80 10*3/mm3 Final   04/02/2023 9.07 3.40 - 10.80 10*3/mm3 Final   04/01/2023 6.97 3.40 - 10.80 10*3/mm3 Final      HGB Hemoglobin   Date Value Ref Range Status   04/03/2023 9.3 (L) 13.0 - 17.7 g/dL Final   04/02/2023 8.4 (L) 13.0 - 17.7 g/dL Final   04/01/2023 7.6 (L) 13.0 - 17.7 g/dL Final      HCT Hematocrit   Date Value Ref Range Status   04/03/2023 27.4 (L) 37.5 - 51.0 % Final   04/02/2023 24.7 (L) 37.5 - 51.0 % Final   04/01/2023 22.6 (L) 37.5 - 51.0 % Final      Platlets No results found for: LABPLAT   MCV MCV   Date Value Ref Range Status   04/03/2023 100.4 (H) 79.0 - 97.0 fL Final   04/02/2023 98.4 (H) 79.0 - 97.0 fL Final   04/01/2023 100.4 (H) 79.0 - 97.0 fL Final          Sodium Sodium   Date Value Ref Range Status   04/03/2023 134 (L) 136 - 145 mmol/L Final   04/02/2023 133 (L) 136 - 145 mmol/L Final   04/01/2023 132 (L) 136 - 145 mmol/L Final      Potassium Potassium   Date Value Ref Range Status   04/03/2023 2.9 (L) 3.5 - 5.2 mmol/L Final   04/02/2023 3.0 (L) 3.5 - 5.2 mmol/L Final   04/01/2023 3.1 (L) 3.5 - 5.2 mmol/L Final      Chloride Chloride   Date Value Ref Range Status   04/03/2023 93 (L) 98 - 107 mmol/L Final   04/02/2023 93 (L) 98 - 107 mmol/L Final   04/01/2023 92 (L) 98 - 107 mmol/L Final      CO2 CO2   Date Value Ref Range Status   04/03/2023 25.0 22.0 - 29.0 mmol/L Final   04/02/2023 24.0 22.0 - 29.0 mmol/L Final   04/01/2023 24.0 22.0 - 29.0 mmol/L Final      BUN BUN   Date Value Ref Range Status   04/03/2023 49 (H) 8 - 23 mg/dL Final   04/02/2023 52 (H) 8 - 23 mg/dL Final    04/01/2023 54 (H) 8 - 23 mg/dL Final      Creatinine Creatinine   Date Value Ref Range Status   04/03/2023 3.55 (H) 0.76 - 1.27 mg/dL Final   04/02/2023 3.93 (H) 0.76 - 1.27 mg/dL Final   04/01/2023 4.31 (H) 0.76 - 1.27 mg/dL Final      Calcium Calcium   Date Value Ref Range Status   04/03/2023 8.3 (L) 8.6 - 10.5 mg/dL Final   04/02/2023 8.0 (L) 8.6 - 10.5 mg/dL Final   04/01/2023 7.5 (L) 8.6 - 10.5 mg/dL Final      PO4 No results found for: CAPO4   Albumin Albumin   Date Value Ref Range Status   04/03/2023 3.1 (L) 3.5 - 5.2 g/dL Final   04/02/2023 2.4 (L) 3.5 - 5.2 g/dL Final   04/01/2023 2.2 (L) 3.5 - 5.2 g/dL Final      Magnesium No results found for: MG   Uric Acid No results found for: URICACID        Results Review:     I reviewed the patient's new clinical results.    aspirin, 81 mg, Oral, Daily  camphor-menthol, , Topical, Q8H  clopidogrel, 75 mg, Oral, Daily  ferrous sulfate, 325 mg, Oral, Daily With Breakfast  First Mouthwash (Magic Mouthwash), 5 mL, Swish & Spit, 4x Daily AC & at Bedtime  fluticasone, 2 spray, Each Nare, Daily  folic acid, 400 mcg, Oral, Daily  furosemide, 20 mg, Oral, Daily  insulin detemir, 5 Units, Subcutaneous, Nightly  insulin lispro, 0-9 Units, Subcutaneous, TID AC  lactulose, 20 g, Oral, Daily  midodrine, 15 mg, Oral, TID AC  oxymetazoline, 2 spray, Each Nare, BID  pantoprazole, 40 mg, Intravenous, Q AM  pharmacy consult - [place], , Does not apply, Daily  potassium chloride, 40 mEq, Oral, Q4H  riFAXIMin, 550 mg, Oral, Q12H  sodium chloride, 10 mL, Intravenous, Q12H  spironolactone, 25 mg, Oral, Daily  ursodiol, 300 mg, Oral, BID           Medication Review: Reviewed    Assessment & Plan       ST elevation myocardial infarction involving right coronary artery    PAOLO (acute kidney injury)    Liver cirrhosis secondary to HAYES    Hyperbilirubinemia    Coagulopathy    Decompensated hepatic cirrhosis    CKD (chronic kidney disease) stage 5, GFR less than 15 ml/min    Hyperlipidemia LDL  goal <70    Coronary artery disease involving native coronary artery of native heart    1.  Acute kidney injury: Patient was last seen a month ago with creatinine 4.5- 5.0 range.  Prior to that baseline was 0.8-1.2, likely diagnosis was hepatorenal versus ATN.  At that time patient was transferred to  for further management.  Patient has been admitted and underwent a cardiac catheterization received 80 mL of IV contrast.   2.  S/p right RCA stenting for the clot 3/25/2023 today  3.  HAYES: Decompensated liver cirrhosis.    4.  Hypotension: In the setting of liver disease. Improved BP high now  5.  Anemia  6.  Thrombocytopenia   7.  Thoracentesis: 3 weeks back  8.  Paracentesis: On 3/17/2023 at [facility].   9   Hypokalemia    Recommendations:  So far patient has not demonstrated meaningful renal recovery since last admission in Feb and developed acute kidney disease with persistent cr elevation. Niurka any uremic symptoms. Therefore no indication of dialysis. However he is at risk for needing dialysis in near future. Family deciding for home hospice  .Continue spironolactone 25 mg daily. Reduce lasix to 20 mg daily  - Oral KCL 40 meq BID x1 day.   - Will need close outpatient f/u in renal clinic. If renal function stable by tomorrow can be discharge with close followup.   - Hold midodrine if sbp>130         Sergei العلي MD  04/03/23  12:52 EDT

## 2023-04-03 NOTE — PROGRESS NOTES
"GI Daily Progress Note  Subjective:    Chief Complaint: Follow-up decompensated liver cirrhosis    Leoncio is resting in bed in no acute distress.  Family at the bedside.  Notes his abdomen is more distended again today following 5 L paracentesis yesterday.  Patient and family with questions regarding hospice care.  Otherwise, no new or worsening GI symptoms reported.  Denies pain.    Objective:    /66 (BP Location: Left arm, Patient Position: Sitting)   Pulse 96   Temp 98.1 °F (36.7 °C) (Oral)   Resp 18   Ht 172.7 cm (67.99\")   Wt 65.9 kg (145 lb 4.8 oz)   SpO2 99%   BMI 22.10 kg/m²     Physical Exam  Vitals and nursing note reviewed.   Constitutional:       General: He is not in acute distress.     Appearance: Normal appearance. He is normal weight. He is ill-appearing. He is not toxic-appearing.   Cardiovascular:      Rate and Rhythm: Normal rate and regular rhythm.      Pulses: Normal pulses.      Heart sounds: Normal heart sounds.   Pulmonary:      Effort: Pulmonary effort is normal. No respiratory distress.      Breath sounds: Normal breath sounds.   Abdominal:      General: Bowel sounds are normal. There is distension.      Palpations: Abdomen is soft. There is no mass.      Tenderness: There is abdominal tenderness. There is no guarding or rebound.      Hernia: No hernia is present.      Comments: Tympanitic to percussion over the midline; dull over the lateralmost aspects.   Skin:     Capillary Refill: Capillary refill takes less than 2 seconds.      Coloration: Skin is pale.   Neurological:      General: No focal deficit present.      Mental Status: He is alert and oriented to person, place, and time.   Psychiatric:         Mood and Affect: Mood normal.         Behavior: Behavior normal.         Thought Content: Thought content normal.         Judgment: Judgment normal.         Lab  I have personally reviewed most recent cardiac tracings, lab results and radiology images and interpretations and " agree with findings.    Lab Results   Component Value Date    WBC 8.88 04/03/2023    HGB 9.3 (L) 04/03/2023    HGB 8.4 (L) 04/02/2023    HGB 7.6 (L) 04/01/2023    .4 (H) 04/03/2023    PLT 50 (L) 04/03/2023    INR 1.91 (H) 03/31/2023    INR 2.05 (H) 03/29/2023    INR 3.09 (H) 03/27/2023    INR 2.71 (H) 03/26/2023    INR 2.58 (H) 03/25/2023       Lab Results   Component Value Date    GLUCOSE 136 (H) 04/03/2023    BUN 49 (H) 04/03/2023    CREATININE 3.55 (H) 04/03/2023    EGFRIFNONA 59 (L) 04/22/2022    EGFRIFAFRI >60 04/22/2022    BCR 13.8 04/03/2023     (L) 04/03/2023    K 2.9 (L) 04/03/2023    CO2 25.0 04/03/2023    CALCIUM 8.3 (L) 04/03/2023    PROTENTOTREF 5.8 (L) 05/24/2015    ALBUMIN 3.1 (L) 04/03/2023    ALKPHOS 95 04/03/2023    BILITOT 4.2 (H) 04/03/2023    BILIDIR 1.91 (H) 03/15/2023    ALT 13 04/03/2023    AST 27 04/03/2023       Assessment:      ST elevation myocardial infarction involving right coronary artery    PAOLO (acute kidney injury)    Liver cirrhosis secondary to HAYES    Hyperbilirubinemia    Coagulopathy    Decompensated hepatic cirrhosis    CKD (chronic kidney disease) stage 5, GFR less than 15 ml/min    Hyperlipidemia LDL goal <70    Coronary artery disease involving native coronary artery of native heart    1.  HAYES cirrhosis. MELD-Na = 33  2.  Ascites  3.  Hepatic hydrothorax.  4.  Chronic kidney disease, creatinine 3.93   5.  STEMI, status post PCI/HARDEEP RCA on 3/25/23    Plan:  Point-of-care ultrasound utilized to evaluate abdomen for ascites; following his third LVP this week, patient already reaccumulated ascites in all quadrants.    >>> Family has elected for Tenckhoff catheter   -N.p.o. at midnight   -Obtain informed consent for Aspira peritoneal drainage catheter insertion   -AM CBC, CMP, PT/INR  >>> Continue HE management   -goal for 3-4 soft BMs daily   -hold if diarrhea     Discussion:  Had a long conversation with patient, spouse, and daughter at the bedside regarding  patient's medical status.  They inquired on the nature of the peritoneal drain and how to care for it as it relates to hospice care; all questions and concerns were addressed.  They had several questions regarding hospice which I referred to the hospice care team for their expertise.  We did discuss how patient's STEMI and renal failure has proven to be greatly detrimental to patient's overall wellbeing and would preclude his ability of seeking transplant.  I also discussed with him that patient would not be a good TIPS candidate due to his high MELD and encephalopathy..  As such, no curative treatments can be offered in the setting and patient would meet hospice criteria.  Patient and family voiced understanding of this and are going to talk amongst themselves regarding the Tenckhoff catheter; if they wish to pursue this, they will relay this information via the nurse to me and I will get this arranged.     Christian Campos, APRN  04/03/23  14:40 EDT

## 2023-04-03 NOTE — PROGRESS NOTES
[facility] Medicine Services  PROGRESS NOTE    Patient Name: Leoncio Hopson  : 1950  MRN: [id number]    Date of Admission: 3/25/2023  Primary Care Physician: Antonio Castro MD    Subjective     CC: f/u PAOLO, cirrhosis, STEMI    HPI:  In bed, he is feeling okay. Paracentesis yesterday but feels like abdomen is filling up again. Long discussion with wife and daughter at bedside. They are anxious to bring him home and agreeable to home hospice services. GI has seen and plan made for peritoneal drain placement tomorrow   Per RN, nosebleed later this morning     ROS:  Gen- No fevers, chills  CV- No chest pain, palpitations  Resp- No cough, dyspnea  GI- No N/V/D, + abd distention     Objective     Vital Signs:   Temp:  [97.6 °F (36.4 °C)-98.3 °F (36.8 °C)] 98.1 °F (36.7 °C)  Heart Rate:  [] 96  Resp:  [16-18] 18  BP: ()/(61-70) 123/66     Physical Exam:  Constitutional: Frail male, in bed. NAD. Awake, alert and conversant  HENT: NCAT, mucous membranes moist. [place]  Respiratory: Clear to auscultation bilaterally, respiratory effort normal on room air   Cardiovascular: RRR, no murmurs, rubs, or gallops  Gastrointestinal: Positive bowel sounds   Musculoskeletal: No bilateral ankle edema  Psychiatric: Appropriate affect, cooperative  Neurologic: Oriented x 3, moves all extremities spontaneously without focal deficits, speech clear    Results Reviewed:  LAB RESULTS:      Lab 23  0750 23  0716 23  0739 23  0607 23  0206 23  0328   WBC 8.88 9.07 6.97 10.98* 13.05* 8.27   HEMOGLOBIN 9.3* 8.4* 7.6* 8.2* 8.8* 8.2*   HEMATOCRIT 27.4* 24.7* 22.6* 23.6* 25.9* 24.7*   PLATELETS 50* 46* 36* 46* 52* 44*   NEUTROS ABS  --   --   --   --   --  5.69   IMMATURE GRANS (ABS)  --   --   --   --   --  0.09*   LYMPHS ABS  --   --   --   --   --  0.63*   MONOS ABS  --   --   --   --   --  0.41   EOS ABS  --   --   --   --   --  1.44*   .4* 98.4* 100.4*  98.3* 102.0* 102.1*   PROTIME  --   --   --  21.9*  --  23.1*         Lab 04/03/23  0750 04/02/23  0716 04/01/23  0739 03/31/23  0607 03/30/23  0206 03/29/23  1619 03/29/23  0200 03/28/23  1337 03/28/23  0300   SODIUM 134* 133* 132* 135* 136  --  138  --  137   POTASSIUM 2.9* 3.0* 3.1* 3.5 3.7   < > 3.2*   < > 3.1*   CHLORIDE 93* 93* 92* 92* 95*  --  94*  --  90*   CO2 25.0 24.0 24.0 27.0 27.0  --  28.0  --  29.0   ANION GAP 16.0* 16.0* 16.0* 16.0* 14.0  --  16.0*  --  18.0*   BUN 49* 52* 54* 59* 69*  --  71*  --  77*   CREATININE 3.55* 3.93* 4.31* 4.75* 4.85*  --  5.14*  --  4.92*   EGFR 17.3* 15.3* 13.7* 12.2* 11.9*  --  11.1*  --  11.7*   GLUCOSE 136* 103* 128* 93 124*  --  121*  --  151*   CALCIUM 8.3* 8.0* 7.5* 8.1* 8.3*  --  8.2*  --  8.2*   MAGNESIUM  --   --   --   --  1.9  --  1.7  --   --    PHOSPHORUS  --   --   --   --   --   --   --   --  3.8    < > = values in this interval not displayed.         Lab 04/03/23  0750 04/02/23  0716 04/01/23  0739 03/31/23  0607 03/30/23  0206   TOTAL PROTEIN 5.5* 4.9* 4.3* 4.9* 5.4*   ALBUMIN 3.1* 2.4* 2.2* 2.5* 2.6*   GLOBULIN 2.4 2.5 2.1 2.4 2.8   ALT (SGPT) 13 14 13 17 19   AST (SGOT) 27 30 32 43* 55*   BILIRUBIN 4.2* 3.8* 3.6* 3.7* 3.3*   ALK PHOS 95 72 64 75 67         Lab 03/31/23  0607 03/29/23  0328   PROTIME 21.9* 23.1*   INR 1.91* 2.05*         Lab 03/29/23  1619   ABO TYPING O   RH TYPING Positive   ANTIBODY SCREEN Negative     Brief Urine Lab Results  (Last result in the past 365 days)      Color   Clarity   Blood   Leuk Est   Nitrite   Protein   CREAT   Urine HCG        03/25/23 2327 Dark Yellow   Turbid   Large (3+)   Moderate (2+)   Negative   >=300 mg/dL (3+)               Microbiology Results Abnormal     Procedure Component Value - Date/Time    Body Fluid Culture - Body Fluid, Peritoneum [[id number]] Collected: 03/31/23 1538    Lab Status: Preliminary result Specimen: Body Fluid from Peritoneum Updated: 04/02/23 1001     Body Fluid Culture No growth at 2  days     Gram Stain Few (2+) WBCs seen      No organisms seen    Blood Culture - Blood, Arm, Left [[id number]]  (Normal) Collected: 03/25/23 2021    Lab Status: Final result Specimen: Blood from Arm, Left Updated: 03/30/23 2045     Blood Culture No growth at 5 days    Blood Culture - Blood, Hand, Left [[id number]]  (Normal) Collected: 03/25/23 1622    Lab Status: Final result Specimen: Blood from Hand, Left Updated: 03/30/23 1700     Blood Culture No growth at 5 days    Narrative:      Aerobic bottle only      Urine Culture - Urine, Indwelling Urethral Catheter [[id number]]  (Normal) Collected: 03/25/23 2327    Lab Status: Final result Specimen: Urine from Indwelling Urethral Catheter Updated: 03/27/23 1014     Urine Culture No growth    Eosinophil Smear - Urine, Urine, Clean Catch [[id number]]  (Normal) Collected: 03/25/23 2241    Lab Status: Final result Specimen: Urine, Clean Catch Updated: 03/26/23 0224     Eosinophil Smear 0 % EOS/100 Cells     Narrative:      No eosinophil seen        No radiology results from the last 24 hrs    Results for orders placed during the hospital encounter of 03/25/23    Adult Transthoracic Echo Complete W/ Cont if Necessary Per Protocol    Interpretation Summary  •  Left ventricular systolic function is normal. Estimated left ventricular EF = 65%  •  The aortic valve is mildly calcified.  There is minimal aortic stenosis present.  •  Moderate pulmonary hypertension is present. Estimated right ventricular systolic pressure from tricuspid regurgitation is moderately elevated (49 mmHg).  •  Moderate dilation of the aortic root is present. Aneurysmal dilation of the ascending aorta is present.  •  There is a right pleural effusion.    Current medications:  Scheduled Meds:aspirin, 81 mg, Oral, Daily  camphor-menthol, , Topical, Q8H  clopidogrel, 75 mg, Oral, Daily  ferrous sulfate, 325 mg, Oral, Daily With Breakfast  First Mouthwash (Magic Mouthwash), 5 mL, Swish & Spit, 4x Daily AC & at  Bedtime  fluticasone, 2 spray, Each Nare, Daily  folic acid, 400 mcg, Oral, Daily  furosemide, 20 mg, Oral, Daily  insulin detemir, 5 Units, Subcutaneous, Nightly  insulin lispro, 0-9 Units, Subcutaneous, TID AC  lactulose, 20 g, Oral, Daily  midodrine, 15 mg, Oral, TID AC  oxymetazoline, 2 spray, Each Nare, BID  pantoprazole, 40 mg, Intravenous, Q AM  pharmacy consult - MTM, , Does not apply, Daily  potassium chloride, 40 mEq, Oral, Q4H  riFAXIMin, 550 mg, Oral, Q12H  sodium chloride, 10 mL, Intravenous, Q12H  spironolactone, 25 mg, Oral, Daily  ursodiol, 300 mg, Oral, BID      Continuous Infusions:   PRN Meds:.•  Calcium Replacement - Follow Nurse / BPA Driven Protocol  •  camphor-menthol  •  dextrose  •  dextrose  •  First Mouthwash (Magic Mouthwash)  •  glucagon (human recombinant)  •  Lidocaine HCl gel  •  Magnesium Low Dose Replacement - Follow Nurse / BPA Driven Protocol  •  palliative care oral rinse  •  Phosphorus Replacement - Follow Nurse / BPA Driven Protocol  •  sodium chloride  •  sodium chloride    Assessment & Plan     Active Hospital Problems    Diagnosis  POA   • **ST elevation myocardial infarction involving right coronary artery [I21.11]  Yes   • Hyperlipidemia LDL goal <70 [E78.5]  Yes   • Coronary artery disease involving native coronary artery of native heart [I25.10]  Yes   • CKD (chronic kidney disease) stage 5, GFR less than 15 ml/min [N18.5]  Yes   • Decompensated hepatic cirrhosis [K72.90, K74.60]  Yes   • Coagulopathy [D68.9]  Yes   • Liver cirrhosis secondary to HAYES [K75.81, K74.60]  Yes   • Hyperbilirubinemia [E80.6]  Yes   • PAOLO (acute kidney injury) [N17.9]  Yes      Resolved Hospital Problems    Diagnosis Date Resolved POA   • Diabetes mellitus [E11.9] 03/25/2023 Unknown     Brief Hospital Course to date:  Leoncio Hopson is a 72 y.o. male with PMHx significant for HTN CKD V (not on HD, HAYES cirrhosis (rejected for liver-kidney transplant at , being evaluated at  but not on  transplant list) with known varices, ascites (s/p paracentesis x 1), recurrent hepatic hydrothorax (s/p thoracentesis x 4) and thrombocytopenia as well as chronic anemia. Recently admitted to [facility] 2/9-2/14/23 for hepatorenal syndrome and hepatic encephalopathy - transferred to  for liver/kidney transplant evaluation. He was rejected for transplant but remain inpatient until 2/23/2023 for stabilization. Creatinine was high as 5. He was unresponsive to albumin challenge and was started on Octreotide/Midodrine with good response. Insurance denied octreotide so he was ultimately weaned off and discharged.    He was transferred to [facility] from [facility] ED on 3/25/2023 for STEMI. He underwent emergent LHC which showed 2v CAD - he is s/p HARDEEP to RCA and unsuccessful thrombectomy to 100% occluded RPDA. ECHO with EF 65%.GI was consulted due to decompensated HAYES liver cirrhosis. He underwent paracentesis on 3/26 with 4.5L removed, 3/31 with 5L removed and 4/2 with another 5L removed. Palliative / hospice care teams consulted. Family has opted for home with hospice. Will place peritoneal drain prior to DC home     STEMI s/p HARDEEP to RCA on 3/25  - Appreciate cardiology assistance  - Continue DAPT. No statin due to liver failure   - EF 65% this admission     Decompensated HAYES cirrhosis w/ascites, varices, thrombocytopenia and coagulopathy  Hepatic Encephalopathy  Hepatic Hydrothorax    - MELD-Na 33  - Over 14L ascites removed in 1 week. s/p paracentesis 3/26 with 4.5L removed, 3/31 w/5L removed and 4/2 with 5L removed, no evidence of SBP  - Continue rifaxamin, lactulose  - Octreotide gtt discontinued on 3/31, does not seem to be getting any benefit from this. Per [facility] records, insurance had denied   - [facility] and GI have spoken with family extensively. He is not a candidate for transplant due to recent STEMI and need for continued DAPT. Palliative/hospice have seen and family have  opted for home with hospice services.   - IR-guided palliative peritoneal drain placement planned for 4/4  - Nephrology adjusting meds. Currently on Lasix 20mg daily, Spironolactone 25mg daily     Hepatorenal syndrome   PAOLO on CKD IV  - Probable HRS vs. ATN  - Nephrology following, appreciate input - dialysis not indicated but has not made any appreciable improvements in renal function this admission     Hypokalemia  - Potassium 2.9 - discussed with nephrology  - KCl 40mEQ x 2 doses today  - AM BMP     Severe lactic acidosis, resolved   - No source of infection identified but procal was mildly elevated and he was treated with IV Zosyn x 7 days due to concerns for sepsis.      Expected Discharge Location and Transportation: Home with hospice   Expected Discharge   Expected Discharge Date and Time     Expected Discharge Date Expected Discharge Time    Apr 4, 2023         DVT prophylaxis:Mechanical DVT prophylaxis orders are present.     AM-PAC 6 Clicks Score (PT): 16 (04/03/23 1150)    CODE STATUS:   Code Status and Medical Interventions:   Ordered at: 04/01/23 1306     Medical Intervention Limits:    NO intubation (DNI)     Level Of Support Discussed With:    Patient     Code Status (Patient has no pulse and is not breathing):    No CPR (Do Not Attempt to Resuscitate)     Medical Interventions (Patient has pulse or is breathing):    Limited Support     Comments:    Full treatment     Release to patient:    Routine Release     Andree Huddleston PA-C  04/03/23

## 2023-04-03 NOTE — PROGRESS NOTES
Continued Stay Note  [facility]     Patient Name: Leoncio Hopson  MRN: [id number]  Today's Date: 4/3/2023    Admit Date: 3/25/2023    Plan: Home with [facility]   Discharge Plan     Row Name 04/03/23 1830       Plan    Plan Home with [facility]    Plan Comments Hospice follow up visit made to pt at 1145, pt's wife and daughter Nica present. Pt sitting up  in recliner eating lunch. Daughter stated spoke with hospice liaison Ty on Saturday, 4/1, regarding hospice services. Nica wanted to discuss hospice with pt and wife. At this time Nica stated pt and family does want hospice services. Nica stated the dr is waiting for a decision on whether or not the pt wants a pleurix drain placed, allowing pt to drain the abdominal fluid at home. Inca stated pt does want the drain and asked this writer to inform the physician. Message sent to the care team that pt does want the drain, The drain will be placed tomorrow at 1100. Family wants to transport pt home via private vehicle tomorrow afternoon. Reviewed pt's current medications, pt currently taking xifaxan for the liver failure, along with lactulose, Nica stated pt does have some pills at home. Informed daughter and spouse that the hospice physician may opt not to continue with the xifaxan. Telephone call to Dr. Herrera, hospice physician, regarding the xifaxan. Dr. Herrera stated once pt has completed the medication currently in the home, Dr. Herrera will ask the pt to stop the xifaxan and continue using the lactulose. During this time pt will be assessed for any changes in mentation, if becomes lethargic. Dr. Herrera stated if the lactulose alone is not effective, Dr. Herrera will discuss with the family the next course of action, which may be restarting the xifaxan Second visit made to pt and family. Explained to the family and pt the above information from Dr. Herrera, family and pt agreeable with the plan. Nica stated has the  hospice number to call to initiate hospice services when pt arrives home. Will continue to follow. Please call [phone] if can be of further assistance.                Discharge Codes    No documentation.               Expected Discharge Date and Time     Expected Discharge Date Expected Discharge Time    Apr 4, 2023             Allyssa Gordillo RN

## 2023-04-03 NOTE — CASE MANAGEMENT/SOCIAL WORK
Continued Stay Note   [facility]     Patient Name: Leoncio Hopson  MRN: [id number]  Today's Date: 4/3/2023    Admit Date: 3/25/2023    Plan: update   Discharge Plan     Row Name 04/03/23 1532       Plan    Plan update    Patient/Family in Agreement with Plan yes    Plan Comments Per hospice liaison, plan is for patient to discharge home tomorrow via car with hospice services.    Final Discharge Disposition Code 50 - home with hospice               Discharge Codes    No documentation.               Expected Discharge Date and Time     Expected Discharge Date Expected Discharge Time    Apr 4, 2023             Shobha Osborn RN

## 2023-04-04 ENCOUNTER — APPOINTMENT (OUTPATIENT)
Dept: INTERVENTIONAL RADIOLOGY/VASCULAR | Facility: HOSPITAL | Age: 73
DRG: 246 | End: 2023-04-04
Payer: MEDICARE

## 2023-04-04 ENCOUNTER — READMISSION MANAGEMENT (OUTPATIENT)
Dept: CALL CENTER | Facility: HOSPITAL | Age: 73
End: 2023-04-04
Payer: MEDICARE

## 2023-04-04 VITALS
SYSTOLIC BLOOD PRESSURE: 120 MMHG | RESPIRATION RATE: 18 BRPM | BODY MASS INDEX: 21.22 KG/M2 | TEMPERATURE: 98.5 F | OXYGEN SATURATION: 100 % | HEART RATE: 113 BPM | DIASTOLIC BLOOD PRESSURE: 89 MMHG | HEIGHT: 68 IN | WEIGHT: 140 LBS

## 2023-04-04 LAB
ALBUMIN SERPL-MCNC: 2.8 G/DL (ref 3.5–5.2)
ALBUMIN/GLOB SERPL: 1.1 G/DL
ALP SERPL-CCNC: 149 U/L (ref 39–117)
ALT SERPL W P-5'-P-CCNC: 14 U/L (ref 1–41)
ANION GAP SERPL CALCULATED.3IONS-SCNC: 14 MMOL/L (ref 5–15)
AST SERPL-CCNC: 27 U/L (ref 1–40)
BASOPHILS # BLD AUTO: 0.05 10*3/MM3 (ref 0–0.2)
BASOPHILS NFR BLD AUTO: 0.6 % (ref 0–1.5)
BILIRUB SERPL-MCNC: 3.7 MG/DL (ref 0–1.2)
BUN SERPL-MCNC: 46 MG/DL (ref 8–23)
BUN/CREAT SERPL: 13.6 (ref 7–25)
CALCIUM SPEC-SCNC: 8.2 MG/DL (ref 8.6–10.5)
CHLORIDE SERPL-SCNC: 100 MMOL/L (ref 98–107)
CO2 SERPL-SCNC: 25 MMOL/L (ref 22–29)
CREAT SERPL-MCNC: 3.38 MG/DL (ref 0.76–1.27)
DEPRECATED RDW RBC AUTO: 59.8 FL (ref 37–54)
EGFRCR SERPLBLD CKD-EPI 2021: 18.3 ML/MIN/1.73
EOSINOPHIL # BLD AUTO: 2.07 10*3/MM3 (ref 0–0.4)
EOSINOPHIL NFR BLD AUTO: 23.6 % (ref 0.3–6.2)
ERYTHROCYTE [DISTWIDTH] IN BLOOD BY AUTOMATED COUNT: 16.1 % (ref 12.3–15.4)
GLOBULIN UR ELPH-MCNC: 2.5 GM/DL
GLUCOSE BLDC GLUCOMTR-MCNC: 206 MG/DL (ref 70–130)
GLUCOSE BLDC GLUCOMTR-MCNC: 215 MG/DL (ref 70–130)
GLUCOSE SERPL-MCNC: 178 MG/DL (ref 65–99)
HCT VFR BLD AUTO: 24.8 % (ref 37.5–51)
HGB BLD-MCNC: 8.2 G/DL (ref 13–17.7)
IMM GRANULOCYTES # BLD AUTO: 0.13 10*3/MM3 (ref 0–0.05)
IMM GRANULOCYTES NFR BLD AUTO: 1.5 % (ref 0–0.5)
INR PPP: 1.69 (ref 0.84–1.13)
LYMPHOCYTES # BLD AUTO: 0.55 10*3/MM3 (ref 0.7–3.1)
LYMPHOCYTES NFR BLD AUTO: 6.3 % (ref 19.6–45.3)
MCH RBC QN AUTO: 33.5 PG (ref 26.6–33)
MCHC RBC AUTO-ENTMCNC: 33.1 G/DL (ref 31.5–35.7)
MCV RBC AUTO: 101.2 FL (ref 79–97)
MONOCYTES # BLD AUTO: 0.46 10*3/MM3 (ref 0.1–0.9)
MONOCYTES NFR BLD AUTO: 5.2 % (ref 5–12)
NEUTROPHILS NFR BLD AUTO: 5.51 10*3/MM3 (ref 1.7–7)
NEUTROPHILS NFR BLD AUTO: 62.8 % (ref 42.7–76)
NRBC BLD AUTO-RTO: 0 /100 WBC (ref 0–0.2)
PLATELET # BLD AUTO: 55 10*3/MM3 (ref 140–450)
PMV BLD AUTO: 11.6 FL (ref 6–12)
POTASSIUM SERPL-SCNC: 4.4 MMOL/L (ref 3.5–5.2)
PROT SERPL-MCNC: 5.3 G/DL (ref 6–8.5)
PROTHROMBIN TIME: 19.9 SECONDS (ref 11.4–14.4)
RBC # BLD AUTO: 2.45 10*6/MM3 (ref 4.14–5.8)
SODIUM SERPL-SCNC: 139 MMOL/L (ref 136–145)
WBC NRBC COR # BLD: 8.77 10*3/MM3 (ref 3.4–10.8)

## 2023-04-04 PROCEDURE — 25010000002 CEFAZOLIN 1-4 GM/50ML-% SOLUTION: Performed by: RADIOLOGY

## 2023-04-04 PROCEDURE — 25010000002 FENTANYL CITRATE (PF) 50 MCG/ML SOLUTION: Performed by: RADIOLOGY

## 2023-04-04 PROCEDURE — 82962 GLUCOSE BLOOD TEST: CPT

## 2023-04-04 PROCEDURE — 80053 COMPREHEN METABOLIC PANEL: CPT | Performed by: INTERNAL MEDICINE

## 2023-04-04 PROCEDURE — 85610 PROTHROMBIN TIME: CPT | Performed by: NURSE PRACTITIONER

## 2023-04-04 PROCEDURE — 49418 INSERT TUN IP CATH PERC: CPT

## 2023-04-04 PROCEDURE — 85025 COMPLETE CBC W/AUTO DIFF WBC: CPT | Performed by: NURSE PRACTITIONER

## 2023-04-04 PROCEDURE — 99239 HOSP IP/OBS DSCHRG MGMT >30: CPT | Performed by: PHYSICIAN ASSISTANT

## 2023-04-04 PROCEDURE — 63710000001 INSULIN LISPRO (HUMAN) PER 5 UNITS: Performed by: PHYSICIAN ASSISTANT

## 2023-04-04 PROCEDURE — C1729 CATH, DRAINAGE: HCPCS

## 2023-04-04 PROCEDURE — 36558 INSERT TUNNELED CV CATH: CPT

## 2023-04-04 PROCEDURE — C1769 GUIDE WIRE: HCPCS

## 2023-04-04 PROCEDURE — 25010000002 MIDAZOLAM PER 1MG: Performed by: RADIOLOGY

## 2023-04-04 PROCEDURE — 99152 MOD SED SAME PHYS/QHP 5/>YRS: CPT

## 2023-04-04 PROCEDURE — 76937 US GUIDE VASCULAR ACCESS: CPT

## 2023-04-04 PROCEDURE — 77001 FLUOROGUIDE FOR VEIN DEVICE: CPT

## 2023-04-04 RX ORDER — MIDAZOLAM HYDROCHLORIDE 2 MG/2ML
INJECTION, SOLUTION INTRAMUSCULAR; INTRAVENOUS AS NEEDED
Status: COMPLETED | OUTPATIENT
Start: 2023-04-04 | End: 2023-04-04

## 2023-04-04 RX ORDER — CLOPIDOGREL BISULFATE 75 MG/1
75 TABLET ORAL DAILY
Qty: 90 TABLET | Refills: 1 | Status: SHIPPED | OUTPATIENT
Start: 2023-04-04

## 2023-04-04 RX ORDER — FUROSEMIDE 20 MG/1
20 TABLET ORAL DAILY
Qty: 30 TABLET | Refills: 5 | Status: SHIPPED | OUTPATIENT
Start: 2023-04-04

## 2023-04-04 RX ORDER — CEFAZOLIN SODIUM 1 G/50ML
1 INJECTION, SOLUTION INTRAVENOUS ONCE
Status: COMPLETED | OUTPATIENT
Start: 2023-04-04 | End: 2023-04-04

## 2023-04-04 RX ORDER — ASPIRIN 81 MG/1
81 TABLET, CHEWABLE ORAL DAILY
Qty: 90 TABLET | Refills: 1 | Status: SHIPPED | OUTPATIENT
Start: 2023-04-04

## 2023-04-04 RX ORDER — MIDODRINE HYDROCHLORIDE 10 MG/1
15 TABLET ORAL
Qty: 135 TABLET | Refills: 5 | Status: SHIPPED | OUTPATIENT
Start: 2023-04-04

## 2023-04-04 RX ORDER — MIDAZOLAM HYDROCHLORIDE 1 MG/ML
INJECTION INTRAMUSCULAR; INTRAVENOUS
Status: DISCONTINUED
Start: 2023-04-04 | End: 2023-04-04 | Stop reason: HOSPADM

## 2023-04-04 RX ORDER — FENTANYL CITRATE 50 UG/ML
INJECTION, SOLUTION INTRAMUSCULAR; INTRAVENOUS
Status: DISCONTINUED
Start: 2023-04-04 | End: 2023-04-04 | Stop reason: HOSPADM

## 2023-04-04 RX ORDER — URSODIOL 300 MG/1
300 CAPSULE ORAL 2 TIMES DAILY
Start: 2023-04-04

## 2023-04-04 RX ORDER — SPIRONOLACTONE 25 MG/1
25 TABLET ORAL DAILY
Qty: 30 TABLET | Refills: 5 | Status: SHIPPED | OUTPATIENT
Start: 2023-04-04

## 2023-04-04 RX ORDER — FENTANYL CITRATE 50 UG/ML
INJECTION, SOLUTION INTRAMUSCULAR; INTRAVENOUS AS NEEDED
Status: COMPLETED | OUTPATIENT
Start: 2023-04-04 | End: 2023-04-04

## 2023-04-04 RX ORDER — OXYMETAZOLINE HYDROCHLORIDE 0.05 G/100ML
2 SPRAY NASAL 2 TIMES DAILY PRN
Qty: 1 ML | Refills: 0
Start: 2023-04-04

## 2023-04-04 RX ORDER — HEPARIN SODIUM 200 [USP'U]/100ML
INJECTION, SOLUTION INTRAVENOUS
Status: DISCONTINUED
Start: 2023-04-04 | End: 2023-04-04 | Stop reason: HOSPADM

## 2023-04-04 RX ORDER — LACTULOSE 10 G/15ML
10 SOLUTION ORAL 2 TIMES DAILY
Start: 2023-04-04

## 2023-04-04 RX ADMIN — CAMPHOR AND MENTHOL: 5; 5 LOTION TOPICAL at 02:20

## 2023-04-04 RX ADMIN — FENTANYL CITRATE 25 MCG: 50 INJECTION, SOLUTION INTRAMUSCULAR; INTRAVENOUS at 09:59

## 2023-04-04 RX ADMIN — OXYMETAZOLINE HCL 2 SPRAY: 0.05 SPRAY NASAL at 12:23

## 2023-04-04 RX ADMIN — MIDAZOLAM HYDROCHLORIDE 0.5 MG: 1 INJECTION, SOLUTION INTRAMUSCULAR; INTRAVENOUS at 10:00

## 2023-04-04 RX ADMIN — ASPIRIN 81 MG CHEWABLE TABLET 81 MG: 81 TABLET CHEWABLE at 12:13

## 2023-04-04 RX ADMIN — MIDAZOLAM HYDROCHLORIDE 0.5 MG: 1 INJECTION, SOLUTION INTRAMUSCULAR; INTRAVENOUS at 10:02

## 2023-04-04 RX ADMIN — INSULIN LISPRO 4 UNITS: 100 INJECTION, SOLUTION INTRAVENOUS; SUBCUTANEOUS at 12:11

## 2023-04-04 RX ADMIN — FERROUS SULFATE TAB 325 MG (65 MG ELEMENTAL FE) 325 MG: 325 (65 FE) TAB at 12:14

## 2023-04-04 RX ADMIN — DIPHENHYDRAMINE HYDROCHLORIDE AND LIDOCAINE HYDROCHLORIDE AND ALUMINUM HYDROXIDE AND MAGNESIUM HYDRO 5 ML: KIT at 12:11

## 2023-04-04 RX ADMIN — CLOPIDOGREL BISULFATE 75 MG: 75 TABLET ORAL at 12:14

## 2023-04-04 RX ADMIN — URSODIOL 300 MG: 300 CAPSULE ORAL at 12:12

## 2023-04-04 RX ADMIN — FUROSEMIDE 20 MG: 20 TABLET ORAL at 12:14

## 2023-04-04 RX ADMIN — FLUTICASONE PROPIONATE 2 SPRAY: 50 SPRAY, METERED NASAL at 12:19

## 2023-04-04 RX ADMIN — Medication 400 MCG: at 12:14

## 2023-04-04 RX ADMIN — SPIRONOLACTONE 25 MG: 25 TABLET ORAL at 12:11

## 2023-04-04 RX ADMIN — LIDOCAINE HYDROCHLORIDE 10 ML: 10; .005 INJECTION, SOLUTION EPIDURAL; INFILTRATION; INTRACAUDAL; PERINEURAL at 10:08

## 2023-04-04 RX ADMIN — RIFAXIMIN 550 MG: 550 TABLET ORAL at 12:14

## 2023-04-04 RX ADMIN — Medication 10 ML: at 12:18

## 2023-04-04 RX ADMIN — PANTOPRAZOLE SODIUM 40 MG: 40 INJECTION, POWDER, LYOPHILIZED, FOR SOLUTION INTRAVENOUS at 05:04

## 2023-04-04 RX ADMIN — LACTULOSE 20 G: 20 SOLUTION ORAL at 12:14

## 2023-04-04 RX ADMIN — CEFAZOLIN SODIUM 1 G: 1 INJECTION, SOLUTION INTRAVENOUS at 09:36

## 2023-04-04 RX ADMIN — FENTANYL CITRATE 25 MCG: 50 INJECTION, SOLUTION INTRAMUSCULAR; INTRAVENOUS at 10:02

## 2023-04-04 RX ADMIN — LIDOCAINE HYDROCHLORIDE 8 ML: 10; .005 INJECTION, SOLUTION EPIDURAL; INFILTRATION; INTRACAUDAL; PERINEURAL at 10:08

## 2023-04-04 RX ADMIN — MIDODRINE HYDROCHLORIDE 15 MG: 5 TABLET ORAL at 12:13

## 2023-04-04 NOTE — PROCEDURES
The following procedure was performed: Aspira pl    Please see corresponding Radiology report for in detail procedural information. The Radiology report will be dictated shortly, if not done so already. Please see the IR RN note for the information regarding medicines administered if any, mir-procedural vitals and I/O information.

## 2023-04-04 NOTE — NURSING NOTE
Prior to central line removal, order for the removal of catheter was verified, patient was assessed, necessary materials were gathered and patient educated.    Patient was positioned supine to ensure that the insertion site was at or below the level of the heart.    Hands were washed, clean gloves were applied and central line dressing was gently removed. Catheter exit site was not cultured.     A new pair of clean gloves were then applied. Insertion site was cleansed with 2% Chlorhexidine swab using a circular motion beginning at the insertion site and moving outward for 30 seconds and allowed to dry.     Clamp on line present.    Patient valsalva.     The central line was grasped at the insertion site and slowly pulled outward parallel to the skin. Resistance not met.    After central line was completely removed, a sterile 4x4 gauze pad was used to apply light pressure until bleeding stopped. At that time, petroleum-based gauze and a sterile occlusive dressing was applied to exit site.     Patient was instructed to keep dressing in place for at least 24 hours and flat    Catheter was inspected after removal and intact.Tip of central line was not sent for culture. Patient tolerated procedure.

## 2023-04-04 NOTE — DISCHARGE SUMMARY
[facility] Medicine Services  DISCHARGE SUMMARY    Patient Name: Leoncio Hopson  : 1950  MRN: [id number]    Date of Admission: 3/25/2023 10:46 AM  Date of Discharge: 2023  Primary Care Physician: Antonio Castro MD    Consults     Date and Time Order Name Status Description    3/31/2023  2:49 PM Inpatient Palliative Care MD Consult Completed     3/25/2023 12:30 PM Inpatient Gastroenterology Consult Completed     3/25/2023 12:30 PM Inpatient Nephrology Consult Completed         Hospital Course     Presenting Problem:   STEMI (ST elevation myocardial infarction) [I21.3]    Active Hospital Problems    Diagnosis  POA   • **ST elevation myocardial infarction involving right coronary artery [I21.11]  Yes   • Hyperlipidemia LDL goal <70 [E78.5]  Yes   • Coronary artery disease involving native coronary artery of native heart [I25.10]  Yes   • CKD (chronic kidney disease) stage 5, GFR less than 15 ml/min [N18.5]  Yes   • Decompensated hepatic cirrhosis [K72.90, K74.60]  Yes   • Coagulopathy [D68.9]  Yes   • Liver cirrhosis secondary to HAYES [K75.81, K74.60]  Yes   • Hyperbilirubinemia [E80.6]  Yes   • PAOLO (acute kidney injury) [N17.9]  Yes      Resolved Hospital Problems    Diagnosis Date Resolved POA   • Diabetes mellitus [E11.9] 2023 Unknown      Hospital Course:  Leoncio Hopson is a 72 y.o. male with PMHx significant for HTN CKD V (not on HD, HAYES cirrhosis (rejected for liver-kidney transplant at , being evaluated at  but not on transplant list) with known varices, ascites (s/p paracentesis x 1), recurrent hepatic hydrothorax (s/p thoracentesis x 4) and thrombocytopenia as well as chronic anemia. Recently admitted to [facility] -23 for hepatorenal syndrome and hepatic encephalopathy - transferred to  for liver/kidney transplant evaluation. He was rejected for transplant but remain inpatient until 2023 for stabilization. Creatinine  was high as 5. He was unresponsive to albumin challenge and was started on Octreotide/Midodrine with good response. Insurance denied octreotide so he was ultimately weaned off and discharged.     He was [facility] ED on 3/25/2023 for STEMI. He underwent emergent LHC which showed 2v CAD - he is s/p HARDEEP to RCA and unsuccessful thrombectomy to 100% occluded RPDA. ECHO with EF 65%.GI was consulted due to decompensated HAYES liver cirrhosis. He underwent paracentesis on 3/26 with 4.5L removed, 3/31 with 5L removed and 4/2 with another 5L removed. Palliative / hospice care teams consulted. Family has opted for home with hospice. Will place peritoneal drain prior to DC home      STEMI s/p HARDEEP to RCA on 3/25  - Appreciate cardiology assistance  - Continue DAPT. No statin due to liver failure   - EF 65% this admission      Decompensated HAYES cirrhosis w/ascites, varices, thrombocytopenia and coagulopathy  Hepatic Encephalopathy  Hepatic Hydrothorax  - MELD-Na 33  - Over 14L ascites removed in 1 week. s/p paracentesis 3/26 with 4.5L removed, 3/31 w/5L removed and 4/2 with 5L removed, no evidence of SBP  - Continue rifaxamin, lactulose (goal of 2-3 BMs per day)   - Octreotide gtt discontinued on 3/31, does not seem to be getting any benefit from this. Per [facility] records, insurance had denied   - Partners and GI have spoken with family extensively. He is not a candidate for transplant due to recent STEMI and need for continued DAPT. Palliative/hospice have seen and family have opted for home with hospice services.   - IR-guided palliative peritoneal drain placed on 4/4/23   - Nephrology adjusted meds. Continue Lasix 20mg daily, Spironolactone 25mg daily      Hepatorenal syndrome   PAOLO on CKD IV  - Probable HRS vs. ATN  - Nephrology following, appreciate input - dialysis not indicated but has not made any appreciable improvements in renal function this admission       Hypokalemia  - Replaced per protocol. Encourage PO intake at DC     DMII  - Stop Metformin at DC  - Continue renally-dosed Januvia. Wife reports patient also taking Glimepiride or Glipizide - not on home med list and she is unsure of dosage. Encouraged her to show meds to hospice and discuss with them. I counseled wife that in hospice care, hypoglycemia is more dangerous than hyperglycemia      Severe lactic acidosis, resolved   - No source of infection identified but procal was mildly elevated and he was treated with IV Zosyn x 7 days due to concerns for sepsis.   - Metformin may have also contributed given renal failure     Day of Discharge     HPI:   In bed after Tenkhoff placement. Feeling okay today with no new complaints. He is anxious to be discharged home. Discussed discharge instructions with wife at bedside.     Review of Systems  Gen- No fevers, chills  CV- No chest pain, palpitations  Resp- No cough, dyspnea  GI- No N/V/D, abd pain    Vital Signs:   Temp:  [96.9 °F (36.1 °C)-98.9 °F (37.2 °C)] 98.5 °F (36.9 °C)  Heart Rate:  [] 98  Resp:  [14-20] 18  BP: ()/(58-80) 121/76  Flow (L/min):  [2] 2    Physical Exam:  Constitutional: Frail male, in bed. NAD. Awake, alert and conversant  HENT: NCAT, mucous membranes moist. [place]  Respiratory: Clear to auscultation bilaterally, respiratory effort normal on room air   Cardiovascular: RRR, no murmurs, rubs, or gallops  Gastrointestinal: Positive bowel sounds. Tenkhoff drain in right lower quadrant. Some bloody drainage from lateral aspect of drain  Musculoskeletal: No bilateral ankle edema  Psychiatric: Appropriate affect, cooperative  Neurologic: Oriented x 3, moves all extremities spontaneously without focal deficits, speech clear    Pertinent  and/or Most Recent Results     LAB RESULTS:      Lab 04/04/23  0736 04/03/23  0750 04/02/23  0716 04/01/23  0739 03/31/23  0607 03/30/23  0206 03/29/23  0328   WBC 8.77 8.88 9.07 6.97 10.98*   < > 8.27    HEMOGLOBIN 8.2* 9.3* 8.4* 7.6* 8.2*   < > 8.2*   HEMATOCRIT 24.8* 27.4* 24.7* 22.6* 23.6*   < > 24.7*   PLATELETS 55* 50* 46* 36* 46*   < > 44*   NEUTROS ABS 5.51  --   --   --   --   --  5.69   IMMATURE GRANS (ABS) 0.13*  --   --   --   --   --  0.09*   LYMPHS ABS 0.55*  --   --   --   --   --  0.63*   MONOS ABS 0.46  --   --   --   --   --  0.41   EOS ABS 2.07*  --   --   --   --   --  1.44*   .2* 100.4* 98.4* 100.4* 98.3*   < > 102.1*   PROTIME  --   --   --   --  21.9*  --  23.1*    < > = values in this interval not displayed.         Lab 04/04/23  0736 04/03/23  0750 04/02/23  0716 04/01/23  0739 03/31/23  0607 03/30/23  0206 03/29/23  1619 03/29/23  0200   SODIUM 139 134* 133* 132* 135* 136  --  138   POTASSIUM 4.4 2.9* 3.0* 3.1* 3.5 3.7   < > 3.2*   CHLORIDE 100 93* 93* 92* 92* 95*  --  94*   CO2 25.0 25.0 24.0 24.0 27.0 27.0  --  28.0   ANION GAP 14.0 16.0* 16.0* 16.0* 16.0* 14.0  --  16.0*   BUN 46* 49* 52* 54* 59* 69*  --  71*   CREATININE 3.38* 3.55* 3.93* 4.31* 4.75* 4.85*  --  5.14*   EGFR 18.3* 17.3* 15.3* 13.7* 12.2* 11.9*  --  11.1*   GLUCOSE 178* 136* 103* 128* 93 124*  --  121*   CALCIUM 8.2* 8.3* 8.0* 7.5* 8.1* 8.3*  --  8.2*   MAGNESIUM  --   --   --   --   --  1.9  --  1.7    < > = values in this interval not displayed.         Lab 04/04/23  0736 04/03/23  0750 04/02/23  0716 04/01/23  0739 03/31/23  0607   TOTAL PROTEIN 5.3* 5.5* 4.9* 4.3* 4.9*   ALBUMIN 2.8* 3.1* 2.4* 2.2* 2.5*   GLOBULIN 2.5 2.4 2.5 2.1 2.4   ALT (SGPT) 14 13 14 13 17   AST (SGOT) 27 27 30 32 43*   BILIRUBIN 3.7* 4.2* 3.8* 3.6* 3.7*   ALK PHOS 149* 95 72 64 75         Lab 03/31/23  0607 03/29/23  0328   PROTIME 21.9* 23.1*   INR 1.91* 2.05*         Lab 03/29/23  1619   ABO TYPING O   RH TYPING Positive   ANTIBODY SCREEN Negative     Brief Urine Lab Results  (Last result in the past 365 days)      Color   Clarity   Blood   Leuk Est   Nitrite   Protein   CREAT   Urine HCG        03/25/23 2056 Dark Yellow   Turbid    Large (3+)   Moderate (2+)   Negative   >=300 mg/dL (3+)               Microbiology Results (last 10 days)     Procedure Component Value - Date/Time    Body Fluid Culture - Body Fluid, Peritoneum [[id number]] Collected: 03/31/23 1538    Lab Status: Final result Specimen: Body Fluid from Peritoneum Updated: 04/03/23 1354     Body Fluid Culture No growth at 3 days     Gram Stain Few (2+) WBCs seen      No organisms seen    Urine Culture - Urine, Indwelling Urethral Catheter [[id number]]  (Normal) Collected: 03/25/23 2327    Lab Status: Final result Specimen: Urine from Indwelling Urethral Catheter Updated: 03/27/23 1014     Urine Culture No growth    Eosinophil Smear - Urine, Urine, Clean Catch [[id number]]  (Normal) Collected: 03/25/23 2241    Lab Status: Final result Specimen: Urine, Clean Catch Updated: 03/26/23 0224     Eosinophil Smear 0 % EOS/100 Cells     Narrative:      No eosinophil seen    Blood Culture - Blood, Arm, Left [[id number]]  (Normal) Collected: 03/25/23 2021    Lab Status: Final result Specimen: Blood from Arm, Left Updated: 03/30/23 2045     Blood Culture No growth at 5 days    Blood Culture - Blood, Hand, Left [[id number]]  (Normal) Collected: 03/25/23 1622    Lab Status: Final result Specimen: Blood from Hand, Left Updated: 03/30/23 1700     Blood Culture No growth at 5 days    Narrative:      Aerobic bottle only          Adult Transthoracic Echo Complete W/ Cont if Necessary Per Protocol    Result Date: 3/25/2023  •  Left ventricular systolic function is normal. Estimated left ventricular EF = 65% •  The aortic valve is mildly calcified.  There is minimal aortic stenosis present. •  Moderate pulmonary hypertension is present. Estimated right ventricular systolic pressure from tricuspid regurgitation is moderately elevated (49 mmHg). •  Moderate dilation of the aortic root is present. Aneurysmal dilation of the ascending aorta is present. •  There is a right pleural effusion.     CT Abdomen Without  Contrast    Result Date: 3/25/2023  CT CHEST WO CONTRAST DIAGNOSTIC, CT PELVIS WO CONTRAST, CT ABDOMEN WO CONTRAST Date of Exam: 3/25/2023 5:14 PM EDT Indication: r/o sepsis. Comparison: Chest CT scan 2/9/2023 Technique: Axial CT images were obtained of the chest without contrast administration.  Reconstructed coronal and sagittal images were also obtained. Automated exposure control and iterative construction methods were used. Findings: CT SCAN OF THE CHEST WITHOUT CONTRAST: There is a moderate right pleural effusion, decreased from 2/9/2023. No left effusion or pericardial effusion is seen. There is ectasia of the ascending aorta is approximately 4.4 cm, minimally if at all increased from the prior study. There is dense coronary artery calcification. No mediastinal adenopathy is seen. Dense peripheral groundglass changes of the left lower lobe are very similar to appearance of Covid 19 pneumonia from the initial months the scan. There are much milder multifocal interstitial changes the left upper lobe. Small elongated inferior right upper lobe nodule or nodular scar is stable. Airways appear normally patent. Bony structures appear to be intact.     1. Moderate right pleural effusion decreased from 2/9/2023. 2. Left lower lobe pneumonia and much milder left upper lobe pneumonia, which may represent Covid 19 or other atypical pneumonia. 3. Stable small inferior right upper lobe pulmonary nodule. 4. Ectatic ascending aorta to 4.4 cm, stable only minimally increased. CT SCAN OF THE ABDOMEN AND PELVIS WITHOUT CONTRAST: Separate scans of the abdomen and pelvis are performed, as the initial scan of the abdomen was performed prior to pelvic ct scan being ordered. The studies are dictated together however. There is extensive ascites, and dense diffuse edema of subcutaneous tissues and particularly dense edema throughout the small bowel mesentery. There is a small cirrhotic appearing liver with no obvious lesion. Spleen is  not enlarged. Pancreas and adrenal  glands appear unremarkable. Concentrated contrast in the renal collecting systems resembles renal calculi. There is a small left lower pole renal cyst measuring near water density. No evidence of obstructive uropathy is seen. There is no evidence of free air or adenopathy. There is marked distention of stomach with fluid and air, and relatively decompressed duodenum but no evidence of obstructive lesion. Regarding lower abdomen pelvis, dense small bowel mesenteric edema and extensive ascites are again noted. No abnormally dilated bowel loops, evidence of pneumatosis or significant bowel wall edema is appreciated. Bladder is normally distended and normal in appearance. No mass or adenopathy is seen. Review of the bony structures shows grade 1 anterolisthesis of L5 on S1 with bilateral pars defects. There is a somewhat transitional appearance of S1 with a rudimentary S1-S2 disc space. Impression: 1. Extensive ascites and extensive dense anasarca throughout the small bowel mesentery and remaining soft tissues. 2. Small cirrhotic liver. 3. Fluid and air distended stomach, to the level of the duodenum. No obvious obstructing lesion or inflammation, possibly gastric ileus. 4. Incidentally noted grade 1 anterolisthesis of L5 on S1, with bilateral pars defects. Electronically Signed: Blayne Chung  3/25/2023 8:56 PM EDT  Workstation ID: [id number]    CT Chest Without Contrast Diagnostic    Result Date: 3/25/2023  CT CHEST WO CONTRAST DIAGNOSTIC, CT PELVIS WO CONTRAST, CT ABDOMEN WO CONTRAST Date of Exam: 3/25/2023 5:14 PM EDT Indication: r/o sepsis. Comparison: Chest CT scan 2/9/2023 Technique: Axial CT images were obtained of the chest without contrast administration.  Reconstructed coronal and sagittal images were also obtained. Automated exposure control and iterative construction methods were used. Findings: CT SCAN OF THE CHEST WITHOUT CONTRAST: There is a moderate right pleural effusion,  decreased from 2/9/2023. No left effusion or pericardial effusion is seen. There is ectasia of the ascending aorta is approximately 4.4 cm, minimally if at all increased from the prior study. There is dense coronary artery calcification. No mediastinal adenopathy is seen. Dense peripheral groundglass changes of the left lower lobe are very similar to appearance of Covid 19 pneumonia from the initial months the scan. There are much milder multifocal interstitial changes the left upper lobe. Small elongated inferior right upper lobe nodule or nodular scar is stable. Airways appear normally patent. Bony structures appear to be intact.     1. Moderate right pleural effusion decreased from 2/9/2023. 2. Left lower lobe pneumonia and much milder left upper lobe pneumonia, which may represent Covid 19 or other atypical pneumonia. 3. Stable small inferior right upper lobe pulmonary nodule. 4. Ectatic ascending aorta to 4.4 cm, stable only minimally increased. CT SCAN OF THE ABDOMEN AND PELVIS WITHOUT CONTRAST: Separate scans of the abdomen and pelvis are performed, as the initial scan of the abdomen was performed prior to pelvic ct scan being ordered. The studies are dictated together however. There is extensive ascites, and dense diffuse edema of subcutaneous tissues and particularly dense edema throughout the small bowel mesentery. There is a small cirrhotic appearing liver with no obvious lesion. Spleen is not enlarged. Pancreas and adrenal  glands appear unremarkable. Concentrated contrast in the renal collecting systems resembles renal calculi. There is a small left lower pole renal cyst measuring near water density. No evidence of obstructive uropathy is seen. There is no evidence of free air or adenopathy. There is marked distention of stomach with fluid and air, and relatively decompressed duodenum but no evidence of obstructive lesion. Regarding lower abdomen pelvis, dense small bowel mesenteric edema and extensive  ascites are again noted. No abnormally dilated bowel loops, evidence of pneumatosis or significant bowel wall edema is appreciated. Bladder is normally distended and normal in appearance. No mass or adenopathy is seen. Review of the bony structures shows grade 1 anterolisthesis of L5 on S1 with bilateral pars defects. There is a somewhat transitional appearance of S1 with a rudimentary S1-S2 disc space. Impression: 1. Extensive ascites and extensive dense anasarca throughout the small bowel mesentery and remaining soft tissues. 2. Small cirrhotic liver. 3. Fluid and air distended stomach, to the level of the duodenum. No obvious obstructing lesion or inflammation, possibly gastric ileus. 4. Incidentally noted grade 1 anterolisthesis of L5 on S1, with bilateral pars defects. Electronically Signed: Blayne Chung  3/25/2023 8:56 PM EDT  Workstation ID: [id number]    CT Pelvis Without Contrast    Result Date: 3/25/2023  CT CHEST WO CONTRAST DIAGNOSTIC, CT PELVIS WO CONTRAST, CT ABDOMEN WO CONTRAST Date of Exam: 3/25/2023 5:14 PM EDT Indication: r/o sepsis. Comparison: Chest CT scan 2/9/2023 Technique: Axial CT images were obtained of the chest without contrast administration.  Reconstructed coronal and sagittal images were also obtained. Automated exposure control and iterative construction methods were used. Findings: CT SCAN OF THE CHEST WITHOUT CONTRAST: There is a moderate right pleural effusion, decreased from 2/9/2023. No left effusion or pericardial effusion is seen. There is ectasia of the ascending aorta is approximately 4.4 cm, minimally if at all increased from the prior study. There is dense coronary artery calcification. No mediastinal adenopathy is seen. Dense peripheral groundglass changes of the left lower lobe are very similar to appearance of Covid 19 pneumonia from the initial months the scan. There are much milder multifocal interstitial changes the left upper lobe. Small elongated inferior right upper  lobe nodule or nodular scar is stable. Airways appear normally patent. Bony structures appear to be intact.     1. Moderate right pleural effusion decreased from 2/9/2023. 2. Left lower lobe pneumonia and much milder left upper lobe pneumonia, which may represent Covid 19 or other atypical pneumonia. 3. Stable small inferior right upper lobe pulmonary nodule. 4. Ectatic ascending aorta to 4.4 cm, stable only minimally increased. CT SCAN OF THE ABDOMEN AND PELVIS WITHOUT CONTRAST: Separate scans of the abdomen and pelvis are performed, as the initial scan of the abdomen was performed prior to pelvic ct scan being ordered. The studies are dictated together however. There is extensive ascites, and dense diffuse edema of subcutaneous tissues and particularly dense edema throughout the small bowel mesentery. There is a small cirrhotic appearing liver with no obvious lesion. Spleen is not enlarged. Pancreas and adrenal  glands appear unremarkable. Concentrated contrast in the renal collecting systems resembles renal calculi. There is a small left lower pole renal cyst measuring near water density. No evidence of obstructive uropathy is seen. There is no evidence of free air or adenopathy. There is marked distention of stomach with fluid and air, and relatively decompressed duodenum but no evidence of obstructive lesion. Regarding lower abdomen pelvis, dense small bowel mesenteric edema and extensive ascites are again noted. No abnormally dilated bowel loops, evidence of pneumatosis or significant bowel wall edema is appreciated. Bladder is normally distended and normal in appearance. No mass or adenopathy is seen. Review of the bony structures shows grade 1 anterolisthesis of L5 on S1 with bilateral pars defects. There is a somewhat transitional appearance of S1 with a rudimentary S1-S2 disc space. Impression: 1. Extensive ascites and extensive dense anasarca throughout the small bowel mesentery and remaining soft  tissues. 2. Small cirrhotic liver. 3. Fluid and air distended stomach, to the level of the duodenum. No obvious obstructing lesion or inflammation, possibly gastric ileus. 4. Incidentally noted grade 1 anterolisthesis of L5 on S1, with bilateral pars defects. Electronically Signed: Blayne Chung  3/25/2023 8:56 PM EDT  Workstation ID: [id number]    Cardiac Catheterization/Vascular Study    Result Date: 3/25/2023  •  Severe 2-vessel CAD (RCA, diagonal branch of the LAD) •  Successful PCI of the proximal RCA with placement of a Xience 4 x 23 mm drug-eluting stent •  Unsuccessful balloon angioplasty of 100% occluded right posterior lateral branch •  Unsuccessful manual thrombectomy of 100% occluded RPDA •  Normal LV filling pressure •  Medical therapy recommended for diagonal branch stenosis. •  DAPT (aspirin 81 mg + clopidogrel 75 mg daily) for 1 month due to high bleed risk.     US Renal Limited    Result Date: 3/25/2023  US RENAL LIMITED Date of Exam: 3/25/2023 4:25 PM EDT Indication: PAOLO on CKD. Comparison: No concurrent abdomen pelvis CT scan. Available. Technique: Grayscale and color Doppler ultrasound evaluation of the kidneys and urinary bladder was performed. Findings: Kidneys are symmetric and normal in size, 9.4 cm in length on the right and 10.2 cm cyst in length on the left. Cortical thickness appears normal. There is a 16 mm left renal mid pole cyst. No other cyst or mass is appreciated on either right or left. There is no evidence of hydronephrosis. No nephrolithiasis is seen. There is fairly extensive 4 quadrant ascites, and a right pleural effusion. Bladder is only mildly distended, but appears somewhat thick-walled. No corresponding abnormality is seen on the patient's CT scan, however. No bladder calculi are seen.     Impression: 1. Normal sized, grossly normal-appearing kidneys with no evidence of obstructive uropathy. Incidentally noted small left renal cyst. 2. At least moderate 4 quadrant ascites. 3.  Normally distended bladder, mildly thick-walled in appearance, nonspecific Electronically Signed: Blayne Chung  3/25/2023 10:36 PM EDT  Workstation ID: [id number]    XR Chest 1 View    Result Date: 3/30/2023  XR CHEST 1 VW Date of Exam: 3/30/2023 3:40 AM EDT Indication: follow up pleural effusions. Comparison: 3/26/2023 Findings: Right-sided central venous catheter projects unchanged. Aeration is worsened from comparison, including both increased bilateral airspace disease, most notable on the right as well as with increased small left and moderate right pleural effusions, with the appearance of underlying pulmonary edema. No pneumothorax. Unchanged heart and mediastinal contours.     Impression: Right-sided central venous catheter projects unchanged. Aeration is worsened from comparison, including both increased bilateral airspace disease, most notable on the right as well as with increased small left and moderate right pleural effusions, with the appearance of underlying pulmonary edema. No pneumothorax. Unchanged heart and mediastinal contours. Electronically Signed: Rylan Gaspar  3/30/2023 5:04 AM EDT  Workstation ID: [id number]    XR Chest 1 View    Result Date: 3/26/2023  Chest one view. DATE: 3/26/2023. COMPARISON: 2/19/2023. CLINICAL HISTORY: CVC PLACEMENT.     There is a right internal jugular central venous catheter with the catheter tip near the atrial caval junction. There is a small right pleural effusion and patchy interstitial changes with mild cardiomegaly which is compatible with mild pulmonary edema. No focal consolidation is otherwise seen. Electronically signed by:  Lalo Elizondo D.O.  3/25/2023 11:46 PM Mountain Time    XR Chest 1 View    Result Date: 3/25/2023  XR CHEST 1 VW Date of Exam: 3/25/2023 3:23 PM EDT Indication: h/o  hepatic hydrothorax. Comparison: CT chest February 9, 2023 Findings: The remains a right pleural effusion which appears small. There is a linear line in the more peripheral  aspect left hemithorax which could reflect soft tissue shadow. The heart looks enlarged. The visualized osseous structures do not appear unusual.     Impression: 1.Small right pleural effusion. 2.Cardiomegaly. Electronically Signed: Gordy Adams  3/25/2023 3:28 PM EDT  Workstation ID: [id number]    CT Guided Paracentesis    Result Date: 3/31/2023  Clinical indication: Recurrent/refractory ascites : Dr. Partha Ribera Procedure: CT-guided paracentesis. Complication: None apparent. Contrast usage: None Estimated blood loss: Negligible Conscious sedation: None Technique: Formal written consent for the procedure was obtained from the patient/family after explaining risks to include bleeding, infection, injury to bowel/adjacent organs, need for additional surgery/procedure.  The patient's right lower quadrant was prepped and draped in the usual, sterile fashion.  Local anesthesia with 1% lidocaine infiltration was achieved.  Utilizing CT guidance, an 8.5 Brazilian drainage catheter was placed into a large pocket of ascites within the right lower quadrant without difficulty.  Approximately 5000 mL of danette-colored ascitic fluid was removed without difficulty.  Specimen was sent for culture/labs, per the referring service.  At the end of the procedure, all catheters were removed and a sterile dressing was applied to the puncture site.  The patient tolerated the procedure well without apparent complication. Impression: Successful CT-guided paracentesis, with 5000 mL of danette-colored ascitic fluid removed without difficulty.  Specimen was sent for culture/labs, per the referring service.    XR Abdomen KUB    Result Date: 3/25/2023  EXAMINATION: XR ABDOMEN KUB DATE: 3/25/2023 9:06 PM SLOT:  60  HISTORY: NG placement COMPARISON: None. FINDINGS/    Nasogastric tube tip projects at the level of the distal esophagus/GE junction, with side-port located in the esophagus. Recommend advancement. Mildly dilated bowel loops in the  upper abdomen. Cardiac silhouette appears mildly enlarged. Small right pleural effusion.   Electronically signed by:  Troy Belcher M.D.  3/25/2023 8:35 PM Mountain Time      Results for orders placed during the hospital encounter of 02/09/23    Duplex Portal Hepatic Complete CAR    Interpretation Summary  DUPLEX PORTAL HEPATIC COMPLETE CAR    Date of Exam: 2/10/2023 10:25 AM EST    Indication: 72-year-old male with hepatic cirrhosis and possible portal vein thrombosis.    Comparison: No comparisons available.    Technique: Color-flow duplex hepatic ultrasonography is accomplished as well as limited evaluation of the left upper quadrant region.    Findings:  The liver demonstrates increased echogenicity and decreased size, consistent with probable cirrhosis. There is a moderate amount of ascites present. The main portal vein is patent with hepatopedal direction blood flow and a velocity of 38 cm/s. The main  portal vein bifurcation along with the right and left portal veins appear to be patent. The right-sided middle hepatic veins are patent with appropriate hepatofugal direction blood flow. The IVC in its proximal portion is patent as is the abdominal aorta  which measures approximate 2.7 cm in AP dimension on sagittal images.  As best can be determined, the superior mesenteric vein appears patent splenic vein is patent in the splenic hilum. The spleen is borderline enlarged at 12.5 cm length. The adjacent right hepatic artery appears patent as do the right and left    Impression  Impression:    1. Patent main portal vein as well as the left and right portal veins with hepatopedal direction blood flow.  2. Patent right and middle hepatic veins.  3. Patent superior mesenteric vein and splenic vein and the splenic hilar region.  4. Coarsened echotexture of the liver, consistent with cirrhosis.  5. Moderate amount of ascites. #6 additional findings as described above in detail.    Electronically Signed: Jameson  Jaja  2/13/2023 9:15 AM EST  Workstation ID: [id number]    Results for orders placed during the hospital encounter of 03/25/23    Adult Transthoracic Echo Complete W/ Cont if Necessary Per Protocol    Interpretation Summary  •  Left ventricular systolic function is normal. Estimated left ventricular EF = 65%  •  The aortic valve is mildly calcified.  There is minimal aortic stenosis present.  •  Moderate pulmonary hypertension is present. Estimated right ventricular systolic pressure from tricuspid regurgitation is moderately elevated (49 mmHg).  •  Moderate dilation of the aortic root is present. Aneurysmal dilation of the ascending aorta is present.  •  There is a right pleural effusion.    Discharge Details        Discharge Medications      New Medications      Instructions Start Date   aspirin 81 MG chewable tablet   81 mg, Oral, Daily      clopidogrel 75 MG tablet  Commonly known as: PLAVIX   75 mg, Oral, Daily      furosemide 20 MG tablet  Commonly known as: LASIX   20 mg, Oral, Daily      oxymetazoline 0.05 % nasal spray  Commonly known as: AFRIN   2 sprays, Nasal, 2 Times Daily PRN      spironolactone 25 MG tablet  Commonly known as: ALDACTONE   25 mg, Oral, Daily         Changes to Medications      Instructions Start Date   lactulose 10 GM/15ML solution  Commonly known as: CHRONULAC  What changed:   · how much to take  · additional instructions  · Another medication with the same name was removed. Continue taking this medication, and follow the directions you see here.   10 g, Oral, 2 Times Daily, GOAL OF 2-3 SOFT BOWEL MOVEMENTS PER DAY. HOLD FOR LOOSE/FREQUENT BOWEL MOVEMENTS      midodrine 10 MG tablet  Commonly known as: PROAMATINE  What changed:   · medication strength  · Another medication with the same name was removed. Continue taking this medication, and follow the directions you see here.   15 mg, Oral, 3 Times Daily Before Meals      SITagliptin 50 MG tablet  Commonly known as: JANUVIA  What  "changed: how much to take   25 mg, Oral, Daily      ursodiol 300 MG capsule  Commonly known as: ACTIGALL  What changed: when to take this   300 mg, Oral, 2 Times Daily         Continue These Medications      Instructions Start Date   diphenhydrAMINE-zinc acetate 2-0.1 % cream   1 application, Topical, As Needed      fluticasone 50 MCG/ACT nasal spray  Commonly known as: FLONASE   2 sprays, Each Nare, Daily      riFAXIMin 550 MG tablet  Commonly known as: XIFAXAN   550 mg, Oral, Every 12 Hours Scheduled         Stop These Medications    ciprofloxacin 500 MG tablet  Commonly known as: CIPRO     Fergon 240 (27 FE) MG tablet  Generic drug: ferrous gluconate     metFORMIN 500 MG tablet  Commonly known as: GLUCOPHAGE     simvastatin 5 MG tablet  Commonly known as: ZOCOR     sodium bicarbonate 650 MG tablet          Allergies   Allergen Reactions   • Contrast Dye (Echo Or Unknown Ct/Mr) Hives     Single hive in substernal/epigastric abdominal area.  Pt. states this rxn has occurred \"the last couple of times I had MRI contrast.   • Gadobutrol Unknown (See Comments)     Discharge Disposition:  Home or Self Care    Diet:  Diet Order   Procedures   • Diet: Regular/House Diet; Texture: Regular Texture (IDDSI 7); Fluid Consistency: Thin (IDDSI 0)     Activity:  Activity Instructions     Activity as Tolerated      Up WIth Assist          CODE STATUS:    Code Status and Medical Interventions:   Ordered at: 04/01/23 1306     Medical Intervention Limits:    NO intubation (DNI)     Level Of Support Discussed With:    Patient     Code Status (Patient has no pulse and is not breathing):    No CPR (Do Not Attempt to Resuscitate)     Medical Interventions (Patient has pulse or is breathing):    Limited Support     Comments:    Full treatment     Release to patient:    Routine Release     No future appointments.    Additional Instructions for the Follow-ups that You Need to Schedule     Ambulatory Referral to Home Health   As directed   "    Face to Face Visit Date: 3/31/2023    Follow-up provider for Plan of Care?: I treated the patient in an acute care facility and will not continue treatment after discharge.    Follow-up provider: SHAKIR DIALLO [[id number]]    Reason/Clinical Findings: ST elevation myocardial infarction involving right coronary artery    Describe mobility limitations that make leaving home difficult: Weakness, impaired physical mobility, impaired functional mobility    Nursing/Therapeutic Services Requested: Skilled Nursing Physical Therapy Occupational Therapy    Skilled nursing orders: Cardiopulmonary assessments Medication education    PT orders: Therapeutic exercise Gait Training Transfer training Strengthening    Weight Bearing Status: As Tolerated    Occupational orders: Activities of daily living Energy conservation Strengthening Home safety assessment             Andree Huddleston PA-C  04/04/23    Time Spent on Discharge:  I spent 40 minutes on this discharge activity which included: face-to-face encounter with the patient, reviewing the data in the system, coordination of the care with the nursing staff as well as consultants, documentation, and entering orders.

## 2023-04-04 NOTE — PRE-SEDATION DOCUMENTATION
[facility]   Vascular Interventional Radiology  History & Physicial    Pertinent information including components of history, physical examination, review of systems, lab and imaging data, and impression and plan from this source was also reviewed for the creation of the following pre-procedural note: Progress Notes by Christian Campos APRN (2023 14:40)         Patient Name:Leoncio Hopson    : 1950    MRN: [id number]    Primary Care Physician: Antonio Castro MD    Referring Physician: Honorio Paul DO     Date of admission: 3/25/2023    Subjective     Reason for Consult: [facility] [address]    History of Present Illness     Leoncio Hopson is a 72 y.o. male referred to IR as noted above.      Active Hospital Problems:  Active Hospital Problems    Diagnosis    • **ST elevation myocardial infarction involving right coronary artery    • Hyperlipidemia LDL goal <70    • Coronary artery disease involving native coronary artery of native heart    • CKD (chronic kidney disease) stage 5, GFR less than 15 ml/min    • Decompensated hepatic cirrhosis    • Coagulopathy    • Liver cirrhosis secondary to HAYES    • Hyperbilirubinemia    • PAOLO (acute kidney injury)        Personal History     Past Medical History:   Diagnosis Date   • Anemia    • Coagulopathy 2023   • Decompensated hepatic cirrhosis 2023   • Diabetes mellitus    • Encephalopathy, hepatic 2023   • Hypertension    • Liver cirrhosis secondary to HAYES    • Liver lesion    • Other ascites 2023   • Thrombocytopenia 2023       Past Surgical History:   Procedure Laterality Date   • ANKLE SURGERY     • CARDIAC CATHETERIZATION N/A 3/25/2023    Procedure: Left Heart Cath;  Surgeon: Nithin Nieto IV, MD;  Location: [facility] INVASIVE LOCATION;  Service: Cardiovascular;  Laterality: N/A;   • CARDIAC CATHETERIZATION  3/25/2023    Procedure: Percutaneous Manual Thrombectomy;  Surgeon: Nithin Nieto IV, MD;   Location:  [facility] CATH INVASIVE LOCATION;  Service: Cardiovascular;;   • CARDIAC CATHETERIZATION N/A 3/25/2023    Procedure: Stent HARDEEP coronary;  Surgeon: Nithin Nieto IV, MD;  Location:  [facility] CATH INVASIVE LOCATION;  Service: Cardiovascular;  Laterality: N/A;   • EYE SURGERY         Family History: His family history includes COPD in his father and mother; Cancer in his father; Diabetes in his father and mother; Heart disease in his father; Rheum arthritis in his mother; Thyroid disease in his mother.     Social History: He  reports that he has never smoked. He has never used smokeless tobacco. He reports that he does not currently use alcohol. He reports that he does not use drugs.    Home Medications:  SITagliptin, aspirin, ciprofloxacin, clopidogrel, diphenhydrAMINE-zinc acetate, ferrous gluconate, fluticasone, lactulose, metFORMIN, midodrine, riFAXIMin, simvastatin, sodium bicarbonate, and ursodiol    Current Medications:  •  aspirin  •  Calcium Replacement - Follow Nurse / BPA Driven Protocol  •  camphor-menthol  •  camphor-menthol  •  ceFAZolin  •  clopidogrel  •  dextrose  •  dextrose  •  fentaNYL citrate (PF)  •  ferrous sulfate  •  First Mouthwash (Magic Mouthwash)  •  First Mouthwash (Magic Mouthwash)  •  fluticasone  •  folic acid  •  furosemide  •  glucagon (human recombinant)  •  heparin (porcine)  •  insulin detemir  •  insulin lispro  •  lactulose  •  Lidocaine HCl gel  •  lidocaine-EPINEPHrine  •  Magnesium Low Dose Replacement - Follow Nurse / BPA Driven Protocol  •  midazolam  •  midodrine  •  oxymetazoline  •  palliative care oral rinse  •  pantoprazole  •  pharmacy consult - MTM  •  Phosphorus Replacement - Follow Nurse / BPA Driven Protocol  •  riFAXIMin  •  sodium chloride  •  sodium chloride  •  sodium chloride  •  spironolactone  •  ursodiol     Allergies:  He is allergic to contrast dye (echo or unknown ct/mr) and gadobutrol.    Review of Systems    IR Procedure pertinent  "significant findings are mentioned in the PMH and PSH above.    Objective     Visit Vitals  /66 (BP Location: Left arm, Patient Position: Lying)   Pulse 89   Temp 98.6 °F (37 °C) (Oral)   Resp 18   Ht 172.7 cm (67.99\")   Wt 63.5 kg (140 lb)   SpO2 95%   BMI 21.29 kg/m²        Physical Exam    A&Ox3.   Able to communicate  No Apparent Distress  Average physique   CVS: Regular rate and rhythm  Respiratory: Non labored breathing. No signs of respiratory compromise.    Result Review      I have personally reviewed the results from the time of this admission to 4/4/2023 09:25 EDT and agree with these findings.  [x]  Laboratory  []  Microbiology  [x]  Radiology  []  EKG/Telemetry   []  Cardiology/Vascular   []  Pathology  []  Old records  []  Other:    Most notable findings include: As noted:    Results from last 7 days   Lab Units 04/03/23  0750 04/02/23  0716 04/01/23  0739 03/31/23  0607 03/30/23  0206 03/29/23  0328   INR   --   --   --  1.91*  --  2.05*   HEMOGLOBIN g/dL 9.3* 8.4* 7.6* 8.2* 8.8* 8.2*   HEMATOCRIT % 27.4* 24.7* 22.6* 23.6* 25.9* 24.7*   PLATELETS 10*3/mm3 50* 46* 36* 46* 52* 44*       Estimated Creatinine Clearance: 17.2 mL/min (A) (by C-G formula based on SCr of 3.38 mg/dL (H)).   Creatinine   Date Value Ref Range Status   04/04/2023 3.38 (H) 0.76 - 1.27 mg/dL Final   04/03/2023 3.55 (H) 0.76 - 1.27 mg/dL Final   04/02/2023 3.93 (H) 0.76 - 1.27 mg/dL Final       COVID19   Date Value Ref Range Status   02/11/2023 Not Detected Not Detected - Ref. Range Final        No results found for: PREGTESTUR, PREGSERUM, HCG, HCGQUANT     ASA SCALE ASSESSMENT (applicable ONLY if sedation planned):  5-Moribund patient not expected to survive 24 hours without an operation     MALLAMPATI CLASSIFICATION (applicable ONLY if sedation planned):  2-Able to visualize the soft palate, fauces, uvula. The anterior & posterior tonsilar pillars are hidden by the tongue.    Assessment / Plan     Leoncio Hopson is a 72 " y.o. male referred to the IR service with above problem.    Plan:   As above.  Ok to proceed with antiplatelet on board due to comorbidities and comfort care intention.    Notice: The note was created before the performance of the procedure. It might have been left in the pending status and signed off after the procedure. The time stamp on the note may be misleading.    Jamar Dickson MD   Vascular Interventional Radiology  04/04/23   9:25 AM EDT

## 2023-04-04 NOTE — PROGRESS NOTES
"   LOS: 10 days    Patient Care Team:  Antonio Castro MD as PCP - General  Antonio Castro MD as PCP - Family Medicine    Chief Complaint: Shortness of breath     72-year-old male with past medical history of [place], liver cirrhosis, prerenal acute kidney failure was last seen a month ago with a creatinine of 5.0 when he was transferred to  for further management for liver transplant.  Dialysis was not started at that time.    Prior to the above baseline creatinine 0.8-1.2.  On previous admission creatinine was 4.7, before discharge. Labs showed a creatinine 3.94, BUN 48, sodium 135, potassium 4.1, CO2 6.0, calcium 9.6, EGFR 15 mL/min, troponin high 53, hemoglobin 10.5, platelets 111 K, lactate high 17.6, phosphorus 3.9 magnesium 1.9, bilirubin 4.6 other liver enzymes were normal.  Subjective :   F/U PAOLO ON CKD.    Interval History:   Stable renal function with some improvement cr 3.3mg/dl Na and k stable.       Review of Systems:   Hard of hearing, abdominal distention, mild shortness of breath, generalized weakness. .  All other negative    Objective     Vital Sign Min/Max for last 24 hours  Temp  Min: 96.9 °F (36.1 °C)  Max: 98.9 °F (37.2 °C)   BP  Min: 99/58  Max: 126/71   Pulse  Min: 83  Max: 114   Resp  Min: 14  Max: 20   SpO2  Min: 91 %  Max: 100 %   Flow (L/min)  Min: 2  Max: 2   Weight  Min: 63.5 kg (140 lb)  Max: 63.5 kg (140 lb)     Flowsheet Rows    Flowsheet Row First Filed Value   Admission Height 172.7 cm (68\") Documented at 03/25/2023 1055   Admission Weight 72.6 kg (160 lb) Documented at 03/25/2023 1055          I/O this shift:  In: 0   Out: 1550 [Urine:450; Drains:1100]  I/O last 3 completed shifts:  In: -   Out: 450 [Urine:450]    Physical Exam:  General appearance: Sick looking  male mild distress laying in bed.    HEENT: Hard of hearing, oral mucosa moist, neck is supple  Lungs: Few rhonchi's and rails heard, equal chest movement, no crepitation.  Heart: Normal S1, S2, no gallop, " murmur, RRR.  Abdomen: Abdomen distended, positive thrill soft, nontender, positive bowel sounds.  Extremities: Positive lower extremity edema, no cyanosis, no joint swelling.  Neuro: Alert, oriented x4, no focal deficit.  Psych: Mood and affect are normal and appropriate.  Skin: Skin is warm and dry.  : No suprapubic fullness or tenderness.    WBC WBC   Date Value Ref Range Status   04/04/2023 8.77 3.40 - 10.80 10*3/mm3 Final   04/03/2023 8.88 3.40 - 10.80 10*3/mm3 Final   04/02/2023 9.07 3.40 - 10.80 10*3/mm3 Final      HGB Hemoglobin   Date Value Ref Range Status   04/04/2023 8.2 (L) 13.0 - 17.7 g/dL Final   04/03/2023 9.3 (L) 13.0 - 17.7 g/dL Final   04/02/2023 8.4 (L) 13.0 - 17.7 g/dL Final      HCT Hematocrit   Date Value Ref Range Status   04/04/2023 24.8 (L) 37.5 - 51.0 % Final   04/03/2023 27.4 (L) 37.5 - 51.0 % Final   04/02/2023 24.7 (L) 37.5 - 51.0 % Final      Platlets No results found for: LABPLAT   MCV MCV   Date Value Ref Range Status   04/04/2023 101.2 (H) 79.0 - 97.0 fL Final   04/03/2023 100.4 (H) 79.0 - 97.0 fL Final   04/02/2023 98.4 (H) 79.0 - 97.0 fL Final          Sodium Sodium   Date Value Ref Range Status   04/04/2023 139 136 - 145 mmol/L Final   04/03/2023 134 (L) 136 - 145 mmol/L Final   04/02/2023 133 (L) 136 - 145 mmol/L Final      Potassium Potassium   Date Value Ref Range Status   04/04/2023 4.4 3.5 - 5.2 mmol/L Final   04/03/2023 2.9 (L) 3.5 - 5.2 mmol/L Final   04/02/2023 3.0 (L) 3.5 - 5.2 mmol/L Final      Chloride Chloride   Date Value Ref Range Status   04/04/2023 100 98 - 107 mmol/L Final   04/03/2023 93 (L) 98 - 107 mmol/L Final   04/02/2023 93 (L) 98 - 107 mmol/L Final      CO2 CO2   Date Value Ref Range Status   04/04/2023 25.0 22.0 - 29.0 mmol/L Final   04/03/2023 25.0 22.0 - 29.0 mmol/L Final   04/02/2023 24.0 22.0 - 29.0 mmol/L Final      BUN BUN   Date Value Ref Range Status   04/04/2023 46 (H) 8 - 23 mg/dL Final   04/03/2023 49 (H) 8 - 23 mg/dL Final   04/02/2023 52  (H) 8 - 23 mg/dL Final      Creatinine Creatinine   Date Value Ref Range Status   04/04/2023 3.38 (H) 0.76 - 1.27 mg/dL Final   04/03/2023 3.55 (H) 0.76 - 1.27 mg/dL Final   04/02/2023 3.93 (H) 0.76 - 1.27 mg/dL Final      Calcium Calcium   Date Value Ref Range Status   04/04/2023 8.2 (L) 8.6 - 10.5 mg/dL Final   04/03/2023 8.3 (L) 8.6 - 10.5 mg/dL Final   04/02/2023 8.0 (L) 8.6 - 10.5 mg/dL Final      PO4 No results found for: CAPO4   Albumin Albumin   Date Value Ref Range Status   04/04/2023 2.8 (L) 3.5 - 5.2 g/dL Final   04/03/2023 3.1 (L) 3.5 - 5.2 g/dL Final   04/02/2023 2.4 (L) 3.5 - 5.2 g/dL Final      Magnesium No results found for: MG   Uric Acid No results found for: URICACID        Results Review:     I reviewed the patient's new clinical results.    aspirin, 81 mg, Oral, Daily  camphor-menthol, , Topical, Q8H  clopidogrel, 75 mg, Oral, Daily  ferrous sulfate, 325 mg, Oral, Daily With Breakfast  First Mouthwash (Magic Mouthwash), 5 mL, Swish & Spit, 4x Daily AC & at Bedtime  fluticasone, 2 spray, Each Nare, Daily  folic acid, 400 mcg, Oral, Daily  furosemide, 20 mg, Oral, Daily  heparin (porcine), , ,   insulin detemir, 5 Units, Subcutaneous, Nightly  insulin lispro, 0-9 Units, Subcutaneous, 4x Daily With Meals & Nightly  lactulose, 20 g, Oral, Daily  midazolam, , ,   midodrine, 15 mg, Oral, TID AC  oxymetazoline, 2 spray, Each Nare, BID  pantoprazole, 40 mg, Intravenous, Q AM  pharmacy consult - MTM, , Does not apply, Daily  riFAXIMin, 550 mg, Oral, Q12H  sodium chloride, 10 mL, Intravenous, Q12H  spironolactone, 25 mg, Oral, Daily  ursodiol, 300 mg, Oral, BID           Medication Review: Reviewed    Assessment & Plan       ST elevation myocardial infarction involving right coronary artery    PAOLO (acute kidney injury)    Liver cirrhosis secondary to HAYES    Hyperbilirubinemia    Coagulopathy    Decompensated hepatic cirrhosis    CKD (chronic kidney disease) stage 5, GFR less than 15 ml/min     Hyperlipidemia LDL goal <70    Coronary artery disease involving native coronary artery of native heart    1.  Acute kidney injury: Patient was last seen a month ago with creatinine 4.5- 5.0 range.  Prior to that baseline was 0.8-1.2, likely diagnosis was hepatorenal versus ATN.  At that time patient was transferred to  for further management.  Patient has been admitted and underwent a cardiac catheterization received 80 mL of IV contrast.   2.  S/p right RCA stenting for the clot 3/25/2023 today  3.  HAYES: Decompensated liver cirrhosis.    4.  Hypotension: In the setting of liver disease. Improved BP high now  5.  Anemia  6.  Thrombocytopenia   7.  Thoracentesis: 3 weeks back  8.  Paracentesis: On 3/17/2023 at [facility].   9   Hypokalemia    Recommendations:  So far patient has not demonstrated meaningful renal recovery since last admission in Feb. Lately some downtrend is seen in cr. Niurka any uremic symptoms. Therefore no indication of dialysis. However he is at risk for needing dialysis in near future. Family deciding for home hospice  .Continue spironolactone 25 mg daily.  lasix to 20 mg daily  - Will need close outpatient f/u in renal clinic. If renal function stable by tomorrow can be discharge with close followup.   - Hold midodrine if sbp>130         Sergei العلي MD  04/04/23  15:19 EDT

## 2023-04-04 NOTE — PROGRESS NOTES
Continued Stay Note  [facility]     Patient Name: Leoncio Hopson  MRN: [id number]  Today's Date: 4/4/2023    Admit Date: 3/25/2023    Plan: Home with [facility]   Discharge Plan     Row Name 04/04/23 1221       Plan    Plan Home with [facility]    Final Discharge Disposition Code 50 - home with hospice    Final Note call made to pt daughter, Nica, at [phone].  Nica confirms plan for pt to dc home with hospice services once drain has been placed.  Nica reports no questions or concerns r/t hospice currently.  Reiterated need to call hospice 24h number once pt arrives home to be admitted to hospice services, daughter verbalized understanding.  Will send DC summary to hospice intake when available.  Message sent to bedside RN requesting IJ be pulled, and some drain supplies be sent home.    Row Name 04/04/23 1146       Plan    Plan Home with Hospice    Patient/Family in Agreement with Plan yes    Plan Comments Discharge orders have been placed.  Per Hospice liaison, patient has opted to discharge home with hospice services after having drain placed today with family to transport.    Final Discharge Disposition Code 50 - home with hospice    Final Note Discharge orders have been placed.  Per Hospice liaison, patient has opted to discharge home with hospice services after having drain placed today with family to transport.               Discharge Codes    No documentation.               Expected Discharge Date and Time     Expected Discharge Date Expected Discharge Time    Apr 4, 2023             Yin Rodriguez RN

## 2023-04-04 NOTE — NURSING NOTE
Image guided Aspira peritoneal drain placed by Dr Dickson. 15.5 [place]  Labs obtained. Sedation time of x minutes, 1 mg Versed and 50 mcg Fentanyl given. Ancef 1 gm IV given.Total sedation time of 19 minutes. Patient tolerated well. Report called to nurse.

## 2023-04-04 NOTE — PLAN OF CARE
Goal Outcome Evaluation:         Denies pain. RA. NSR/ Sinus Tachycardia. Abdomen firm, rounded, but nontender. Sarna lotion administered for itching. NPO at 0000 in anticipation for intraperitoneal catheter placement. Per CM note, plan is to DC home after via car with hospice services.

## 2023-04-04 NOTE — OUTREACH NOTE
Prep Survey    Flowsheet Row Responses   [facility] patient discharged from? [facility]   Is LACE score < 7 ? No   Eligibility Not Eligible   What are the reasons patient is not eligible? Hospice/Pallative Care   Does the patient have one of the following disease processes/diagnoses(primary or secondary)? Other   Prep survey completed? Yes          Molly CUNNINGHAM - Registered Nurse

## 2023-04-04 NOTE — CASE MANAGEMENT/SOCIAL WORK
Case Management Discharge Note      Final Note: Discharge orders have been placed.  Per Hospice liaison, patient has opted to discharge home with hospice services after having drain placed today with family to transport.         Selected Continued Care - Admitted Since 3/25/2023     Destination    No services have been selected for the patient.              Durable Medical Equipment Coordination complete.    Service Provider Selected Services Address Phone Fax Patient Preferred    [facility] [address] [id number] [phone] --          Dialysis/Infusion    No services have been selected for the patient.              Home Medical Care Coordination complete.    Service Provider Selected Services Address Phone Fax Patient Preferred    [facility] [address], [facility] [address] [facility] [address] [phone] --          Therapy    No services have been selected for the patient.              Community Resources    No services have been selected for the patient.              Community & DME    No services have been selected for the patient.                Selected Continued Care - Prior Encounters Includes continued care and service providers with selected services from prior encounters from 12/25/2022 to 4/4/2023    Discharged on 2/14/2023 Admission date: 2/9/2023 - Discharge disposition: [facility] (DC - External)    Destination     Service Provider Selected Services Address Phone Fax Patient Preferred    [facility] [address] [address] [phone] -- --                         Final Discharge Disposition Code: 50 - home with hospice

## 2023-04-09 LAB — BACTERIA FLD CULT: NORMAL

## 2023-05-08 LAB
QT INTERVAL: 292 MS
QTC INTERVAL: 405 MS

## (undated) DEVICE — Device: Brand: ASAHI SION BLUE

## (undated) DEVICE — GUIDE CATHETER: Brand: MACH1™

## (undated) DEVICE — COPILOT BLEEDBACK CONTROL VALVE: Brand: COPILOT

## (undated) DEVICE — DEV INFL MONARCH 25W

## (undated) DEVICE — TREK CORONARY DILATATION CATHETER 2.50 MM X 15 MM / RAPID-EXCHANGE: Brand: TREK

## (undated) DEVICE — NC TREK CORONARY DILATATION CATHETER 4.0 MM X 15 MM / RAPID-EXCHANGE: Brand: NC TREK

## (undated) DEVICE — CATH DIAG EXPO M/ PK 6FR FL4/FR4 PIG 3PK

## (undated) DEVICE — CATH ASPIR EXPORT AP .014IN 6F140CM

## (undated) DEVICE — CVR PROB ULTRASND/TRANSD W/GEL 7X11IN STRL

## (undated) DEVICE — CATH DIAG EXPO .056 FL3.5 6F 100CM

## (undated) DEVICE — PK CATH CARD 10

## (undated) DEVICE — GW INQWIRE FC PTFE STD J/1.5 .035 260

## (undated) DEVICE — ADULT, W/LG. BACK PAD, RADIOTRANSPARENT ELEMENT AND LEAD WIRE: Brand: DEFIBRILLATION ELECTRODES

## (undated) DEVICE — DEV COMP RAD PRELUDESYNC 24CM

## (undated) DEVICE — MODEL BT2000 P/N 700287-012KIT CONTENTS: MANIFOLD WITH SALINE AND CONTRAST PORTS, SALINE TUBING WITH SPIKE AND HAND SYRINGE, TRANSDUCER: Brand: BT2000 AUTOMATED MANIFOLD KIT

## (undated) DEVICE — MODEL AT P65, P/N 701554-001KIT CONTENTS: HAND CONTROLLER, 3-WAY HIGH-PRESSURE STOPCOCK WITH ROTATING END AND PREMIUM HIGH-PRESSURE TUBING: Brand: ANGIOTOUCH® KIT

## (undated) DEVICE — ST INF PRI SMRTSTE 20DRP 2VLV 24ML 117

## (undated) DEVICE — NDL ANGIOGR ADV THN SMOTH SGLWALL 21G 1.5

## (undated) DEVICE — GLIDESHEATH BASIC HYDROPHILIC COATED INTRODUCER SHEATH: Brand: GLIDESHEATH